# Patient Record
Sex: MALE | Race: ASIAN | NOT HISPANIC OR LATINO | Employment: OTHER | URBAN - METROPOLITAN AREA
[De-identification: names, ages, dates, MRNs, and addresses within clinical notes are randomized per-mention and may not be internally consistent; named-entity substitution may affect disease eponyms.]

---

## 2018-12-10 ENCOUNTER — TELEPHONE (OUTPATIENT)
Dept: ONCOLOGY | Facility: CLINIC | Age: 58
End: 2018-12-10

## 2018-12-10 NOTE — TELEPHONE ENCOUNTER
ONCOLOGY INTAKE: Records Information      APPT INFORMATION:  Referring provider:  self, referred   Referring provider s clinic:    Reason for visit/diagnosis:      Were the records received with the referral (via [employer])?     Has patient been seen for any external appt for this diagnosis (enter clinic/location)?

## 2019-01-30 NOTE — TELEPHONE ENCOUNTER
RECORDS STATUS - BREAST    RECORDS RECEIVED FROM: LOC&ALL   DATE RECEIVED: Care Everywhere   NOTES STATUS DETAILS   OFFICE NOTE from referring provider none Self referred   OFFICE NOTE from medical oncologist Received CE   OFFICE NOTE from surgeon     OFFICE NOTE from radiation oncologist Received CE   DISCHARGE SUMMARY from hospital     DISCHARGE REPORT from the      OPERATIVE REPORT     MEDICATION LIST Received [facility]    CLINICAL TRIAL TREATMENTS TO DATE     LABS Care Everywhere    PATHOLOGY REPORTS  (Tissue diagnosis, Stage, ER/PA percentage positive and intensity of staining, HER2 IHC, FISH, and all biopsies from breast and any distant metastasis)                 Care Everywhere    GENONOMIC TESTING     TYPE:   (Next Generation Sequencing, including Foundation One testing, and Oncotype score)     IMAGING (NEED IMAGES & REPORT)     CT SCANS Receieved PACS/[facility]   MRI     MAMMO     ULTRASOUND Received PACS/Epic   PET     BONE SCAN     BRAIN MRI

## 2019-01-31 ENCOUNTER — PRE VISIT (OUTPATIENT)
Dept: ONCOLOGY | Facility: CLINIC | Age: 59
End: 2019-01-31

## 2019-01-31 ENCOUNTER — ONCOLOGY VISIT (OUTPATIENT)
Dept: ONCOLOGY | Facility: CLINIC | Age: 59
End: 2019-01-31
Attending: INTERNAL MEDICINE
Payer: COMMERCIAL

## 2019-01-31 VITALS
HEIGHT: 66 IN | TEMPERATURE: 97.7 F | HEART RATE: 107 BPM | BODY MASS INDEX: 24.94 KG/M2 | WEIGHT: 155.2 LBS | OXYGEN SATURATION: 97 % | SYSTOLIC BLOOD PRESSURE: 179 MMHG | DIASTOLIC BLOOD PRESSURE: 99 MMHG

## 2019-01-31 DIAGNOSIS — D45 POLYCYTHEMIA VERA (H): ICD-10-CM

## 2019-01-31 DIAGNOSIS — D45 POLYCYTHEMIA VERA (H): Primary | ICD-10-CM

## 2019-01-31 LAB
ANISOCYTOSIS BLD QL SMEAR: SLIGHT
BASOPHILS # BLD AUTO: 0.3 10E9/L (ref 0–0.2)
BASOPHILS NFR BLD AUTO: 3.6 %
DIFFERENTIAL METHOD BLD: ABNORMAL
ELLIPTOCYTES BLD QL SMEAR: SLIGHT
EOSINOPHIL # BLD AUTO: 0.5 10E9/L (ref 0–0.7)
EOSINOPHIL NFR BLD AUTO: 6.2 %
ERYTHROCYTE [DISTWIDTH] IN BLOOD BY AUTOMATED COUNT: 18.6 % (ref 10–15)
HCT VFR BLD AUTO: 49.2 % (ref 40–53)
HGB BLD-MCNC: 14.1 G/DL (ref 13.3–17.7)
LYMPHOCYTES # BLD AUTO: 0.9 10E9/L (ref 0.8–5.3)
LYMPHOCYTES NFR BLD AUTO: 9.7 %
MCH RBC QN AUTO: 20.3 PG (ref 26.5–33)
MCHC RBC AUTO-ENTMCNC: 28.7 G/DL (ref 31.5–36.5)
MCV RBC AUTO: 71 FL (ref 78–100)
MICROCYTES BLD QL SMEAR: PRESENT
MONOCYTES # BLD AUTO: 0.5 10E9/L (ref 0–1.3)
MONOCYTES NFR BLD AUTO: 5.3 %
MYELOCYTES # BLD: 0.1 10E9/L
MYELOCYTES NFR BLD MANUAL: 0.9 %
NEUTROPHILS # BLD AUTO: 6.5 10E9/L (ref 1.6–8.3)
NEUTROPHILS NFR BLD AUTO: 74.3 %
PLATELET # BLD AUTO: 371 10E9/L (ref 150–450)
PLATELET # BLD EST: ABNORMAL 10*3/UL
POIKILOCYTOSIS BLD QL SMEAR: SLIGHT
POLYCHROMASIA BLD QL SMEAR: SLIGHT
RBC # BLD AUTO: 6.94 10E12/L (ref 4.4–5.9)
WBC # BLD AUTO: 8.8 10E9/L (ref 4–11)

## 2019-01-31 PROCEDURE — 85025 COMPLETE CBC W/AUTO DIFF WBC: CPT | Performed by: INTERNAL MEDICINE

## 2019-01-31 PROCEDURE — 36415 COLL VENOUS BLD VENIPUNCTURE: CPT | Performed by: INTERNAL MEDICINE

## 2019-01-31 PROCEDURE — G0463 HOSPITAL OUTPT CLINIC VISIT: HCPCS | Mod: ZF

## 2019-01-31 PROCEDURE — 99204 OFFICE O/P NEW MOD 45 MIN: CPT | Mod: GC | Performed by: INTERNAL MEDICINE

## 2019-01-31 RX ORDER — HYDROXYUREA 500 MG/1
500 CAPSULE ORAL
COMMUNITY
Start: 2018-08-10 | End: 2019-02-02

## 2019-01-31 RX ORDER — LORATADINE 10 MG/1
10 TABLET ORAL PRN
Status: ON HOLD | COMMUNITY
End: 2024-01-01

## 2019-01-31 RX ORDER — IBUPROFEN 400 MG/1
200 TABLET, FILM COATED ORAL EVERY 6 HOURS PRN
COMMUNITY
End: 2024-01-01

## 2019-01-31 RX ORDER — OMEGA-3 FATTY ACIDS/FISH OIL 300-1000MG
CAPSULE ORAL
COMMUNITY
End: 2024-01-01

## 2019-01-31 SDOH — HEALTH STABILITY: MENTAL HEALTH: HOW OFTEN DO YOU HAVE 6 OR MORE DRINKS ON ONE OCCASION?: LESS THAN MONTHLY

## 2019-01-31 SDOH — HEALTH STABILITY: MENTAL HEALTH: HOW MANY STANDARD DRINKS CONTAINING ALCOHOL DO YOU HAVE ON A TYPICAL DAY?: 1 OR 2

## 2019-01-31 SDOH — HEALTH STABILITY: MENTAL HEALTH: HOW OFTEN DO YOU HAVE A DRINK CONTAINING ALCOHOL?: 2-3 TIMES A WEEK

## 2019-01-31 ASSESSMENT — MIFFLIN-ST. JEOR: SCORE: 1470.24

## 2019-01-31 ASSESSMENT — PAIN SCALES - GENERAL: PAINLEVEL: NO PAIN (0)

## 2019-01-31 NOTE — LETTER
2019       RE: Zaheer Borges  [address] [place] [id number]     Dear Colleague,    Thank you for referring your patient, Zaheer Borges, to the [facility]CER [facility]IC. Please see a copy of my visit note below.    RE: Zaheer Borges : 1960   MRN: [id number] GILMER: 2019     HEMATOLOGY -- NEW PATIENT VISIT NOTE       PROBLEM: Polycythemia vera     was seen for initial consultation on 2019 at the request of Dr. Sandoval    HISTORY OF PRESENT ILLNESS:   Mr. Borges is a 58 year old male with a history of polycythemia vera diagnosed in . He has been managed by Dr. Julianne Sandoval at [facility] since that time. He has been managed with serial phlebotomy approximately every 2 months and he has not had much variation in his need for phlebotomy. He states that he typically has 500 ml removed each time and he does not require volume resuscitation or slow blood removal. He more recently had hydrea added about 6 months ago due to elevated platelets of around 1,000,000. He was initially started on 1000 mg daily but this dropped his platelets below 100,000 and the decision was made to decrease his hydrea to 500 mg daily. He has been on this dose for about the past 5 months and reports that his platelets have been in the normal range. While on 1000 mg hydrea, he did not require phlebotomy. However, after dropping his dose to 500 mg, he again requires phlebotomy every 2 months. Aside from his need for phlebotomy and medication, his disease has not significantly impacted his overall well-being. He reports some shortness of breath with activity around the time he needs phlebotomy. He also has some pruritis symptoms but these are not particularly significant at the moment.  He reports more severe itching last winter. He tolerates the hydrea well and has not noticed any nausea, vomiting, or diarrhea. No skin changes or chest pains. A complete ROS was otherwise unremarkable.     PAST MEDICAL HISTORY:   No past  "medical history on file.    No past surgical history on file.     MEDICATIONS:   Current Outpatient Medications   Medication Sig Dispense Refill     cetirizine HCl (ZYRTEC ALLERGY) 10 MG CAPS        hydroxyurea (HYDREA) 500 MG capsule CHEMO Take 500 mg by mouth       ibuprofen (ADVIL/MOTRIN) 400 MG tablet Take 200 mg by mouth every 6 hours as needed for moderate pain       loratadine (CLARITIN) 10 MG tablet Take 10 mg by mouth       Multiple Vitamins-Minerals (MULTIVITAMIN ADULT PO)        omega 3 1000 MG CAPS          ALLERGIES:  is allergic to sulfa drugs    SOCIAL HISTORY: Lives in [place].  with two teenage children. Non smoker. Rare EtOH. 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    FAMILY HISTORY:   No family history of blood disorders    REVIEW OF SYMPTOMS:  A full 14-point review of systems was performed.     PHYSICAL EXAMINATION:    BP (!) 179/99   Pulse 107   Temp 97.7  F (36.5  C) (Oral)   Ht 1.674 m (5' 5.91\")   Wt 70.4 kg (155 lb 3.2 oz)   SpO2 97%   BMI 25.12 kg/m     General: Alert, oriented, NAD  Skin: No rashes or lesions appreciated  HEENT: NCAT, EOMI  Neck: Supple, full ROM, no cervical LAD  Breasts: Deferred  Heart: WWP, RRR  Lungs: Breathing comfortably on RA, CTAB  Abd: Nondistended  /Rectal: Deferred  Ext: Atraumatic, grossly intact strength  Neuro: CNs grossly intact, normal gait    LABORATORY DATA: Pending    ASSESSMENT AND PLAN: 58 year old male with polycythemia vera, well controlled with hydroxyurea and serial phlebotomy.     We discussed that given his stability on his current regimen of phlebotomy every 2 months with hydrea 500 mg daily, we are inclined to continue this regimen. His primary reason for coming to us is due to an insurance change and he was otherwise satisfied with his care previously. He previously had a standing order for labs and then would call to schedule phlebotomy appointments and he would like to " continue to manage his disease in this way. We agree that this is an acceptable plan given his longstanding history with this set up. He understands that our target hematocrit is <45% and he should undergo phlebotomy when he exceeds this. We will also plan to continue hydrea 500 mg daily assuming his blood counts remain stable. Follow up will be every 6 months for now or sooner if he has any concerns. His last phlebotomy was in early December and he will be due soon. We will check his counts today and schedule phlebotomy accordingly. Finally, we discussed that we would recommend restarting ASA 81 mg to lower thrombotic risk. He previously had taken this but stopped it when he developed some stomach discomfort. He is willing to try it again and will monitor for gastric symptoms.         The patient was seen and discussed with staff, Dr. Peterson.     James Marcano MD  Resident, PGY-4  Department of Radiation Oncology  [facility]  [phone]      HEMATOLOGY STAFF:  Seen with resident, whose note reflects our joint evaluation, assessment, and plan.    Escobar Peterson MD  Associate Professor of Medicine  Division of Hematology, Oncology, and Transplantation  Director, [facility]                         Again, thank you for allowing me to participate in the care of your patient.      Sincerely,    Escobar Peterson MD

## 2019-01-31 NOTE — NURSING NOTE
"Oncology Rooming Note    January 31, 2019 11:21 AM   Zaheer Borges is a 58 year old male who presents for:    Chief Complaint   Patient presents with     Oncology Clinic Visit     NEW; Polycythemia     Initial Vitals: BP (!) 179/99   Pulse 107   Temp 97.7  F (36.5  C) (Oral)   Ht 1.674 m (5' 5.91\")   Wt 70.4 kg (155 lb 3.2 oz)   SpO2 97%   BMI 25.12 kg/m   Estimated body mass index is 25.12 kg/m  as calculated from the following:    Height as of this encounter: 1.674 m (5' 5.91\").    Weight as of this encounter: 70.4 kg (155 lb 3.2 oz). Body surface area is 1.81 meters squared.  No Pain (0) Comment: Data Unavailable   No LMP for male patient.  Allergies reviewed: Yes  Medications reviewed: Yes    Medications: Medication refills not needed today.  [facility] into [employer] [address] [place] [address]  AT [facility] [address]    Clinical concerns: No New Concerns Nicholas was NOT notified.    8 minutes for nursing intake (face to face time)     Dalia Prieto MA              "

## 2019-01-31 NOTE — PROGRESS NOTES
RE: Zaheer Borges : 1960   MRN: [id number] GILMER: 2019     HEMATOLOGY -- NEW PATIENT VISIT NOTE       PROBLEM: Polycythemia vera     was seen for initial consultation on 2019 at the request of Dr. Sandoval    HISTORY OF PRESENT ILLNESS:   Mr. Borges is a 58 year old male with a history of polycythemia vera diagnosed in . He has been managed by Dr. Julianne Sandoval at [facility] since that time. He has been managed with serial phlebotomy approximately every 2 months and he has not had much variation in his need for phlebotomy. He states that he typically has 500 ml removed each time and he does not require volume resuscitation or slow blood removal. He more recently had hydrea added about 6 months ago due to elevated platelets of around 1,000,000. He was initially started on 1000 mg daily but this dropped his platelets below 100,000 and the decision was made to decrease his hydrea to 500 mg daily. He has been on this dose for about the past 5 months and reports that his platelets have been in the normal range. While on 1000 mg hydrea, he did not require phlebotomy. However, after dropping his dose to 500 mg, he again requires phlebotomy every 2 months. Aside from his need for phlebotomy and medication, his disease has not significantly impacted his overall well-being. He reports some shortness of breath with activity around the time he needs phlebotomy. He also has some pruritis symptoms but these are not particularly significant at the moment.  He reports more severe itching last winter. He tolerates the hydrea well and has not noticed any nausea, vomiting, or diarrhea. No skin changes or chest pains. A complete ROS was otherwise unremarkable.     PAST MEDICAL HISTORY:   No past medical history on file.    No past surgical history on file.     MEDICATIONS:   Current Outpatient Medications   Medication Sig Dispense Refill     cetirizine HCl (ZYRTEC ALLERGY) 10 MG CAPS        hydroxyurea (HYDREA)  "500 MG capsule CHEMO Take 500 mg by mouth       ibuprofen (ADVIL/MOTRIN) 400 MG tablet Take 200 mg by mouth every 6 hours as needed for moderate pain       loratadine (CLARITIN) 10 MG tablet Take 10 mg by mouth       Multiple Vitamins-Minerals (MULTIVITAMIN ADULT PO)        omega 3 1000 MG CAPS          ALLERGIES:  is allergic to sulfa drugs    SOCIAL HISTORY: Lives in [place].  with two teenage children. Non smoker. Rare EtOH. 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    FAMILY HISTORY:   No family history of blood disorders    REVIEW OF SYMPTOMS:  A full 14-point review of systems was performed.     PHYSICAL EXAMINATION:    BP (!) 179/99   Pulse 107   Temp 97.7  F (36.5  C) (Oral)   Ht 1.674 m (5' 5.91\")   Wt 70.4 kg (155 lb 3.2 oz)   SpO2 97%   BMI 25.12 kg/m    General: Alert, oriented, NAD  Skin: No rashes or lesions appreciated  HEENT: NCAT, EOMI  Neck: Supple, full ROM, no cervical LAD  Breasts: Deferred  Heart: WWP, RRR  Lungs: Breathing comfortably on RA, CTAB  Abd: Nondistended  /Rectal: Deferred  Ext: Atraumatic, grossly intact strength  Neuro: CNs grossly intact, normal gait    LABORATORY DATA: Pending    ASSESSMENT AND PLAN: 58 year old male with polycythemia vera, well controlled with hydroxyurea and serial phlebotomy.     We discussed that given his stability on his current regimen of phlebotomy every 2 months with hydrea 500 mg daily, we are inclined to continue this regimen. His primary reason for coming to us is due to an insurance change and he was otherwise satisfied with his care previously. He previously had a standing order for labs and then would call to schedule phlebotomy appointments and he would like to continue to manage his disease in this way. We agree that this is an acceptable plan given his longstanding history with this set up. He understands that our target hematocrit is <45% and he should undergo phlebotomy when " he exceeds this. We will also plan to continue hydrea 500 mg daily assuming his blood counts remain stable. Follow up will be every 6 months for now or sooner if he has any concerns. His last phlebotomy was in early December and he will be due soon. We will check his counts today and schedule phlebotomy accordingly. Finally, we discussed that we would recommend restarting ASA 81 mg to lower thrombotic risk. He previously had taken this but stopped it when he developed some stomach discomfort. He is willing to try it again and will monitor for gastric symptoms.         The patient was seen and discussed with staff, Dr. Peterson.     James Marcano MD  Resident, PGY-4  Department of Radiation Oncology  [facility]  [phone]      HEMATOLOGY STAFF:  Seen with resident, whose note reflects our joint evaluation, assessment, and plan.    Escobar Peterson MD  Associate Professor of Medicine  Division of Hematology, Oncology, and Transplantation  Director, [facility] Ble[facility] Clotting [facility]

## 2019-01-31 NOTE — LETTER
Date:February 4, 2019      Patient was self referred, no letter generated. Do not send.        [facility] Information

## 2019-02-01 ENCOUNTER — INFUSION THERAPY VISIT (OUTPATIENT)
Dept: INFUSION THERAPY | Facility: CLINIC | Age: 59
End: 2019-02-01
Attending: INTERNAL MEDICINE
Payer: COMMERCIAL

## 2019-02-01 VITALS — SYSTOLIC BLOOD PRESSURE: 150 MMHG | DIASTOLIC BLOOD PRESSURE: 96 MMHG | HEART RATE: 84 BPM

## 2019-02-01 DIAGNOSIS — D45 POLYCYTHEMIA VERA (H): Primary | ICD-10-CM

## 2019-02-01 PROCEDURE — 99195 PHLEBOTOMY: CPT

## 2019-02-01 NOTE — PROGRESS NOTES
Infusion Nursing Note:  Zaheer Borges presents today for therapeutic phlebotomy.    Patient seen by provider today: No   present during visit today: Not Applicable.    Note: 500 ml blood removed from pt's right AC without difficulty. Denied dizziness following phleb. Was given 240 ml juice.     Intravenous Access:  Phlebotomy set used.    Treatment Conditions:  Results reviewed, labs MET treatment parameters, ok to proceed with treatment.      Post Infusion Assessment:  Patient tolerated procedure without incident.    Discharge Plan:   Patient discharged in stable condition accompanied by: self.  Departure Mode: Ambulatory.    Bianca Tee RN

## 2019-02-02 RX ORDER — HYDROXYUREA 500 MG/1
500 CAPSULE ORAL DAILY
Qty: 90 CAPSULE | Refills: 3 | Status: SHIPPED | OUTPATIENT
Start: 2019-02-02 | End: 2019-11-06

## 2019-02-21 ENCOUNTER — MYC MEDICAL ADVICE (OUTPATIENT)
Dept: ONCOLOGY | Facility: CLINIC | Age: 59
End: 2019-02-21

## 2019-03-01 DIAGNOSIS — D45 POLYCYTHEMIA VERA (H): ICD-10-CM

## 2019-03-01 LAB
ANISOCYTOSIS BLD QL SMEAR: ABNORMAL
BASOPHILS # BLD AUTO: 0.7 10E9/L (ref 0–0.2)
BASOPHILS NFR BLD AUTO: 8.8 %
DACRYOCYTES BLD QL SMEAR: SLIGHT
DIFFERENTIAL METHOD BLD: ABNORMAL
ELLIPTOCYTES BLD QL SMEAR: SLIGHT
EOSINOPHIL # BLD AUTO: 0.4 10E9/L (ref 0–0.7)
EOSINOPHIL NFR BLD AUTO: 5.3 %
ERYTHROCYTE [DISTWIDTH] IN BLOOD BY AUTOMATED COUNT: 18.3 % (ref 10–15)
HCT VFR BLD AUTO: 46.7 % (ref 40–53)
HGB BLD-MCNC: 13 G/DL (ref 13.3–17.7)
LYMPHOCYTES # BLD AUTO: 1.3 10E9/L (ref 0.8–5.3)
LYMPHOCYTES NFR BLD AUTO: 16.8 %
MCH RBC QN AUTO: 19.9 PG (ref 26.5–33)
MCHC RBC AUTO-ENTMCNC: 27.8 G/DL (ref 31.5–36.5)
MCV RBC AUTO: 72 FL (ref 78–100)
MICROCYTES BLD QL SMEAR: PRESENT
MONOCYTES # BLD AUTO: 0.1 10E9/L (ref 0–1.3)
MONOCYTES NFR BLD AUTO: 1.8 %
NEUTROPHILS # BLD AUTO: 5.2 10E9/L (ref 1.6–8.3)
NEUTROPHILS NFR BLD AUTO: 67.3 %
OVALOCYTES BLD QL SMEAR: SLIGHT
PLATELET # BLD AUTO: 459 10E9/L (ref 150–450)
PLATELET # BLD EST: ABNORMAL 10*3/UL
POIKILOCYTOSIS BLD QL SMEAR: SLIGHT
RBC # BLD AUTO: 6.53 10E12/L (ref 4.4–5.9)
STOMATOCYTES BLD QL SMEAR: SLIGHT
WBC # BLD AUTO: 7.8 10E9/L (ref 4–11)

## 2019-03-04 ENCOUNTER — CARE COORDINATION (OUTPATIENT)
Dept: HEMATOLOGY | Facility: CLINIC | Age: 59
End: 2019-03-04

## 2019-03-04 DIAGNOSIS — D45 POLYCYTHEMIA VERA (H): Primary | ICD-10-CM

## 2019-03-04 NOTE — PROGRESS NOTES
Received phone call from patient, along with Vito, and a staff message to update phlebotomy orders.  Previous orders was entered as a one time, and Dr. Peterson intended it to be a standing order.  Orders updated.  Patient is planning to have phlebotomy soon, and is planning to travel soon as well.

## 2019-03-05 ENCOUNTER — INFUSION THERAPY VISIT (OUTPATIENT)
Dept: INFUSION THERAPY | Facility: CLINIC | Age: 59
End: 2019-03-05
Attending: INTERNAL MEDICINE
Payer: COMMERCIAL

## 2019-03-05 VITALS
RESPIRATION RATE: 18 BRPM | SYSTOLIC BLOOD PRESSURE: 135 MMHG | HEART RATE: 91 BPM | TEMPERATURE: 98.1 F | DIASTOLIC BLOOD PRESSURE: 95 MMHG

## 2019-03-05 DIAGNOSIS — D45 POLYCYTHEMIA VERA (H): Primary | ICD-10-CM

## 2019-03-05 PROCEDURE — 99195 PHLEBOTOMY: CPT

## 2019-03-05 ASSESSMENT — PAIN SCALES - GENERAL: PAINLEVEL: NO PAIN (0)

## 2019-03-05 NOTE — PROGRESS NOTES
Here for therapeutic phlebotomy, which he states he has been doing for many years.  Recent hematocrit 46.3.  Basilic vein on right arm accessed on first attempt with #16 jelco.  Derrick 500 ml blood obtained over approximately 15 minutes.    Patient consumed 11 oz juice prior to being discharged in stable condition.

## 2019-04-23 DIAGNOSIS — D45 POLYCYTHEMIA VERA (H): ICD-10-CM

## 2019-04-23 LAB
ANISOCYTOSIS BLD QL SMEAR: ABNORMAL
BASOPHILS # BLD AUTO: 0.4 10E9/L (ref 0–0.2)
BASOPHILS NFR BLD AUTO: 4.4 %
DIFFERENTIAL METHOD BLD: ABNORMAL
EOSINOPHIL # BLD AUTO: 0.4 10E9/L (ref 0–0.7)
EOSINOPHIL NFR BLD AUTO: 4.4 %
ERYTHROCYTE [DISTWIDTH] IN BLOOD BY AUTOMATED COUNT: 18.2 % (ref 10–15)
HCT VFR BLD AUTO: 44.5 % (ref 40–53)
HGB BLD-MCNC: 12.6 G/DL (ref 13.3–17.7)
LYMPHOCYTES # BLD AUTO: 1.3 10E9/L (ref 0.8–5.3)
LYMPHOCYTES NFR BLD AUTO: 15 %
MCH RBC QN AUTO: 20.2 PG (ref 26.5–33)
MCHC RBC AUTO-ENTMCNC: 28.3 G/DL (ref 31.5–36.5)
MCV RBC AUTO: 71 FL (ref 78–100)
MONOCYTES # BLD AUTO: 0.4 10E9/L (ref 0–1.3)
MONOCYTES NFR BLD AUTO: 4.4 %
NEUTROPHILS # BLD AUTO: 6.3 10E9/L (ref 1.6–8.3)
NEUTROPHILS NFR BLD AUTO: 71.8 %
OVALOCYTES BLD QL SMEAR: ABNORMAL
PLATELET # BLD AUTO: 479 10E9/L (ref 150–450)
PLATELET # BLD EST: ABNORMAL 10*3/UL
POIKILOCYTOSIS BLD QL SMEAR: ABNORMAL
RBC # BLD AUTO: 6.25 10E12/L (ref 4.4–5.9)
WBC # BLD AUTO: 8.8 10E9/L (ref 4–11)

## 2019-05-17 DIAGNOSIS — D45 POLYCYTHEMIA VERA (H): ICD-10-CM

## 2019-05-17 LAB
BASOPHILS # BLD AUTO: 0.2 10E9/L (ref 0–0.2)
BASOPHILS NFR BLD AUTO: 2.9 %
DIFFERENTIAL METHOD BLD: ABNORMAL
EOSINOPHIL # BLD AUTO: 0.3 10E9/L (ref 0–0.7)
EOSINOPHIL NFR BLD AUTO: 3.7 %
ERYTHROCYTE [DISTWIDTH] IN BLOOD BY AUTOMATED COUNT: 18.3 % (ref 10–15)
HCT VFR BLD AUTO: 46 % (ref 40–53)
HGB BLD-MCNC: 12.8 G/DL (ref 13.3–17.7)
IMM GRANULOCYTES # BLD: 0.1 10E9/L (ref 0–0.4)
IMM GRANULOCYTES NFR BLD: 1.5 %
LYMPHOCYTES # BLD AUTO: 1.1 10E9/L (ref 0.8–5.3)
LYMPHOCYTES NFR BLD AUTO: 13.6 %
MCH RBC QN AUTO: 19.9 PG (ref 26.5–33)
MCHC RBC AUTO-ENTMCNC: 27.8 G/DL (ref 31.5–36.5)
MCV RBC AUTO: 72 FL (ref 78–100)
MONOCYTES # BLD AUTO: 0.4 10E9/L (ref 0–1.3)
MONOCYTES NFR BLD AUTO: 4.6 %
NEUTROPHILS # BLD AUTO: 5.7 10E9/L (ref 1.6–8.3)
NEUTROPHILS NFR BLD AUTO: 73.7 %
NRBC # BLD AUTO: 0 10*3/UL
NRBC BLD AUTO-RTO: 0 /100
PLATELET # BLD AUTO: 288 10E9/L (ref 150–450)
RBC # BLD AUTO: 6.43 10E12/L (ref 4.4–5.9)
WBC # BLD AUTO: 7.8 10E9/L (ref 4–11)

## 2019-05-21 ENCOUNTER — INFUSION THERAPY VISIT (OUTPATIENT)
Dept: INFUSION THERAPY | Facility: CLINIC | Age: 59
End: 2019-05-21
Attending: INTERNAL MEDICINE
Payer: COMMERCIAL

## 2019-05-21 DIAGNOSIS — D45 POLYCYTHEMIA VERA (H): Primary | ICD-10-CM

## 2019-05-21 PROCEDURE — 99195 PHLEBOTOMY: CPT

## 2019-05-21 NOTE — PROGRESS NOTES
Pt here for therapeutic phlebotomy.  Labs drawn earlier this week.      Declined lidocaine.  Accessed L arm antecubital vein with 16 gauge needle.  Apprx 500 ml blood withdrawn.  Gave pt 2 cans juice.  Denied dizziness.  Declined having VS taken as he has not had problems in the past.     Discharged to home ambulatory.  RTC in 2 months or PRN.

## 2019-07-19 DIAGNOSIS — D45 POLYCYTHEMIA VERA (H): ICD-10-CM

## 2019-07-19 LAB
ANISOCYTOSIS BLD QL SMEAR: ABNORMAL
BASOPHILS # BLD AUTO: 0.3 10E9/L (ref 0–0.2)
BASOPHILS NFR BLD AUTO: 4.4 %
DIFFERENTIAL METHOD BLD: ABNORMAL
ELLIPTOCYTES BLD QL SMEAR: SLIGHT
EOSINOPHIL # BLD AUTO: 0.3 10E9/L (ref 0–0.7)
EOSINOPHIL NFR BLD AUTO: 4.4 %
ERYTHROCYTE [DISTWIDTH] IN BLOOD BY AUTOMATED COUNT: 18.3 % (ref 10–15)
HCT VFR BLD AUTO: 44.7 % (ref 40–53)
HGB BLD-MCNC: 12.1 G/DL (ref 13.3–17.7)
LYMPHOCYTES # BLD AUTO: 1.3 10E9/L (ref 0.8–5.3)
LYMPHOCYTES NFR BLD AUTO: 16.7 %
MCH RBC QN AUTO: 19.5 PG (ref 26.5–33)
MCHC RBC AUTO-ENTMCNC: 27.1 G/DL (ref 31.5–36.5)
MCV RBC AUTO: 72 FL (ref 78–100)
MONOCYTES # BLD AUTO: 0.2 10E9/L (ref 0–1.3)
MONOCYTES NFR BLD AUTO: 2.6 %
MYELOCYTES # BLD: 0.1 10E9/L
MYELOCYTES NFR BLD MANUAL: 0.9 %
NEUTROPHILS # BLD AUTO: 5.5 10E9/L (ref 1.6–8.3)
NEUTROPHILS NFR BLD AUTO: 71 %
OVALOCYTES BLD QL SMEAR: SLIGHT
PLATELET # BLD AUTO: 306 10E9/L (ref 150–450)
PLATELET # BLD EST: ABNORMAL 10*3/UL
POIKILOCYTOSIS BLD QL SMEAR: SLIGHT
RBC # BLD AUTO: 6.2 10E12/L (ref 4.4–5.9)
WBC # BLD AUTO: 7.7 10E9/L (ref 4–11)

## 2019-08-22 ENCOUNTER — ONCOLOGY VISIT (OUTPATIENT)
Dept: ONCOLOGY | Facility: CLINIC | Age: 59
End: 2019-08-22
Attending: INTERNAL MEDICINE
Payer: COMMERCIAL

## 2019-08-22 VITALS
OXYGEN SATURATION: 98 % | DIASTOLIC BLOOD PRESSURE: 96 MMHG | HEART RATE: 76 BPM | HEIGHT: 66 IN | RESPIRATION RATE: 16 BRPM | SYSTOLIC BLOOD PRESSURE: 168 MMHG | BODY MASS INDEX: 24.73 KG/M2 | WEIGHT: 153.9 LBS

## 2019-08-22 DIAGNOSIS — D45 POLYCYTHEMIA VERA (H): ICD-10-CM

## 2019-08-22 DIAGNOSIS — D45 POLYCYTHEMIA VERA (H): Primary | ICD-10-CM

## 2019-08-22 LAB
ANISOCYTOSIS BLD QL SMEAR: ABNORMAL
BASOPHILS # BLD AUTO: 0.8 10E9/L (ref 0–0.2)
BASOPHILS NFR BLD AUTO: 8 %
DACRYOCYTES BLD QL SMEAR: ABNORMAL
DIFFERENTIAL METHOD BLD: ABNORMAL
EOSINOPHIL # BLD AUTO: 0.3 10E9/L (ref 0–0.7)
EOSINOPHIL NFR BLD AUTO: 3 %
ERYTHROCYTE [DISTWIDTH] IN BLOOD BY AUTOMATED COUNT: 18.9 % (ref 10–15)
HCT VFR BLD AUTO: 49.2 % (ref 40–53)
HGB BLD-MCNC: 13.5 G/DL (ref 13.3–17.7)
LYMPHOCYTES # BLD AUTO: 0.9 10E9/L (ref 0.8–5.3)
LYMPHOCYTES NFR BLD AUTO: 9 %
MCH RBC QN AUTO: 19.9 PG (ref 26.5–33)
MCHC RBC AUTO-ENTMCNC: 27.4 G/DL (ref 31.5–36.5)
MCV RBC AUTO: 73 FL (ref 78–100)
METAMYELOCYTES # BLD: 0.2 10E9/L
METAMYELOCYTES NFR BLD MANUAL: 2 %
MONOCYTES # BLD AUTO: 0.1 10E9/L (ref 0–1.3)
MONOCYTES NFR BLD AUTO: 1 %
NEUTROPHILS # BLD AUTO: 7.5 10E9/L (ref 1.6–8.3)
NEUTROPHILS NFR BLD AUTO: 77 %
OVALOCYTES BLD QL SMEAR: ABNORMAL
PLATELET # BLD AUTO: 371 10E9/L (ref 150–450)
PLATELET # BLD EST: ABNORMAL 10*3/UL
POIKILOCYTOSIS BLD QL SMEAR: ABNORMAL
RBC # BLD AUTO: 6.77 10E12/L (ref 4.4–5.9)
WBC # BLD AUTO: 9.7 10E9/L (ref 4–11)

## 2019-08-22 PROCEDURE — 36415 COLL VENOUS BLD VENIPUNCTURE: CPT | Performed by: INTERNAL MEDICINE

## 2019-08-22 PROCEDURE — 40000556 ZZH STATISTIC PERIPHERAL IV START W US GUIDANCE: Mod: ZF

## 2019-08-22 PROCEDURE — G0463 HOSPITAL OUTPT CLINIC VISIT: HCPCS | Mod: ZF

## 2019-08-22 PROCEDURE — 85025 COMPLETE CBC W/AUTO DIFF WBC: CPT | Performed by: INTERNAL MEDICINE

## 2019-08-22 PROCEDURE — 99214 OFFICE O/P EST MOD 30 MIN: CPT | Mod: ZP | Performed by: INTERNAL MEDICINE

## 2019-08-22 PROCEDURE — 99195 PHLEBOTOMY: CPT

## 2019-08-22 ASSESSMENT — PAIN SCALES - GENERAL: PAINLEVEL: NO PAIN (0)

## 2019-08-22 ASSESSMENT — MIFFLIN-ST. JEOR: SCORE: 1459.41

## 2019-08-22 NOTE — PROGRESS NOTES
Infusion Nursing Note:  Zaheer Borges presents today for phlebotomy.    Patient seen by provider today: Yes, Dr. Peterson   present during visit today: Not Applicable.    Note: Proceed with treatment.    500 ml blood removed. Pt tolerated well, denied any dizziness or lightheadedness. Pt declined getting post vital signs, drank 8 oz of apple juice prior to leaving.     Intravenous Access:  Peripheral IV placed.    Treatment Conditions:  Lab Results   Component Value Date    HGB 13.5 08/22/2019     Lab Results   Component Value Date    WBC 9.7 08/22/2019      Lab Results   Component Value Date    ANEU 5.5 07/19/2019     Lab Results   Component Value Date     08/22/2019      Results reviewed, labs MET treatment parameters, ok to proceed with treatment.      Post Infusion Assessment:  Patient tolerated infusion without incident.  Blood return noted pre and post infusion.  Site patent and intact, free from redness, edema or discomfort.  No evidence of extravasations.  Access discontinued per protocol.       Discharge Plan:   Patient declined prescription refills.  Discharge instructions reviewed with: Patient.  Patient and/or family verbalized understanding of discharge instructions and all questions answered.  AVS to patient via [employer].  Patient will call for next appointment.   Patient discharged in stable condition accompanied by: self.  Departure Mode: Ambulatory.    Sharon Solomon, RN, RN

## 2019-08-22 NOTE — PATIENT INSTRUCTIONS
Contact Numbers:     [facility] Main Line: [phone]  [facility] Triage: [phone]    Call triage to speak with triage if you are experiencing chills and/or temperature greater than or equal to 100.5, uncontrolled nausea/vomiting, diarrhea, constipation, dizziness, shortness of breath, chest pain, bleeding, unexplained bruising, or any new/concerning symptoms, questions/concerns.     If you are having any concerning symptoms or wish to speak to a provider before your next infusion visit, please call your care coordinator or triage to notify them so we can adequately serve you.     If you need a refill on a narcotic prescription, please call triage or your care coordinator before your infusion appointment.

## 2019-08-22 NOTE — NURSING NOTE
"Oncology Rooming Note    August 22, 2019 12:47 PM   Zaheer Borges is a 59 year old male who presents for:    Chief Complaint   Patient presents with     Oncology Clinic Visit     RETURN VISIT; 6 MONTH FOLLOW UP POLYCYTHEMIA VERA; VITALS COMPLETED BY [facility]      Initial Vitals: BP (!) 168/96   Pulse 76   Resp 16   Ht 1.674 m (5' 5.91\")   Wt 69.8 kg (153 lb 14.4 oz)   SpO2 98%   BMI 24.91 kg/m   Estimated body mass index is 24.91 kg/m  as calculated from the following:    Height as of this encounter: 1.674 m (5' 5.91\").    Weight as of this encounter: 69.8 kg (153 lb 14.4 oz). Body surface area is 1.8 meters squared.  No Pain (0) Comment: Data Unavailable   No LMP for male patient.  Allergies reviewed: Yes  Medications reviewed: Yes    Medications: Medication refills not needed today.  Pharmacy name entered into [employer]: [id number] [address]  AT [facility] OF [address]    Clinical concerns: No new concerns but would like to discuss the dose of his Hydrea    Dr Peterson  was notified.      Sharon Benites              "

## 2019-08-22 NOTE — LETTER
8/22/2019       RE: Zaheer Borges  [address] [place] [address]     Dear Colleague,    Thank you for referring your patient, Zaheer Borges, to the [facility]CER [facility]INIC. Please see a copy of my visit note below.    HEMATOLOGY CLINIC VISIT    Zaheer Borges is a 59-year-old male with polycythemia vera who returns today for follow-up.  Please see my initial consultation from January 2019 for details of his history.  In summary, he was diagnosed in 2003 and had been followed in the [facility] system since that time.  He has recently transitioned his care here due to a change in insurance.    He has been managed for more than 10 years with phlebotomy, requiring these approximately every 2 months.  In mid 2018, hydroxyurea was added due to an elevated platelet count of approximately 1 million.  He was initially started on 1000 mg of Hydrea daily but this dropped his platelet count less than 100,000 and so the dose was decreased to 500 mg daily, with his platelet count subsequently returning to normal range.  While he was on the larger dose of Hydrea, he did not require phlebotomy to maintain a hematocrit less than 45%.  However, when the dose was decreased, he returned to requiring phlebotomies about every 2 months.    Since we last saw him several months ago, he is continued to do well.  He has been receiving phlebotomies about every 2 months although his last one was in May which is almost 3 months ago.  He feels increasing fatigue and decreased energy when his hematocrit exceeds 45%.  He would like to do something to decrease his need for phlebotomy if possible.  He is open to trying a larger dose of hydroxyurea again.    Remainder of complete review of systems is negative.    On physical exam he looks well.  Detailed exam was not performed today.    Labs today show white count of 9.7, hemoglobin 13.5, hematocrit 49.2%, platelet count 371,000.      ASSESSMENT / PLAN:  Polycythemia vera.    Overall, Mr. Borges is  doing quite well but would like to try to increase the Hydrea dose to see if we can decrease the need for phlebotomy.  We decided together to go up to 1000 mg alternating with 500 mg every other day.  He will take the bigger dose on even days.  I encouraged him to continue taking daily aspirin as well.    We will monitor his labs every 2 to 4 weeks while we see the effect of the change in Hydrea dose.  As long as things remain stable, will remain in contact through [facility] and plan to see him back in 12 months for repeat clinical evaluation.  He will contact us with any new questions or concerns in the meantime.    Total time 25 minutes, all in counseling and coordination of care.    Escobar Peterson MD  Associate Professor of Medicine  Division of Hematology, Oncology, and Transplantation  Director, [facility] Clotting [facility]

## 2019-08-29 NOTE — PROGRESS NOTES
HEMATOLOGY CLINIC VISIT    Zaheer Borges is a 59-year-old male with polycythemia vera who returns today for follow-up.  Please see my initial consultation from January 2019 for details of his history.  In summary, he was diagnosed in 2003 and had been followed in the [facility] system since that time.  He has recently transitioned his care here due to a change in insurance.    He has been managed for more than 10 years with phlebotomy, requiring these approximately every 2 months.  In mid 2018, hydroxyurea was added due to an elevated platelet count of approximately 1 million.  He was initially started on 1000 mg of Hydrea daily but this dropped his platelet count less than 100,000 and so the dose was decreased to 500 mg daily, with his platelet count subsequently returning to normal range.  While he was on the larger dose of Hydrea, he did not require phlebotomy to maintain a hematocrit less than 45%.  However, when the dose was decreased, he returned to requiring phlebotomies about every 2 months.    Since we last saw him several months ago, he is continued to do well.  He has been receiving phlebotomies about every 2 months although his last one was in May which is almost 3 months ago.  He feels increasing fatigue and decreased energy when his hematocrit exceeds 45%.  He would like to do something to decrease his need for phlebotomy if possible.  He is open to trying a larger dose of hydroxyurea again.    Remainder of complete review of systems is negative.    On physical exam he looks well.  Detailed exam was not performed today.    Labs today show white count of 9.7, hemoglobin 13.5, hematocrit 49.2%, platelet count 371,000.      ASSESSMENT / PLAN:  Polycythemia vera.    Overall, Mr. Borges is doing quite well but would like to try to increase the Hydrea dose to see if we can decrease the need for phlebotomy.  We decided together to go up to 1000 mg alternating with 500 mg every other day.  He will take the  bigger dose on even days.  I encouraged him to continue taking daily aspirin as well.    We will monitor his labs every 2 to 4 weeks while we see the effect of the change in Hydrea dose.  As long as things remain stable, will remain in contact through [facility] and plan to see him back in 12 months for repeat clinical evaluation.  He will contact us with any new questions or concerns in the meantime.      Total time 25 minutes, all in counseling and coordination of care.      Escobar Peterson MD  Associate Professor of Medicine  Division of Hematology, Oncology, and Transplantation  Director, [facility] Bleeding [facility] Clotting Disorders

## 2019-08-30 DIAGNOSIS — D45 POLYCYTHEMIA VERA (H): ICD-10-CM

## 2019-08-30 LAB
ANISOCYTOSIS BLD QL SMEAR: ABNORMAL
BASOPHILS # BLD AUTO: 0.3 10E9/L (ref 0–0.2)
BASOPHILS NFR BLD AUTO: 3.6 %
DIFFERENTIAL METHOD BLD: ABNORMAL
EOSINOPHIL # BLD AUTO: 0.3 10E9/L (ref 0–0.7)
EOSINOPHIL NFR BLD AUTO: 3.5 %
ERYTHROCYTE [DISTWIDTH] IN BLOOD BY AUTOMATED COUNT: 18.6 % (ref 10–15)
HCT VFR BLD AUTO: 43 % (ref 40–53)
HGB BLD-MCNC: 12.1 G/DL (ref 13.3–17.7)
LYMPHOCYTES # BLD AUTO: 1.1 10E9/L (ref 0.8–5.3)
LYMPHOCYTES NFR BLD AUTO: 15.2 %
MCH RBC QN AUTO: 20 PG (ref 26.5–33)
MCHC RBC AUTO-ENTMCNC: 28.1 G/DL (ref 31.5–36.5)
MCV RBC AUTO: 71 FL (ref 78–100)
METAMYELOCYTES # BLD: 0.1 10E9/L
METAMYELOCYTES NFR BLD MANUAL: 0.9 %
MONOCYTES # BLD AUTO: 0.2 10E9/L (ref 0–1.3)
MONOCYTES NFR BLD AUTO: 2.7 %
NEUTROPHILS # BLD AUTO: 5.6 10E9/L (ref 1.6–8.3)
NEUTROPHILS NFR BLD AUTO: 74.1 %
PLATELET # BLD AUTO: 510 10E9/L (ref 150–450)
PLATELET # BLD EST: ABNORMAL 10*3/UL
POIKILOCYTOSIS BLD QL SMEAR: SLIGHT
POLYCHROMASIA BLD QL SMEAR: SLIGHT
RBC # BLD AUTO: 6.05 10E12/L (ref 4.4–5.9)
WBC # BLD AUTO: 7.5 10E9/L (ref 4–11)

## 2019-10-02 ENCOUNTER — HEALTH MAINTENANCE LETTER (OUTPATIENT)
Age: 59
End: 2019-10-02

## 2019-10-11 DIAGNOSIS — D45 POLYCYTHEMIA VERA (H): ICD-10-CM

## 2019-10-11 LAB
ANISOCYTOSIS BLD QL SMEAR: ABNORMAL
BASOPHILS # BLD AUTO: 0.1 10E9/L (ref 0–0.2)
BASOPHILS NFR BLD AUTO: 2.6 %
DIFFERENTIAL METHOD BLD: ABNORMAL
EOSINOPHIL # BLD AUTO: 0.1 10E9/L (ref 0–0.7)
EOSINOPHIL NFR BLD AUTO: 1.8 %
ERYTHROCYTE [DISTWIDTH] IN BLOOD BY AUTOMATED COUNT: 19.3 % (ref 10–15)
HCT VFR BLD AUTO: 43.7 % (ref 40–53)
HGB BLD-MCNC: 12.5 G/DL (ref 13.3–17.7)
LYMPHOCYTES # BLD AUTO: 1.2 10E9/L (ref 0.8–5.3)
LYMPHOCYTES NFR BLD AUTO: 21.9 %
MCH RBC QN AUTO: 21.3 PG (ref 26.5–33)
MCHC RBC AUTO-ENTMCNC: 28.6 G/DL (ref 31.5–36.5)
MCV RBC AUTO: 74 FL (ref 78–100)
MONOCYTES # BLD AUTO: 0.1 10E9/L (ref 0–1.3)
MONOCYTES NFR BLD AUTO: 1.8 %
NEUTROPHILS # BLD AUTO: 3.8 10E9/L (ref 1.6–8.3)
NEUTROPHILS NFR BLD AUTO: 71.9 %
PLATELET # BLD AUTO: 167 10E9/L (ref 150–450)
PLATELET # BLD EST: ABNORMAL 10*3/UL
POIKILOCYTOSIS BLD QL SMEAR: SLIGHT
RBC # BLD AUTO: 5.88 10E12/L (ref 4.4–5.9)
WBC # BLD AUTO: 5.3 10E9/L (ref 4–11)

## 2019-10-15 DIAGNOSIS — D45 POLYCYTHEMIA VERA (H): Primary | ICD-10-CM

## 2019-10-22 DIAGNOSIS — D45 POLYCYTHEMIA VERA (H): ICD-10-CM

## 2019-10-22 LAB
ANISOCYTOSIS BLD QL SMEAR: ABNORMAL
BASOPHILS # BLD AUTO: 0.3 10E9/L (ref 0–0.2)
BASOPHILS NFR BLD AUTO: 5.3 %
DIFFERENTIAL METHOD BLD: ABNORMAL
ELLIPTOCYTES BLD QL SMEAR: SLIGHT
EOSINOPHIL # BLD AUTO: 0.2 10E9/L (ref 0–0.7)
EOSINOPHIL NFR BLD AUTO: 4.4 %
ERYTHROCYTE [DISTWIDTH] IN BLOOD BY AUTOMATED COUNT: 19.6 % (ref 10–15)
HCT VFR BLD AUTO: 47.1 % (ref 40–53)
HGB BLD-MCNC: 13.1 G/DL (ref 13.3–17.7)
LYMPHOCYTES # BLD AUTO: 1.2 10E9/L (ref 0.8–5.3)
LYMPHOCYTES NFR BLD AUTO: 22.1 %
MCH RBC QN AUTO: 21 PG (ref 26.5–33)
MCHC RBC AUTO-ENTMCNC: 27.8 G/DL (ref 31.5–36.5)
MCV RBC AUTO: 76 FL (ref 78–100)
MICROCYTES BLD QL SMEAR: PRESENT
MONOCYTES # BLD AUTO: 0.3 10E9/L (ref 0–1.3)
MONOCYTES NFR BLD AUTO: 5.3 %
NEUTROPHILS # BLD AUTO: 3.5 10E9/L (ref 1.6–8.3)
NEUTROPHILS NFR BLD AUTO: 62.9 %
OVALOCYTES BLD QL SMEAR: SLIGHT
PLATELET # BLD AUTO: 297 10E9/L (ref 150–450)
PLATELET # BLD EST: ABNORMAL 10*3/UL
POIKILOCYTOSIS BLD QL SMEAR: SLIGHT
RBC # BLD AUTO: 6.24 10E12/L (ref 4.4–5.9)
WBC # BLD AUTO: 5.6 10E9/L (ref 4–11)

## 2019-10-29 DIAGNOSIS — D45 POLYCYTHEMIA VERA (H): ICD-10-CM

## 2019-10-29 LAB
ANISOCYTOSIS BLD QL SMEAR: SLIGHT
BASOPHILS # BLD AUTO: 0.3 10E9/L (ref 0–0.2)
BASOPHILS NFR BLD AUTO: 6.1 %
DIFFERENTIAL METHOD BLD: ABNORMAL
EOSINOPHIL # BLD AUTO: 0.4 10E9/L (ref 0–0.7)
EOSINOPHIL NFR BLD AUTO: 7 %
ERYTHROCYTE [DISTWIDTH] IN BLOOD BY AUTOMATED COUNT: 19.3 % (ref 10–15)
HCT VFR BLD AUTO: 47.6 % (ref 40–53)
HGB BLD-MCNC: 13.2 G/DL (ref 13.3–17.7)
LYMPHOCYTES # BLD AUTO: 0.9 10E9/L (ref 0.8–5.3)
LYMPHOCYTES NFR BLD AUTO: 17.4 %
MCH RBC QN AUTO: 21.2 PG (ref 26.5–33)
MCHC RBC AUTO-ENTMCNC: 27.7 G/DL (ref 31.5–36.5)
MCV RBC AUTO: 76 FL (ref 78–100)
MICROCYTES BLD QL SMEAR: PRESENT
MONOCYTES # BLD AUTO: 0.3 10E9/L (ref 0–1.3)
MONOCYTES NFR BLD AUTO: 6.1 %
MYELOCYTES # BLD: 0 10E9/L
MYELOCYTES NFR BLD MANUAL: 0.9 %
NEUTROPHILS # BLD AUTO: 3.3 10E9/L (ref 1.6–8.3)
NEUTROPHILS NFR BLD AUTO: 62.5 %
OVALOCYTES BLD QL SMEAR: ABNORMAL
PLATELET # BLD AUTO: 425 10E9/L (ref 150–450)
PLATELET # BLD EST: ABNORMAL 10*3/UL
POIKILOCYTOSIS BLD QL SMEAR: ABNORMAL
RBC # BLD AUTO: 6.24 10E12/L (ref 4.4–5.9)
WBC # BLD AUTO: 5.2 10E9/L (ref 4–11)

## 2019-10-31 ENCOUNTER — INFUSION THERAPY VISIT (OUTPATIENT)
Dept: INFUSION THERAPY | Facility: CLINIC | Age: 59
End: 2019-10-31
Attending: INTERNAL MEDICINE
Payer: COMMERCIAL

## 2019-10-31 VITALS — RESPIRATION RATE: 16 BRPM | HEART RATE: 75 BPM | OXYGEN SATURATION: 98 %

## 2019-10-31 DIAGNOSIS — D45 POLYCYTHEMIA VERA (H): Primary | ICD-10-CM

## 2019-10-31 PROCEDURE — 99195 PHLEBOTOMY: CPT

## 2019-10-31 NOTE — PROGRESS NOTES
Infusion Nursing Note:  Zaheer Borges presents today for therapeutic phlebotomy.    Patient seen by provider today: No   present during visit today: Not Applicable.    Note: Patient states he was feeling a little more short of breath and some fatigue.  Declines having BP taken    Intravenous Access: #16 jelco placed in right basilic vein with 1 attempt.    Treatment Conditions:  Lab Results   Component Value Date    HGB 13.2 10/29/2019     Lab Results   Component Value Date    WBC 5.2 10/29/2019      Lab Results   Component Value Date    ANEU 3.3 10/29/2019     Lab Results   Component Value Date     10/29/2019    hematocrit  47.6    Results reviewed, labs MET treatment parameters, ok to proceed with treatment.      Post Infusion Assessment:  500cc phlebotomy performed without difficulty over about 20 minutes.  Patient had 2 cans of juice following this.    Discharge Plan:   Patient discharged in stable condition accompanied by: self.  Departure Mode: Ambulatory.  Will follow up with provider.  Stefanie Tejada RN

## 2019-11-15 DIAGNOSIS — D45 POLYCYTHEMIA VERA (H): ICD-10-CM

## 2019-11-15 LAB
BASOPHILS # BLD AUTO: 0.1 10E9/L (ref 0–0.2)
BASOPHILS NFR BLD AUTO: 2.6 %
DIFFERENTIAL METHOD BLD: ABNORMAL
EOSINOPHIL # BLD AUTO: 0.2 10E9/L (ref 0–0.7)
EOSINOPHIL NFR BLD AUTO: 3.5 %
ERYTHROCYTE [DISTWIDTH] IN BLOOD BY AUTOMATED COUNT: 17.4 % (ref 10–15)
HCT VFR BLD AUTO: 41.9 % (ref 40–53)
HGB BLD-MCNC: 11.8 G/DL (ref 13.3–17.7)
IMM GRANULOCYTES # BLD: 0 10E9/L (ref 0–0.4)
IMM GRANULOCYTES NFR BLD: 0.8 %
LYMPHOCYTES # BLD AUTO: 0.9 10E9/L (ref 0.8–5.3)
LYMPHOCYTES NFR BLD AUTO: 18.9 %
MCH RBC QN AUTO: 21.5 PG (ref 26.5–33)
MCHC RBC AUTO-ENTMCNC: 28.2 G/DL (ref 31.5–36.5)
MCV RBC AUTO: 76 FL (ref 78–100)
MONOCYTES # BLD AUTO: 0.3 10E9/L (ref 0–1.3)
MONOCYTES NFR BLD AUTO: 5.1 %
NEUTROPHILS # BLD AUTO: 3.4 10E9/L (ref 1.6–8.3)
NEUTROPHILS NFR BLD AUTO: 69.1 %
NRBC # BLD AUTO: 0 10*3/UL
NRBC BLD AUTO-RTO: 0 /100
PLATELET # BLD AUTO: 164 10E9/L (ref 150–450)
RBC # BLD AUTO: 5.5 10E12/L (ref 4.4–5.9)
WBC # BLD AUTO: 4.9 10E9/L (ref 4–11)

## 2019-11-21 ENCOUNTER — MYC MEDICAL ADVICE (OUTPATIENT)
Dept: ONCOLOGY | Facility: CLINIC | Age: 59
End: 2019-11-21

## 2019-12-02 ENCOUNTER — MYC REFILL (OUTPATIENT)
Dept: ONCOLOGY | Facility: CLINIC | Age: 59
End: 2019-12-02

## 2019-12-02 DIAGNOSIS — D45 POLYCYTHEMIA VERA (H): ICD-10-CM

## 2019-12-03 DIAGNOSIS — D45 POLYCYTHEMIA VERA (H): ICD-10-CM

## 2019-12-03 LAB
ANISOCYTOSIS BLD QL SMEAR: ABNORMAL
BASOPHILS # BLD AUTO: 0.2 10E9/L (ref 0–0.2)
BASOPHILS NFR BLD AUTO: 4.3 %
DIFFERENTIAL METHOD BLD: ABNORMAL
EOSINOPHIL # BLD AUTO: 0.2 10E9/L (ref 0–0.7)
EOSINOPHIL NFR BLD AUTO: 3.4 %
ERYTHROCYTE [DISTWIDTH] IN BLOOD BY AUTOMATED COUNT: 17.8 % (ref 10–15)
HCT VFR BLD AUTO: 44.3 % (ref 40–53)
HGB BLD-MCNC: 12.5 G/DL (ref 13.3–17.7)
LYMPHOCYTES # BLD AUTO: 0.9 10E9/L (ref 0.8–5.3)
LYMPHOCYTES NFR BLD AUTO: 16.4 %
MCH RBC QN AUTO: 21.9 PG (ref 26.5–33)
MCHC RBC AUTO-ENTMCNC: 28.2 G/DL (ref 31.5–36.5)
MCV RBC AUTO: 77 FL (ref 78–100)
METAMYELOCYTES # BLD: 0 10E9/L
METAMYELOCYTES NFR BLD MANUAL: 0.9 %
MICROCYTES BLD QL SMEAR: PRESENT
MONOCYTES # BLD AUTO: 0.1 10E9/L (ref 0–1.3)
MONOCYTES NFR BLD AUTO: 1.7 %
NEUTROPHILS # BLD AUTO: 4 10E9/L (ref 1.6–8.3)
NEUTROPHILS NFR BLD AUTO: 73.3 %
OVALOCYTES BLD QL SMEAR: ABNORMAL
PLATELET # BLD AUTO: 327 10E9/L (ref 150–450)
PLATELET # BLD EST: ABNORMAL 10*3/UL
POIKILOCYTOSIS BLD QL SMEAR: ABNORMAL
RBC # BLD AUTO: 5.72 10E12/L (ref 4.4–5.9)
WBC # BLD AUTO: 5.4 10E9/L (ref 4–11)

## 2019-12-03 RX ORDER — HYDROXYUREA 500 MG/1
CAPSULE ORAL
Qty: 90 CAPSULE | Refills: 3 | Status: SHIPPED | OUTPATIENT
Start: 2019-12-03 | End: 2019-12-10

## 2019-12-10 RX ORDER — HYDROXYUREA 500 MG/1
CAPSULE ORAL
Qty: 90 CAPSULE | Refills: 3
Start: 2019-12-06 | End: 2020-04-07

## 2019-12-16 DIAGNOSIS — D45 POLYCYTHEMIA VERA (H): ICD-10-CM

## 2019-12-16 LAB
ANISOCYTOSIS BLD QL SMEAR: SLIGHT
BASOPHILS # BLD AUTO: 0.2 10E9/L (ref 0–0.2)
BASOPHILS NFR BLD AUTO: 3.5 %
DIFFERENTIAL METHOD BLD: ABNORMAL
EOSINOPHIL # BLD AUTO: 0.1 10E9/L (ref 0–0.7)
EOSINOPHIL NFR BLD AUTO: 1.7 %
ERYTHROCYTE [DISTWIDTH] IN BLOOD BY AUTOMATED COUNT: 17.8 % (ref 10–15)
HCT VFR BLD AUTO: 43.7 % (ref 40–53)
HGB BLD-MCNC: 12.5 G/DL (ref 13.3–17.7)
LYMPHOCYTES # BLD AUTO: 1.1 10E9/L (ref 0.8–5.3)
LYMPHOCYTES NFR BLD AUTO: 24.4 %
MCH RBC QN AUTO: 22 PG (ref 26.5–33)
MCHC RBC AUTO-ENTMCNC: 28.6 G/DL (ref 31.5–36.5)
MCV RBC AUTO: 77 FL (ref 78–100)
MICROCYTES BLD QL SMEAR: PRESENT
MONOCYTES # BLD AUTO: 0.1 10E9/L (ref 0–1.3)
MONOCYTES NFR BLD AUTO: 1.7 %
NEUTROPHILS # BLD AUTO: 3 10E9/L (ref 1.6–8.3)
NEUTROPHILS NFR BLD AUTO: 68.7 %
OVALOCYTES BLD QL SMEAR: SLIGHT
PLATELET # BLD AUTO: 123 10E9/L (ref 150–450)
PLATELET # BLD EST: ABNORMAL 10*3/UL
POIKILOCYTOSIS BLD QL SMEAR: ABNORMAL
RBC # BLD AUTO: 5.67 10E12/L (ref 4.4–5.9)
RBC INCLUSIONS BLD: SLIGHT
WBC # BLD AUTO: 4.4 10E9/L (ref 4–11)

## 2020-01-02 DIAGNOSIS — D45 POLYCYTHEMIA VERA (H): ICD-10-CM

## 2020-01-02 LAB
BASOPHILS # BLD AUTO: 0.2 10E9/L (ref 0–0.2)
BASOPHILS NFR BLD AUTO: 2.9 %
DIFFERENTIAL METHOD BLD: ABNORMAL
EOSINOPHIL # BLD AUTO: 0.2 10E9/L (ref 0–0.7)
EOSINOPHIL NFR BLD AUTO: 3.7 %
ERYTHROCYTE [DISTWIDTH] IN BLOOD BY AUTOMATED COUNT: 17.8 % (ref 10–15)
HCT VFR BLD AUTO: 43.7 % (ref 40–53)
HGB BLD-MCNC: 12.6 G/DL (ref 13.3–17.7)
IMM GRANULOCYTES # BLD: 0 10E9/L (ref 0–0.4)
IMM GRANULOCYTES NFR BLD: 0.8 %
LYMPHOCYTES # BLD AUTO: 1 10E9/L (ref 0.8–5.3)
LYMPHOCYTES NFR BLD AUTO: 19 %
MCH RBC QN AUTO: 22.5 PG (ref 26.5–33)
MCHC RBC AUTO-ENTMCNC: 28.8 G/DL (ref 31.5–36.5)
MCV RBC AUTO: 78 FL (ref 78–100)
MONOCYTES # BLD AUTO: 0.4 10E9/L (ref 0–1.3)
MONOCYTES NFR BLD AUTO: 8 %
NEUTROPHILS # BLD AUTO: 3.4 10E9/L (ref 1.6–8.3)
NEUTROPHILS NFR BLD AUTO: 65.6 %
NRBC # BLD AUTO: 0 10*3/UL
NRBC BLD AUTO-RTO: 0 /100
PLATELET # BLD AUTO: 299 10E9/L (ref 150–450)
RBC # BLD AUTO: 5.61 10E12/L (ref 4.4–5.9)
WBC # BLD AUTO: 5.2 10E9/L (ref 4–11)

## 2020-01-22 DIAGNOSIS — D45 POLYCYTHEMIA VERA (H): ICD-10-CM

## 2020-01-22 LAB
BASOPHILS # BLD AUTO: 0.1 10E9/L (ref 0–0.2)
BASOPHILS NFR BLD AUTO: 2.2 %
DIFFERENTIAL METHOD BLD: ABNORMAL
EOSINOPHIL # BLD AUTO: 0.2 10E9/L (ref 0–0.7)
EOSINOPHIL NFR BLD AUTO: 3.9 %
ERYTHROCYTE [DISTWIDTH] IN BLOOD BY AUTOMATED COUNT: 17.8 % (ref 10–15)
HCT VFR BLD AUTO: 46.6 % (ref 40–53)
HGB BLD-MCNC: 13.4 G/DL (ref 13.3–17.7)
IMM GRANULOCYTES # BLD: 0.1 10E9/L (ref 0–0.4)
IMM GRANULOCYTES NFR BLD: 1 %
LYMPHOCYTES # BLD AUTO: 1.1 10E9/L (ref 0.8–5.3)
LYMPHOCYTES NFR BLD AUTO: 23 %
MCH RBC QN AUTO: 22.9 PG (ref 26.5–33)
MCHC RBC AUTO-ENTMCNC: 28.8 G/DL (ref 31.5–36.5)
MCV RBC AUTO: 80 FL (ref 78–100)
MONOCYTES # BLD AUTO: 0.4 10E9/L (ref 0–1.3)
MONOCYTES NFR BLD AUTO: 8.1 %
NEUTROPHILS # BLD AUTO: 3 10E9/L (ref 1.6–8.3)
NEUTROPHILS NFR BLD AUTO: 61.8 %
NRBC # BLD AUTO: 0 10*3/UL
NRBC BLD AUTO-RTO: 0 /100
PLATELET # BLD AUTO: 146 10E9/L (ref 150–450)
PLATELET # BLD EST: ABNORMAL 10*3/UL
RBC # BLD AUTO: 5.85 10E12/L (ref 4.4–5.9)
WBC # BLD AUTO: 4.9 10E9/L (ref 4–11)

## 2020-02-03 DIAGNOSIS — D45 POLYCYTHEMIA VERA (H): ICD-10-CM

## 2020-02-03 LAB
ANISOCYTOSIS BLD QL SMEAR: SLIGHT
BASOPHILS # BLD AUTO: 0.2 10E9/L (ref 0–0.2)
BASOPHILS NFR BLD AUTO: 4.4 %
DIFFERENTIAL METHOD BLD: ABNORMAL
EOSINOPHIL # BLD AUTO: 0.2 10E9/L (ref 0–0.7)
EOSINOPHIL NFR BLD AUTO: 3.5 %
ERYTHROCYTE [DISTWIDTH] IN BLOOD BY AUTOMATED COUNT: 17.7 % (ref 10–15)
HCT VFR BLD AUTO: 45.2 % (ref 40–53)
HGB BLD-MCNC: 13.3 G/DL (ref 13.3–17.7)
LYMPHOCYTES # BLD AUTO: 1.2 10E9/L (ref 0.8–5.3)
LYMPHOCYTES NFR BLD AUTO: 24.6 %
MCH RBC QN AUTO: 23.4 PG (ref 26.5–33)
MCHC RBC AUTO-ENTMCNC: 29.4 G/DL (ref 31.5–36.5)
MCV RBC AUTO: 79 FL (ref 78–100)
MICROCYTES BLD QL SMEAR: PRESENT
MONOCYTES # BLD AUTO: 0.1 10E9/L (ref 0–1.3)
MONOCYTES NFR BLD AUTO: 2.6 %
NEUTROPHILS # BLD AUTO: 3.2 10E9/L (ref 1.6–8.3)
NEUTROPHILS NFR BLD AUTO: 64.9 %
OVALOCYTES BLD QL SMEAR: SLIGHT
PLATELET # BLD AUTO: 239 10E9/L (ref 150–450)
PLATELET # BLD EST: ABNORMAL 10*3/UL
POIKILOCYTOSIS BLD QL SMEAR: SLIGHT
RBC # BLD AUTO: 5.69 10E12/L (ref 4.4–5.9)
WBC # BLD AUTO: 4.9 10E9/L (ref 4–11)

## 2020-02-04 ENCOUNTER — INFUSION THERAPY VISIT (OUTPATIENT)
Dept: INFUSION THERAPY | Facility: CLINIC | Age: 60
End: 2020-02-04
Attending: INTERNAL MEDICINE
Payer: COMMERCIAL

## 2020-02-04 VITALS
DIASTOLIC BLOOD PRESSURE: 102 MMHG | SYSTOLIC BLOOD PRESSURE: 158 MMHG | TEMPERATURE: 98.3 F | OXYGEN SATURATION: 99 % | HEART RATE: 74 BPM

## 2020-02-04 DIAGNOSIS — D45 POLYCYTHEMIA VERA (H): Primary | ICD-10-CM

## 2020-02-04 PROCEDURE — 99195 PHLEBOTOMY: CPT

## 2020-02-04 NOTE — PROGRESS NOTES
Nursing Note  Zaheer Borges [facility] for:   Chief Complaint   Patient presents with     Procedure     THERAPEUTIC PHLEBOTOMY     During today's [facility] appointment, orders from Dr. Peterson were completed.      Progress note:  Patient identification verified by name and date of birth.  Assessment completed.  Vitals recorded in Doc Flowsheets.       present during visit today: Not Applicable.    Treatment Conditions: hematocrit >45% per parameters. 500 mls blood phelbotomized per therapy plan orders.     HTN at today's visit, pt reporting this is common for him he states 'I have white coat syndrome.' BP post procedure 158/103. Pt reports being asymptomatic and states he will follow up at his PCP appt this Friday regarding HTN.     Post Infusion Assessment:  Patient tolerated infusion without incident.     Discharge Plan:   Follow up plan of care with: ongoing infusions at [facility]. and primary care provider,  Discharge instructions were reviewed with patient.  Patient/representative verbalized understanding of discharge instructions and all questions answered.  Patient discharged from [facility] in stable condition.    Sarahi Srivastava RN        BP (!) (P) 182/94   Pulse 71   Temp 98.3  F (36.8  C) (Oral)   SpO2 99%

## 2020-02-07 ENCOUNTER — RECORDS - HEALTHEAST (OUTPATIENT)
Dept: LAB | Facility: CLINIC | Age: 60
End: 2020-02-07

## 2020-02-07 LAB
ALBUMIN SERPL-MCNC: 4.3 G/DL (ref 3.5–5)
ALP SERPL-CCNC: 59 U/L (ref 45–120)
ALT SERPL W P-5'-P-CCNC: 19 U/L (ref 0–45)
ANION GAP SERPL CALCULATED.3IONS-SCNC: 12 MMOL/L (ref 5–18)
AST SERPL W P-5'-P-CCNC: 21 U/L (ref 0–40)
BILIRUB SERPL-MCNC: 0.5 MG/DL (ref 0–1)
BUN SERPL-MCNC: 17 MG/DL (ref 8–22)
CALCIUM SERPL-MCNC: 9.3 MG/DL (ref 8.5–10.5)
CHLORIDE BLD-SCNC: 105 MMOL/L (ref 98–107)
CHOLEST SERPL-MCNC: 155 MG/DL
CO2 SERPL-SCNC: 24 MMOL/L (ref 22–31)
CREAT SERPL-MCNC: 0.78 MG/DL (ref 0.7–1.3)
FASTING STATUS PATIENT QL REPORTED: ABNORMAL
GFR SERPL CREATININE-BSD FRML MDRD: >60 ML/MIN/1.73M2
GLUCOSE BLD-MCNC: 107 MG/DL (ref 70–125)
HDLC SERPL-MCNC: 45 MG/DL
LDLC SERPL CALC-MCNC: 58 MG/DL
POTASSIUM BLD-SCNC: 3.8 MMOL/L (ref 3.5–5)
PROT SERPL-MCNC: 6.8 G/DL (ref 6–8)
PSA SERPL-MCNC: 1.1 NG/ML (ref 0–3.5)
SODIUM SERPL-SCNC: 141 MMOL/L (ref 136–145)
TRIGL SERPL-MCNC: 259 MG/DL
TSH SERPL DL<=0.005 MIU/L-ACNC: 1.63 UIU/ML (ref 0.3–5)

## 2020-02-27 DIAGNOSIS — D45 POLYCYTHEMIA VERA (H): ICD-10-CM

## 2020-02-27 LAB
BASOPHILS # BLD AUTO: 0.1 10E9/L (ref 0–0.2)
BASOPHILS NFR BLD AUTO: 2.5 %
DIFFERENTIAL METHOD BLD: ABNORMAL
EOSINOPHIL # BLD AUTO: 0.2 10E9/L (ref 0–0.7)
EOSINOPHIL NFR BLD AUTO: 3.5 %
ERYTHROCYTE [DISTWIDTH] IN BLOOD BY AUTOMATED COUNT: 17.2 % (ref 10–15)
HCT VFR BLD AUTO: 41.6 % (ref 40–53)
HGB BLD-MCNC: 12 G/DL (ref 13.3–17.7)
IMM GRANULOCYTES # BLD: 0 10E9/L (ref 0–0.4)
IMM GRANULOCYTES NFR BLD: 0.5 %
LYMPHOCYTES # BLD AUTO: 1 10E9/L (ref 0.8–5.3)
LYMPHOCYTES NFR BLD AUTO: 22.2 %
MCH RBC QN AUTO: 24 PG (ref 26.5–33)
MCHC RBC AUTO-ENTMCNC: 28.8 G/DL (ref 31.5–36.5)
MCV RBC AUTO: 83 FL (ref 78–100)
MONOCYTES # BLD AUTO: 0.4 10E9/L (ref 0–1.3)
MONOCYTES NFR BLD AUTO: 8.6 %
NEUTROPHILS # BLD AUTO: 2.7 10E9/L (ref 1.6–8.3)
NEUTROPHILS NFR BLD AUTO: 62.7 %
NRBC # BLD AUTO: 0 10*3/UL
NRBC BLD AUTO-RTO: 0 /100
PLATELET # BLD AUTO: 227 10E9/L (ref 150–450)
RBC # BLD AUTO: 4.99 10E12/L (ref 4.4–5.9)
WBC # BLD AUTO: 4.3 10E9/L (ref 4–11)

## 2020-04-07 DIAGNOSIS — D45 POLYCYTHEMIA VERA (H): ICD-10-CM

## 2020-04-07 RX ORDER — HYDROXYUREA 500 MG/1
CAPSULE ORAL
Qty: 90 CAPSULE | Refills: 1 | Status: SHIPPED | OUTPATIENT
Start: 2020-04-07 | End: 2020-07-03

## 2020-06-23 DIAGNOSIS — D45 POLYCYTHEMIA VERA (H): ICD-10-CM

## 2020-06-23 LAB
BASOPHILS # BLD AUTO: 0 10E9/L (ref 0–0.2)
BASOPHILS NFR BLD AUTO: 1.2 %
DIFFERENTIAL METHOD BLD: ABNORMAL
EOSINOPHIL # BLD AUTO: 0.1 10E9/L (ref 0–0.7)
EOSINOPHIL NFR BLD AUTO: 3 %
ERYTHROCYTE [DISTWIDTH] IN BLOOD BY AUTOMATED COUNT: 16.2 % (ref 10–15)
HCT VFR BLD AUTO: 45.5 % (ref 40–53)
HGB BLD-MCNC: 14.6 G/DL (ref 13.3–17.7)
IMM GRANULOCYTES # BLD: 0 10E9/L (ref 0–0.4)
IMM GRANULOCYTES NFR BLD: 0.6 %
LYMPHOCYTES # BLD AUTO: 0.9 10E9/L (ref 0.8–5.3)
LYMPHOCYTES NFR BLD AUTO: 26.6 %
MCH RBC QN AUTO: 29.4 PG (ref 26.5–33)
MCHC RBC AUTO-ENTMCNC: 32.1 G/DL (ref 31.5–36.5)
MCV RBC AUTO: 92 FL (ref 78–100)
MONOCYTES # BLD AUTO: 0.3 10E9/L (ref 0–1.3)
MONOCYTES NFR BLD AUTO: 8.3 %
NEUTROPHILS # BLD AUTO: 2 10E9/L (ref 1.6–8.3)
NEUTROPHILS NFR BLD AUTO: 60.3 %
NRBC # BLD AUTO: 0 10*3/UL
NRBC BLD AUTO-RTO: 0 /100
PLATELET # BLD AUTO: 90 10E9/L (ref 150–450)
PLATELET # BLD EST: ABNORMAL 10*3/UL
RBC # BLD AUTO: 4.96 10E12/L (ref 4.4–5.9)
WBC # BLD AUTO: 3.4 10E9/L (ref 4–11)

## 2020-07-03 ENCOUNTER — MYC REFILL (OUTPATIENT)
Dept: ONCOLOGY | Facility: CLINIC | Age: 60
End: 2020-07-03

## 2020-07-03 DIAGNOSIS — D45 POLYCYTHEMIA VERA (H): ICD-10-CM

## 2020-07-06 RX ORDER — HYDROXYUREA 500 MG/1
CAPSULE ORAL
Qty: 90 CAPSULE | Refills: 1 | Status: SHIPPED | OUTPATIENT
Start: 2020-07-06 | End: 2020-08-27

## 2020-07-07 DIAGNOSIS — D45 POLYCYTHEMIA VERA (H): ICD-10-CM

## 2020-07-07 LAB
BASOPHILS # BLD AUTO: 0.1 10E9/L (ref 0–0.2)
BASOPHILS NFR BLD AUTO: 1.3 %
DIFFERENTIAL METHOD BLD: ABNORMAL
EOSINOPHIL # BLD AUTO: 0.1 10E9/L (ref 0–0.7)
EOSINOPHIL NFR BLD AUTO: 3 %
ERYTHROCYTE [DISTWIDTH] IN BLOOD BY AUTOMATED COUNT: 15.2 % (ref 10–15)
HCT VFR BLD AUTO: 49.4 % (ref 40–53)
HGB BLD-MCNC: 15.5 G/DL (ref 13.3–17.7)
IMM GRANULOCYTES # BLD: 0.1 10E9/L (ref 0–0.4)
IMM GRANULOCYTES NFR BLD: 1.1 %
LYMPHOCYTES # BLD AUTO: 1.1 10E9/L (ref 0.8–5.3)
LYMPHOCYTES NFR BLD AUTO: 23.4 %
MCH RBC QN AUTO: 29.1 PG (ref 26.5–33)
MCHC RBC AUTO-ENTMCNC: 31.4 G/DL (ref 31.5–36.5)
MCV RBC AUTO: 93 FL (ref 78–100)
MONOCYTES # BLD AUTO: 0.3 10E9/L (ref 0–1.3)
MONOCYTES NFR BLD AUTO: 6 %
NEUTROPHILS # BLD AUTO: 3 10E9/L (ref 1.6–8.3)
NEUTROPHILS NFR BLD AUTO: 65.2 %
NRBC # BLD AUTO: 0 10*3/UL
NRBC BLD AUTO-RTO: 0 /100
PLATELET # BLD AUTO: 244 10E9/L (ref 150–450)
RBC # BLD AUTO: 5.32 10E12/L (ref 4.4–5.9)
WBC # BLD AUTO: 4.7 10E9/L (ref 4–11)

## 2020-07-13 ENCOUNTER — INFUSION THERAPY VISIT (OUTPATIENT)
Dept: ONCOLOGY | Facility: CLINIC | Age: 60
End: 2020-07-13
Attending: INTERNAL MEDICINE
Payer: COMMERCIAL

## 2020-07-13 VITALS
SYSTOLIC BLOOD PRESSURE: 124 MMHG | HEART RATE: 80 BPM | RESPIRATION RATE: 16 BRPM | OXYGEN SATURATION: 98 % | DIASTOLIC BLOOD PRESSURE: 81 MMHG | TEMPERATURE: 98.4 F

## 2020-07-13 DIAGNOSIS — D45 POLYCYTHEMIA VERA (H): Primary | ICD-10-CM

## 2020-07-13 PROCEDURE — 99195 PHLEBOTOMY: CPT

## 2020-07-13 NOTE — PATIENT INSTRUCTIONS
[facility] Triage and after hours / weekends / holidays:  [phone]    Please call the triage or after hours line if you experience a temperature greater than or equal to 100.5, shaking chills, have uncontrolled nausea, vomiting and/or diarrhea, dizziness, shortness of breath, chest pain, bleeding, unexplained bruising, or if you have any other new/concerning symptoms, questions or concerns.      If you are having any concerning symptoms or wish to speak to a provider before your next infusion visit, please call your care coordinator or triage to notify them so we can adequately serve you.     If you need a refill on a narcotic prescription or other medication, please call before your infusion appointment.                 July 2020 Sunday Monday Tuesday Wednesday Thursday Friday Saturday                  1     2     3     4       5     6     7    LAB  10:30 AM   (15 min.)    LAB   [facility] 8     9     10     11       12     13    [facility] ONC INFUSION 120   2:30 PM   (120 min.)    ONCOLOGY INFUSION   [facility] 14     15     16     17     18       19     20     21     22     23     24     25       26     27     28     29     30     31                      August 2020 Sunday Monday Tuesday Wednesday Thursday Friday Saturday                                 1       2     3     4     5     6     7     8       9     10     11     12     13     14     15       16     17     18     19     20     21     22       23     24     25     26     27     28     29       30     31                                              Lab Results:  No results found for this or any previous visit (from the past 12 hour(s)).

## 2020-07-13 NOTE — PROGRESS NOTES
Infusion Nursing Note:  Zaheer Borges presents today for Phlebotomy.  Patient seen by provider today: No   present during visit today: Not Applicable.    Note: Patient presents to infusion feeling well. Patient denies pain and states no acute complaints or concerns needing to be addressed today. Patient states consistent characteristic  symptoms of increased fatigue and minor SOB associated with needing phlebotomy today. Patient also denies s/s of infection such as fever, shortness of breath, cough, chest pain, or changes in taste/smell.  Initial Bp elevated at arrival with 188 systolic with patient stating he has a history of white coat syndrome. Patient states he does follow-up with PCP regarding elevated bp and states he takes bp at home regularly that concludes 120 systolic. Patient denies headache or neurological changes associated with elevated bp and voices understanding  to seek immediate medical attention if those symptoms arise with elevated bp.  500ccs of whole blood removed and completed at 1452.    Intravenous Access:  Peripheral IV placed.    Treatment Conditions:  Lab Results   Component Value Date    HGB 15.5 07/07/2020     Lab Results   Component Value Date    WBC 4.7 07/07/2020      Lab Results   Component Value Date    ANEU 3.0 07/07/2020     Lab Results   Component Value Date     07/07/2020      Results reviewed, labs MET treatment parameters, ok to proceed with treatment. Phlebotomy to be done for Hematocrit greater then 45% (49.4% on 7/7/2020) .      Post Infusion Assessment:  Bp 124/81 post Phlebotomy. Patient denies dizziness, lightheadedness, or headache post whole blood removal; refusing IVFs or juice post phlebotomy. Pt voiced understanding to maintain adequate fluid intake post blood removal.  Access discontinued per protocol.       Discharge Plan:   Patient declined prescription refills.  Discharge instructions reviewed with: Patient.  Patient and/or family verbalized  understanding of discharge instructions and all questions answered.  AVS to patient via [id number].  Patient will return in a couple months for next appointment. IB sent to Dr. Peterson to confirm patients lab/phlebotomy schedule.  Patient discharged in stable condition accompanied by: self.  Departure Mode: Ambulatory.  Face to Face time: 0 minutes.    Nazario Sorensen RN

## 2020-07-24 ENCOUNTER — RECORDS - HEALTHEAST (OUTPATIENT)
Dept: NEUROLOGY | Facility: CLINIC | Age: 60
End: 2020-07-24

## 2020-07-24 ENCOUNTER — HOSPITAL ENCOUNTER (OUTPATIENT)
Dept: CT IMAGING | Facility: CLINIC | Age: 60
Discharge: HOME | End: 2020-07-24
Attending: PSYCHIATRY & NEUROLOGY

## 2020-07-24 ENCOUNTER — RECORDS - HEALTHEAST (OUTPATIENT)
Dept: LAB | Facility: HOSPITAL | Age: 60
End: 2020-07-24

## 2020-07-24 ENCOUNTER — RECORDS - HEALTHEAST (OUTPATIENT)
Dept: ADMINISTRATIVE | Facility: OTHER | Age: 60
End: 2020-07-24

## 2020-07-24 ENCOUNTER — AMBULATORY - HEALTHEAST (OUTPATIENT)
Dept: LAB | Facility: HOSPITAL | Age: 60
End: 2020-07-24

## 2020-07-24 DIAGNOSIS — N20.0 KIDNEY STONE: ICD-10-CM

## 2020-07-24 DIAGNOSIS — D45 POLYCYTHEMIA VERA (H): ICD-10-CM

## 2020-07-24 DIAGNOSIS — R10.30 LOWER ABDOMINAL PAIN: ICD-10-CM

## 2020-07-24 DIAGNOSIS — R41.3 MEMORY DIFFICULTIES: Primary | ICD-10-CM

## 2020-07-24 DIAGNOSIS — R53.83 OTHER FATIGUE: ICD-10-CM

## 2020-07-24 LAB
ALBUMIN UR-MCNC: NEGATIVE MG/DL
ANION GAP SERPL CALCULATED.3IONS-SCNC: 9 MMOL/L (ref 5–18)
APPEARANCE UR: CLEAR
BACTERIA #/AREA URNS HPF: ABNORMAL HPF
BASOPHILS # BLD AUTO: 0.1 THOU/UL (ref 0–0.2)
BASOPHILS NFR BLD AUTO: 2 % (ref 0–2)
BILIRUB UR QL STRIP: NEGATIVE
BUN SERPL-MCNC: 11 MG/DL (ref 8–22)
CALCIUM SERPL-MCNC: 9.2 MG/DL (ref 8.5–10.5)
CHLORIDE BLD-SCNC: 107 MMOL/L (ref 98–107)
CO2 SERPL-SCNC: 26 MMOL/L (ref 22–31)
COLOR UR AUTO: YELLOW
CREAT SERPL-MCNC: 0.78 MG/DL (ref 0.7–1.3)
EOSINOPHIL # BLD AUTO: 0.1 THOU/UL (ref 0–0.4)
EOSINOPHIL NFR BLD AUTO: 2 % (ref 0–6)
ERYTHROCYTE [DISTWIDTH] IN BLOOD BY AUTOMATED COUNT: 14.5 % (ref 11–14.5)
GFR SERPL CREATININE-BSD FRML MDRD: >60 ML/MIN/1.73M2
GLUCOSE BLD-MCNC: 100 MG/DL (ref 70–125)
GLUCOSE UR STRIP-MCNC: NEGATIVE MG/DL
HCT VFR BLD AUTO: 42.9 % (ref 40–54)
HGB BLD-MCNC: 13.8 G/DL (ref 14–18)
HGB UR QL STRIP: ABNORMAL
KETONES UR STRIP-MCNC: NEGATIVE MG/DL
LEUKOCYTE ESTERASE UR QL STRIP: NEGATIVE
LYMPHOCYTES # BLD AUTO: 0.9 THOU/UL (ref 0.8–4.4)
LYMPHOCYTES NFR BLD AUTO: 23 % (ref 20–40)
MCH RBC QN AUTO: 29.6 PG (ref 27–34)
MCHC RBC AUTO-ENTMCNC: 32.2 G/DL (ref 32–36)
MCV RBC AUTO: 92 FL (ref 80–100)
MONOCYTES # BLD AUTO: 0.3 THOU/UL (ref 0–0.9)
MONOCYTES NFR BLD AUTO: 7 % (ref 2–10)
NEUTROPHILS # BLD AUTO: 2.6 THOU/UL (ref 2–7.7)
NEUTROPHILS NFR BLD AUTO: 66 % (ref 50–70)
NITRATE UR QL: NEGATIVE
PH UR STRIP: 7.5 [PH] (ref 4.5–8)
PLATELET # BLD AUTO: 133 THOU/UL (ref 140–440)
PMV BLD AUTO: 9.4 FL (ref 8.5–12.5)
POTASSIUM BLD-SCNC: 4.4 MMOL/L (ref 3.5–5)
RBC # BLD AUTO: 4.67 MILL/UL (ref 4.4–6.2)
RBC #/AREA URNS AUTO: >100 HPF
SODIUM SERPL-SCNC: 142 MMOL/L (ref 136–145)
SP GR UR STRIP: 1 (ref 1–1.03)
SQUAMOUS #/AREA URNS AUTO: ABNORMAL LPF
TSH SERPL DL<=0.005 MIU/L-ACNC: 2.25 UIU/ML (ref 0.3–5)
UROBILINOGEN UR STRIP-ACNC: ABNORMAL
VIT B12 SERPL-MCNC: 390 PG/ML (ref 213–816)
WBC #/AREA URNS AUTO: ABNORMAL HPF
WBC: 4 THOU/UL (ref 4–11)

## 2020-07-24 PROCEDURE — 99214 OFFICE O/P EST MOD 30 MIN: CPT | Performed by: PSYCHIATRY & NEUROLOGY

## 2020-07-24 NOTE — PROGRESS NOTES
NEUROLOGY NOTE        Assessment/Plan          Some memory concerns over the last 1 year.    Fatigue for 1 week.      Painless bloody urine 1 day, history of kidney stones attack in 1996.    Polycythemia vera getting phlebotomy regularly, last treatment was 7/13/2020.    Migraines visual auras.  Not intractable and no status migrainosus.  Taking Excedrin migraine or ibuprofen to abort.    Plan/recommendations:  Check vitamin B12 and thiamine level, BMP TSH.  Consider MRI study of the brain.  COVID-19 screening  UA, ultrasound of kidneys.  Consider CT or MRI study of the kidneys.  She will also make an appointment with urologist         [place]       Zaheer Bass is a 60 year old male seen for multiple symptoms.      He had painless bloody urination on 7/23 all day.  No urinary frequency or urgency or dysuria.  No fevers.  Feeling some pressure sensation and discomfort in the lower back for a few days.    Feeling fatigued easily over the last 1 week.  No true weakness.  No sensory difficulty.    No falls or injury.  No incontinence.    Over the past 1 year noted occasionally trouble to remember names.  He normally has excellent memory.  Was a neurosurgeon.  Only very sharp person.    History of polycythemia vera diagnosed around 2008 initially at [facility] transferred care to [place] in 2008 due to insurance issue.  Followed up regularly with hematology. getting phlebotomy regularly, last treatment was 7/13/2020.  Hydroxyurea was added in mid 2018 for elevated platelet counts which was approximately 1 million.  Dosage has been adjusted up and down depending on platelet count.    No respiratory symptoms except shortness of breath after exertion at times.    Maybe slight weight loss over the last few months intentionally with exercise and diet control.    Migraines visual auras.  Not intractable and no status migrainosus.  Taking Excedrin migraine or ibuprofen to abort.    Results for ZAHEER BASS (MRN  [id number]) as of 7/24/2020 09:57   Ref. Range 6/23/2020 11:22 7/7/2020 10:34   WBC Latest Ref Range: 4.0 - 11.0 10e9/L 3.4 (L) 4.7   Hemoglobin Latest Ref Range: 13.3 - 17.7 g/dL 14.6 15.5   Hematocrit Latest Ref Range: 40.0 - 53.0 % 45.5 49.4   Platelet Count Latest Ref Range: 150 - 450 10e9/L 90 (L) 244   RBC Count Latest Ref Range: 4.4 - 5.9 10e12/L 4.96 5.32   MCV Latest Ref Range: 78 - 100 fl 92 93   MCH Latest Ref Range: 26.5 - 33.0 pg 29.4 29.1   MCHC Latest Ref Range: 31.5 - 36.5 g/dL 32.1 31.4 (L)   RDW Latest Ref Range: 10.0 - 15.0 % 16.2 (H) 15.2 (H)              Review of system     10 point system review otherwise unremarkable    PHYSICAL EXAMINATION     Vital signs in last 24 hours:  There were no vitals filed for this visit.    Vitals seem to be fine.  Normal mental status, language and speech.  Cognitive function fine.  Exam of head, neck, eyes, ears, nose and mouth negative.  Normal muscle bulk, tone and strength in 4 extremities.  No focal sensory difficulty.  Hand coordination, gait and station intact.  No edema or skin rashes.        Problem List     Patient Active Problem List    Diagnosis Date Noted     Polycythemia vera (H) 02/01/2019     Priority: Medium         Past medical history     History of kidney stone in 2017    Family history     Diabetes in his mom.    Social history     Social History     Socioeconomic History     Marital status:      Spouse name: Not on file     Number of children: Not on file     Years of education: Not on file     Highest education level: Not on file   Occupational History     Not on file   Social Needs     Financial resource strain: Not on file     Food insecurity     Worry: Not on file     Inability: Not on file     Transportation needs     Medical: Not on file     Non-medical: Not on file   Tobacco Use     Smoking status: Former Smoker     Packs/day: 0.10     Years: 0.50     Pack years: 0.05     Types: Cigarettes     Start date: 1/1/1980      Smokeless tobacco: Never Used   Substance and Sexual Activity     Alcohol use: Yes     Alcohol/week: 2.0 - 3.0 standard drinks     Types: 2 - 3 Glasses of wine per week     Frequency: 2-3 times a week     Drinks per session: 1 or 2     Binge frequency: Less than monthly     Drug use: No     Sexual activity: Not on file   Lifestyle     Physical activity     Days per week: Not on file     Minutes per session: Not on file     Stress: Not on file   Relationships     Social connections     Talks on phone: Not on file     Gets together: Not on file     Attends Anabaptism service: Not on file     Active member of club or organization: Not on file     Attends meetings of clubs or organizations: Not on file     Relationship status: Not on file     Intimate partner violence     Fear of current or ex partner: Not on file     Emotionally abused: Not on file     Physically abused: Not on file     Forced sexual activity: Not on file   Other Topics Concern     Not on file   Social History Narrative     Not on file         Allergy     Sulfa drugs    MEDICATIONS List     Current Outpatient Medications   Medication Sig Dispense Refill     cetirizine HCl (ZYRTEC ALLERGY) 10 MG CAPS Take by mouth as needed        hydroxyurea (HYDREA) 500 MG capsule Take 1000mg 5 days a week alternating with 500mg on Monday and Friday 90 capsule 1     ibuprofen (ADVIL/MOTRIN) 400 MG tablet Take 200 mg by mouth every 6 hours as needed for moderate pain       loratadine (CLARITIN) 10 MG tablet Take 10 mg by mouth as needed        Multiple Vitamins-Minerals (MULTIVITAMIN ADULT PO)        omega 3 1000 MG CAPS                Sherri Merrill MD, MD, PhD  Neurology   Office tel: [phone]

## 2020-07-28 LAB — VIT B1 PYROPHOSHATE BLD-SCNC: 98 NMOL/L (ref 70–180)

## 2020-08-25 DIAGNOSIS — R10.30 LOWER ABDOMINAL PAIN: ICD-10-CM

## 2020-08-25 DIAGNOSIS — R41.3 MEMORY DIFFICULTIES: ICD-10-CM

## 2020-08-25 DIAGNOSIS — N20.0 KIDNEY STONE: ICD-10-CM

## 2020-08-25 DIAGNOSIS — D45 POLYCYTHEMIA VERA (H): ICD-10-CM

## 2020-08-25 DIAGNOSIS — R53.83 OTHER FATIGUE: ICD-10-CM

## 2020-08-25 LAB
BASOPHILS # BLD AUTO: 0.1 10E9/L (ref 0–0.2)
BASOPHILS NFR BLD AUTO: 1.8 %
DIFFERENTIAL METHOD BLD: ABNORMAL
EOSINOPHIL # BLD AUTO: 0.1 10E9/L (ref 0–0.7)
EOSINOPHIL NFR BLD AUTO: 2.9 %
ERYTHROCYTE [DISTWIDTH] IN BLOOD BY AUTOMATED COUNT: 13.6 % (ref 10–15)
HCT VFR BLD AUTO: 46.4 % (ref 40–53)
HGB BLD-MCNC: 15.2 G/DL (ref 13.3–17.7)
IMM GRANULOCYTES # BLD: 0 10E9/L (ref 0–0.4)
IMM GRANULOCYTES NFR BLD: 0.8 %
LYMPHOCYTES # BLD AUTO: 1 10E9/L (ref 0.8–5.3)
LYMPHOCYTES NFR BLD AUTO: 27 %
MCH RBC QN AUTO: 31 PG (ref 26.5–33)
MCHC RBC AUTO-ENTMCNC: 32.8 G/DL (ref 31.5–36.5)
MCV RBC AUTO: 95 FL (ref 78–100)
MONOCYTES # BLD AUTO: 0.3 10E9/L (ref 0–1.3)
MONOCYTES NFR BLD AUTO: 7.3 %
NEUTROPHILS # BLD AUTO: 2.3 10E9/L (ref 1.6–8.3)
NEUTROPHILS NFR BLD AUTO: 60.2 %
NRBC # BLD AUTO: 0 10*3/UL
NRBC BLD AUTO-RTO: 0 /100
PLATELET # BLD AUTO: 140 10E9/L (ref 150–450)
RBC # BLD AUTO: 4.9 10E12/L (ref 4.4–5.9)
WBC # BLD AUTO: 3.8 10E9/L (ref 4–11)

## 2020-08-27 ENCOUNTER — VIRTUAL VISIT (OUTPATIENT)
Dept: ONCOLOGY | Facility: CLINIC | Age: 60
End: 2020-08-27
Attending: INTERNAL MEDICINE
Payer: COMMERCIAL

## 2020-08-27 DIAGNOSIS — D45 POLYCYTHEMIA VERA (H): Primary | ICD-10-CM

## 2020-08-27 PROCEDURE — 40001009 ZZH VIDEO/TELEPHONE VISIT; NO CHARGE

## 2020-08-27 PROCEDURE — G0463 HOSPITAL OUTPT CLINIC VISIT: HCPCS | Mod: GT,ZF

## 2020-08-27 PROCEDURE — 99214 OFFICE O/P EST MOD 30 MIN: CPT | Mod: 95 | Performed by: INTERNAL MEDICINE

## 2020-08-27 RX ORDER — HYDROXYUREA 500 MG/1
CAPSULE ORAL
Qty: 90 CAPSULE | Refills: 3 | Status: SHIPPED | OUTPATIENT
Start: 2020-08-27 | End: 2021-08-05 | Stop reason: DRUGHIGH

## 2020-08-27 NOTE — LETTER
"    8/27/2020         RE: Zaheer Borges  [address] [place] [address]        Dear Colleague,    Thank you for referring your patient, Zaheer Borges, to the [facility]. Please see a copy of my visit note below.    Zaheer Borges is a 60 year old male who is being evaluated via a billable video visit.      The patient has been notified of following:     \"This video visit will be conducted via a call between you and your physician/provider. We have found that certain health care needs can be provided without the need for an in-person physical exam.  This service lets us provide the care you need with a video conversation.  If a prescription is necessary we can send it directly to your pharmacy.  If lab work is needed we can place an order for that and you can then stop by our lab to have the test done at a later time.    Video visits are billed at different rates depending on your insurance coverage.  Please reach out to your insurance provider with any questions.    If during the course of the call the physician/provider feels a video visit is not appropriate, you will not be charged for this service.\"    Patient has given verbal consent for Video visit? Yes  How would you like to obtain your AVS? MyChart  If you are dropped from the video visit, the video invite should be resent to: Text to cell phone: [phone]   Will anyone else be joining your video visit? No       Vitals - Patient Reported  Weight (Patient Reported): 68 kg (150 lb)  Height (Patient Reported): 170.2 cm (5' 7\")  BMI (Based on Pt Reported Ht/Wt): 23.49  Pain Score: No Pain (0)      I have reviewed and updated the patient's allergies and medication list.    Concerns: Is wanting to increase the dosage of his Hydroxyurea-says current dose is not working.  Refills: Will need Hydroxyurea once decides new dosage      Tory Olmstead CMA          Video-Visit Details    Video link would not connect.  Converted to telephone " visit.            HEMATOLOGY CLINIC VISIT      NOTE:  Due to the ongoing COVID-19 pandemic, this visit was conducted by telephone, with the patient's approval.      Zaheer Borges is a 60-year-old male with polycythemia vera who returns today for follow-up.  Please see my initial consultation from January 2019 for details of his history.  In summary, he was diagnosed in 2003 and had been followed in the [facility] system since that time.  He transitioned his care here due to a change in insurance.    He has been managed since diagnosis with phlebotomy, requiring these approximately every 2 months.  In mid 2018, hydroxyurea was added due to an elevated platelet count of approximately 1 million.  He was initially started on 1000 mg of Hydrea daily but this dropped his platelet count less than 100,000 and so the dose was decreased to 500 mg daily, with his platelet count subsequently returning to normal range.  While he was on the larger dose of Hydrea, he did not require phlebotomy to maintain a hematocrit less than 45%.  However, when the dose was decreased, he returned to requiring phlebotomies about every 2 months.    When I last saw him in August 2019, we decided to begin gently increasing the hydroxyurea dose to see if we could increase the interval between phlebotomies.  He does notice increased fatigue and shortness of breath when his hematocrit exceeds 45%.  We started with hydroxyurea 1000 mg alternating with 500 mg every other day.  By November 2019 we had increased to taking the 1000 mg dose 5 days a week, with 500 mg dosing 2 days a week.  Because this did not seem to change his need for phlebotomies every 2 months, in April 2020 he increased the Hydrea dose to 1000 mg 6 days a week and 5 mg 1 day/week.  2 months later, in June 2020, his platelet count had dropped to 90,000.  At that time his hematocrit was 45.5% but it had been 4 months since his prior phlebotomy.  In response to the lower platelet count,  he decreased the hydroxyurea dose back down to 1000 mg 4 days a week and 500 mg 3 days a week.    Currently, he is taking 2 different hydroxyurea regimens on alternating weeks.  One week he takes 1000 mg 6 days and 500 mg 1 day, and alternating weeks he takes the 1000 mg dose on 5 days and the 500 mg dose of 2 days.    Overall he continues to feel well and has no new symptoms or concerns..    Physical exam: Not done, telephone visit.    Labs done 2 days ago show white count of 3.8 with a normal differential, hemoglobin 15.2, hematocrit 46.4 %, platelet count 140,000.      ASSESSMENT / PLAN:  Polycythemia vera.    Overall, Zaheer continues to do well.  He appears to be quite sensitive to very small changes in hydroxyurea dose.  Our goal is still to try to increase the interval between phlebotomies as much as possible.  It does not appear that we will be able to avoid phlebotomy altogether, as the amount of hydroxyurea it would take to achieve this would leave him with unacceptable thrombocytopenia and/or leukopenia.    I explained to him that even though his white count and platelet count have drifted down in response to the higher dose of hydroxyurea, as long as they are maintained in safe ranges, they do not necessarily need to be normal.  In that regard, would like to keep his total white blood cell count right around the lower limit of normal and we could tolerate platelet counts in the 100,000 range as long as they are stable.    Thus, we will continue with hydroxyurea 1000 mg 6 days/week and 500 mg 1 day/week.  We will continue to monitor his CBC closely.  We will also continue with therapeutic phlebotomy as needed for hematocrits of 45% or higher.  We will continue to maintain contact through MyChart and/or telephone, and plan for return clinic visit in 12 months, sooner if there are new issues or concerns.      Total time 25 minutes, all in counseling and coordination of care.      Escobar Peterson MD  Associate  Professor of Medicine  Division of Hematology, Oncology, and Transplantation  Director, [facility]        Again, thank you for allowing me to participate in the care of your patient.        Sincerely,        Escobar Peterson MD

## 2020-08-27 NOTE — PROGRESS NOTES
"Zaheer Borges is a 60 year old male who is being evaluated via a billable video visit.      The patient has been notified of following:     \"This video visit will be conducted via a call between you and your physician/provider. We have found that certain health care needs can be provided without the need for an in-person physical exam.  This service lets us provide the care you need with a video conversation.  If a prescription is necessary we can send it directly to your pharmacy.  If lab work is needed we can place an order for that and you can then stop by our lab to have the test done at a later time.    Video visits are billed at different rates depending on your insurance coverage.  Please reach out to your insurance provider with any questions.    If during the course of the call the physician/provider feels a video visit is not appropriate, you will not be charged for this service.\"    Patient has given verbal consent for Video visit? Yes  How would you like to obtain your AVS? MyChart  If you are dropped from the video visit, the video invite should be resent to: Text to cell phone: [phone]   Will anyone else be joining your video visit? No       Vitals - Patient Reported  Weight (Patient Reported): 68 kg (150 lb)  Height (Patient Reported): 170.2 cm (5' 7\")  BMI (Based on Pt Reported Ht/Wt): 23.49  Pain Score: No Pain (0)      I have reviewed and updated the patient's allergies and medication list.    Concerns: Is wanting to increase the dosage of his Hydroxyurea-says current dose is not working.  Refills: Will need Hydroxyurea once decides new dosage      Tory Olmstead CMA          Video-Visit Details    Video link would not connect.  Converted to telephone visit.        "

## 2020-09-02 ENCOUNTER — INFUSION THERAPY VISIT (OUTPATIENT)
Dept: INFUSION THERAPY | Facility: CLINIC | Age: 60
End: 2020-09-02
Attending: INTERNAL MEDICINE
Payer: COMMERCIAL

## 2020-09-02 VITALS
RESPIRATION RATE: 16 BRPM | DIASTOLIC BLOOD PRESSURE: 96 MMHG | OXYGEN SATURATION: 99 % | HEART RATE: 79 BPM | SYSTOLIC BLOOD PRESSURE: 151 MMHG

## 2020-09-02 DIAGNOSIS — D45 POLYCYTHEMIA VERA (H): Primary | ICD-10-CM

## 2020-09-02 PROCEDURE — 99195 PHLEBOTOMY: CPT

## 2020-09-02 NOTE — LETTER
9/2/2020         RE: Zaheer Borges  [address] [place] [address]        Dear Colleague,    Thank you for referring your patient, Zaheer Borges, to the [facility]. Please see a copy of my visit note below.    Nursing Note  Zaheer Borges [facility] for: Phlebotomy  During [facility]'s [facility] appointment, orders from Dr. Peterson were completed.  Frequency: prn    Progress note:  Patient identification verified by name and date of birth.  Assessment completed.  Vitals recorded in Doc Flowsheets.  Patient was provided with education regarding medication/procedure and possible side effects.  Patient verbalized understanding.     present during visit today: Not Applicable.    Treatment Conditions: hematocrit on 8/25/20 was 46.4 so met parameters to do phlebotomy    Premedications: were not ordered.    Drug Waste Record: No    Infusion length and rate:  500ml blood removed over approx 20 minutes.    Labs: see treatment conditions above    Vascular access: peripheral IV placed today.    Post Infusion Assessment:  Patient tolerated infusion without incident.     Discharge Plan:   Follow up plan of care with: ongoing infusions at [facility] as needed.   Discharge instructions were reviewed with patient.  Patient/representative verbalized understanding of discharge instructions and all questions answered.  Patient discharged [facility] in stable condition.    Alondra Torres RN        BP (!) 149/95   Pulse 88   Resp 16   SpO2 99%       Again, thank you for allowing me to participate in the care of your patient.        Sincerely,        [facility]

## 2020-09-02 NOTE — PROGRESS NOTES
Nursing Note  Zaheer Borges presents today [facility] for: Phlebotomy  During [facility]'s [facility] appointment, orders from Dr. Peterson were completed.  Frequency: prn    Progress note:  Patient identification verified by name and date of birth.  Assessment completed.  Vitals recorded in Doc Flowsheets.  Patient was provided with education regarding medication/procedure and possible side effects.  Patient verbalized understanding.     present during visit today: Not Applicable.    Treatment Conditions: hematocrit on 8/25/20 was 46.4 so met parameters to do phlebotomy    Premedications: were not ordered.    Drug Waste Record: No    Infusion length and rate:  500ml blood removed over approx 20 minutes.    Labs: see treatment conditions above    Vascular access: peripheral IV placed today.    Post Infusion Assessment:  Patient tolerated infusion without incident.     Discharge Plan:   Follow up plan of care with: ongoing infusions at [facility] as needed.   Discharge instructions were reviewed with patient.  Patient/representative verbalized understanding of discharge instructions and all questions answered.  Patient discharged [facility] in stable condition.    Alondra Torres RN        BP (!) 149/95   Pulse 88   Resp 16   SpO2 99%

## 2020-09-10 DIAGNOSIS — D45 POLYCYTHEMIA VERA (H): ICD-10-CM

## 2020-09-10 LAB
BASOPHILS # BLD AUTO: 0 10E9/L (ref 0–0.2)
BASOPHILS NFR BLD AUTO: 0.9 %
DIFFERENTIAL METHOD BLD: ABNORMAL
EOSINOPHIL # BLD AUTO: 0.1 10E9/L (ref 0–0.7)
EOSINOPHIL NFR BLD AUTO: 2.8 %
ERYTHROCYTE [DISTWIDTH] IN BLOOD BY AUTOMATED COUNT: 13.5 % (ref 10–15)
HCT VFR BLD AUTO: 42.3 % (ref 40–53)
HGB BLD-MCNC: 13.5 G/DL (ref 13.3–17.7)
IMM GRANULOCYTES # BLD: 0 10E9/L (ref 0–0.4)
IMM GRANULOCYTES NFR BLD: 0.6 %
LYMPHOCYTES # BLD AUTO: 0.9 10E9/L (ref 0.8–5.3)
LYMPHOCYTES NFR BLD AUTO: 26.9 %
MCH RBC QN AUTO: 30.9 PG (ref 26.5–33)
MCHC RBC AUTO-ENTMCNC: 31.9 G/DL (ref 31.5–36.5)
MCV RBC AUTO: 97 FL (ref 78–100)
MONOCYTES # BLD AUTO: 0.3 10E9/L (ref 0–1.3)
MONOCYTES NFR BLD AUTO: 8.8 %
NEUTROPHILS # BLD AUTO: 1.9 10E9/L (ref 1.6–8.3)
NEUTROPHILS NFR BLD AUTO: 60 %
NRBC # BLD AUTO: 0 10*3/UL
NRBC BLD AUTO-RTO: 0 /100
PLATELET # BLD AUTO: 137 10E9/L (ref 150–450)
RBC # BLD AUTO: 4.37 10E12/L (ref 4.4–5.9)
WBC # BLD AUTO: 3.2 10E9/L (ref 4–11)

## 2020-09-17 ENCOUNTER — HOSPITAL ENCOUNTER (OUTPATIENT)
Dept: MRI IMAGING | Facility: CLINIC | Age: 60
Discharge: HOME | End: 2020-09-17
Attending: PSYCHIATRY & NEUROLOGY

## 2020-09-17 DIAGNOSIS — R41.3 MEMORY DIFFICULTIES: ICD-10-CM

## 2020-09-17 DIAGNOSIS — R51.9 ACUTE INTRACTABLE HEADACHE, UNSPECIFIED HEADACHE TYPE: ICD-10-CM

## 2020-09-17 DIAGNOSIS — R42 DIZZINESS: ICD-10-CM

## 2020-09-24 DIAGNOSIS — D45 POLYCYTHEMIA VERA (H): ICD-10-CM

## 2020-09-24 LAB
BASOPHILS # BLD AUTO: 0 10E9/L (ref 0–0.2)
BASOPHILS NFR BLD AUTO: 1.4 %
DIFFERENTIAL METHOD BLD: ABNORMAL
EOSINOPHIL # BLD AUTO: 0.1 10E9/L (ref 0–0.7)
EOSINOPHIL NFR BLD AUTO: 1.8 %
ERYTHROCYTE [DISTWIDTH] IN BLOOD BY AUTOMATED COUNT: 13.2 % (ref 10–15)
HCT VFR BLD AUTO: 40.9 % (ref 40–53)
HGB BLD-MCNC: 13.1 G/DL (ref 13.3–17.7)
IMM GRANULOCYTES # BLD: 0 10E9/L (ref 0–0.4)
IMM GRANULOCYTES NFR BLD: 0.7 %
LYMPHOCYTES # BLD AUTO: 0.7 10E9/L (ref 0.8–5.3)
LYMPHOCYTES NFR BLD AUTO: 25.1 %
MCH RBC QN AUTO: 30.6 PG (ref 26.5–33)
MCHC RBC AUTO-ENTMCNC: 32 G/DL (ref 31.5–36.5)
MCV RBC AUTO: 96 FL (ref 78–100)
MONOCYTES # BLD AUTO: 0.2 10E9/L (ref 0–1.3)
MONOCYTES NFR BLD AUTO: 7.5 %
NEUTROPHILS # BLD AUTO: 1.8 10E9/L (ref 1.6–8.3)
NEUTROPHILS NFR BLD AUTO: 63.5 %
NRBC # BLD AUTO: 0 10*3/UL
NRBC BLD AUTO-RTO: 0 /100
PLATELET # BLD AUTO: 165 10E9/L (ref 150–450)
RBC # BLD AUTO: 4.28 10E12/L (ref 4.4–5.9)
WBC # BLD AUTO: 2.8 10E9/L (ref 4–11)

## 2020-10-08 DIAGNOSIS — D45 POLYCYTHEMIA VERA (H): ICD-10-CM

## 2020-10-08 LAB
BASOPHILS # BLD AUTO: 0.1 10E9/L (ref 0–0.2)
BASOPHILS NFR BLD AUTO: 1.2 %
DIFFERENTIAL METHOD BLD: ABNORMAL
EOSINOPHIL # BLD AUTO: 0.1 10E9/L (ref 0–0.7)
EOSINOPHIL NFR BLD AUTO: 1.4 %
ERYTHROCYTE [DISTWIDTH] IN BLOOD BY AUTOMATED COUNT: 13.1 % (ref 10–15)
HCT VFR BLD AUTO: 43.7 % (ref 40–53)
HGB BLD-MCNC: 13.7 G/DL (ref 13.3–17.7)
IMM GRANULOCYTES # BLD: 0 10E9/L (ref 0–0.4)
IMM GRANULOCYTES NFR BLD: 0.6 %
LYMPHOCYTES # BLD AUTO: 0.8 10E9/L (ref 0.8–5.3)
LYMPHOCYTES NFR BLD AUTO: 15.4 %
MCH RBC QN AUTO: 30.3 PG (ref 26.5–33)
MCHC RBC AUTO-ENTMCNC: 31.4 G/DL (ref 31.5–36.5)
MCV RBC AUTO: 97 FL (ref 78–100)
MONOCYTES # BLD AUTO: 0.3 10E9/L (ref 0–1.3)
MONOCYTES NFR BLD AUTO: 6.4 %
NEUTROPHILS # BLD AUTO: 3.9 10E9/L (ref 1.6–8.3)
NEUTROPHILS NFR BLD AUTO: 75 %
NRBC # BLD AUTO: 0 10*3/UL
NRBC BLD AUTO-RTO: 0 /100
PLATELET # BLD AUTO: 110 10E9/L (ref 150–450)
RBC # BLD AUTO: 4.52 10E12/L (ref 4.4–5.9)
WBC # BLD AUTO: 5.1 10E9/L (ref 4–11)

## 2020-10-08 PROCEDURE — 85025 COMPLETE CBC W/AUTO DIFF WBC: CPT | Performed by: PATHOLOGY

## 2020-10-22 DIAGNOSIS — D45 POLYCYTHEMIA VERA (H): ICD-10-CM

## 2020-10-22 LAB
BASOPHILS # BLD AUTO: 0.1 10E9/L (ref 0–0.2)
BASOPHILS NFR BLD AUTO: 1.7 %
DIFFERENTIAL METHOD BLD: ABNORMAL
EOSINOPHIL # BLD AUTO: 0.1 10E9/L (ref 0–0.7)
EOSINOPHIL NFR BLD AUTO: 2.3 %
ERYTHROCYTE [DISTWIDTH] IN BLOOD BY AUTOMATED COUNT: 13.2 % (ref 10–15)
HCT VFR BLD AUTO: 44.7 % (ref 40–53)
HGB BLD-MCNC: 13.9 G/DL (ref 13.3–17.7)
IMM GRANULOCYTES # BLD: 0 10E9/L (ref 0–0.4)
IMM GRANULOCYTES NFR BLD: 0.7 %
LYMPHOCYTES # BLD AUTO: 0.8 10E9/L (ref 0.8–5.3)
LYMPHOCYTES NFR BLD AUTO: 27.5 %
MCH RBC QN AUTO: 30.3 PG (ref 26.5–33)
MCHC RBC AUTO-ENTMCNC: 31.1 G/DL (ref 31.5–36.5)
MCV RBC AUTO: 98 FL (ref 78–100)
MONOCYTES # BLD AUTO: 0.2 10E9/L (ref 0–1.3)
MONOCYTES NFR BLD AUTO: 5.7 %
NEUTROPHILS # BLD AUTO: 1.9 10E9/L (ref 1.6–8.3)
NEUTROPHILS NFR BLD AUTO: 62.1 %
NRBC # BLD AUTO: 0 10*3/UL
NRBC BLD AUTO-RTO: 0 /100
PLATELET # BLD AUTO: 177 10E9/L (ref 150–450)
RBC # BLD AUTO: 4.58 10E12/L (ref 4.4–5.9)
WBC # BLD AUTO: 3 10E9/L (ref 4–11)

## 2020-10-22 PROCEDURE — 36415 COLL VENOUS BLD VENIPUNCTURE: CPT | Performed by: PATHOLOGY

## 2020-10-22 PROCEDURE — 85025 COMPLETE CBC W/AUTO DIFF WBC: CPT | Performed by: PATHOLOGY

## 2020-11-03 DIAGNOSIS — D45 POLYCYTHEMIA VERA (H): Primary | ICD-10-CM

## 2020-11-03 RX ORDER — HYDROXYUREA 500 MG/1
1000 CAPSULE ORAL DAILY
Qty: 60 CAPSULE | Refills: 3 | Status: SHIPPED | OUTPATIENT
Start: 2020-11-03 | End: 2021-03-26

## 2020-11-05 DIAGNOSIS — D45 POLYCYTHEMIA VERA (H): ICD-10-CM

## 2020-11-05 LAB
BASOPHILS # BLD AUTO: 0.1 10E9/L (ref 0–0.2)
BASOPHILS NFR BLD AUTO: 1.8 %
DIFFERENTIAL METHOD BLD: ABNORMAL
EOSINOPHIL # BLD AUTO: 0.1 10E9/L (ref 0–0.7)
EOSINOPHIL NFR BLD AUTO: 1.8 %
ERYTHROCYTE [DISTWIDTH] IN BLOOD BY AUTOMATED COUNT: 13 % (ref 10–15)
HCT VFR BLD AUTO: 45.8 % (ref 40–53)
HGB BLD-MCNC: 14.4 G/DL (ref 13.3–17.7)
IMM GRANULOCYTES # BLD: 0 10E9/L (ref 0–0.4)
IMM GRANULOCYTES NFR BLD: 0.9 %
LYMPHOCYTES # BLD AUTO: 0.7 10E9/L (ref 0.8–5.3)
LYMPHOCYTES NFR BLD AUTO: 22 %
MCH RBC QN AUTO: 29.8 PG (ref 26.5–33)
MCHC RBC AUTO-ENTMCNC: 31.4 G/DL (ref 31.5–36.5)
MCV RBC AUTO: 95 FL (ref 78–100)
MONOCYTES # BLD AUTO: 0.2 10E9/L (ref 0–1.3)
MONOCYTES NFR BLD AUTO: 7.3 %
NEUTROPHILS # BLD AUTO: 2.2 10E9/L (ref 1.6–8.3)
NEUTROPHILS NFR BLD AUTO: 66.2 %
NRBC # BLD AUTO: 0 10*3/UL
NRBC BLD AUTO-RTO: 0 /100
PLATELET # BLD AUTO: 99 10E9/L (ref 150–450)
RBC # BLD AUTO: 4.83 10E12/L (ref 4.4–5.9)
WBC # BLD AUTO: 3.3 10E9/L (ref 4–11)

## 2020-11-05 PROCEDURE — 36415 COLL VENOUS BLD VENIPUNCTURE: CPT | Performed by: PATHOLOGY

## 2020-11-05 PROCEDURE — 85025 COMPLETE CBC W/AUTO DIFF WBC: CPT | Performed by: PATHOLOGY

## 2020-11-10 ENCOUNTER — INFUSION THERAPY VISIT (OUTPATIENT)
Dept: ONCOLOGY | Facility: CLINIC | Age: 60
End: 2020-11-10
Attending: INTERNAL MEDICINE
Payer: COMMERCIAL

## 2020-11-10 VITALS
DIASTOLIC BLOOD PRESSURE: 86 MMHG | OXYGEN SATURATION: 100 % | HEART RATE: 79 BPM | TEMPERATURE: 98.1 F | SYSTOLIC BLOOD PRESSURE: 143 MMHG | RESPIRATION RATE: 16 BRPM

## 2020-11-10 DIAGNOSIS — D45 POLYCYTHEMIA VERA (H): Primary | ICD-10-CM

## 2020-11-10 PROCEDURE — 999N000127 HC STATISTIC PERIPHERAL IV START W US GUIDANCE

## 2020-11-10 ASSESSMENT — PAIN SCALES - GENERAL: PAINLEVEL: NO PAIN (0)

## 2020-11-10 NOTE — PROGRESS NOTES
Infusion Nursing Note:  Zaheer Borges presents today for Phlebotomy.    Patient seen by provider today: No   present during visit today: Not Applicable.    Note: Patient reported to clinic today with complaint of fatigue and SOB with exertion. Patient states this happens when hematocrit is over 45. He stated he usually feels much better after phlebotomy.  527 ml taken off in 30 minutes.  Intravenous Access:  Peripheral IV placed.    Treatment Conditions:  Lab Results   Component Value Date    HGB 14.4 11/05/2020     Lab Results   Component Value Date    WBC 3.3 11/05/2020      Lab Results   Component Value Date    ANEU 2.2 11/05/2020     Lab Results   Component Value Date    PLT 99 11/05/2020          Post Infusion Assessment:  Patient tolerated infusion without incident.  Site patent and intact, free from redness, edema or discomfort.  No evidence of extravasations.  Access discontinued per protocol.       Discharge Plan:   Patient declined prescription refills.  Discharge instructions reviewed with: Patient.  Patient and/or family verbalized understanding of discharge instructions and all questions answered.  AVS to patient via Lavante[employer].  Patient will call  for next appointment.   Patient discharged in stable condition accompanied by: self.  Departure Mode: Ambulatory.  Face to Face time: 5 minutes.    Lion Pardees RN

## 2020-11-10 NOTE — PATIENT INSTRUCTIONS
[facility] Main Line: [phone]    Call triage nurse with chills and/or temperature greater than or equal to 100.4, uncontrolled nausea/vomiting, diarrhea, constipation, dizziness, shortness of breath, chest pain, bleeding, unexplained bruising, or any new/concerning symptoms, questions/concerns.   If you are having any concerning symptoms or wish to speak to a provider before your next infusion visit, please call your care coordinator or triage to notify them so we can adequately serve you.   Nurse Triage line:  [phone]    If after hours, weekends, or holidays, call main hospital  and ask for Oncology doctor on call @ [phone]      November 2020 Sunday Monday Tuesday Wednesday Thursday Friday Saturday   1     2     3     4     5    LAB  12:45 PM   (15 min.)    LAB   [facility] [place] 6     7       8     9     10    [facility] ONC INFUSION 60   3:00 PM   (60 min.)    ONCOLOGY INFUSION   [facility] 11     12     13     14       15     16     17     18     19     20     21       22     23     24     25     26     27     28       29     30                                          December 2020 Sunday Monday Tuesday Wednesday Thursday Friday Saturday             1     2     3     4     5       6     7     8     9     10     11     12       13     14     15     16     17     18     19       20     21     22     23     24     25     26       27     28     29     30     31                               Lab Results:  No results found for this or any previous visit (from the past 12 hour(s)).

## 2020-12-01 DIAGNOSIS — D45 POLYCYTHEMIA VERA (H): ICD-10-CM

## 2020-12-01 LAB
BASOPHILS # BLD AUTO: 0 10E9/L (ref 0–0.2)
BASOPHILS NFR BLD AUTO: 1.1 %
DIFFERENTIAL METHOD BLD: ABNORMAL
EOSINOPHIL # BLD AUTO: 0.1 10E9/L (ref 0–0.7)
EOSINOPHIL NFR BLD AUTO: 2.8 %
ERYTHROCYTE [DISTWIDTH] IN BLOOD BY AUTOMATED COUNT: 13 % (ref 10–15)
HCT VFR BLD AUTO: 43.3 % (ref 40–53)
HGB BLD-MCNC: 13.7 G/DL (ref 13.3–17.7)
IMM GRANULOCYTES # BLD: 0 10E9/L (ref 0–0.4)
IMM GRANULOCYTES NFR BLD: 0.6 %
LYMPHOCYTES # BLD AUTO: 1 10E9/L (ref 0.8–5.3)
LYMPHOCYTES NFR BLD AUTO: 27.6 %
MCH RBC QN AUTO: 29.9 PG (ref 26.5–33)
MCHC RBC AUTO-ENTMCNC: 31.6 G/DL (ref 31.5–36.5)
MCV RBC AUTO: 95 FL (ref 78–100)
MONOCYTES # BLD AUTO: 0.3 10E9/L (ref 0–1.3)
MONOCYTES NFR BLD AUTO: 8.8 %
NEUTROPHILS # BLD AUTO: 2.1 10E9/L (ref 1.6–8.3)
NEUTROPHILS NFR BLD AUTO: 59.1 %
NRBC # BLD AUTO: 0 10*3/UL
NRBC BLD AUTO-RTO: 0 /100
PLATELET # BLD AUTO: 122 10E9/L (ref 150–450)
RBC # BLD AUTO: 4.58 10E12/L (ref 4.4–5.9)
WBC # BLD AUTO: 3.6 10E9/L (ref 4–11)

## 2020-12-01 PROCEDURE — 36415 COLL VENOUS BLD VENIPUNCTURE: CPT | Performed by: PATHOLOGY

## 2020-12-01 PROCEDURE — 85025 COMPLETE CBC W/AUTO DIFF WBC: CPT | Performed by: PATHOLOGY

## 2021-01-08 DIAGNOSIS — D45 POLYCYTHEMIA VERA (H): ICD-10-CM

## 2021-01-08 LAB
BASOPHILS # BLD AUTO: 0.1 10E9/L (ref 0–0.2)
BASOPHILS NFR BLD AUTO: 1.4 %
DIFFERENTIAL METHOD BLD: ABNORMAL
EOSINOPHIL # BLD AUTO: 0.1 10E9/L (ref 0–0.7)
EOSINOPHIL NFR BLD AUTO: 2.4 %
ERYTHROCYTE [DISTWIDTH] IN BLOOD BY AUTOMATED COUNT: 13.5 % (ref 10–15)
HCT VFR BLD AUTO: 43 % (ref 40–53)
HGB BLD-MCNC: 13.9 G/DL (ref 13.3–17.7)
IMM GRANULOCYTES # BLD: 0 10E9/L (ref 0–0.4)
IMM GRANULOCYTES NFR BLD: 1.1 %
LYMPHOCYTES # BLD AUTO: 0.9 10E9/L (ref 0.8–5.3)
LYMPHOCYTES NFR BLD AUTO: 25.5 %
MCH RBC QN AUTO: 30.1 PG (ref 26.5–33)
MCHC RBC AUTO-ENTMCNC: 32.3 G/DL (ref 31.5–36.5)
MCV RBC AUTO: 93 FL (ref 78–100)
MONOCYTES # BLD AUTO: 0.3 10E9/L (ref 0–1.3)
MONOCYTES NFR BLD AUTO: 7.6 %
NEUTROPHILS # BLD AUTO: 2.3 10E9/L (ref 1.6–8.3)
NEUTROPHILS NFR BLD AUTO: 62 %
NRBC # BLD AUTO: 0 10*3/UL
NRBC BLD AUTO-RTO: 1 /100
PLATELET # BLD AUTO: 100 10E9/L (ref 150–450)
RBC # BLD AUTO: 4.62 10E12/L (ref 4.4–5.9)
WBC # BLD AUTO: 3.7 10E9/L (ref 4–11)

## 2021-01-08 PROCEDURE — 85025 COMPLETE CBC W/AUTO DIFF WBC: CPT | Performed by: PATHOLOGY

## 2021-01-08 PROCEDURE — 36415 COLL VENOUS BLD VENIPUNCTURE: CPT | Performed by: PATHOLOGY

## 2021-01-15 ENCOUNTER — HEALTH MAINTENANCE LETTER (OUTPATIENT)
Age: 61
End: 2021-01-15

## 2021-01-29 DIAGNOSIS — D45 POLYCYTHEMIA VERA (H): ICD-10-CM

## 2021-01-29 LAB
BASOPHILS # BLD AUTO: 0.1 10E9/L (ref 0–0.2)
BASOPHILS NFR BLD AUTO: 1.7 %
DIFFERENTIAL METHOD BLD: ABNORMAL
EOSINOPHIL # BLD AUTO: 0.1 10E9/L (ref 0–0.7)
EOSINOPHIL NFR BLD AUTO: 2 %
ERYTHROCYTE [DISTWIDTH] IN BLOOD BY AUTOMATED COUNT: 13.5 % (ref 10–15)
HCT VFR BLD AUTO: 44.8 % (ref 40–53)
HGB BLD-MCNC: 14.4 G/DL (ref 13.3–17.7)
IMM GRANULOCYTES # BLD: 0 10E9/L (ref 0–0.4)
IMM GRANULOCYTES NFR BLD: 0.7 %
LYMPHOCYTES # BLD AUTO: 0.7 10E9/L (ref 0.8–5.3)
LYMPHOCYTES NFR BLD AUTO: 23.1 %
MCH RBC QN AUTO: 30 PG (ref 26.5–33)
MCHC RBC AUTO-ENTMCNC: 32.1 G/DL (ref 31.5–36.5)
MCV RBC AUTO: 93 FL (ref 78–100)
MONOCYTES # BLD AUTO: 0.2 10E9/L (ref 0–1.3)
MONOCYTES NFR BLD AUTO: 7.6 %
NEUTROPHILS # BLD AUTO: 2 10E9/L (ref 1.6–8.3)
NEUTROPHILS NFR BLD AUTO: 64.9 %
NRBC # BLD AUTO: 0 10*3/UL
NRBC BLD AUTO-RTO: 0 /100
PLATELET # BLD AUTO: 172 10E9/L (ref 150–450)
RBC # BLD AUTO: 4.8 10E12/L (ref 4.4–5.9)
WBC # BLD AUTO: 3 10E9/L (ref 4–11)

## 2021-01-29 PROCEDURE — 36415 COLL VENOUS BLD VENIPUNCTURE: CPT | Performed by: PATHOLOGY

## 2021-01-29 PROCEDURE — 85025 COMPLETE CBC W/AUTO DIFF WBC: CPT | Performed by: PATHOLOGY

## 2021-02-04 ENCOUNTER — INFUSION THERAPY VISIT (OUTPATIENT)
Dept: ONCOLOGY | Facility: CLINIC | Age: 61
End: 2021-02-04
Attending: INTERNAL MEDICINE
Payer: COMMERCIAL

## 2021-02-04 VITALS
OXYGEN SATURATION: 97 % | TEMPERATURE: 98.2 F | RESPIRATION RATE: 16 BRPM | DIASTOLIC BLOOD PRESSURE: 97 MMHG | HEART RATE: 79 BPM | SYSTOLIC BLOOD PRESSURE: 191 MMHG

## 2021-02-04 DIAGNOSIS — D45 POLYCYTHEMIA VERA (H): Primary | ICD-10-CM

## 2021-02-04 DIAGNOSIS — D45 POLYCYTHEMIA VERA (H): ICD-10-CM

## 2021-02-04 LAB
BASOPHILS # BLD AUTO: 0.1 10E9/L (ref 0–0.2)
BASOPHILS NFR BLD AUTO: 1.3 %
DIFFERENTIAL METHOD BLD: ABNORMAL
EOSINOPHIL # BLD AUTO: 0.1 10E9/L (ref 0–0.7)
EOSINOPHIL NFR BLD AUTO: 2.4 %
ERYTHROCYTE [DISTWIDTH] IN BLOOD BY AUTOMATED COUNT: 13.5 % (ref 10–15)
HCT VFR BLD AUTO: 46.3 % (ref 40–53)
HGB BLD-MCNC: 15 G/DL (ref 13.3–17.7)
IMM GRANULOCYTES # BLD: 0 10E9/L (ref 0–0.4)
IMM GRANULOCYTES NFR BLD: 0.8 %
LYMPHOCYTES # BLD AUTO: 1.1 10E9/L (ref 0.8–5.3)
LYMPHOCYTES NFR BLD AUTO: 28.1 %
MCH RBC QN AUTO: 31.1 PG (ref 26.5–33)
MCHC RBC AUTO-ENTMCNC: 32.4 G/DL (ref 31.5–36.5)
MCV RBC AUTO: 96 FL (ref 78–100)
MONOCYTES # BLD AUTO: 0.3 10E9/L (ref 0–1.3)
MONOCYTES NFR BLD AUTO: 7.7 %
NEUTROPHILS # BLD AUTO: 2.3 10E9/L (ref 1.6–8.3)
NEUTROPHILS NFR BLD AUTO: 59.7 %
NRBC # BLD AUTO: 0 10*3/UL
NRBC BLD AUTO-RTO: 0 /100
PLATELET # BLD AUTO: 133 10E9/L (ref 150–450)
RBC # BLD AUTO: 4.82 10E12/L (ref 4.4–5.9)
WBC # BLD AUTO: 3.8 10E9/L (ref 4–11)

## 2021-02-04 PROCEDURE — 36415 COLL VENOUS BLD VENIPUNCTURE: CPT | Performed by: INTERNAL MEDICINE

## 2021-02-04 PROCEDURE — 85025 COMPLETE CBC W/AUTO DIFF WBC: CPT | Performed by: INTERNAL MEDICINE

## 2021-02-04 ASSESSMENT — PAIN SCALES - GENERAL: PAINLEVEL: NO PAIN (0)

## 2021-02-04 NOTE — PROGRESS NOTES
"Infusion Nursing Note:  Zaheer Borges presents today for Phlebotomy.    Patient seen by provider today: No   present during visit today: Not Applicable.    Note: Patient arrives feeling well. Reports NGUYEN when hematocrit elevated but otherwise feeling well and has good energy. VSS although hypertensive today. Pt states he has \"white coat syndrome\".    Intravenous Access:  20g IV placed.    Treatment Conditions:  Lab Results   Component Value Date    HGB 15.0 02/04/2021     Lab Results   Component Value Date    WBC 3.8 02/04/2021      Lab Results   Component Value Date    ANEU 2.3 02/04/2021     Lab Results   Component Value Date     02/04/2021      Hematocrit 46.3% on 2/4/21  Results reviewed, labs MET treatment parameters, ok to proceed with treatment. Hematocrit >45%.      Post Infusion Assessment:  Patient tolerated phlebotomy without incident. 586ml removed.  Site patent and intact, free from redness, edema or discomfort.  No evidence of extravasations.  Access discontinued per protocol.       Discharge Plan:   Patient declined prescription refills.   Discharge instructions reviewed with: Patient.  Patient and/or family verbalized understanding of discharge instructions and all questions answered.  AVS to patient via [employer].  Patient will call to schedule next appointment.  Patient discharged in stable condition accompanied by: self.  Departure Mode: Ambulatory.    Sophie Grigsby RN                        "

## 2021-03-12 DIAGNOSIS — D45 POLYCYTHEMIA VERA (H): ICD-10-CM

## 2021-03-12 LAB
BASOPHILS # BLD AUTO: 0.1 10E9/L (ref 0–0.2)
BASOPHILS NFR BLD AUTO: 1.8 %
DIFFERENTIAL METHOD BLD: ABNORMAL
EOSINOPHIL # BLD AUTO: 0.1 10E9/L (ref 0–0.7)
EOSINOPHIL NFR BLD AUTO: 2.5 %
ERYTHROCYTE [DISTWIDTH] IN BLOOD BY AUTOMATED COUNT: 13 % (ref 10–15)
HCT VFR BLD AUTO: 42.8 % (ref 40–53)
HGB BLD-MCNC: 13.4 G/DL (ref 13.3–17.7)
IMM GRANULOCYTES # BLD: 0 10E9/L (ref 0–0.4)
IMM GRANULOCYTES NFR BLD: 0.7 %
LYMPHOCYTES # BLD AUTO: 0.7 10E9/L (ref 0.8–5.3)
LYMPHOCYTES NFR BLD AUTO: 26.1 %
MCH RBC QN AUTO: 30.2 PG (ref 26.5–33)
MCHC RBC AUTO-ENTMCNC: 31.3 G/DL (ref 31.5–36.5)
MCV RBC AUTO: 96 FL (ref 78–100)
MONOCYTES # BLD AUTO: 0.3 10E9/L (ref 0–1.3)
MONOCYTES NFR BLD AUTO: 8.8 %
NEUTROPHILS # BLD AUTO: 1.7 10E9/L (ref 1.6–8.3)
NEUTROPHILS NFR BLD AUTO: 60.1 %
NRBC # BLD AUTO: 0 10*3/UL
NRBC BLD AUTO-RTO: 0 /100
PLATELET # BLD AUTO: 97 10E9/L (ref 150–450)
RBC # BLD AUTO: 4.44 10E12/L (ref 4.4–5.9)
WBC # BLD AUTO: 2.8 10E9/L (ref 4–11)

## 2021-03-12 PROCEDURE — 85025 COMPLETE CBC W/AUTO DIFF WBC: CPT | Performed by: PATHOLOGY

## 2021-03-12 PROCEDURE — 36415 COLL VENOUS BLD VENIPUNCTURE: CPT | Performed by: PATHOLOGY

## 2021-03-19 DIAGNOSIS — D45 POLYCYTHEMIA VERA (H): ICD-10-CM

## 2021-03-19 LAB
BASOPHILS # BLD AUTO: 0.1 10E9/L (ref 0–0.2)
BASOPHILS NFR BLD AUTO: 1.5 %
DIFFERENTIAL METHOD BLD: ABNORMAL
EOSINOPHIL # BLD AUTO: 0.1 10E9/L (ref 0–0.7)
EOSINOPHIL NFR BLD AUTO: 2.9 %
ERYTHROCYTE [DISTWIDTH] IN BLOOD BY AUTOMATED COUNT: 12.9 % (ref 10–15)
HCT VFR BLD AUTO: 42.3 % (ref 40–53)
HGB BLD-MCNC: 14 G/DL (ref 13.3–17.7)
IMM GRANULOCYTES # BLD: 0 10E9/L (ref 0–0.4)
IMM GRANULOCYTES NFR BLD: 0.6 %
LYMPHOCYTES # BLD AUTO: 1 10E9/L (ref 0.8–5.3)
LYMPHOCYTES NFR BLD AUTO: 27.6 %
MCH RBC QN AUTO: 30.9 PG (ref 26.5–33)
MCHC RBC AUTO-ENTMCNC: 33.1 G/DL (ref 31.5–36.5)
MCV RBC AUTO: 93 FL (ref 78–100)
MONOCYTES # BLD AUTO: 0.3 10E9/L (ref 0–1.3)
MONOCYTES NFR BLD AUTO: 8.7 %
NEUTROPHILS # BLD AUTO: 2 10E9/L (ref 1.6–8.3)
NEUTROPHILS NFR BLD AUTO: 58.7 %
NRBC # BLD AUTO: 0 10*3/UL
NRBC BLD AUTO-RTO: 0 /100
PLATELET # BLD AUTO: 168 10E9/L (ref 150–450)
RBC # BLD AUTO: 4.53 10E12/L (ref 4.4–5.9)
WBC # BLD AUTO: 3.4 10E9/L (ref 4–11)

## 2021-03-19 PROCEDURE — 36415 COLL VENOUS BLD VENIPUNCTURE: CPT | Performed by: PATHOLOGY

## 2021-03-19 PROCEDURE — 85025 COMPLETE CBC W/AUTO DIFF WBC: CPT | Performed by: PATHOLOGY

## 2021-03-26 DIAGNOSIS — D45 POLYCYTHEMIA VERA (H): ICD-10-CM

## 2021-03-26 NOTE — TELEPHONE ENCOUNTER
Last OV 8/27/20, next follow-up 8/26/21    Dose increased in mychart sent 11/3 by Dr. Peterson: Yes, I agree with increasing the hydroxyurea to 1000 mg daily as you suggested.    Routed to provider for approval

## 2021-03-29 RX ORDER — HYDROXYUREA 500 MG/1
CAPSULE ORAL
Qty: 180 CAPSULE | Refills: 3 | Status: SHIPPED | OUTPATIENT
Start: 2021-03-29 | End: 2021-08-05 | Stop reason: DRUGHIGH

## 2021-04-16 DIAGNOSIS — D45 POLYCYTHEMIA VERA (H): ICD-10-CM

## 2021-04-16 LAB
BASOPHILS # BLD AUTO: 0.1 10E9/L (ref 0–0.2)
BASOPHILS NFR BLD AUTO: 1.9 %
DIFFERENTIAL METHOD BLD: ABNORMAL
EOSINOPHIL # BLD AUTO: 0.1 10E9/L (ref 0–0.7)
EOSINOPHIL NFR BLD AUTO: 3.2 %
ERYTHROCYTE [DISTWIDTH] IN BLOOD BY AUTOMATED COUNT: 13.2 % (ref 10–15)
HCT VFR BLD AUTO: 42.6 % (ref 40–53)
HGB BLD-MCNC: 13.8 G/DL (ref 13.3–17.7)
IMM GRANULOCYTES # BLD: 0 10E9/L (ref 0–0.4)
IMM GRANULOCYTES NFR BLD: 1 %
LYMPHOCYTES # BLD AUTO: 1 10E9/L (ref 0.8–5.3)
LYMPHOCYTES NFR BLD AUTO: 30.7 %
MCH RBC QN AUTO: 30.5 PG (ref 26.5–33)
MCHC RBC AUTO-ENTMCNC: 32.4 G/DL (ref 31.5–36.5)
MCV RBC AUTO: 94 FL (ref 78–100)
MONOCYTES # BLD AUTO: 0.3 10E9/L (ref 0–1.3)
MONOCYTES NFR BLD AUTO: 8.3 %
NEUTROPHILS # BLD AUTO: 1.7 10E9/L (ref 1.6–8.3)
NEUTROPHILS NFR BLD AUTO: 54.9 %
NRBC # BLD AUTO: 0 10*3/UL
NRBC BLD AUTO-RTO: 0 /100
PLATELET # BLD AUTO: 117 10E9/L (ref 150–450)
RBC # BLD AUTO: 4.53 10E12/L (ref 4.4–5.9)
WBC # BLD AUTO: 3.1 10E9/L (ref 4–11)

## 2021-04-16 PROCEDURE — 36415 COLL VENOUS BLD VENIPUNCTURE: CPT | Performed by: PATHOLOGY

## 2021-04-16 PROCEDURE — 85025 COMPLETE CBC W/AUTO DIFF WBC: CPT | Performed by: PATHOLOGY

## 2021-05-27 DIAGNOSIS — D45 POLYCYTHEMIA VERA (H): ICD-10-CM

## 2021-05-27 LAB
BASOPHILS # BLD AUTO: 0.1 10E9/L (ref 0–0.2)
BASOPHILS NFR BLD AUTO: 1.8 %
DIFFERENTIAL METHOD BLD: ABNORMAL
EOSINOPHIL # BLD AUTO: 0.1 10E9/L (ref 0–0.7)
EOSINOPHIL NFR BLD AUTO: 2.3 %
ERYTHROCYTE [DISTWIDTH] IN BLOOD BY AUTOMATED COUNT: 13.5 % (ref 10–15)
HCT VFR BLD AUTO: 44.4 % (ref 40–53)
HGB BLD-MCNC: 14.4 G/DL (ref 13.3–17.7)
IMM GRANULOCYTES # BLD: 0 10E9/L (ref 0–0.4)
IMM GRANULOCYTES NFR BLD: 0.8 %
LYMPHOCYTES # BLD AUTO: 1 10E9/L (ref 0.8–5.3)
LYMPHOCYTES NFR BLD AUTO: 26.9 %
MCH RBC QN AUTO: 31.1 PG (ref 26.5–33)
MCHC RBC AUTO-ENTMCNC: 32.4 G/DL (ref 31.5–36.5)
MCV RBC AUTO: 96 FL (ref 78–100)
MONOCYTES # BLD AUTO: 0.4 10E9/L (ref 0–1.3)
MONOCYTES NFR BLD AUTO: 9.1 %
NEUTROPHILS # BLD AUTO: 2.3 10E9/L (ref 1.6–8.3)
NEUTROPHILS NFR BLD AUTO: 59.1 %
NRBC # BLD AUTO: 0 10*3/UL
NRBC BLD AUTO-RTO: 0 /100
PLATELET # BLD AUTO: 213 10E9/L (ref 150–450)
RBC # BLD AUTO: 4.63 10E12/L (ref 4.4–5.9)
WBC # BLD AUTO: 3.9 10E9/L (ref 4–11)

## 2021-05-27 PROCEDURE — 36415 COLL VENOUS BLD VENIPUNCTURE: CPT | Performed by: PATHOLOGY

## 2021-05-27 PROCEDURE — 85025 COMPLETE CBC W/AUTO DIFF WBC: CPT | Performed by: PATHOLOGY

## 2021-06-18 DIAGNOSIS — D45 POLYCYTHEMIA VERA (H): ICD-10-CM

## 2021-06-18 LAB
BASOPHILS # BLD AUTO: 0 10E9/L (ref 0–0.2)
BASOPHILS NFR BLD AUTO: 1 %
DIFFERENTIAL METHOD BLD: ABNORMAL
EOSINOPHIL # BLD AUTO: 0.1 10E9/L (ref 0–0.7)
EOSINOPHIL NFR BLD AUTO: 2 %
ERYTHROCYTE [DISTWIDTH] IN BLOOD BY AUTOMATED COUNT: 13.8 % (ref 10–15)
HCT VFR BLD AUTO: 45 % (ref 40–53)
HGB BLD-MCNC: 14.9 G/DL (ref 13.3–17.7)
IMM GRANULOCYTES # BLD: 0 10E9/L (ref 0–0.4)
IMM GRANULOCYTES NFR BLD: 0.5 %
LYMPHOCYTES # BLD AUTO: 0.7 10E9/L (ref 0.8–5.3)
LYMPHOCYTES NFR BLD AUTO: 16.7 %
MCH RBC QN AUTO: 30.9 PG (ref 26.5–33)
MCHC RBC AUTO-ENTMCNC: 33.1 G/DL (ref 31.5–36.5)
MCV RBC AUTO: 93 FL (ref 78–100)
MONOCYTES # BLD AUTO: 0.3 10E9/L (ref 0–1.3)
MONOCYTES NFR BLD AUTO: 6.3 %
NEUTROPHILS # BLD AUTO: 2.9 10E9/L (ref 1.6–8.3)
NEUTROPHILS NFR BLD AUTO: 73.5 %
NRBC # BLD AUTO: 0 10*3/UL
NRBC BLD AUTO-RTO: 0 /100
PLATELET # BLD AUTO: 131 10E9/L (ref 150–450)
RBC # BLD AUTO: 4.82 10E12/L (ref 4.4–5.9)
WBC # BLD AUTO: 4 10E9/L (ref 4–11)

## 2021-06-18 PROCEDURE — 36415 COLL VENOUS BLD VENIPUNCTURE: CPT | Performed by: PATHOLOGY

## 2021-06-18 PROCEDURE — 85025 COMPLETE CBC W/AUTO DIFF WBC: CPT | Performed by: PATHOLOGY

## 2021-06-28 ENCOUNTER — INFUSION THERAPY VISIT (OUTPATIENT)
Dept: ONCOLOGY | Facility: CLINIC | Age: 61
End: 2021-06-28
Attending: INTERNAL MEDICINE
Payer: COMMERCIAL

## 2021-06-28 ENCOUNTER — APPOINTMENT (OUTPATIENT)
Dept: LAB | Facility: CLINIC | Age: 61
End: 2021-06-28
Attending: INTERNAL MEDICINE
Payer: COMMERCIAL

## 2021-06-28 VITALS
TEMPERATURE: 98.6 F | WEIGHT: 149.4 LBS | BODY MASS INDEX: 24.18 KG/M2 | HEART RATE: 77 BPM | SYSTOLIC BLOOD PRESSURE: 148 MMHG | DIASTOLIC BLOOD PRESSURE: 82 MMHG | RESPIRATION RATE: 16 BRPM | OXYGEN SATURATION: 98 %

## 2021-06-28 DIAGNOSIS — D45 POLYCYTHEMIA VERA (H): Primary | ICD-10-CM

## 2021-06-28 LAB
BASOPHILS # BLD AUTO: 0.1 10E9/L (ref 0–0.2)
BASOPHILS NFR BLD AUTO: 1.6 %
DIFFERENTIAL METHOD BLD: ABNORMAL
EOSINOPHIL # BLD AUTO: 0.1 10E9/L (ref 0–0.7)
EOSINOPHIL NFR BLD AUTO: 2.1 %
ERYTHROCYTE [DISTWIDTH] IN BLOOD BY AUTOMATED COUNT: 14.3 % (ref 10–15)
HCT VFR BLD AUTO: 47.9 % (ref 40–53)
HGB BLD-MCNC: 15.5 G/DL (ref 13.3–17.7)
IMM GRANULOCYTES # BLD: 0 10E9/L (ref 0–0.4)
IMM GRANULOCYTES NFR BLD: 0.5 %
LYMPHOCYTES # BLD AUTO: 0.9 10E9/L (ref 0.8–5.3)
LYMPHOCYTES NFR BLD AUTO: 23 %
MCH RBC QN AUTO: 31.5 PG (ref 26.5–33)
MCHC RBC AUTO-ENTMCNC: 32.4 G/DL (ref 31.5–36.5)
MCV RBC AUTO: 97 FL (ref 78–100)
MONOCYTES # BLD AUTO: 0.3 10E9/L (ref 0–1.3)
MONOCYTES NFR BLD AUTO: 8 %
NEUTROPHILS # BLD AUTO: 2.5 10E9/L (ref 1.6–8.3)
NEUTROPHILS NFR BLD AUTO: 64.8 %
NRBC # BLD AUTO: 0 10*3/UL
NRBC BLD AUTO-RTO: 0 /100
PLATELET # BLD AUTO: 194 10E9/L (ref 150–450)
RBC # BLD AUTO: 4.92 10E12/L (ref 4.4–5.9)
WBC # BLD AUTO: 3.9 10E9/L (ref 4–11)

## 2021-06-28 PROCEDURE — 85025 COMPLETE CBC W/AUTO DIFF WBC: CPT | Performed by: INTERNAL MEDICINE

## 2021-06-28 PROCEDURE — 99195 PHLEBOTOMY: CPT

## 2021-06-28 ASSESSMENT — PAIN SCALES - GENERAL: PAINLEVEL: NO PAIN (0)

## 2021-06-28 NOTE — PROGRESS NOTES
Infusion Nursing Note:  Zaheer Borges presents today for therapeutic phlebotomy.    Patient seen by provider today: No   present during visit today: Not Applicable.    Note: Zaheer arrives to infusion feeling well, he denies any recent fevers, chills or signs of infection. Patient notes some fatigue and mild NGUYEN when it comes time for his phlebotomy, but other than that he has no symptoms or concerns. Mild hypertension noted, but patient states this is normal for him when he comes to clinic and he tracks his blood pressures at home.    527 ml phlebotomized off. BP stable- denies any dizziness or lightheadedness.    Intravenous Access:  Peripheral IV placed.    Treatment Conditions:  Lab Results   Component Value Date    HGB 15.5 06/28/2021     Lab Results   Component Value Date    WBC 3.9 06/28/2021      Lab Results   Component Value Date    ANEU 2.5 06/28/2021     Lab Results   Component Value Date     06/28/2021      Hematocrit: 47.9- phlebotomy parameters met (> 45%)    Post Infusion Assessment:  Patient tolerated infusion without incident.  Blood return noted pre and post infusion.  Access discontinued per protocol.       Discharge Plan:   Patient declined prescription refills.  AVS to patient via [employer].  Patient states he will call to schedule his next phlebotomy appointment.   Patient discharged in stable condition accompanied by: self.  Departure Mode: Ambulatory.  Face to Face time: 0.      Adalgisa Lundberg RN

## 2021-06-28 NOTE — NURSING NOTE
Chief Complaint   Patient presents with     Blood Draw     Labs drawn from placed PIV by RN in lab. Vitals taken. Checked into next appointment.      Labs drawn with IV start by RN in lab.  Vital signs taken.  Pt checked in to next appointment.    Karin Fiore RN

## 2021-08-05 ENCOUNTER — VIRTUAL VISIT (OUTPATIENT)
Dept: ONCOLOGY | Facility: CLINIC | Age: 61
End: 2021-08-05
Attending: INTERNAL MEDICINE
Payer: COMMERCIAL

## 2021-08-05 DIAGNOSIS — D45 POLYCYTHEMIA VERA (H): ICD-10-CM

## 2021-08-05 PROCEDURE — 99214 OFFICE O/P EST MOD 30 MIN: CPT | Mod: GT | Performed by: INTERNAL MEDICINE

## 2021-08-05 RX ORDER — HYDROXYUREA 500 MG/1
CAPSULE ORAL
Qty: 160 CAPSULE | Refills: 3 | Status: SHIPPED | OUTPATIENT
Start: 2021-08-05 | End: 2022-04-16

## 2021-08-05 NOTE — PROGRESS NOTES
Zaheer is a 61 year old who is being evaluated via a billable video visit.      How would you like to obtain your AVS? MyChart  If the video visit is dropped, the invitation should be resent by: Text to cell phone: [phone]  Will anyone else be joining your video visit? No

## 2021-08-05 NOTE — LETTER
8/5/2021         RE: Zaheer Borges  [address] [place] [address]        Dear Colleague,    Thank you for referring your patient, Zaheer Borges, to the [facility]. Please see a copy of my visit note below.    Zaheer is a 61 year old who is being evaluated via a billable video visit.      How would you like to obtain your AVS? MyChart  If the video visit is dropped, the invitation should be resent by: Text to cell phone: [phone]  Will anyone else be joining your video visit? No                HEMATOLOGY CLINIC VISIT      NOTE:  Due to the ongoing COVID-19 pandemic, this visit was conducted by video, with the patient's approval.      Zaheer Borges is a 61-year-old male with polycythemia vera who returns today for follow-up.  Please see my initial consultation from January 2019 for details of his history.  In summary, he was diagnosed in 2003 and had been followed in the [facility] system since that time.  He transitioned his care here due to a change in insurance.    He has been managed since diagnosis with phlebotomy, requiring these approximately every 2 months.  In mid 2018, hydroxyurea was added due to an elevated platelet count of approximately 1 million.  He was initially started on 1000 mg of Hydrea daily but this dropped his platelet count less than 100,000 and so the dose was decreased to 500 mg daily, with his platelet count subsequently returning to normal range.  While he was on the larger dose of Hydrea, he did not require phlebotomy to maintain a hematocrit less than 45%. However, when the dose was decreased, he returned to requiring phlebotomies about every 2 months.    Over the last year, he has remained well. 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 He is currently taking hydroxyurea 1000 mg daily. He would like to try to increase the dose a bit to see if he can minimize his need for  phlebotomy. Over the last year he has required phlebotomy treatments 4 times (September 2020, November 2020, February 2021, June 2021). Still taking a daily aspirin tablet in addition to hydroxyurea.    He reports no other concerns.    Physical exam: He looks well. Detailed exam not performed (video visit).    Labs: No new labs were obtained as part of today's visit. Recent labs reviewed. Over the last year, his CBC parameters have remained stable.      ASSESSMENT / PLAN:  Polycythemia vera.    Overall, Zaheer continues to do well. He would like to continue adjusting the hydroxyurea dose to minimize his need for phlebotomy. His CBC parameters have remained stable and he does have some room to go up on the dose. Historically he has been quite sensitive to small changes in dose and so we will do this carefully.  He is currently taking 1000 mg daily. He will start by adding 2 additional pills per week and will continue to monitor his CBC every 2 to 4 weeks as long as dose adjustments are being made.    We will also continue with therapeutic phlebotomy as needed for hematocrits of 45% or higher.  We will continue to maintain contact through MyChart and/or telephone, and plan for return clinic visit in 12 months, sooner if there are new issues or concerns.    Total time 30 minutes, including review of medical records and labs, video visit, and documentation.      Escobar Peterson MD  Professor of Medicine  Division of Hematology, Oncology, and Transplantation  Director, [facility]          Again, thank you for allowing me to participate in the care of your patient.        Sincerely,        Escobar Peterson MD

## 2021-08-05 NOTE — PROGRESS NOTES
HEMATOLOGY CLINIC VISIT      NOTE:  Due to the ongoing COVID-19 pandemic, this visit was conducted by video, with the patient's approval.      Zaheer Borges is a 61-year-old male with polycythemia vera who returns today for follow-up.  Please see my initial consultation from January 2019 for details of his history.  In summary, he was diagnosed in 2003 and had been followed in the [facility] system since that time.  He transitioned his care here due to a change in insurance.    He has been managed since diagnosis with phlebotomy, requiring these approximately every 2 months.  In mid 2018, hydroxyurea was added due to an elevated platelet count of approximately 1 million.  He was initially started on 1000 mg of Hydrea daily but this dropped his platelet count less than 100,000 and so the dose was decreased to 500 mg daily, with his platelet count subsequently returning to normal range.  While he was on the larger dose of Hydrea, he did not require phlebotomy to maintain a hematocrit less than 45%. However, when the dose was decreased, he returned to requiring phlebotomies about every 2 months.    Over the last year, he has remained well. 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 He is currently taking hydroxyurea 1000 mg daily. He would like to try to increase the dose a bit to see if he can minimize his need for phlebotomy. Over the last year he has required phlebotomy treatments 4 times (September 2020, November 2020, February 2021, June 2021). Still taking a daily aspirin tablet in addition to hydroxyurea.    He reports no other concerns.    Physical exam: He looks well. Detailed exam not performed (video visit).    Labs: No new labs were obtained as part of today's visit. Recent labs reviewed. Over the last year, his CBC parameters have remained stable.      ASSESSMENT / PLAN:  Polycythemia vera.    Overall, Zaheer continues to do  well. He would like to continue adjusting the hydroxyurea dose to minimize his need for phlebotomy. His CBC parameters have remained stable and he does have some room to go up on the dose. Historically he has been quite sensitive to small changes in dose and so we will do this carefully.  He is currently taking 1000 mg daily. He will start by adding 2 additional pills per week and will continue to monitor his CBC every 2 to 4 weeks as long as dose adjustments are being made.    We will also continue with therapeutic phlebotomy as needed for hematocrits of 45% or higher.  We will continue to maintain contact through Playrcarthart and/or telephone, and plan for return clinic visit in 12 months, sooner if there are new issues or concerns.    Total time 30 minutes, including review of medical records and labs, video visit, and documentation.      Escobar Peterson MD  Professor of Medicine  Division of Hematology, Oncology, and Transplantation  Director, [facility]

## 2021-08-20 ENCOUNTER — LAB (OUTPATIENT)
Dept: LAB | Facility: CLINIC | Age: 61
End: 2021-08-20
Payer: COMMERCIAL

## 2021-08-20 DIAGNOSIS — D45 POLYCYTHEMIA VERA (H): ICD-10-CM

## 2021-08-20 LAB
BASOPHILS # BLD AUTO: 0.1 10E3/UL (ref 0–0.2)
BASOPHILS NFR BLD AUTO: 2 %
EOSINOPHIL # BLD AUTO: 0.1 10E3/UL (ref 0–0.7)
EOSINOPHIL NFR BLD AUTO: 3 %
ERYTHROCYTE [DISTWIDTH] IN BLOOD BY AUTOMATED COUNT: 13.1 % (ref 10–15)
HCT VFR BLD AUTO: 43 % (ref 40–53)
HGB BLD-MCNC: 14.2 G/DL (ref 13.3–17.7)
IMM GRANULOCYTES # BLD: 0 10E3/UL
IMM GRANULOCYTES NFR BLD: 1 %
LYMPHOCYTES # BLD AUTO: 0.9 10E3/UL (ref 0.8–5.3)
LYMPHOCYTES NFR BLD AUTO: 22 %
MCH RBC QN AUTO: 30.6 PG (ref 26.5–33)
MCHC RBC AUTO-ENTMCNC: 33 G/DL (ref 31.5–36.5)
MCV RBC AUTO: 93 FL (ref 78–100)
MONOCYTES # BLD AUTO: 0.3 10E3/UL (ref 0–1.3)
MONOCYTES NFR BLD AUTO: 8 %
NEUTROPHILS # BLD AUTO: 2.7 10E3/UL (ref 1.6–8.3)
NEUTROPHILS NFR BLD AUTO: 64 %
NRBC # BLD AUTO: 0 10E3/UL
NRBC BLD AUTO-RTO: 0 /100
PLATELET # BLD AUTO: 111 10E3/UL (ref 150–450)
RBC # BLD AUTO: 4.64 10E6/UL (ref 4.4–5.9)
WBC # BLD AUTO: 4.1 10E3/UL (ref 4–11)

## 2021-08-20 PROCEDURE — 36415 COLL VENOUS BLD VENIPUNCTURE: CPT | Performed by: PATHOLOGY

## 2021-08-20 PROCEDURE — 85025 COMPLETE CBC W/AUTO DIFF WBC: CPT | Performed by: PATHOLOGY

## 2021-09-03 ENCOUNTER — LAB (OUTPATIENT)
Dept: LAB | Facility: CLINIC | Age: 61
End: 2021-09-03
Payer: COMMERCIAL

## 2021-09-03 DIAGNOSIS — D45 POLYCYTHEMIA VERA (H): ICD-10-CM

## 2021-09-03 LAB
BASOPHILS # BLD AUTO: 0.1 10E3/UL (ref 0–0.2)
BASOPHILS NFR BLD AUTO: 2 %
EOSINOPHIL # BLD AUTO: 0.1 10E3/UL (ref 0–0.7)
EOSINOPHIL NFR BLD AUTO: 3 %
ERYTHROCYTE [DISTWIDTH] IN BLOOD BY AUTOMATED COUNT: 13.5 % (ref 10–15)
HCT VFR BLD AUTO: 43.5 % (ref 40–53)
HGB BLD-MCNC: 14.5 G/DL (ref 13.3–17.7)
IMM GRANULOCYTES # BLD: 0 10E3/UL
IMM GRANULOCYTES NFR BLD: 1 %
LYMPHOCYTES # BLD AUTO: 0.9 10E3/UL (ref 0.8–5.3)
LYMPHOCYTES NFR BLD AUTO: 26 %
MCH RBC QN AUTO: 30.9 PG (ref 26.5–33)
MCHC RBC AUTO-ENTMCNC: 33.3 G/DL (ref 31.5–36.5)
MCV RBC AUTO: 93 FL (ref 78–100)
MONOCYTES # BLD AUTO: 0.2 10E3/UL (ref 0–1.3)
MONOCYTES NFR BLD AUTO: 7 %
NEUTROPHILS # BLD AUTO: 2.3 10E3/UL (ref 1.6–8.3)
NEUTROPHILS NFR BLD AUTO: 61 %
NRBC # BLD AUTO: 0 10E3/UL
NRBC BLD AUTO-RTO: 0 /100
PLATELET # BLD AUTO: 200 10E3/UL (ref 150–450)
RBC # BLD AUTO: 4.69 10E6/UL (ref 4.4–5.9)
WBC # BLD AUTO: 3.6 10E3/UL (ref 4–11)

## 2021-09-03 PROCEDURE — 85025 COMPLETE CBC W/AUTO DIFF WBC: CPT | Performed by: PATHOLOGY

## 2021-09-03 PROCEDURE — 36415 COLL VENOUS BLD VENIPUNCTURE: CPT | Performed by: PATHOLOGY

## 2021-09-04 ENCOUNTER — HEALTH MAINTENANCE LETTER (OUTPATIENT)
Age: 61
End: 2021-09-04

## 2021-09-17 ENCOUNTER — LAB (OUTPATIENT)
Dept: LAB | Facility: CLINIC | Age: 61
End: 2021-09-17
Payer: COMMERCIAL

## 2021-09-17 LAB — HOLD SPECIMEN: NORMAL

## 2021-09-24 ENCOUNTER — LAB (OUTPATIENT)
Dept: LAB | Facility: CLINIC | Age: 61
End: 2021-09-24
Payer: COMMERCIAL

## 2021-09-24 DIAGNOSIS — D45 POLYCYTHEMIA VERA (H): ICD-10-CM

## 2021-09-24 LAB
BASOPHILS # BLD AUTO: 0 10E3/UL (ref 0–0.2)
BASOPHILS NFR BLD AUTO: 1 %
EOSINOPHIL # BLD AUTO: 0.1 10E3/UL (ref 0–0.7)
EOSINOPHIL NFR BLD AUTO: 2 %
ERYTHROCYTE [DISTWIDTH] IN BLOOD BY AUTOMATED COUNT: 15 % (ref 10–15)
HCT VFR BLD AUTO: 43.5 % (ref 40–53)
HGB BLD-MCNC: 14.3 G/DL (ref 13.3–17.7)
IMM GRANULOCYTES # BLD: 0 10E3/UL
IMM GRANULOCYTES NFR BLD: 0 %
LYMPHOCYTES # BLD AUTO: 0.6 10E3/UL (ref 0.8–5.3)
LYMPHOCYTES NFR BLD AUTO: 24 %
MCH RBC QN AUTO: 31.6 PG (ref 26.5–33)
MCHC RBC AUTO-ENTMCNC: 32.9 G/DL (ref 31.5–36.5)
MCV RBC AUTO: 96 FL (ref 78–100)
MONOCYTES # BLD AUTO: 0.2 10E3/UL (ref 0–1.3)
MONOCYTES NFR BLD AUTO: 7 %
NEUTROPHILS # BLD AUTO: 1.7 10E3/UL (ref 1.6–8.3)
NEUTROPHILS NFR BLD AUTO: 66 %
NRBC # BLD AUTO: 0 10E3/UL
NRBC BLD AUTO-RTO: 0 /100
PLATELET # BLD AUTO: 85 10E3/UL (ref 150–450)
RBC # BLD AUTO: 4.52 10E6/UL (ref 4.4–5.9)
WBC # BLD AUTO: 2.6 10E3/UL (ref 4–11)

## 2021-09-24 PROCEDURE — 85025 COMPLETE CBC W/AUTO DIFF WBC: CPT | Performed by: PATHOLOGY

## 2021-09-24 PROCEDURE — 36415 COLL VENOUS BLD VENIPUNCTURE: CPT | Performed by: PATHOLOGY

## 2021-09-30 ENCOUNTER — LAB (OUTPATIENT)
Dept: LAB | Facility: CLINIC | Age: 61
End: 2021-09-30
Payer: COMMERCIAL

## 2021-09-30 DIAGNOSIS — D45 POLYCYTHEMIA VERA (H): ICD-10-CM

## 2021-09-30 LAB
BASOPHILS # BLD AUTO: 0.1 10E3/UL (ref 0–0.2)
BASOPHILS NFR BLD AUTO: 2 %
EOSINOPHIL # BLD AUTO: 0.1 10E3/UL (ref 0–0.7)
EOSINOPHIL NFR BLD AUTO: 2 %
ERYTHROCYTE [DISTWIDTH] IN BLOOD BY AUTOMATED COUNT: 15.3 % (ref 10–15)
HCT VFR BLD AUTO: 45 % (ref 40–53)
HGB BLD-MCNC: 15.1 G/DL (ref 13.3–17.7)
IMM GRANULOCYTES # BLD: 0 10E3/UL
IMM GRANULOCYTES NFR BLD: 1 %
LYMPHOCYTES # BLD AUTO: 0.9 10E3/UL (ref 0.8–5.3)
LYMPHOCYTES NFR BLD AUTO: 27 %
MCH RBC QN AUTO: 31.2 PG (ref 26.5–33)
MCHC RBC AUTO-ENTMCNC: 33.6 G/DL (ref 31.5–36.5)
MCV RBC AUTO: 93 FL (ref 78–100)
MONOCYTES # BLD AUTO: 0.2 10E3/UL (ref 0–1.3)
MONOCYTES NFR BLD AUTO: 7 %
NEUTROPHILS # BLD AUTO: 1.9 10E3/UL (ref 1.6–8.3)
NEUTROPHILS NFR BLD AUTO: 61 %
NRBC # BLD AUTO: 0 10E3/UL
NRBC BLD AUTO-RTO: 0 /100
PLATELET # BLD AUTO: 160 10E3/UL (ref 150–450)
RBC # BLD AUTO: 4.84 10E6/UL (ref 4.4–5.9)
WBC # BLD AUTO: 3.1 10E3/UL (ref 4–11)

## 2021-09-30 PROCEDURE — 85025 COMPLETE CBC W/AUTO DIFF WBC: CPT | Performed by: PATHOLOGY

## 2021-09-30 PROCEDURE — 36415 COLL VENOUS BLD VENIPUNCTURE: CPT | Performed by: PATHOLOGY

## 2021-10-11 ENCOUNTER — APPOINTMENT (OUTPATIENT)
Dept: LAB | Facility: CLINIC | Age: 61
End: 2021-10-11
Payer: COMMERCIAL

## 2021-10-11 LAB
BASOPHILS # BLD AUTO: 0 10E3/UL (ref 0–0.2)
BASOPHILS NFR BLD AUTO: 1 %
EOSINOPHIL # BLD AUTO: 0.1 10E3/UL (ref 0–0.7)
EOSINOPHIL NFR BLD AUTO: 2 %
ERYTHROCYTE [DISTWIDTH] IN BLOOD BY AUTOMATED COUNT: 14.7 % (ref 10–15)
HCT VFR BLD AUTO: 44.6 % (ref 40–53)
HGB BLD-MCNC: 14.9 G/DL (ref 13.3–17.7)
IMM GRANULOCYTES # BLD: 0 10E3/UL
IMM GRANULOCYTES NFR BLD: 1 %
LYMPHOCYTES # BLD AUTO: 0.7 10E3/UL (ref 0.8–5.3)
LYMPHOCYTES NFR BLD AUTO: 20 %
MCH RBC QN AUTO: 32 PG (ref 26.5–33)
MCHC RBC AUTO-ENTMCNC: 33.4 G/DL (ref 31.5–36.5)
MCV RBC AUTO: 96 FL (ref 78–100)
MONOCYTES # BLD AUTO: 0.3 10E3/UL (ref 0–1.3)
MONOCYTES NFR BLD AUTO: 8 %
NEUTROPHILS # BLD AUTO: 2.4 10E3/UL (ref 1.6–8.3)
NEUTROPHILS NFR BLD AUTO: 68 %
NRBC # BLD AUTO: 0 10E3/UL
NRBC BLD AUTO-RTO: 0 /100
PLATELET # BLD AUTO: 217 10E3/UL (ref 150–450)
RBC # BLD AUTO: 4.65 10E6/UL (ref 4.4–5.9)
WBC # BLD AUTO: 3.5 10E3/UL (ref 4–11)

## 2021-10-22 ENCOUNTER — LAB (OUTPATIENT)
Dept: LAB | Facility: CLINIC | Age: 61
End: 2021-10-22
Payer: COMMERCIAL

## 2021-10-22 DIAGNOSIS — D45 POLYCYTHEMIA VERA (H): ICD-10-CM

## 2021-10-22 LAB
BASOPHILS # BLD AUTO: 0 10E3/UL (ref 0–0.2)
BASOPHILS NFR BLD AUTO: 1 %
EOSINOPHIL # BLD AUTO: 0.1 10E3/UL (ref 0–0.7)
EOSINOPHIL NFR BLD AUTO: 2 %
ERYTHROCYTE [DISTWIDTH] IN BLOOD BY AUTOMATED COUNT: 13.8 % (ref 10–15)
HCT VFR BLD AUTO: 44.9 % (ref 40–53)
HGB BLD-MCNC: 15.2 G/DL (ref 13.3–17.7)
IMM GRANULOCYTES # BLD: 0 10E3/UL
IMM GRANULOCYTES NFR BLD: 1 %
LYMPHOCYTES # BLD AUTO: 0.7 10E3/UL (ref 0.8–5.3)
LYMPHOCYTES NFR BLD AUTO: 23 %
MCH RBC QN AUTO: 31.7 PG (ref 26.5–33)
MCHC RBC AUTO-ENTMCNC: 33.9 G/DL (ref 31.5–36.5)
MCV RBC AUTO: 94 FL (ref 78–100)
MONOCYTES # BLD AUTO: 0.2 10E3/UL (ref 0–1.3)
MONOCYTES NFR BLD AUTO: 7 %
NEUTROPHILS # BLD AUTO: 2.2 10E3/UL (ref 1.6–8.3)
NEUTROPHILS NFR BLD AUTO: 66 %
NRBC # BLD AUTO: 0 10E3/UL
NRBC BLD AUTO-RTO: 0 /100
PLATELET # BLD AUTO: 121 10E3/UL (ref 150–450)
RBC # BLD AUTO: 4.8 10E6/UL (ref 4.4–5.9)
WBC # BLD AUTO: 3.3 10E3/UL (ref 4–11)

## 2021-10-22 PROCEDURE — 36415 COLL VENOUS BLD VENIPUNCTURE: CPT | Performed by: PATHOLOGY

## 2021-10-22 PROCEDURE — 85025 COMPLETE CBC W/AUTO DIFF WBC: CPT | Performed by: PATHOLOGY

## 2021-10-28 ENCOUNTER — INFUSION THERAPY VISIT (OUTPATIENT)
Dept: ONCOLOGY | Facility: CLINIC | Age: 61
End: 2021-10-28
Attending: INTERNAL MEDICINE
Payer: COMMERCIAL

## 2021-10-28 VITALS
OXYGEN SATURATION: 96 % | DIASTOLIC BLOOD PRESSURE: 87 MMHG | RESPIRATION RATE: 12 BRPM | HEART RATE: 75 BPM | TEMPERATURE: 99.2 F | SYSTOLIC BLOOD PRESSURE: 144 MMHG

## 2021-10-28 DIAGNOSIS — D45 POLYCYTHEMIA VERA (H): Primary | ICD-10-CM

## 2021-10-28 LAB
BASOPHILS # BLD AUTO: 0.1 10E3/UL (ref 0–0.2)
BASOPHILS NFR BLD AUTO: 2 %
EOSINOPHIL # BLD AUTO: 0.1 10E3/UL (ref 0–0.7)
EOSINOPHIL NFR BLD AUTO: 3 %
ERYTHROCYTE [DISTWIDTH] IN BLOOD BY AUTOMATED COUNT: 14.1 % (ref 10–15)
HCT VFR BLD AUTO: 47.5 % (ref 40–53)
HGB BLD-MCNC: 16 G/DL (ref 13.3–17.7)
IMM GRANULOCYTES # BLD: 0 10E3/UL
IMM GRANULOCYTES NFR BLD: 1 %
LYMPHOCYTES # BLD AUTO: 0.9 10E3/UL (ref 0.8–5.3)
LYMPHOCYTES NFR BLD AUTO: 23 %
MCH RBC QN AUTO: 31.7 PG (ref 26.5–33)
MCHC RBC AUTO-ENTMCNC: 33.7 G/DL (ref 31.5–36.5)
MCV RBC AUTO: 94 FL (ref 78–100)
MONOCYTES # BLD AUTO: 0.3 10E3/UL (ref 0–1.3)
MONOCYTES NFR BLD AUTO: 8 %
NEUTROPHILS # BLD AUTO: 2.5 10E3/UL (ref 1.6–8.3)
NEUTROPHILS NFR BLD AUTO: 63 %
NRBC # BLD AUTO: 0 10E3/UL
NRBC BLD AUTO-RTO: 0 /100
PLATELET # BLD AUTO: 122 10E3/UL (ref 150–450)
RBC # BLD AUTO: 5.04 10E6/UL (ref 4.4–5.9)
WBC # BLD AUTO: 3.9 10E3/UL (ref 4–11)

## 2021-10-28 PROCEDURE — 99195 PHLEBOTOMY: CPT

## 2021-10-28 PROCEDURE — 85025 COMPLETE CBC W/AUTO DIFF WBC: CPT

## 2021-10-28 PROCEDURE — 36415 COLL VENOUS BLD VENIPUNCTURE: CPT

## 2021-10-28 ASSESSMENT — PAIN SCALES - GENERAL: PAINLEVEL: NO PAIN (0)

## 2021-10-28 NOTE — PROGRESS NOTES
Infusion Nursing Note:  Zaheer Borges presents today for Phlebotomy.    Patient seen by provider today: No   present during visit today: Not Applicable.    Note: Patient denies the following: fevers, body aches, chills, headaches, vision changes, dizziness, chest pain, shortness of breath, nausea, vomiting, constipation, abdominal pain, rashes, bruising/bleeding, mouth sores, swelling or pain in the legs. Ok for treatment.     Remove 500 mL blood prn for hematocrit >45%.    500 ml blood return without incident. VS monitored pre and post phlebotomy, see VS flow sheet.    Intravenous Access:  Peripheral IV placed.    Treatment Conditions:  Lab Results   Component Value Date    HGB 16.0 10/28/2021    WBC 3.9 (L) 10/28/2021    ANEU 2.5 06/28/2021    ANEUTAUTO 2.5 10/28/2021     (L) 10/28/2021      HEMAT : 47.5%    Post Infusion Assessment:  Site patent and intact, free from redness, edema or discomfort.  No evidence of extravasations.  Access discontinued per protocol.       Discharge Plan:   Patient declined prescription refills.  Discharge instructions reviewed with: Patient.  Patient and/or family verbalized understanding of discharge instructions and all questions answered.  AVS to patient via MelodigramT.  Patient will call scheduling to make his next phlebotomy appt.  Patient discharged in stable condition accompanied by: self.  Departure Mode: Ambulatory.    Jing Cavazos RN

## 2021-12-03 ENCOUNTER — LAB (OUTPATIENT)
Dept: LAB | Facility: CLINIC | Age: 61
End: 2021-12-03
Payer: COMMERCIAL

## 2021-12-03 DIAGNOSIS — D45 POLYCYTHEMIA VERA (H): ICD-10-CM

## 2021-12-03 LAB
BASOPHILS # BLD AUTO: 0 10E3/UL (ref 0–0.2)
BASOPHILS NFR BLD AUTO: 1 %
EOSINOPHIL # BLD AUTO: 0.1 10E3/UL (ref 0–0.7)
EOSINOPHIL NFR BLD AUTO: 2 %
ERYTHROCYTE [DISTWIDTH] IN BLOOD BY AUTOMATED COUNT: 12.7 % (ref 10–15)
HCT VFR BLD AUTO: 42.8 % (ref 40–53)
HGB BLD-MCNC: 14.5 G/DL (ref 13.3–17.7)
IMM GRANULOCYTES # BLD: 0 10E3/UL
IMM GRANULOCYTES NFR BLD: 1 %
LYMPHOCYTES # BLD AUTO: 0.8 10E3/UL (ref 0.8–5.3)
LYMPHOCYTES NFR BLD AUTO: 26 %
MCH RBC QN AUTO: 32.7 PG (ref 26.5–33)
MCHC RBC AUTO-ENTMCNC: 33.9 G/DL (ref 31.5–36.5)
MCV RBC AUTO: 96 FL (ref 78–100)
MONOCYTES # BLD AUTO: 0.2 10E3/UL (ref 0–1.3)
MONOCYTES NFR BLD AUTO: 6 %
NEUTROPHILS # BLD AUTO: 2 10E3/UL (ref 1.6–8.3)
NEUTROPHILS NFR BLD AUTO: 64 %
NRBC # BLD AUTO: 0 10E3/UL
NRBC BLD AUTO-RTO: 0 /100
PLATELET # BLD AUTO: 146 10E3/UL (ref 150–450)
RBC # BLD AUTO: 4.44 10E6/UL (ref 4.4–5.9)
WBC # BLD AUTO: 3.1 10E3/UL (ref 4–11)

## 2021-12-03 PROCEDURE — 85025 COMPLETE CBC W/AUTO DIFF WBC: CPT | Performed by: PATHOLOGY

## 2021-12-03 PROCEDURE — 36415 COLL VENOUS BLD VENIPUNCTURE: CPT | Performed by: PATHOLOGY

## 2022-01-13 ENCOUNTER — APPOINTMENT (OUTPATIENT)
Dept: LAB | Facility: CLINIC | Age: 62
End: 2022-01-13
Payer: COMMERCIAL

## 2022-01-13 PROCEDURE — 36415 COLL VENOUS BLD VENIPUNCTURE: CPT | Performed by: PATHOLOGY

## 2022-01-13 PROCEDURE — 85025 COMPLETE CBC W/AUTO DIFF WBC: CPT | Performed by: PATHOLOGY

## 2022-01-16 ENCOUNTER — MYC MEDICAL ADVICE (OUTPATIENT)
Dept: ONCOLOGY | Facility: CLINIC | Age: 62
End: 2022-01-16
Payer: COMMERCIAL

## 2022-01-28 ENCOUNTER — INFUSION THERAPY VISIT (OUTPATIENT)
Dept: ONCOLOGY | Facility: CLINIC | Age: 62
End: 2022-01-28
Attending: INTERNAL MEDICINE
Payer: COMMERCIAL

## 2022-01-28 VITALS
DIASTOLIC BLOOD PRESSURE: 97 MMHG | HEART RATE: 81 BPM | SYSTOLIC BLOOD PRESSURE: 163 MMHG | TEMPERATURE: 97.8 F | OXYGEN SATURATION: 96 % | RESPIRATION RATE: 16 BRPM

## 2022-01-28 DIAGNOSIS — D45 POLYCYTHEMIA VERA (H): Primary | ICD-10-CM

## 2022-01-28 PROCEDURE — 99195 PHLEBOTOMY: CPT

## 2022-01-28 NOTE — PROGRESS NOTES
"Infusion Nursing Note:  Zaheer Borges presents today for Therapeutic Phlebotomy.    Patient seen by provider today: No   present during visit today: Not Applicable.    Note: Patient arrives feeling well. Denies N/V/D, fever or chills. Gets SOB with moderate activity but this his baseline. BP 200s/110s upon arrival. Otherwise asymptomatic and states he gets \"white coat syndrome\". Has been continuously elevated during his infusion visits but this is higher than his usual. Pt takes his BP at home and runs normal. BP improved to 160s/90s post phleb. Notified Dr. Peterson via IB.      Intravenous Access:  20g peripheral IV placed.    Treatment Conditions:  Hematocrit on 1/13: 47.5%    Orders to remove 500cc's if hematocrit >45%.      Post Infusion Assessment:  Patient tolerated phleb without incident. ~610cc's moved.  Blood return noted pre and post infusion.  Site patent and intact, free from redness, edema or discomfort.  Access discontinued per protocol.       Discharge Plan:   Patient declined prescription refills.  Discharge instructions reviewed with: Patient.  Patient and/or family verbalized understanding of discharge instructions and all questions answered.  AVS to patient via [employer]. Patient will call scheduling to make his next phlebotomy appt.  Patient discharged in stable condition accompanied by: self.  Departure Mode: Ambulatory.      Sophie Grigsby RN                      "

## 2022-02-19 ENCOUNTER — HEALTH MAINTENANCE LETTER (OUTPATIENT)
Age: 62
End: 2022-02-19

## 2022-04-15 ENCOUNTER — LAB (OUTPATIENT)
Dept: LAB | Facility: CLINIC | Age: 62
End: 2022-04-15
Payer: COMMERCIAL

## 2022-04-15 DIAGNOSIS — D45 POLYCYTHEMIA VERA (H): ICD-10-CM

## 2022-04-15 LAB
BASOPHILS # BLD AUTO: 0.1 10E3/UL (ref 0–0.2)
BASOPHILS NFR BLD AUTO: 2 %
EOSINOPHIL # BLD AUTO: 0.1 10E3/UL (ref 0–0.7)
EOSINOPHIL NFR BLD AUTO: 2 %
ERYTHROCYTE [DISTWIDTH] IN BLOOD BY AUTOMATED COUNT: 13 % (ref 10–15)
HCT VFR BLD AUTO: 46.7 % (ref 40–53)
HGB BLD-MCNC: 15.2 G/DL (ref 13.3–17.7)
IMM GRANULOCYTES # BLD: 0 10E3/UL
IMM GRANULOCYTES NFR BLD: 1 %
LYMPHOCYTES # BLD AUTO: 0.9 10E3/UL (ref 0.8–5.3)
LYMPHOCYTES NFR BLD AUTO: 26 %
MCH RBC QN AUTO: 31.7 PG (ref 26.5–33)
MCHC RBC AUTO-ENTMCNC: 32.5 G/DL (ref 31.5–36.5)
MCV RBC AUTO: 97 FL (ref 78–100)
MONOCYTES # BLD AUTO: 0.3 10E3/UL (ref 0–1.3)
MONOCYTES NFR BLD AUTO: 8 %
NEUTROPHILS # BLD AUTO: 2.1 10E3/UL (ref 1.6–8.3)
NEUTROPHILS NFR BLD AUTO: 61 %
NRBC # BLD AUTO: 0 10E3/UL
NRBC BLD AUTO-RTO: 0 /100
PLATELET # BLD AUTO: 254 10E3/UL (ref 150–450)
RBC # BLD AUTO: 4.8 10E6/UL (ref 4.4–5.9)
WBC # BLD AUTO: 3.3 10E3/UL (ref 4–11)

## 2022-04-15 PROCEDURE — 36415 COLL VENOUS BLD VENIPUNCTURE: CPT | Performed by: PATHOLOGY

## 2022-04-15 PROCEDURE — 85025 COMPLETE CBC W/AUTO DIFF WBC: CPT | Performed by: PATHOLOGY

## 2022-04-15 NOTE — TELEPHONE ENCOUNTER
[facility] Triage    Last prescribing provider: Nicholas    Last clinic visit date: 8/4/22 [place]    Any missed appointments or no-shows since last clinic visit?: No    Recommendations for requested medication (if none, N/A): NA    Next clinic visit date: 8/5/22 Nicholas    Any other pertinent information (if none, N/A): LULU    Routing to Dr. Peterson

## 2022-04-16 RX ORDER — HYDROXYUREA 500 MG/1
CAPSULE ORAL
Qty: 180 CAPSULE | Refills: 1 | Status: SHIPPED | OUTPATIENT
Start: 2022-04-16 | End: 2022-04-18

## 2022-04-26 ENCOUNTER — INFUSION THERAPY VISIT (OUTPATIENT)
Dept: ONCOLOGY | Facility: CLINIC | Age: 62
End: 2022-04-26
Attending: INTERNAL MEDICINE
Payer: COMMERCIAL

## 2022-04-26 VITALS
TEMPERATURE: 98.5 F | DIASTOLIC BLOOD PRESSURE: 92 MMHG | RESPIRATION RATE: 16 BRPM | SYSTOLIC BLOOD PRESSURE: 145 MMHG | HEART RATE: 84 BPM | OXYGEN SATURATION: 95 %

## 2022-04-26 DIAGNOSIS — D45 POLYCYTHEMIA VERA (H): Primary | ICD-10-CM

## 2022-04-26 PROCEDURE — 99195 PHLEBOTOMY: CPT

## 2022-04-26 ASSESSMENT — PAIN SCALES - GENERAL: PAINLEVEL: NO PAIN (0)

## 2022-04-26 NOTE — PATIENT INSTRUCTIONS
April 2022 Sunday Monday Tuesday Wednesday Thursday Friday Saturday                            1     2       3     4     5     6     7     8     9       10     11     12     13     14     15    LAB   1:30 PM   (15 min.)    LAB   [facility] [place] 16       17     18     19     20     21     22     23       24     25     26    ONC INFUSION 1 HR (60 MIN)   2:00 PM   (60 min.)    ONC INFUSION NURSE   [facility] 27 28 29     30                   May 2022      [place] Monday Tuesday Wednesday Thursday Friday Saturday   1     2     3     4     5     6     7       8     9     10     11     12     13     14       15     16     17     18     19     20     21       22     23     24     25     26     27     28       29     30     31                                           Lab Results:  No results found for this or any previous visit (from the past 12 hour(s)).

## 2022-04-26 NOTE — PROGRESS NOTES
Infusion Nursing Note:  Zaheer Borges presents today for Phlebotomy.    Patient seen by provider today: No   present during visit today: Not Applicable.    Note: Patient reported to clinic today with no new complaints or concerns. Patient started Phlebotomy at 1430 and was complete at 500 ml out at 1447. No concerning s/s and VSS upon leaving clinic.    Intravenous Access:  Peripheral IV placed.    Treatment Conditions:  Lab Results   Component Value Date    HGB 15.2 04/15/2022    WBC 3.3 (L) 04/15/2022    ANEU 2.5 06/28/2021    ANEUTAUTO 2.1 04/15/2022     04/15/2022      Lab Results   Component Value Date     07/24/2020    POTASSIUM 4.4 07/24/2020    CR 0.78 07/24/2020    ARISTIDES 9.2 07/24/2020    BILITOTAL 0.5 02/07/2020    ALBUMIN 4.3 02/07/2020    ALT 19 02/07/2020    AST 21 02/07/2020     Post Infusion Assessment:  Site patent and intact, free from redness, edema or discomfort.  No evidence of extravasations.  Access discontinued per protocol.     Discharge Plan:   Patient declined prescription refills.  Discharge instructions reviewed with: Patient.  Patient and/or family verbalized understanding of discharge instructions and all questions answered.  AVS to patient via [employer].  Patient will return 8/4/22 for next appointment.   Patient discharged in stable condition accompanied by: self.  Departure Mode: Ambulatory.  Face to Face time: 0 minutes.    Lion Paredes RN

## 2022-06-06 ENCOUNTER — LAB (OUTPATIENT)
Dept: LAB | Facility: CLINIC | Age: 62
End: 2022-06-06
Payer: COMMERCIAL

## 2022-06-06 DIAGNOSIS — D45 POLYCYTHEMIA VERA (H): ICD-10-CM

## 2022-06-06 LAB
BASOPHILS # BLD AUTO: 0 10E3/UL (ref 0–0.2)
BASOPHILS NFR BLD AUTO: 1 %
EOSINOPHIL # BLD AUTO: 0.1 10E3/UL (ref 0–0.7)
EOSINOPHIL NFR BLD AUTO: 2 %
ERYTHROCYTE [DISTWIDTH] IN BLOOD BY AUTOMATED COUNT: 12.5 % (ref 10–15)
HCT VFR BLD AUTO: 42.8 % (ref 40–53)
HGB BLD-MCNC: 14.2 G/DL (ref 13.3–17.7)
IMM GRANULOCYTES # BLD: 0 10E3/UL
IMM GRANULOCYTES NFR BLD: 1 %
LYMPHOCYTES # BLD AUTO: 0.5 10E3/UL (ref 0.8–5.3)
LYMPHOCYTES NFR BLD AUTO: 18 %
MCH RBC QN AUTO: 31.8 PG (ref 26.5–33)
MCHC RBC AUTO-ENTMCNC: 33.2 G/DL (ref 31.5–36.5)
MCV RBC AUTO: 96 FL (ref 78–100)
MONOCYTES # BLD AUTO: 0.2 10E3/UL (ref 0–1.3)
MONOCYTES NFR BLD AUTO: 8 %
NEUTROPHILS # BLD AUTO: 2 10E3/UL (ref 1.6–8.3)
NEUTROPHILS NFR BLD AUTO: 70 %
NRBC # BLD AUTO: 0 10E3/UL
NRBC BLD AUTO-RTO: 0 /100
PLATELET # BLD AUTO: 91 10E3/UL (ref 150–450)
RBC # BLD AUTO: 4.47 10E6/UL (ref 4.4–5.9)
WBC # BLD AUTO: 2.9 10E3/UL (ref 4–11)

## 2022-06-06 PROCEDURE — 85025 COMPLETE CBC W/AUTO DIFF WBC: CPT | Performed by: PATHOLOGY

## 2022-06-06 PROCEDURE — 36415 COLL VENOUS BLD VENIPUNCTURE: CPT | Performed by: PATHOLOGY

## 2022-06-30 ENCOUNTER — LAB (OUTPATIENT)
Dept: LAB | Facility: CLINIC | Age: 62
End: 2022-06-30
Payer: COMMERCIAL

## 2022-06-30 DIAGNOSIS — D45 POLYCYTHEMIA VERA (H): ICD-10-CM

## 2022-06-30 LAB
BASOPHILS # BLD AUTO: 0.1 10E3/UL (ref 0–0.2)
BASOPHILS NFR BLD AUTO: 1 %
EOSINOPHIL # BLD AUTO: 0.1 10E3/UL (ref 0–0.7)
EOSINOPHIL NFR BLD AUTO: 2 %
ERYTHROCYTE [DISTWIDTH] IN BLOOD BY AUTOMATED COUNT: 12.1 % (ref 10–15)
HCT VFR BLD AUTO: 44.9 % (ref 40–53)
HGB BLD-MCNC: 14.8 G/DL (ref 13.3–17.7)
IMM GRANULOCYTES # BLD: 0 10E3/UL
IMM GRANULOCYTES NFR BLD: 1 %
LYMPHOCYTES # BLD AUTO: 0.9 10E3/UL (ref 0.8–5.3)
LYMPHOCYTES NFR BLD AUTO: 24 %
MCH RBC QN AUTO: 31.5 PG (ref 26.5–33)
MCHC RBC AUTO-ENTMCNC: 33 G/DL (ref 31.5–36.5)
MCV RBC AUTO: 96 FL (ref 78–100)
MONOCYTES # BLD AUTO: 0.3 10E3/UL (ref 0–1.3)
MONOCYTES NFR BLD AUTO: 8 %
NEUTROPHILS # BLD AUTO: 2.4 10E3/UL (ref 1.6–8.3)
NEUTROPHILS NFR BLD AUTO: 64 %
NRBC # BLD AUTO: 0 10E3/UL
NRBC BLD AUTO-RTO: 0 /100
PLATELET # BLD AUTO: 133 10E3/UL (ref 150–450)
RBC # BLD AUTO: 4.7 10E6/UL (ref 4.4–5.9)
WBC # BLD AUTO: 3.7 10E3/UL (ref 4–11)

## 2022-06-30 PROCEDURE — 36415 COLL VENOUS BLD VENIPUNCTURE: CPT | Performed by: PATHOLOGY

## 2022-06-30 PROCEDURE — 85025 COMPLETE CBC W/AUTO DIFF WBC: CPT | Performed by: PATHOLOGY

## 2022-07-13 ENCOUNTER — PATIENT OUTREACH (OUTPATIENT)
Dept: ONCOLOGY | Facility: CLINIC | Age: 62
End: 2022-07-13

## 2022-07-13 ENCOUNTER — LAB (OUTPATIENT)
Dept: LAB | Facility: CLINIC | Age: 62
End: 2022-07-13
Payer: COMMERCIAL

## 2022-07-13 DIAGNOSIS — D45 POLYCYTHEMIA VERA (H): ICD-10-CM

## 2022-07-13 LAB
BASOPHILS # BLD AUTO: 0.1 10E3/UL (ref 0–0.2)
BASOPHILS NFR BLD AUTO: 1 %
EOSINOPHIL # BLD AUTO: 0.1 10E3/UL (ref 0–0.7)
EOSINOPHIL NFR BLD AUTO: 4 %
ERYTHROCYTE [DISTWIDTH] IN BLOOD BY AUTOMATED COUNT: 12.6 % (ref 10–15)
HCT VFR BLD AUTO: 44.2 % (ref 40–53)
HGB BLD-MCNC: 15 G/DL (ref 13.3–17.7)
IMM GRANULOCYTES # BLD: 0 10E3/UL
IMM GRANULOCYTES NFR BLD: 1 %
LYMPHOCYTES # BLD AUTO: 0.9 10E3/UL (ref 0.8–5.3)
LYMPHOCYTES NFR BLD AUTO: 26 %
MCH RBC QN AUTO: 31.7 PG (ref 26.5–33)
MCHC RBC AUTO-ENTMCNC: 33.9 G/DL (ref 31.5–36.5)
MCV RBC AUTO: 93 FL (ref 78–100)
MONOCYTES # BLD AUTO: 0.3 10E3/UL (ref 0–1.3)
MONOCYTES NFR BLD AUTO: 8 %
NEUTROPHILS # BLD AUTO: 2.1 10E3/UL (ref 1.6–8.3)
NEUTROPHILS NFR BLD AUTO: 60 %
NRBC # BLD AUTO: 0 10E3/UL
NRBC BLD AUTO-RTO: 0 /100
PLATELET # BLD AUTO: 147 10E3/UL (ref 150–450)
RBC # BLD AUTO: 4.73 10E6/UL (ref 4.4–5.9)
WBC # BLD AUTO: 3.5 10E3/UL (ref 4–11)

## 2022-07-13 PROCEDURE — 36415 COLL VENOUS BLD VENIPUNCTURE: CPT

## 2022-07-13 PROCEDURE — 85025 COMPLETE CBC W/AUTO DIFF WBC: CPT

## 2022-07-13 NOTE — NURSING NOTE
Chief Complaint   Patient presents with     Labs Only     Blood drawn via VPT by LPN.      GOPI Gil LPN

## 2022-07-13 NOTE — PROGRESS NOTES
"   07/13/22 1332   OTHER   Cancer Care Care Coordination Status Maintenance     Coordination needed when labs <45%hematocrit    [facility]: Hematology                                                                                          Patient labs today show hematocrit at 44.2%.  Therapy plan is for  phlebotomy when hematocrit >45%    Today patient is reporting symptoms and would like to proceed with phlebotomy as planned for tomorrow 7/14.    Symptoms include itching and SOB and SOB with exertion.  He also would like to proceed due to upcoming travel and would like to feel better for his trip.     Updated , whom approves this request : \"ok to proceed with phlebotomy on 7/14 with hematocrit at  44.2%\"        Noreen Carrillo LPN  Hematology Care Coordination  [phone]  "

## 2022-07-14 ENCOUNTER — INFUSION THERAPY VISIT (OUTPATIENT)
Dept: ONCOLOGY | Facility: CLINIC | Age: 62
End: 2022-07-14
Payer: COMMERCIAL

## 2022-07-14 VITALS
RESPIRATION RATE: 16 BRPM | SYSTOLIC BLOOD PRESSURE: 131 MMHG | DIASTOLIC BLOOD PRESSURE: 94 MMHG | TEMPERATURE: 98.1 F | OXYGEN SATURATION: 96 % | HEART RATE: 80 BPM

## 2022-07-14 DIAGNOSIS — D45 POLYCYTHEMIA VERA (H): Primary | ICD-10-CM

## 2022-07-14 DIAGNOSIS — D45 POLYCYTHEMIA VERA (H): ICD-10-CM

## 2022-07-14 PROCEDURE — 99195 PHLEBOTOMY: CPT

## 2022-07-14 ASSESSMENT — PAIN SCALES - GENERAL: PAINLEVEL: NO PAIN (0)

## 2022-07-14 NOTE — PROGRESS NOTES
Infusion Nursing Note:  Zaheer Borges presents today for phlebotomy.   Patient seen by provider : No        present during visit today: Not Applicable.    Note: Zaheer arrives to infusion today with symptoms of itchiness and SOB with exertion. He otherwise feels at his baseline. He requests a phlebotomy today with hematocrit 44.2%, which was approved by Dr. Peterson.     Intravenous Access:  20 gauge peripheral IV placed.    Post Infusion Assessment:  Access discontinued per protocol.  500 mL whole blood removed without incident.  BP stable, no dizziness/lightheadedness post treatment.      Discharge Plan:   Patient declined prescription refills.  AVS to patient via Yvol[employer]T.  Patient will return as needed for next appointment.   Patient discharged in stable condition accompanied by: self.  Departure Mode: Ambulatory.    Tiki Restrepo RN

## 2022-07-15 RX ORDER — HYDROXYUREA 500 MG/1
CAPSULE ORAL
Qty: 195 CAPSULE | Refills: 3 | Status: SHIPPED | OUTPATIENT
Start: 2022-07-15 | End: 2023-06-07

## 2022-09-14 ENCOUNTER — LAB (OUTPATIENT)
Dept: LAB | Facility: CLINIC | Age: 62
End: 2022-09-14
Payer: COMMERCIAL

## 2022-09-14 DIAGNOSIS — D45 POLYCYTHEMIA VERA (H): ICD-10-CM

## 2022-09-14 LAB
BASOPHILS # BLD AUTO: 0.1 10E3/UL (ref 0–0.2)
BASOPHILS NFR BLD AUTO: 1 %
EOSINOPHIL # BLD AUTO: 0.1 10E3/UL (ref 0–0.7)
EOSINOPHIL NFR BLD AUTO: 2 %
ERYTHROCYTE [DISTWIDTH] IN BLOOD BY AUTOMATED COUNT: 13.4 % (ref 10–15)
HCT VFR BLD AUTO: 43.9 % (ref 40–53)
HGB BLD-MCNC: 14.4 G/DL (ref 13.3–17.7)
IMM GRANULOCYTES # BLD: 0 10E3/UL
IMM GRANULOCYTES NFR BLD: 1 %
LYMPHOCYTES # BLD AUTO: 1 10E3/UL (ref 0.8–5.3)
LYMPHOCYTES NFR BLD AUTO: 23 %
MCH RBC QN AUTO: 30.8 PG (ref 26.5–33)
MCHC RBC AUTO-ENTMCNC: 32.8 G/DL (ref 31.5–36.5)
MCV RBC AUTO: 94 FL (ref 78–100)
MONOCYTES # BLD AUTO: 0.3 10E3/UL (ref 0–1.3)
MONOCYTES NFR BLD AUTO: 7 %
NEUTROPHILS # BLD AUTO: 2.9 10E3/UL (ref 1.6–8.3)
NEUTROPHILS NFR BLD AUTO: 66 %
NRBC # BLD AUTO: 0 10E3/UL
NRBC BLD AUTO-RTO: 0 /100
PLATELET # BLD AUTO: 160 10E3/UL (ref 150–450)
RBC # BLD AUTO: 4.68 10E6/UL (ref 4.4–5.9)
WBC # BLD AUTO: 4.3 10E3/UL (ref 4–11)

## 2022-09-14 PROCEDURE — 85025 COMPLETE CBC W/AUTO DIFF WBC: CPT | Performed by: PATHOLOGY

## 2022-09-14 PROCEDURE — 36415 COLL VENOUS BLD VENIPUNCTURE: CPT | Performed by: PATHOLOGY

## 2022-09-15 ENCOUNTER — VIRTUAL VISIT (OUTPATIENT)
Dept: ONCOLOGY | Facility: CLINIC | Age: 62
End: 2022-09-15
Attending: INTERNAL MEDICINE
Payer: COMMERCIAL

## 2022-09-15 DIAGNOSIS — D45 POLYCYTHEMIA VERA (H): Primary | ICD-10-CM

## 2022-09-15 PROCEDURE — 99214 OFFICE O/P EST MOD 30 MIN: CPT | Mod: GT | Performed by: INTERNAL MEDICINE

## 2022-09-15 NOTE — LETTER
9/15/2022         RE: Zaheer Borges  [address] [place] [address]        Dear Colleague,    Thank you for referring your patient, Zaheer Borges, to the [facility]. Please see a copy of my visit note below.      HEMATOLOGY CLINIC VISIT      NOTE:  Due to the ongoing COVID-19 pandemic, this visit was conducted by video, with the patient's approval.      Zaheer Borges is a 62-year-old male with polycythemia vera who returns today for follow-up.      Background history:  He was diagnosed in 2003 and [facility].  He transitioned his care here in January 2019 due to a change in insurance.    He has been managed since diagnosis with phlebotomy, initially requiring these approximately every 2 months.  In mid 2018, hydroxyurea was added due to an elevated platelet count of approximately 1 million.  He was started on 1000 mg of Hydrea daily but this dropped his platelet count less than 100,000 and so the dose was decreased to 500 mg daily, with his platelet count subsequently returning to normal range.  While he was on the larger dose of Hydrea, he did not require phlebotomy to maintain a hematocrit less than 45%. However, when the dose was decreased, he returned to requiring phlebotomies about every 2 months.    In addition to hydroxyurea, he takes aspirin 81 mg daily.    Interval history:  At his last visit in August 2021, we initiated a plan of trying to increase the hydroxyurea dose in an attempt to increase the interval between phlebotomy treatments.  We started by adding 2 extra pills per week.  We took this cautious approach because historically he has been quite sensitive to small adjustments in hydroxyurea dosing.  With 2 extra pills per week, his platelet count dropped noticeably and so we eventually settled on a regimen of 1000 mg daily, with 1500 mg every Monday.    Over the last year, he has remained well.  He continues to require  phlebotomy treatments approximately every 3 months (most recent treatments were in October 2021, January 2022, April 2022, and July 2022).  He continues to enjoy his [facility] and is traveling more frequently.  Over the summer, we authorized a phlebotomy treatment a bit early (hematocrit just under 45%) to accommodate an upcoming trip.  He tolerated this without difficulty.  He asked if he can continue to have some flexibility in his treatment plan in this regard.      No other new issues or concerns.    Physical exam:   He looks well. Detailed exam not performed (video visit).    Labs:   White count 4.3 with a normal differential, hemoglobin 14.4, MCV 94, hematocrit 43.9, platelets 160,000.  Over the last year, his CBC parameters have remained stable.      ASSESSMENT / PLAN:  Polycythemia vera.    Overall, Zaheer continues to do well.  With the current hydroxyurea dose of 1000 mg daily except on Mondays when he takes 1500 mg, he continues to require phlebotomy treatments every 3 months.  He remains exquisitely sensitive to small adjustments in hydroxyurea dose, with his platelet count dropping into the 90,000 100,000 range by simply adding 1 additional hydroxyurea pill per week.  Thus, we will not make any further adjustments in his hydroxyurea regimen at this time.    As outlined above, he may request phlebotomy treatments a bit early (hematocrit not yet above 45%) to accommodate his travels.  I reassured him that we should be able to make adjustments in his phlebotomy schedule to accommodate his travel schedule.    As long as he remains well, we will reevaluate things in 12 months.  He will let me know if he has any questions or concerns in the meanwhile.      Total time 30 minutes, including review of medical records and labs, video visit, and documentation.      Escobar Peterson MD  Professor of Medicine  Division of Hematology, Oncology, and Transplantation  Director, [facility]

## 2022-09-15 NOTE — PROGRESS NOTES
Zaheer is a 62 year old who is being evaluated via a billable video visit.      How would you like to obtain your AVS? MyChart  If the video visit is dropped, the invitation should be resent by: Text to cell phone: [phone]  Will anyone else be joining your video visit? Eva STEVEN    Video-Visit Details    Type of service:  Video Visit    Originating Location (pt. Location):  Home    Distant Location (provider location):  Office     Platform used for Video Visit: Matilda

## 2022-09-15 NOTE — PROGRESS NOTES
HEMATOLOGY CLINIC VISIT      NOTE:  Due to the ongoing COVID-19 pandemic, this visit was conducted by video, with the patient's approval.      Zaheer Borges is a 62-year-old male with polycythemia vera who returns today for follow-up.      Background history:  He was diagnosed in 2003 and had been followed in the [facility] system initially.  He transitioned his care here in January 2019 due to a change in insurance.    He has been managed since diagnosis with phlebotomy, initially requiring these approximately every 2 months.  In mid 2018, hydroxyurea was added due to an elevated platelet count of approximately 1 million.  He was started on 1000 mg of Hydrea daily but this dropped his platelet count less than 100,000 and so the dose was decreased to 500 mg daily, with his platelet count subsequently returning to normal range.  While he was on the larger dose of Hydrea, he did not require phlebotomy to maintain a hematocrit less than 45%. However, when the dose was decreased, he returned to requiring phlebotomies about every 2 months.    In addition to hydroxyurea, he takes aspirin 81 mg daily.    Interval history:  At his last visit in August 2021, we initiated a plan of trying to increase the hydroxyurea dose in an attempt to increase the interval between phlebotomy treatments.  We started by adding 2 extra pills per week.  We took this cautious approach because historically he has been quite sensitive to small adjustments in hydroxyurea dosing.  With 2 extra pills per week, his platelet count dropped noticeably and so we eventually settled on a regimen of 1000 mg daily, with 1500 mg every Monday.    Over the last year, he has remained well.  He continues to require phlebotomy treatments approximately every 3 months (most recent treatments were in October 2021, January 2022, April 2022, and July 2022).  He continues to enjoy his [place] and is traveling more frequently.  Over the summer, we authorized a  phlebotomy treatment a bit early (hematocrit just under 45%) to accommodate an upcoming trip.  He tolerated this without difficulty.  He asked if he can continue to have some flexibility in his treatment plan in this regard.      No other new issues or concerns.    Physical exam:   He looks well. Detailed exam not performed (video visit).    Labs:   White count 4.3 with a normal differential, hemoglobin 14.4, MCV 94, hematocrit 43.9, platelets 160,000.  Over the last year, his CBC parameters have remained stable.      ASSESSMENT / PLAN:  Polycythemia vera.    Overall, Zaheer continues to do well.  With the current hydroxyurea dose of 1000 mg daily except on Mondays when he takes 1500 mg, he continues to require phlebotomy treatments every 3 months.  He remains exquisitely sensitive to small adjustments in hydroxyurea dose, with his platelet count dropping into the 90,000 100,000 range by simply adding 1 additional hydroxyurea pill per week.  Thus, we will not make any further adjustments in his hydroxyurea regimen at this time.    As outlined above, he may request phlebotomy treatments a bit early (hematocrit not yet above 45%) to accommodate his travels.  I reassured him that we should be able to make adjustments in his phlebotomy schedule to accommodate his travel schedule.    As long as he remains well, we will reevaluate things in 12 months.  He will let me know if he has any questions or concerns in the meanwhile.      Total time 30 minutes, including review of medical records and labs, video visit, and documentation.      Escobar Peterson MD  Professor of Medicine  Division of Hematology, Oncology, and Transplantation  Director, [facility]

## 2022-10-13 ENCOUNTER — LAB (OUTPATIENT)
Dept: LAB | Facility: CLINIC | Age: 62
End: 2022-10-13
Payer: COMMERCIAL

## 2022-10-13 DIAGNOSIS — D45 POLYCYTHEMIA VERA (H): ICD-10-CM

## 2022-10-13 LAB
BASOPHILS # BLD AUTO: 0 10E3/UL (ref 0–0.2)
BASOPHILS NFR BLD AUTO: 1 %
EOSINOPHIL # BLD AUTO: 0 10E3/UL (ref 0–0.7)
EOSINOPHIL NFR BLD AUTO: 1 %
ERYTHROCYTE [DISTWIDTH] IN BLOOD BY AUTOMATED COUNT: 13.7 % (ref 10–15)
HCT VFR BLD AUTO: 46.5 % (ref 40–53)
HGB BLD-MCNC: 15.4 G/DL (ref 13.3–17.7)
IMM GRANULOCYTES # BLD: 0.1 10E3/UL
IMM GRANULOCYTES NFR BLD: 1 %
LYMPHOCYTES # BLD AUTO: 0.4 10E3/UL (ref 0.8–5.3)
LYMPHOCYTES NFR BLD AUTO: 9 %
MCH RBC QN AUTO: 30.4 PG (ref 26.5–33)
MCHC RBC AUTO-ENTMCNC: 33.1 G/DL (ref 31.5–36.5)
MCV RBC AUTO: 92 FL (ref 78–100)
MONOCYTES # BLD AUTO: 0.3 10E3/UL (ref 0–1.3)
MONOCYTES NFR BLD AUTO: 7 %
NEUTROPHILS # BLD AUTO: 3.6 10E3/UL (ref 1.6–8.3)
NEUTROPHILS NFR BLD AUTO: 81 %
NRBC # BLD AUTO: 0 10E3/UL
NRBC BLD AUTO-RTO: 0 /100
PLATELET # BLD AUTO: 108 10E3/UL (ref 150–450)
RBC # BLD AUTO: 5.07 10E6/UL (ref 4.4–5.9)
WBC # BLD AUTO: 4.4 10E3/UL (ref 4–11)

## 2022-10-13 PROCEDURE — 85025 COMPLETE CBC W/AUTO DIFF WBC: CPT | Performed by: PATHOLOGY

## 2022-10-13 PROCEDURE — 36415 COLL VENOUS BLD VENIPUNCTURE: CPT | Performed by: PATHOLOGY

## 2022-10-18 ENCOUNTER — INFUSION THERAPY VISIT (OUTPATIENT)
Dept: ONCOLOGY | Facility: CLINIC | Age: 62
End: 2022-10-18
Attending: INTERNAL MEDICINE
Payer: COMMERCIAL

## 2022-10-18 VITALS
SYSTOLIC BLOOD PRESSURE: 146 MMHG | DIASTOLIC BLOOD PRESSURE: 92 MMHG | RESPIRATION RATE: 22 BRPM | HEART RATE: 73 BPM | TEMPERATURE: 98.2 F | OXYGEN SATURATION: 95 %

## 2022-10-18 DIAGNOSIS — D45 POLYCYTHEMIA VERA (H): Primary | ICD-10-CM

## 2022-10-18 PROCEDURE — 99195 PHLEBOTOMY: CPT

## 2022-10-18 ASSESSMENT — PAIN SCALES - GENERAL: PAINLEVEL: NO PAIN (0)

## 2022-10-18 NOTE — PROGRESS NOTES
Infusion Nursing Note:  Zaheer Borges presents today for Phlebotomy.    Patient seen by provider today: No    Note: Patient presents to clinic today feeling well with no questions.  Pt did not request or require any intervention for pain today.  Pt felt well following removal of 500cc blood, VSS.    Intravenous Access:  Peripheral IV placed.    Treatment Conditions:  Lab Results   Component Value Date    HGB 15.4 10/13/2022    WBC 4.4 10/13/2022    ANEU 2.5 06/28/2021    ANEUTAUTO 3.6 10/13/2022     (L) 10/13/2022      Results reviewed, labs MET treatment parameters, ok to proceed with treatment.    Post Infusion Assessment:  Patient tolerated Phlebotomy without .    Discharge Plan:   Patient declined prescription refills.  Discharge instructions reviewed with: Patient.  Patient and/or family verbalized understanding of discharge instructions and all questions answered.  AVS to patient via MerkleT.  Patient will return in three months for next appointment; pt will call to schedule.   Patient discharged in stable condition accompanied by: self.  Departure Mode: Ambulatory.    Nelly Castillo RN

## 2022-10-18 NOTE — PROGRESS NOTES
HEMATOLOGY CLINIC VISIT      NOTE:  Due to the ongoing COVID-19 pandemic, this visit was conducted by telephone, with the patient's approval.      Zaheer Borges is a 60-year-old male with polycythemia vera who returns today for follow-up.  Please see my initial consultation from January 2019 for details of his history.  In summary, he was diagnosed in 2003 and had been followed in the [facility] system since that time.  He transitioned his care here due to a change in insurance.    He has been managed since diagnosis with phlebotomy, requiring these approximately every 2 months.  In mid 2018, hydroxyurea was added due to an elevated platelet count of approximately 1 million.  He was initially started on 1000 mg of Hydrea daily but this dropped his platelet count less than 100,000 and so the dose was decreased to 500 mg daily, with his platelet count subsequently returning to normal range.  While he was on the larger dose of Hydrea, he did not require phlebotomy to maintain a hematocrit less than 45%.  However, when the dose was decreased, he returned to requiring phlebotomies about every 2 months.    When I last saw him in August 2019, we decided to begin gently increasing the hydroxyurea dose to see if we could increase the interval between phlebotomies.  He does notice increased fatigue and shortness of breath when his hematocrit exceeds 45%.  We started with hydroxyurea 1000 mg alternating with 500 mg every other day.  By November 2019 we had increased to taking the 1000 mg dose 5 days a week, with 500 mg dosing 2 days a week.  Because this did not seem to change his need for phlebotomies every 2 months, in April 2020 he increased the Hydrea dose to 1000 mg 6 days a week and 5 mg 1 day/week.  2 months later, in June 2020, his platelet count had dropped to 90,000.  At that time his hematocrit was 45.5% but it had been 4 months since his prior phlebotomy.  In response to the lower platelet count, he decreased the  hydroxyurea dose back down to 1000 mg 4 days a week and 500 mg 3 days a week.    Currently, he is taking 2 different hydroxyurea regimens on alternating weeks.  One week he takes 1000 mg 6 days and 500 mg 1 day, and alternating weeks he takes the 1000 mg dose on 5 days and the 500 mg dose of 2 days.    Overall he continues to feel well and has no new symptoms or concerns..    Physical exam: Not done, telephone visit.    Labs done 2 days ago show white count of 3.8 with a normal differential, hemoglobin 15.2, hematocrit 46.4 %, platelet count 140,000.      ASSESSMENT / PLAN:  Polycythemia vera.    Overall, Zaheer continues to do well.  He appears to be quite sensitive to very small changes in hydroxyurea dose.  Our goal is still to try to increase the interval between phlebotomies as much as possible.  It does not appear that we will be able to avoid phlebotomy altogether, as the amount of hydroxyurea it would take to achieve this would leave him with unacceptable thrombocytopenia and/or leukopenia.    I explained to him that even though his white count and platelet count have drifted down in response to the higher dose of hydroxyurea, as long as they are maintained in safe ranges, they do not necessarily need to be normal.  In that regard, would like to keep his total white blood cell count right around the lower limit of normal and we could tolerate platelet counts in the 100,000 range as long as they are stable.    Thus, we will continue with hydroxyurea 1000 mg 6 days/week and 500 mg 1 day/week.  We will continue to monitor his CBC closely.  We will also continue with therapeutic phlebotomy as needed for hematocrits of 45% or higher.  We will continue to maintain contact through MyChart and/or telephone, and plan for return clinic visit in 12 months, sooner if there are new issues or concerns.      Total time 25 minutes, all in counseling and coordination of care.      Escobar Peterson MD   of  Medicine  Division of Hematology, Oncology, and Transplantation  Director, [facility]       Billing Type: [employer] Expected Date Of Service: 10/18/2022 Bill For Surgical Tray: no Performing Laboratory: [id number]

## 2022-10-18 NOTE — PATIENT INSTRUCTIONS
Contact Numbers    [facility] [place]: [phone]    Call with chills and/or temperature greater than or equal to 100.5, uncontrolled nausea/vomiting, diarrhea, constipation, dizziness, shortness of breath, chest pain, bleeding, unexplained bruising, or any new/concerning symptoms, questions/concerns.     If you are having any concerning symptoms or wish to speak to a provider before your next infusion visit, please call your care coordinator or triage to notify them so we can adequately serve you.       After Hours: [phone]    If after hours, weekends, or holidays, call main hospital  and ask for Oncology doctor on call.      October 2022 Sunday Monday Tuesday Wednesday Thursday Friday Saturday                                 1       2     3     4     5     6     7     8       9     10     11     12     13    LAB   4:00 PM   (15 min.)    LAB   [facility] [place] 14     15       16     17     18    ONC INFUSION 1 HR (60 MIN)   3:30 PM   (60 min.)    ONC INFUSION NURSE   [facility] 19     20     21     22       23     24     25     26     27     28     29       30     31                                            November 2022 Sunday Monday Tuesday Wednesday Thursday Friday Saturday             1     2     3     4     5       6     7     8     9     10     11     12       13     14     15     16     17     18     19       20     21     22     23     24     25     26       27     28     29     30                                      Lab Results:  No results found for this or any previous visit (from the past 12 hour(s)).

## 2022-12-20 ENCOUNTER — LAB (OUTPATIENT)
Dept: LAB | Facility: CLINIC | Age: 62
End: 2022-12-20
Payer: COMMERCIAL

## 2022-12-20 DIAGNOSIS — D45 POLYCYTHEMIA VERA (H): ICD-10-CM

## 2022-12-20 LAB
BASOPHILS # BLD AUTO: 0.1 10E3/UL (ref 0–0.2)
BASOPHILS NFR BLD AUTO: 2 %
EOSINOPHIL # BLD AUTO: 0.1 10E3/UL (ref 0–0.7)
EOSINOPHIL NFR BLD AUTO: 2 %
ERYTHROCYTE [DISTWIDTH] IN BLOOD BY AUTOMATED COUNT: 14.6 % (ref 10–15)
HCT VFR BLD AUTO: 48.1 % (ref 40–53)
HGB BLD-MCNC: 15.6 G/DL (ref 13.3–17.7)
IMM GRANULOCYTES # BLD: 0.1 10E3/UL
IMM GRANULOCYTES NFR BLD: 1 %
LYMPHOCYTES # BLD AUTO: 0.8 10E3/UL (ref 0.8–5.3)
LYMPHOCYTES NFR BLD AUTO: 17 %
MCH RBC QN AUTO: 29.5 PG (ref 26.5–33)
MCHC RBC AUTO-ENTMCNC: 32.4 G/DL (ref 31.5–36.5)
MCV RBC AUTO: 91 FL (ref 78–100)
MONOCYTES # BLD AUTO: 0.3 10E3/UL (ref 0–1.3)
MONOCYTES NFR BLD AUTO: 6 %
NEUTROPHILS # BLD AUTO: 3.2 10E3/UL (ref 1.6–8.3)
NEUTROPHILS NFR BLD AUTO: 72 %
NRBC # BLD AUTO: 0 10E3/UL
NRBC BLD AUTO-RTO: 0 /100
PLATELET # BLD AUTO: 133 10E3/UL (ref 150–450)
RBC # BLD AUTO: 5.28 10E6/UL (ref 4.4–5.9)
WBC # BLD AUTO: 4.5 10E3/UL (ref 4–11)

## 2022-12-20 PROCEDURE — 85025 COMPLETE CBC W/AUTO DIFF WBC: CPT | Performed by: PATHOLOGY

## 2022-12-20 PROCEDURE — 36415 COLL VENOUS BLD VENIPUNCTURE: CPT | Performed by: PATHOLOGY

## 2022-12-21 ENCOUNTER — LAB (OUTPATIENT)
Dept: LAB | Facility: CLINIC | Age: 62
End: 2022-12-21
Payer: COMMERCIAL

## 2022-12-21 DIAGNOSIS — D45 POLYCYTHEMIA VERA (H): ICD-10-CM

## 2022-12-21 LAB
BASOPHILS # BLD AUTO: 0.1 10E3/UL (ref 0–0.2)
BASOPHILS NFR BLD AUTO: 2 %
EOSINOPHIL # BLD AUTO: 0.1 10E3/UL (ref 0–0.7)
EOSINOPHIL NFR BLD AUTO: 3 %
ERYTHROCYTE [DISTWIDTH] IN BLOOD BY AUTOMATED COUNT: 14.5 % (ref 10–15)
HCT VFR BLD AUTO: 49.5 % (ref 40–53)
HGB BLD-MCNC: 15.8 G/DL (ref 13.3–17.7)
IMM GRANULOCYTES # BLD: 0 10E3/UL
IMM GRANULOCYTES NFR BLD: 1 %
LYMPHOCYTES # BLD AUTO: 0.8 10E3/UL (ref 0.8–5.3)
LYMPHOCYTES NFR BLD AUTO: 19 %
MCH RBC QN AUTO: 29.5 PG (ref 26.5–33)
MCHC RBC AUTO-ENTMCNC: 31.9 G/DL (ref 31.5–36.5)
MCV RBC AUTO: 92 FL (ref 78–100)
MONOCYTES # BLD AUTO: 0.3 10E3/UL (ref 0–1.3)
MONOCYTES NFR BLD AUTO: 7 %
NEUTROPHILS # BLD AUTO: 2.9 10E3/UL (ref 1.6–8.3)
NEUTROPHILS NFR BLD AUTO: 68 %
NRBC # BLD AUTO: 0 10E3/UL
NRBC BLD AUTO-RTO: 0 /100
PLATELET # BLD AUTO: 155 10E3/UL (ref 150–450)
RBC # BLD AUTO: 5.36 10E6/UL (ref 4.4–5.9)
WBC # BLD AUTO: 4.2 10E3/UL (ref 4–11)

## 2022-12-21 PROCEDURE — 36415 COLL VENOUS BLD VENIPUNCTURE: CPT | Performed by: PATHOLOGY

## 2022-12-21 PROCEDURE — 85025 COMPLETE CBC W/AUTO DIFF WBC: CPT | Performed by: PATHOLOGY

## 2022-12-23 ENCOUNTER — INFUSION THERAPY VISIT (OUTPATIENT)
Dept: ONCOLOGY | Facility: CLINIC | Age: 62
End: 2022-12-23
Attending: INTERNAL MEDICINE
Payer: COMMERCIAL

## 2022-12-23 VITALS
OXYGEN SATURATION: 95 % | SYSTOLIC BLOOD PRESSURE: 163 MMHG | DIASTOLIC BLOOD PRESSURE: 84 MMHG | HEART RATE: 79 BPM | RESPIRATION RATE: 18 BRPM | TEMPERATURE: 97.8 F

## 2022-12-23 DIAGNOSIS — D45 POLYCYTHEMIA VERA (H): Primary | ICD-10-CM

## 2022-12-23 NOTE — PATIENT INSTRUCTIONS
Contact Numbers  [facility]: [phone] (for symptom and scheduling needs)    Please call the [facility] Triage line if you experience a temperature greater than or equal to 100.4, shaking chills, have uncontrolled nausea, vomiting and/or diarrhea, dizziness, shortness of breath, chest pain, bleeding, unexplained bruising, or if you have any other new/concerning symptoms, questions or concerns.     If you are having any concerning symptoms or wish to speak to a provider before your next infusion visit, please call your care coordinator or triage to notify them so we can adequately serve you.     If you need a refill on a narcotic prescription or other medication, please call triage before your infusion appointment.           December 2022 Sunday Monday Tuesday Wednesday Thursday Friday Saturday                       1     2     3       4     5     6     7     8     9     10       11     12     13     14     15     16     17       18     19     20    LAB   2:00 PM   (15 min.)    LAB   [facility] [place] 21    LAB  10:15 AM   (15 min.)    LAB   [facility] [place] 22     23    ONC INFUSION 1 HR (60 MIN)   1:15 PM   (60 min.)    ONC INFUSION NURSE   [facility] 24       25     26     27     28     29     30     31                   January 2023 Sunday Monday Tuesday Wednesday Thursday Friday Saturday   1     2     3     4     5     6     7       8     9     10     11     12     13     14       15     16     17     18     19     20     21       22     23     24     25     26     27     28       29     30     31                                           Lab Results:  No results found for this or any previous visit (from the past 12 hour(s)).

## 2022-12-23 NOTE — PROGRESS NOTES
"Infusion Nursing Note:  Zaheer Borges presents today for Phlebotomy.    Patient seen by provider today: No   present during visit today: Not Applicable.    Note: Patient presents to infusion today doing well. Denies any recent fevers, chills, shortness of breath, chest pains or cough. BP elevated at 179/95 upon arrival but asymptomatic. Patient states he gets \"white coat syndrome\" and monitors his BP regularly at home where it is WNL. Offers no new changes or concerns at this time.    500ml whole blood removed per orders for Hematocrit > 45%. Patient tolerated phlebotomy without incident. Vital signs stable.     Intravenous Access:  Peripheral IV placed.    Treatment Conditions:  Lab Results   Component Value Date    HGB 15.8 12/21/2022    WBC 4.2 12/21/2022    ANEU 2.5 06/28/2021    ANEUTAUTO 2.9 12/21/2022     12/21/2022      Results reviewed, labs MET treatment parameters, ok to proceed with treatment.  Hematocrit 49.5.    Post Infusion Assessment:  Patient tolerated infusion without incident.  Blood return noted pre and post infusion.  Site patent and intact, free from redness, edema or discomfort.  No evidence of extravasations.  Access discontinued per protocol.     Discharge Plan:   Patient declined prescription refills.  Discharge instructions reviewed with: Patient.  Patient and/or family verbalized understanding of discharge instructions and all questions answered.  AVS to patient via [email or web address]. IB sent to scheduling to cancel 12/27 phlebotomy appointment as it is not needed. Patient states he will call to make next lab appointment and schedule next phlebotomy when needed.  Patient discharged in stable condition accompanied by: self.  Departure Mode: Ambulatory.      Irene Leone RN                    "

## 2023-01-01 ENCOUNTER — LAB (OUTPATIENT)
Dept: LAB | Facility: CLINIC | Age: 63
End: 2023-01-01
Payer: COMMERCIAL

## 2023-01-01 ENCOUNTER — NURSE TRIAGE (OUTPATIENT)
Dept: ONCOLOGY | Facility: CLINIC | Age: 63
End: 2023-01-01

## 2023-01-01 ENCOUNTER — INFUSION THERAPY VISIT (OUTPATIENT)
Dept: ONCOLOGY | Facility: CLINIC | Age: 63
End: 2023-01-01
Attending: INTERNAL MEDICINE
Payer: COMMERCIAL

## 2023-01-01 ENCOUNTER — APPOINTMENT (OUTPATIENT)
Dept: LAB | Facility: CLINIC | Age: 63
End: 2023-01-01
Payer: COMMERCIAL

## 2023-01-01 ENCOUNTER — MYC MEDICAL ADVICE (OUTPATIENT)
Dept: ONCOLOGY | Facility: CLINIC | Age: 63
End: 2023-01-01
Payer: COMMERCIAL

## 2023-01-01 VITALS
HEART RATE: 65 BPM | DIASTOLIC BLOOD PRESSURE: 93 MMHG | OXYGEN SATURATION: 97 % | SYSTOLIC BLOOD PRESSURE: 137 MMHG | TEMPERATURE: 98 F | RESPIRATION RATE: 16 BRPM

## 2023-01-01 DIAGNOSIS — D45 POLYCYTHEMIA VERA (H): ICD-10-CM

## 2023-01-01 DIAGNOSIS — D45 POLYCYTHEMIA VERA (H): Primary | ICD-10-CM

## 2023-01-01 LAB
BASO+EOS+MONOS # BLD AUTO: ABNORMAL 10*3/UL
BASO+EOS+MONOS NFR BLD AUTO: ABNORMAL %
BASOPHILS # BLD AUTO: 0 10E3/UL (ref 0–0.2)
BASOPHILS # BLD AUTO: 0.1 10E3/UL (ref 0–0.2)
BASOPHILS NFR BLD AUTO: 1 %
EOSINOPHIL # BLD AUTO: 0 10E3/UL (ref 0–0.7)
EOSINOPHIL # BLD AUTO: 0.1 10E3/UL (ref 0–0.7)
EOSINOPHIL NFR BLD AUTO: 1 %
EOSINOPHIL NFR BLD AUTO: 2 %
ERYTHROCYTE [DISTWIDTH] IN BLOOD BY AUTOMATED COUNT: 12 % (ref 10–15)
ERYTHROCYTE [DISTWIDTH] IN BLOOD BY AUTOMATED COUNT: 12.1 % (ref 10–15)
ERYTHROCYTE [DISTWIDTH] IN BLOOD BY AUTOMATED COUNT: 12.1 % (ref 10–15)
ERYTHROCYTE [DISTWIDTH] IN BLOOD BY AUTOMATED COUNT: 12.3 % (ref 10–15)
ERYTHROCYTE [DISTWIDTH] IN BLOOD BY AUTOMATED COUNT: 12.4 % (ref 10–15)
ERYTHROCYTE [DISTWIDTH] IN BLOOD BY AUTOMATED COUNT: 12.6 % (ref 10–15)
ERYTHROCYTE [DISTWIDTH] IN BLOOD BY AUTOMATED COUNT: 12.8 % (ref 10–15)
ERYTHROCYTE [DISTWIDTH] IN BLOOD BY AUTOMATED COUNT: 12.9 % (ref 10–15)
ERYTHROCYTE [DISTWIDTH] IN BLOOD BY AUTOMATED COUNT: 13.1 % (ref 10–15)
ERYTHROCYTE [DISTWIDTH] IN BLOOD BY AUTOMATED COUNT: 13.2 % (ref 10–15)
HCT VFR BLD AUTO: 39.5 % (ref 40–53)
HCT VFR BLD AUTO: 40.5 % (ref 40–53)
HCT VFR BLD AUTO: 44.2 % (ref 40–53)
HCT VFR BLD AUTO: 44.5 % (ref 40–53)
HCT VFR BLD AUTO: 45.2 % (ref 40–53)
HCT VFR BLD AUTO: 45.2 % (ref 40–53)
HCT VFR BLD AUTO: 45.8 % (ref 40–53)
HCT VFR BLD AUTO: 46.2 % (ref 40–53)
HCT VFR BLD AUTO: 46.5 % (ref 40–53)
HCT VFR BLD AUTO: 46.9 % (ref 40–53)
HGB BLD-MCNC: 14.1 G/DL (ref 13.3–17.7)
HGB BLD-MCNC: 14.5 G/DL (ref 13.3–17.7)
HGB BLD-MCNC: 15.7 G/DL (ref 13.3–17.7)
HGB BLD-MCNC: 15.7 G/DL (ref 13.3–17.7)
HGB BLD-MCNC: 15.8 G/DL (ref 13.3–17.7)
HGB BLD-MCNC: 15.9 G/DL (ref 13.3–17.7)
HGB BLD-MCNC: 16.1 G/DL (ref 13.3–17.7)
HGB BLD-MCNC: 16.3 G/DL (ref 13.3–17.7)
HGB BLD-MCNC: 16.4 G/DL (ref 13.3–17.7)
HGB BLD-MCNC: 16.5 G/DL (ref 13.3–17.7)
IMM GRANULOCYTES # BLD: 0 10E3/UL
IMM GRANULOCYTES NFR BLD: 1 %
LYMPHOCYTES # BLD AUTO: 0.5 10E3/UL (ref 0.8–5.3)
LYMPHOCYTES # BLD AUTO: 0.7 10E3/UL (ref 0.8–5.3)
LYMPHOCYTES # BLD AUTO: 0.8 10E3/UL (ref 0.8–5.3)
LYMPHOCYTES # BLD AUTO: 0.8 10E3/UL (ref 0.8–5.3)
LYMPHOCYTES # BLD AUTO: 0.9 10E3/UL (ref 0.8–5.3)
LYMPHOCYTES NFR BLD AUTO: 15 %
LYMPHOCYTES NFR BLD AUTO: 16 %
LYMPHOCYTES NFR BLD AUTO: 18 %
LYMPHOCYTES NFR BLD AUTO: 19 %
LYMPHOCYTES NFR BLD AUTO: 20 %
LYMPHOCYTES NFR BLD AUTO: 20 %
LYMPHOCYTES NFR BLD AUTO: 21 %
LYMPHOCYTES NFR BLD AUTO: 22 %
LYMPHOCYTES NFR BLD AUTO: 23 %
LYMPHOCYTES NFR BLD AUTO: 27 %
MCH RBC QN AUTO: 35.8 PG (ref 26.5–33)
MCH RBC QN AUTO: 35.9 PG (ref 26.5–33)
MCH RBC QN AUTO: 36.3 PG (ref 26.5–33)
MCH RBC QN AUTO: 36.5 PG (ref 26.5–33)
MCH RBC QN AUTO: 36.6 PG (ref 26.5–33)
MCH RBC QN AUTO: 36.7 PG (ref 26.5–33)
MCH RBC QN AUTO: 37.2 PG (ref 26.5–33)
MCH RBC QN AUTO: 37.5 PG (ref 26.5–33)
MCHC RBC AUTO-ENTMCNC: 34.7 G/DL (ref 31.5–36.5)
MCHC RBC AUTO-ENTMCNC: 34.8 G/DL (ref 31.5–36.5)
MCHC RBC AUTO-ENTMCNC: 35.2 G/DL (ref 31.5–36.5)
MCHC RBC AUTO-ENTMCNC: 35.2 G/DL (ref 31.5–36.5)
MCHC RBC AUTO-ENTMCNC: 35.3 G/DL (ref 31.5–36.5)
MCHC RBC AUTO-ENTMCNC: 35.3 G/DL (ref 31.5–36.5)
MCHC RBC AUTO-ENTMCNC: 35.6 G/DL (ref 31.5–36.5)
MCHC RBC AUTO-ENTMCNC: 35.7 G/DL (ref 31.5–36.5)
MCHC RBC AUTO-ENTMCNC: 35.7 G/DL (ref 31.5–36.5)
MCHC RBC AUTO-ENTMCNC: 35.8 G/DL (ref 31.5–36.5)
MCV RBC AUTO: 102 FL (ref 78–100)
MCV RBC AUTO: 102 FL (ref 78–100)
MCV RBC AUTO: 103 FL (ref 78–100)
MCV RBC AUTO: 104 FL (ref 78–100)
MCV RBC AUTO: 105 FL (ref 78–100)
MONOCYTES # BLD AUTO: 0.2 10E3/UL (ref 0–1.3)
MONOCYTES # BLD AUTO: 0.3 10E3/UL (ref 0–1.3)
MONOCYTES NFR BLD AUTO: 5 %
MONOCYTES NFR BLD AUTO: 6 %
MONOCYTES NFR BLD AUTO: 7 %
MONOCYTES NFR BLD AUTO: 8 %
NEUTROPHILS # BLD AUTO: 2.1 10E3/UL (ref 1.6–8.3)
NEUTROPHILS # BLD AUTO: 2.2 10E3/UL (ref 1.6–8.3)
NEUTROPHILS # BLD AUTO: 2.3 10E3/UL (ref 1.6–8.3)
NEUTROPHILS # BLD AUTO: 2.7 10E3/UL (ref 1.6–8.3)
NEUTROPHILS # BLD AUTO: 2.8 10E3/UL (ref 1.6–8.3)
NEUTROPHILS # BLD AUTO: 3.1 10E3/UL (ref 1.6–8.3)
NEUTROPHILS # BLD AUTO: 3.1 10E3/UL (ref 1.6–8.3)
NEUTROPHILS NFR BLD AUTO: 61 %
NEUTROPHILS NFR BLD AUTO: 67 %
NEUTROPHILS NFR BLD AUTO: 68 %
NEUTROPHILS NFR BLD AUTO: 69 %
NEUTROPHILS NFR BLD AUTO: 69 %
NEUTROPHILS NFR BLD AUTO: 71 %
NEUTROPHILS NFR BLD AUTO: 72 %
NEUTROPHILS NFR BLD AUTO: 72 %
NEUTROPHILS NFR BLD AUTO: 74 %
NEUTROPHILS NFR BLD AUTO: 76 %
NRBC # BLD AUTO: 0 10E3/UL
NRBC BLD AUTO-RTO: 0 /100
PLATELET # BLD AUTO: 109 10E3/UL (ref 150–450)
PLATELET # BLD AUTO: 109 10E3/UL (ref 150–450)
PLATELET # BLD AUTO: 119 10E3/UL (ref 150–450)
PLATELET # BLD AUTO: 131 10E3/UL (ref 150–450)
PLATELET # BLD AUTO: 132 10E3/UL (ref 150–450)
PLATELET # BLD AUTO: 139 10E3/UL (ref 150–450)
PLATELET # BLD AUTO: 152 10E3/UL (ref 150–450)
PLATELET # BLD AUTO: 153 10E3/UL (ref 150–450)
PLATELET # BLD AUTO: 155 10E3/UL (ref 150–450)
PLATELET # BLD AUTO: 162 10E3/UL (ref 150–450)
RBC # BLD AUTO: 3.79 10E6/UL (ref 4.4–5.9)
RBC # BLD AUTO: 3.87 10E6/UL (ref 4.4–5.9)
RBC # BLD AUTO: 4.31 10E6/UL (ref 4.4–5.9)
RBC # BLD AUTO: 4.37 10E6/UL (ref 4.4–5.9)
RBC # BLD AUTO: 4.38 10E6/UL (ref 4.4–5.9)
RBC # BLD AUTO: 4.38 10E6/UL (ref 4.4–5.9)
RBC # BLD AUTO: 4.43 10E6/UL (ref 4.4–5.9)
RBC # BLD AUTO: 4.49 10E6/UL (ref 4.4–5.9)
RBC # BLD AUTO: 4.49 10E6/UL (ref 4.4–5.9)
RBC # BLD AUTO: 4.51 10E6/UL (ref 4.4–5.9)
WBC # BLD AUTO: 3.3 10E3/UL (ref 4–11)
WBC # BLD AUTO: 3.3 10E3/UL (ref 4–11)
WBC # BLD AUTO: 3.5 10E3/UL (ref 4–11)
WBC # BLD AUTO: 3.7 10E3/UL (ref 4–11)
WBC # BLD AUTO: 3.8 10E3/UL (ref 4–11)
WBC # BLD AUTO: 3.9 10E3/UL (ref 4–11)
WBC # BLD AUTO: 4.1 10E3/UL (ref 4–11)
WBC # BLD AUTO: 4.3 10E3/UL (ref 4–11)

## 2023-01-01 PROCEDURE — 36415 COLL VENOUS BLD VENIPUNCTURE: CPT | Performed by: PATHOLOGY

## 2023-01-01 PROCEDURE — 85025 COMPLETE CBC W/AUTO DIFF WBC: CPT | Performed by: PATHOLOGY

## 2023-01-01 PROCEDURE — 36415 COLL VENOUS BLD VENIPUNCTURE: CPT

## 2023-01-01 PROCEDURE — 99195 PHLEBOTOMY: CPT

## 2023-01-01 PROCEDURE — 85025 COMPLETE CBC W/AUTO DIFF WBC: CPT

## 2023-01-01 RX ORDER — HYDROXYUREA 500 MG/1
1500 CAPSULE ORAL DAILY
Qty: 270 CAPSULE | Refills: 3 | Status: SHIPPED | OUTPATIENT
Start: 2023-01-01 | End: 2024-01-01

## 2023-01-10 ENCOUNTER — LAB (OUTPATIENT)
Dept: LAB | Facility: CLINIC | Age: 63
End: 2023-01-10
Payer: COMMERCIAL

## 2023-01-10 DIAGNOSIS — D45 POLYCYTHEMIA VERA (H): ICD-10-CM

## 2023-01-10 LAB
BASOPHILS # BLD AUTO: 0.1 10E3/UL (ref 0–0.2)
BASOPHILS NFR BLD AUTO: 2 %
EOSINOPHIL # BLD AUTO: 0.1 10E3/UL (ref 0–0.7)
EOSINOPHIL NFR BLD AUTO: 3 %
ERYTHROCYTE [DISTWIDTH] IN BLOOD BY AUTOMATED COUNT: 15.3 % (ref 10–15)
HCT VFR BLD AUTO: 46.1 % (ref 40–53)
HGB BLD-MCNC: 14.4 G/DL (ref 13.3–17.7)
IMM GRANULOCYTES # BLD: 0 10E3/UL
IMM GRANULOCYTES NFR BLD: 1 %
LYMPHOCYTES # BLD AUTO: 1.1 10E3/UL (ref 0.8–5.3)
LYMPHOCYTES NFR BLD AUTO: 27 %
MCH RBC QN AUTO: 29 PG (ref 26.5–33)
MCHC RBC AUTO-ENTMCNC: 31.2 G/DL (ref 31.5–36.5)
MCV RBC AUTO: 93 FL (ref 78–100)
MONOCYTES # BLD AUTO: 0.3 10E3/UL (ref 0–1.3)
MONOCYTES NFR BLD AUTO: 8 %
NEUTROPHILS # BLD AUTO: 2.3 10E3/UL (ref 1.6–8.3)
NEUTROPHILS NFR BLD AUTO: 59 %
NRBC # BLD AUTO: 0 10E3/UL
NRBC BLD AUTO-RTO: 0 /100
PLATELET # BLD AUTO: 165 10E3/UL (ref 150–450)
RBC # BLD AUTO: 4.96 10E6/UL (ref 4.4–5.9)
WBC # BLD AUTO: 3.9 10E3/UL (ref 4–11)

## 2023-01-10 PROCEDURE — 36415 COLL VENOUS BLD VENIPUNCTURE: CPT | Performed by: PATHOLOGY

## 2023-01-10 PROCEDURE — 85025 COMPLETE CBC W/AUTO DIFF WBC: CPT | Performed by: PATHOLOGY

## 2023-01-12 ENCOUNTER — LAB (OUTPATIENT)
Dept: LAB | Facility: CLINIC | Age: 63
End: 2023-01-12
Payer: COMMERCIAL

## 2023-01-12 DIAGNOSIS — D45 POLYCYTHEMIA VERA (H): ICD-10-CM

## 2023-01-12 LAB
BASOPHILS # BLD AUTO: 0.1 10E3/UL (ref 0–0.2)
BASOPHILS NFR BLD AUTO: 2 %
EOSINOPHIL # BLD AUTO: 0.1 10E3/UL (ref 0–0.7)
EOSINOPHIL NFR BLD AUTO: 2 %
ERYTHROCYTE [DISTWIDTH] IN BLOOD BY AUTOMATED COUNT: 15.3 % (ref 10–15)
HCT VFR BLD AUTO: 46.8 % (ref 40–53)
HGB BLD-MCNC: 15.1 G/DL (ref 13.3–17.7)
IMM GRANULOCYTES # BLD: 0 10E3/UL
IMM GRANULOCYTES NFR BLD: 1 %
LYMPHOCYTES # BLD AUTO: 1 10E3/UL (ref 0.8–5.3)
LYMPHOCYTES NFR BLD AUTO: 22 %
MCH RBC QN AUTO: 29.7 PG (ref 26.5–33)
MCHC RBC AUTO-ENTMCNC: 32.3 G/DL (ref 31.5–36.5)
MCV RBC AUTO: 92 FL (ref 78–100)
MONOCYTES # BLD AUTO: 0.3 10E3/UL (ref 0–1.3)
MONOCYTES NFR BLD AUTO: 8 %
NEUTROPHILS # BLD AUTO: 2.9 10E3/UL (ref 1.6–8.3)
NEUTROPHILS NFR BLD AUTO: 65 %
NRBC # BLD AUTO: 0 10E3/UL
NRBC BLD AUTO-RTO: 0 /100
PLATELET # BLD AUTO: 163 10E3/UL (ref 150–450)
RBC # BLD AUTO: 5.08 10E6/UL (ref 4.4–5.9)
WBC # BLD AUTO: 4.4 10E3/UL (ref 4–11)

## 2023-01-12 PROCEDURE — 85025 COMPLETE CBC W/AUTO DIFF WBC: CPT | Performed by: PATHOLOGY

## 2023-01-12 PROCEDURE — 36415 COLL VENOUS BLD VENIPUNCTURE: CPT | Performed by: PATHOLOGY

## 2023-01-14 ENCOUNTER — INFUSION THERAPY VISIT (OUTPATIENT)
Dept: ONCOLOGY | Facility: CLINIC | Age: 63
End: 2023-01-14
Attending: INTERNAL MEDICINE
Payer: COMMERCIAL

## 2023-01-14 VITALS
OXYGEN SATURATION: 98 % | TEMPERATURE: 98 F | HEART RATE: 71 BPM | RESPIRATION RATE: 16 BRPM | DIASTOLIC BLOOD PRESSURE: 95 MMHG | SYSTOLIC BLOOD PRESSURE: 148 MMHG

## 2023-01-14 DIAGNOSIS — D45 POLYCYTHEMIA VERA (H): Primary | ICD-10-CM

## 2023-01-14 NOTE — PROGRESS NOTES
Infusion Nursing Note:  Zaheer Borges presents today for Phlebotomy.    Patient seen by provider today: No   present during visit today: Not Applicable.    Note: Patient states he has been .    Intravenous Access:  Peripheral IV placed (20 gauge).    Treatment Conditions:  Hgb 15.1  hematocrit 46.8  Results reviewed, labs MET treatment parameters, ok to proceed with treatment.    Post Infusion Assessment:  Patient tolerated phlebotomy without incident.   Post BP WNL.  Patient denied any lightheadedness or dizziness prior to discharge.  Encouraged patient to drink plenty of PO fluids.      Discharge Plan:   Patient declined prescription refills.  Patient and/or family verbalized understanding of discharge instructions and all questions answered.  AVS to patient via [email or web address].  Patient states he will call to make his next appointment.  Patient discharged in stable condition accompanied by: self.  Departure Mode: Ambulatory.  Face to Face time: 0.      LACIE QUICK RN

## 2023-01-18 ENCOUNTER — LAB (OUTPATIENT)
Dept: LAB | Facility: CLINIC | Age: 63
End: 2023-01-18
Payer: COMMERCIAL

## 2023-01-18 DIAGNOSIS — D45 POLYCYTHEMIA VERA (H): ICD-10-CM

## 2023-01-18 LAB
BASOPHILS # BLD AUTO: 0.1 10E3/UL (ref 0–0.2)
BASOPHILS NFR BLD AUTO: 2 %
EOSINOPHIL # BLD AUTO: 0.1 10E3/UL (ref 0–0.7)
EOSINOPHIL NFR BLD AUTO: 2 %
ERYTHROCYTE [DISTWIDTH] IN BLOOD BY AUTOMATED COUNT: 15.6 % (ref 10–15)
HCT VFR BLD AUTO: 44.7 % (ref 40–53)
HGB BLD-MCNC: 14.3 G/DL (ref 13.3–17.7)
IMM GRANULOCYTES # BLD: 0.1 10E3/UL
IMM GRANULOCYTES NFR BLD: 1 %
LYMPHOCYTES # BLD AUTO: 0.9 10E3/UL (ref 0.8–5.3)
LYMPHOCYTES NFR BLD AUTO: 20 %
MCH RBC QN AUTO: 29.9 PG (ref 26.5–33)
MCHC RBC AUTO-ENTMCNC: 32 G/DL (ref 31.5–36.5)
MCV RBC AUTO: 93 FL (ref 78–100)
MONOCYTES # BLD AUTO: 0.3 10E3/UL (ref 0–1.3)
MONOCYTES NFR BLD AUTO: 6 %
NEUTROPHILS # BLD AUTO: 3 10E3/UL (ref 1.6–8.3)
NEUTROPHILS NFR BLD AUTO: 69 %
NRBC # BLD AUTO: 0 10E3/UL
NRBC BLD AUTO-RTO: 0 /100
PLATELET # BLD AUTO: 122 10E3/UL (ref 150–450)
RBC # BLD AUTO: 4.79 10E6/UL (ref 4.4–5.9)
WBC # BLD AUTO: 4.3 10E3/UL (ref 4–11)

## 2023-01-18 PROCEDURE — 85025 COMPLETE CBC W/AUTO DIFF WBC: CPT | Performed by: PATHOLOGY

## 2023-01-18 PROCEDURE — 36415 COLL VENOUS BLD VENIPUNCTURE: CPT | Performed by: PATHOLOGY

## 2023-01-28 ENCOUNTER — INFUSION THERAPY VISIT (OUTPATIENT)
Dept: ONCOLOGY | Facility: CLINIC | Age: 63
End: 2023-01-28
Attending: INTERNAL MEDICINE
Payer: COMMERCIAL

## 2023-01-28 ENCOUNTER — LAB (OUTPATIENT)
Dept: LAB | Facility: CLINIC | Age: 63
End: 2023-01-28
Payer: COMMERCIAL

## 2023-01-28 VITALS
RESPIRATION RATE: 20 BRPM | HEART RATE: 73 BPM | DIASTOLIC BLOOD PRESSURE: 93 MMHG | OXYGEN SATURATION: 100 % | SYSTOLIC BLOOD PRESSURE: 161 MMHG

## 2023-01-28 DIAGNOSIS — D45 POLYCYTHEMIA VERA (H): ICD-10-CM

## 2023-01-28 LAB
BASOPHILS # BLD AUTO: 0 10E3/UL (ref 0–0.2)
BASOPHILS NFR BLD AUTO: 1 %
EOSINOPHIL # BLD AUTO: 0.1 10E3/UL (ref 0–0.7)
EOSINOPHIL NFR BLD AUTO: 3 %
ERYTHROCYTE [DISTWIDTH] IN BLOOD BY AUTOMATED COUNT: 15.7 % (ref 10–15)
HCT VFR BLD AUTO: 42.9 % (ref 40–53)
HGB BLD-MCNC: 13.9 G/DL (ref 13.3–17.7)
IMM GRANULOCYTES # BLD: 0 10E3/UL
IMM GRANULOCYTES NFR BLD: 1 %
LYMPHOCYTES # BLD AUTO: 0.8 10E3/UL (ref 0.8–5.3)
LYMPHOCYTES NFR BLD AUTO: 24 %
MCH RBC QN AUTO: 30.3 PG (ref 26.5–33)
MCHC RBC AUTO-ENTMCNC: 32.4 G/DL (ref 31.5–36.5)
MCV RBC AUTO: 94 FL (ref 78–100)
MONOCYTES # BLD AUTO: 0.3 10E3/UL (ref 0–1.3)
MONOCYTES NFR BLD AUTO: 9 %
NEUTROPHILS # BLD AUTO: 2.2 10E3/UL (ref 1.6–8.3)
NEUTROPHILS NFR BLD AUTO: 62 %
NRBC # BLD AUTO: 0 10E3/UL
NRBC BLD AUTO-RTO: 0 /100
PLATELET # BLD AUTO: 214 10E3/UL (ref 150–450)
RBC # BLD AUTO: 4.59 10E6/UL (ref 4.4–5.9)
WBC # BLD AUTO: 3.5 10E3/UL (ref 4–11)

## 2023-01-28 PROCEDURE — 85025 COMPLETE CBC W/AUTO DIFF WBC: CPT | Performed by: PATHOLOGY

## 2023-01-28 PROCEDURE — 99195 PHLEBOTOMY: CPT

## 2023-01-28 PROCEDURE — 36415 COLL VENOUS BLD VENIPUNCTURE: CPT | Performed by: PATHOLOGY

## 2023-01-28 ASSESSMENT — PAIN SCALES - GENERAL: PAINLEVEL: NO PAIN (0)

## 2023-01-28 NOTE — PATIENT INSTRUCTIONS
Contact Numbers    [facility]: [phone]    Call with chills and/or temperature greater than or equal to 100.5, uncontrolled nausea/vomiting, diarrhea, constipation, dizziness, shortness of breath, chest pain, bleeding, unexplained bruising, or any new/concerning symptoms, questions/concerns.     If you are having any concerning symptoms or wish to speak to a provider before your next infusion visit, please call your care coordinator or triage to notify them so we can adequately serve you.       After Hours: [phone]    If after hours, weekends, or holidays, call main hospital  and ask for Oncology doctor on call.      January 2023 Sunday Monday Tuesday Wednesday Thursday Friday Saturday   1     2     3     4     5     6     7       8     9     10    LAB  10:15 AM   (15 min.)    LAB   [facility] [place] 11     12    LAB   9:15 AM   (15 min.)    LAB   [facility] 13     14    ONC INFUSION 1 HR (60 MIN)  11:00 AM   (60 min.)    ONC INFUSION NURSE   [facility]   15     16     17     18    LAB  10:00 AM   (15 min.)    LAB   [facility] [place] 19     20     21       22     23     24     25     26     27     28    LAB   9:30 AM   (15 min.)    LAB   [facility]    ONC INFUSION 1 HR (60 MIN)  10:00 AM   (60 min.)    ONC INFUSION NURSE   [facility]   29 30 31 February 2023 Sunday Monday Tuesday Wednesday Thursday Friday Saturday                  1     2     3     4       5     6     7     8     9     10     11       12     13     14     15     16     17     18       19     20     21     22     23     24     25       26     27     28                                           Lab Results:  Recent Results (from the past 12 hour(s))   CBC with platelets and differential    Collection Time: 01/28/23  9:45 AM    Result Value Ref Range    WBC Count 3.5 (L) 4.0 - 11.0 10e3/uL    RBC Count 4.59 4.40 - 5.90 10e6/uL    Hemoglobin 13.9 13.3 - 17.7 g/dL    Hematocrit 42.9 40.0 - 53.0 %    MCV 94 78 - 100 fL    MCH 30.3 26.5 - 33.0 pg    MCHC 32.4 31.5 - 36.5 g/dL    RDW 15.7 (H) 10.0 - 15.0 %    Platelet Count 214 150 - 450 10e3/uL    % Neutrophils 62 %    % Lymphocytes 24 %    % Monocytes 9 %    % Eosinophils 3 %    % Basophils 1 %    % Immature Granulocytes 1 %    NRBCs per 100 WBC 0 <1 /100    Absolute Neutrophils 2.2 1.6 - 8.3 10e3/uL    Absolute Lymphocytes 0.8 0.8 - 5.3 10e3/uL    Absolute Monocytes 0.3 0.0 - 1.3 10e3/uL    Absolute Eosinophils 0.1 0.0 - 0.7 10e3/uL    Absolute Basophils 0.0 0.0 - 0.2 10e3/uL    Absolute Immature Granulocytes 0.0 <=0.4 10e3/uL    Absolute NRBCs 0.0 10e3/uL

## 2023-01-28 NOTE — PROGRESS NOTES
Infusion Nursing Note:  Zaheer Borges presents today for phlebotomy.    Patient seen by provider today: No    Note: Patient presents to clinic today feeling generally well.  Endorses some NGUYEN.  Reports small amt of bleeding on bilateral feet this past week, has resolved.  Denies trama, s/s infection, change in shoes, soaps, etc.  Will monitor and notify us if returns.  Pt did not request or require any intervention for pain today.    Intravenous Access:  No Intravenous access at this visit.    Treatment Conditions:  Lab Results   Component Value Date    HGB 13.9 01/28/2023    WBC 3.5 (L) 01/28/2023    ANEU 2.5 06/28/2021    ANEUTAUTO 2.2 01/28/2023     01/28/2023      Results reviewed, labs did NOT meet treatment parameters: not needed.    Discharge Plan:   Patient declined prescription refills.  Discharge instructions reviewed with: Patient.  Patient and/or family verbalized understanding of discharge instructions and all questions answered.  AVS to patient via [email or web address].  Patient will return as needed/team directed for next appointment.  No further phleb appts scheduled, requested RNCC follow up and schedule as appropriate.    Patient discharged in stable condition accompanied by: self.  Departure Mode: Ambulatory.    Nelly Castillo RN

## 2023-02-03 ENCOUNTER — LAB (OUTPATIENT)
Dept: LAB | Facility: CLINIC | Age: 63
End: 2023-02-03
Payer: COMMERCIAL

## 2023-02-03 DIAGNOSIS — D45 POLYCYTHEMIA VERA (H): ICD-10-CM

## 2023-02-03 LAB
BASOPHILS # BLD AUTO: 0 10E3/UL (ref 0–0.2)
BASOPHILS NFR BLD AUTO: 2 %
EOSINOPHIL # BLD AUTO: 0.1 10E3/UL (ref 0–0.7)
EOSINOPHIL NFR BLD AUTO: 2 %
ERYTHROCYTE [DISTWIDTH] IN BLOOD BY AUTOMATED COUNT: 14.6 % (ref 10–15)
HCT VFR BLD AUTO: 44.5 % (ref 40–53)
HGB BLD-MCNC: 14.1 G/DL (ref 13.3–17.7)
IMM GRANULOCYTES # BLD: 0 10E3/UL
IMM GRANULOCYTES NFR BLD: 1 %
LYMPHOCYTES # BLD AUTO: 0.6 10E3/UL (ref 0.8–5.3)
LYMPHOCYTES NFR BLD AUTO: 23 %
MCH RBC QN AUTO: 29.9 PG (ref 26.5–33)
MCHC RBC AUTO-ENTMCNC: 31.7 G/DL (ref 31.5–36.5)
MCV RBC AUTO: 95 FL (ref 78–100)
MONOCYTES # BLD AUTO: 0.2 10E3/UL (ref 0–1.3)
MONOCYTES NFR BLD AUTO: 8 %
NEUTROPHILS # BLD AUTO: 1.7 10E3/UL (ref 1.6–8.3)
NEUTROPHILS NFR BLD AUTO: 64 %
NRBC # BLD AUTO: 0 10E3/UL
NRBC BLD AUTO-RTO: 0 /100
PLATELET # BLD AUTO: 205 10E3/UL (ref 150–450)
RBC # BLD AUTO: 4.71 10E6/UL (ref 4.4–5.9)
WBC # BLD AUTO: 2.6 10E3/UL (ref 4–11)

## 2023-02-03 PROCEDURE — 36415 COLL VENOUS BLD VENIPUNCTURE: CPT | Performed by: PATHOLOGY

## 2023-02-03 PROCEDURE — 85025 COMPLETE CBC W/AUTO DIFF WBC: CPT | Performed by: PATHOLOGY

## 2023-02-10 ENCOUNTER — LAB (OUTPATIENT)
Dept: LAB | Facility: CLINIC | Age: 63
End: 2023-02-10
Payer: COMMERCIAL

## 2023-02-10 DIAGNOSIS — D45 POLYCYTHEMIA VERA (H): ICD-10-CM

## 2023-02-10 LAB
BASOPHILS # BLD AUTO: 0.1 10E3/UL (ref 0–0.2)
BASOPHILS NFR BLD AUTO: 2 %
EOSINOPHIL # BLD AUTO: 0.1 10E3/UL (ref 0–0.7)
EOSINOPHIL NFR BLD AUTO: 3 %
ERYTHROCYTE [DISTWIDTH] IN BLOOD BY AUTOMATED COUNT: 14.3 % (ref 10–15)
HCT VFR BLD AUTO: 43.7 % (ref 40–53)
HGB BLD-MCNC: 14.2 G/DL (ref 13.3–17.7)
IMM GRANULOCYTES # BLD: 0 10E3/UL
IMM GRANULOCYTES NFR BLD: 0 %
LYMPHOCYTES # BLD AUTO: 0.7 10E3/UL (ref 0.8–5.3)
LYMPHOCYTES NFR BLD AUTO: 23 %
MCH RBC QN AUTO: 30.3 PG (ref 26.5–33)
MCHC RBC AUTO-ENTMCNC: 32.5 G/DL (ref 31.5–36.5)
MCV RBC AUTO: 93 FL (ref 78–100)
MONOCYTES # BLD AUTO: 0.2 10E3/UL (ref 0–1.3)
MONOCYTES NFR BLD AUTO: 7 %
NEUTROPHILS # BLD AUTO: 2 10E3/UL (ref 1.6–8.3)
NEUTROPHILS NFR BLD AUTO: 65 %
NRBC # BLD AUTO: 0 10E3/UL
NRBC BLD AUTO-RTO: 0 /100
PLATELET # BLD AUTO: 142 10E3/UL (ref 150–450)
RBC # BLD AUTO: 4.68 10E6/UL (ref 4.4–5.9)
WBC # BLD AUTO: 3.1 10E3/UL (ref 4–11)

## 2023-02-10 PROCEDURE — 36415 COLL VENOUS BLD VENIPUNCTURE: CPT | Performed by: PATHOLOGY

## 2023-02-10 PROCEDURE — 85025 COMPLETE CBC W/AUTO DIFF WBC: CPT | Performed by: PATHOLOGY

## 2023-02-24 ENCOUNTER — LAB (OUTPATIENT)
Dept: LAB | Facility: CLINIC | Age: 63
End: 2023-02-24
Payer: COMMERCIAL

## 2023-02-24 DIAGNOSIS — D45 POLYCYTHEMIA VERA (H): ICD-10-CM

## 2023-02-24 LAB
BASOPHILS # BLD AUTO: 0.1 10E3/UL (ref 0–0.2)
BASOPHILS NFR BLD AUTO: 2 %
EOSINOPHIL # BLD AUTO: 0.1 10E3/UL (ref 0–0.7)
EOSINOPHIL NFR BLD AUTO: 2 %
ERYTHROCYTE [DISTWIDTH] IN BLOOD BY AUTOMATED COUNT: 14.4 % (ref 10–15)
HCT VFR BLD AUTO: 46.1 % (ref 40–53)
HGB BLD-MCNC: 15.2 G/DL (ref 13.3–17.7)
IMM GRANULOCYTES # BLD: 0 10E3/UL
IMM GRANULOCYTES NFR BLD: 1 %
LYMPHOCYTES # BLD AUTO: 0.8 10E3/UL (ref 0.8–5.3)
LYMPHOCYTES NFR BLD AUTO: 24 %
MCH RBC QN AUTO: 31 PG (ref 26.5–33)
MCHC RBC AUTO-ENTMCNC: 33 G/DL (ref 31.5–36.5)
MCV RBC AUTO: 94 FL (ref 78–100)
MONOCYTES # BLD AUTO: 0.2 10E3/UL (ref 0–1.3)
MONOCYTES NFR BLD AUTO: 6 %
NEUTROPHILS # BLD AUTO: 2.1 10E3/UL (ref 1.6–8.3)
NEUTROPHILS NFR BLD AUTO: 65 %
NRBC # BLD AUTO: 0 10E3/UL
NRBC BLD AUTO-RTO: 0 /100
PLATELET # BLD AUTO: 130 10E3/UL (ref 150–450)
RBC # BLD AUTO: 4.9 10E6/UL (ref 4.4–5.9)
WBC # BLD AUTO: 3.2 10E3/UL (ref 4–11)

## 2023-02-24 PROCEDURE — 36415 COLL VENOUS BLD VENIPUNCTURE: CPT | Performed by: PATHOLOGY

## 2023-02-24 PROCEDURE — 85025 COMPLETE CBC W/AUTO DIFF WBC: CPT | Performed by: PATHOLOGY

## 2023-03-07 ENCOUNTER — APPOINTMENT (OUTPATIENT)
Dept: LAB | Facility: CLINIC | Age: 63
End: 2023-03-07
Payer: COMMERCIAL

## 2023-03-07 ENCOUNTER — INFUSION THERAPY VISIT (OUTPATIENT)
Dept: ONCOLOGY | Facility: CLINIC | Age: 63
End: 2023-03-07
Attending: INTERNAL MEDICINE
Payer: COMMERCIAL

## 2023-03-07 VITALS
HEART RATE: 75 BPM | RESPIRATION RATE: 18 BRPM | OXYGEN SATURATION: 98 % | DIASTOLIC BLOOD PRESSURE: 92 MMHG | TEMPERATURE: 97.9 F | SYSTOLIC BLOOD PRESSURE: 151 MMHG

## 2023-03-07 DIAGNOSIS — D45 POLYCYTHEMIA VERA (H): Primary | ICD-10-CM

## 2023-03-07 LAB
BASOPHILS # BLD AUTO: 0 10E3/UL (ref 0–0.2)
BASOPHILS NFR BLD AUTO: 1 %
EOSINOPHIL # BLD AUTO: 0.1 10E3/UL (ref 0–0.7)
EOSINOPHIL NFR BLD AUTO: 2 %
ERYTHROCYTE [DISTWIDTH] IN BLOOD BY AUTOMATED COUNT: 14.8 % (ref 10–15)
HCT VFR BLD AUTO: 48.9 % (ref 40–53)
HGB BLD-MCNC: 15.9 G/DL (ref 13.3–17.7)
IMM GRANULOCYTES # BLD: 0 10E3/UL
IMM GRANULOCYTES NFR BLD: 1 %
LYMPHOCYTES # BLD AUTO: 0.5 10E3/UL (ref 0.8–5.3)
LYMPHOCYTES NFR BLD AUTO: 14 %
MCH RBC QN AUTO: 31.1 PG (ref 26.5–33)
MCHC RBC AUTO-ENTMCNC: 32.5 G/DL (ref 31.5–36.5)
MCV RBC AUTO: 96 FL (ref 78–100)
MONOCYTES # BLD AUTO: 0.3 10E3/UL (ref 0–1.3)
MONOCYTES NFR BLD AUTO: 8 %
NEUTROPHILS # BLD AUTO: 2.8 10E3/UL (ref 1.6–8.3)
NEUTROPHILS NFR BLD AUTO: 74 %
NRBC # BLD AUTO: 0 10E3/UL
NRBC BLD AUTO-RTO: 0 /100
PLATELET # BLD AUTO: 174 10E3/UL (ref 150–450)
RBC # BLD AUTO: 5.11 10E6/UL (ref 4.4–5.9)
WBC # BLD AUTO: 3.8 10E3/UL (ref 4–11)

## 2023-03-07 PROCEDURE — 85025 COMPLETE CBC W/AUTO DIFF WBC: CPT

## 2023-03-07 PROCEDURE — 36415 COLL VENOUS BLD VENIPUNCTURE: CPT

## 2023-03-07 PROCEDURE — 99195 PHLEBOTOMY: CPT

## 2023-03-07 NOTE — PROGRESS NOTES
Infusion Nursing Note:  Zaheer Borges presents today for Phlebotomy.    Patient spoke with provider today: No    Treatment Conditions:  Lab Results   Component Value Date    HGB 15.9 03/07/2023    WBC 3.8 (L) 03/07/2023    ANEU 2.5 06/28/2021    ANEUTAUTO 2.8 03/07/2023     03/07/2023          Note: Hematocrit 48.9 today. Orders to remove 500 mL blood prn for hematocrit >45%.    Denies fever/chills, or cough. Reports occasional NGUYEN, and overall fatigue.     500 ml blood removed without incident.     VS stable at time of discharge, see flowsheet.  No complaints of dizziness/lightheadedness.  Encouraged PO fluids at home.    Intravenous Access:  Peripheral IV placed.      Discharge Plan:   Patient declined prescription refills.  Discharge instructions reviewed with: Patient.  Patient and/or family verbalized understanding of discharge instructions and all questions answered.  AVS to patient via MYCHART.    Patient discharged in stable condition accompanied by: self.  Departure Mode: Ambulatory.    Neisha Moore RN

## 2023-03-24 ENCOUNTER — LAB (OUTPATIENT)
Dept: LAB | Facility: CLINIC | Age: 63
End: 2023-03-24
Payer: COMMERCIAL

## 2023-03-24 DIAGNOSIS — D45 POLYCYTHEMIA VERA (H): ICD-10-CM

## 2023-03-24 LAB
BASOPHILS # BLD AUTO: 0 10E3/UL (ref 0–0.2)
BASOPHILS NFR BLD AUTO: 1 %
EOSINOPHIL # BLD AUTO: 0.1 10E3/UL (ref 0–0.7)
EOSINOPHIL NFR BLD AUTO: 2 %
ERYTHROCYTE [DISTWIDTH] IN BLOOD BY AUTOMATED COUNT: 14.7 % (ref 10–15)
HCT VFR BLD AUTO: 44.6 % (ref 40–53)
HGB BLD-MCNC: 14.7 G/DL (ref 13.3–17.7)
IMM GRANULOCYTES # BLD: 0 10E3/UL
IMM GRANULOCYTES NFR BLD: 1 %
LYMPHOCYTES # BLD AUTO: 0.8 10E3/UL (ref 0.8–5.3)
LYMPHOCYTES NFR BLD AUTO: 23 %
MCH RBC QN AUTO: 32.6 PG (ref 26.5–33)
MCHC RBC AUTO-ENTMCNC: 33 G/DL (ref 31.5–36.5)
MCV RBC AUTO: 99 FL (ref 78–100)
MONOCYTES # BLD AUTO: 0.2 10E3/UL (ref 0–1.3)
MONOCYTES NFR BLD AUTO: 7 %
NEUTROPHILS # BLD AUTO: 2.2 10E3/UL (ref 1.6–8.3)
NEUTROPHILS NFR BLD AUTO: 66 %
NRBC # BLD AUTO: 0 10E3/UL
NRBC BLD AUTO-RTO: 0 /100
PLATELET # BLD AUTO: 94 10E3/UL (ref 150–450)
RBC # BLD AUTO: 4.51 10E6/UL (ref 4.4–5.9)
WBC # BLD AUTO: 3.3 10E3/UL (ref 4–11)

## 2023-03-24 PROCEDURE — 36415 COLL VENOUS BLD VENIPUNCTURE: CPT | Performed by: PATHOLOGY

## 2023-03-24 PROCEDURE — 85025 COMPLETE CBC W/AUTO DIFF WBC: CPT | Performed by: PATHOLOGY

## 2023-03-31 ENCOUNTER — LAB (OUTPATIENT)
Dept: LAB | Facility: CLINIC | Age: 63
End: 2023-03-31
Payer: COMMERCIAL

## 2023-03-31 DIAGNOSIS — D45 POLYCYTHEMIA VERA (H): ICD-10-CM

## 2023-03-31 LAB
BASOPHILS # BLD AUTO: 0 10E3/UL (ref 0–0.2)
BASOPHILS NFR BLD AUTO: 1 %
EOSINOPHIL # BLD AUTO: 0.1 10E3/UL (ref 0–0.7)
EOSINOPHIL NFR BLD AUTO: 2 %
ERYTHROCYTE [DISTWIDTH] IN BLOOD BY AUTOMATED COUNT: 15 % (ref 10–15)
HCT VFR BLD AUTO: 46.3 % (ref 40–53)
HGB BLD-MCNC: 15.5 G/DL (ref 13.3–17.7)
IMM GRANULOCYTES # BLD: 0 10E3/UL
IMM GRANULOCYTES NFR BLD: 1 %
LYMPHOCYTES # BLD AUTO: 1 10E3/UL (ref 0.8–5.3)
LYMPHOCYTES NFR BLD AUTO: 27 %
MCH RBC QN AUTO: 33.2 PG (ref 26.5–33)
MCHC RBC AUTO-ENTMCNC: 33.5 G/DL (ref 31.5–36.5)
MCV RBC AUTO: 99 FL (ref 78–100)
MONOCYTES # BLD AUTO: 0.3 10E3/UL (ref 0–1.3)
MONOCYTES NFR BLD AUTO: 8 %
NEUTROPHILS # BLD AUTO: 2.2 10E3/UL (ref 1.6–8.3)
NEUTROPHILS NFR BLD AUTO: 61 %
NRBC # BLD AUTO: 0 10E3/UL
NRBC BLD AUTO-RTO: 0 /100
PLATELET # BLD AUTO: 192 10E3/UL (ref 150–450)
RBC # BLD AUTO: 4.67 10E6/UL (ref 4.4–5.9)
WBC # BLD AUTO: 3.6 10E3/UL (ref 4–11)

## 2023-03-31 PROCEDURE — 36415 COLL VENOUS BLD VENIPUNCTURE: CPT | Performed by: PATHOLOGY

## 2023-03-31 PROCEDURE — 85025 COMPLETE CBC W/AUTO DIFF WBC: CPT | Performed by: PATHOLOGY

## 2023-04-01 ENCOUNTER — HEALTH MAINTENANCE LETTER (OUTPATIENT)
Age: 63
End: 2023-04-01

## 2023-04-07 ENCOUNTER — LAB (OUTPATIENT)
Dept: LAB | Facility: CLINIC | Age: 63
End: 2023-04-07
Payer: COMMERCIAL

## 2023-04-07 DIAGNOSIS — D45 POLYCYTHEMIA VERA (H): ICD-10-CM

## 2023-04-07 LAB
BASOPHILS # BLD AUTO: 0.1 10E3/UL (ref 0–0.2)
BASOPHILS NFR BLD AUTO: 1 %
EOSINOPHIL # BLD AUTO: 0.1 10E3/UL (ref 0–0.7)
EOSINOPHIL NFR BLD AUTO: 2 %
ERYTHROCYTE [DISTWIDTH] IN BLOOD BY AUTOMATED COUNT: 14.5 % (ref 10–15)
HCT VFR BLD AUTO: 45.2 % (ref 40–53)
HGB BLD-MCNC: 15.2 G/DL (ref 13.3–17.7)
IMM GRANULOCYTES # BLD: 0 10E3/UL
IMM GRANULOCYTES NFR BLD: 1 %
LYMPHOCYTES # BLD AUTO: 1 10E3/UL (ref 0.8–5.3)
LYMPHOCYTES NFR BLD AUTO: 25 %
MCH RBC QN AUTO: 33.5 PG (ref 26.5–33)
MCHC RBC AUTO-ENTMCNC: 33.6 G/DL (ref 31.5–36.5)
MCV RBC AUTO: 100 FL (ref 78–100)
MONOCYTES # BLD AUTO: 0.3 10E3/UL (ref 0–1.3)
MONOCYTES NFR BLD AUTO: 8 %
NEUTROPHILS # BLD AUTO: 2.6 10E3/UL (ref 1.6–8.3)
NEUTROPHILS NFR BLD AUTO: 63 %
NRBC # BLD AUTO: 0 10E3/UL
NRBC BLD AUTO-RTO: 0 /100
PLATELET # BLD AUTO: 210 10E3/UL (ref 150–450)
RBC # BLD AUTO: 4.54 10E6/UL (ref 4.4–5.9)
WBC # BLD AUTO: 4 10E3/UL (ref 4–11)

## 2023-04-07 PROCEDURE — 36415 COLL VENOUS BLD VENIPUNCTURE: CPT | Performed by: PATHOLOGY

## 2023-04-07 PROCEDURE — 85025 COMPLETE CBC W/AUTO DIFF WBC: CPT | Performed by: PATHOLOGY

## 2023-04-14 ENCOUNTER — LAB (OUTPATIENT)
Dept: LAB | Facility: CLINIC | Age: 63
End: 2023-04-14
Payer: COMMERCIAL

## 2023-04-14 DIAGNOSIS — D45 POLYCYTHEMIA VERA (H): ICD-10-CM

## 2023-04-14 LAB
BASOPHILS # BLD AUTO: 0.1 10E3/UL (ref 0–0.2)
BASOPHILS NFR BLD AUTO: 2 %
EOSINOPHIL # BLD AUTO: 0 10E3/UL (ref 0–0.7)
EOSINOPHIL NFR BLD AUTO: 1 %
ERYTHROCYTE [DISTWIDTH] IN BLOOD BY AUTOMATED COUNT: 14.3 % (ref 10–15)
HCT VFR BLD AUTO: 43.8 % (ref 40–53)
HGB BLD-MCNC: 15.1 G/DL (ref 13.3–17.7)
IMM GRANULOCYTES # BLD: 0 10E3/UL
IMM GRANULOCYTES NFR BLD: 1 %
LYMPHOCYTES # BLD AUTO: 0.8 10E3/UL (ref 0.8–5.3)
LYMPHOCYTES NFR BLD AUTO: 23 %
MCH RBC QN AUTO: 33.9 PG (ref 26.5–33)
MCHC RBC AUTO-ENTMCNC: 34.5 G/DL (ref 31.5–36.5)
MCV RBC AUTO: 98 FL (ref 78–100)
MONOCYTES # BLD AUTO: 0.2 10E3/UL (ref 0–1.3)
MONOCYTES NFR BLD AUTO: 7 %
NEUTROPHILS # BLD AUTO: 2.2 10E3/UL (ref 1.6–8.3)
NEUTROPHILS NFR BLD AUTO: 66 %
NRBC # BLD AUTO: 0 10E3/UL
NRBC BLD AUTO-RTO: 0 /100
PLATELET # BLD AUTO: 118 10E3/UL (ref 150–450)
RBC # BLD AUTO: 4.46 10E6/UL (ref 4.4–5.9)
WBC # BLD AUTO: 3.3 10E3/UL (ref 4–11)

## 2023-04-14 PROCEDURE — 85025 COMPLETE CBC W/AUTO DIFF WBC: CPT | Performed by: PATHOLOGY

## 2023-04-14 PROCEDURE — 36415 COLL VENOUS BLD VENIPUNCTURE: CPT | Performed by: PATHOLOGY

## 2023-04-21 ENCOUNTER — LAB (OUTPATIENT)
Dept: LAB | Facility: CLINIC | Age: 63
End: 2023-04-21
Payer: COMMERCIAL

## 2023-04-21 DIAGNOSIS — D45 POLYCYTHEMIA VERA (H): ICD-10-CM

## 2023-04-21 LAB
BASOPHILS # BLD AUTO: 0.1 10E3/UL (ref 0–0.2)
BASOPHILS NFR BLD AUTO: 2 %
EOSINOPHIL # BLD AUTO: 0.1 10E3/UL (ref 0–0.7)
EOSINOPHIL NFR BLD AUTO: 2 %
ERYTHROCYTE [DISTWIDTH] IN BLOOD BY AUTOMATED COUNT: 14.5 % (ref 10–15)
HCT VFR BLD AUTO: 47.2 % (ref 40–53)
HGB BLD-MCNC: 16.2 G/DL (ref 13.3–17.7)
IMM GRANULOCYTES # BLD: 0 10E3/UL
IMM GRANULOCYTES NFR BLD: 1 %
LYMPHOCYTES # BLD AUTO: 1 10E3/UL (ref 0.8–5.3)
LYMPHOCYTES NFR BLD AUTO: 24 %
MCH RBC QN AUTO: 34 PG (ref 26.5–33)
MCHC RBC AUTO-ENTMCNC: 34.3 G/DL (ref 31.5–36.5)
MCV RBC AUTO: 99 FL (ref 78–100)
MONOCYTES # BLD AUTO: 0.3 10E3/UL (ref 0–1.3)
MONOCYTES NFR BLD AUTO: 8 %
NEUTROPHILS # BLD AUTO: 2.6 10E3/UL (ref 1.6–8.3)
NEUTROPHILS NFR BLD AUTO: 63 %
NRBC # BLD AUTO: 0 10E3/UL
NRBC BLD AUTO-RTO: 0 /100
PLATELET # BLD AUTO: 108 10E3/UL (ref 150–450)
RBC # BLD AUTO: 4.76 10E6/UL (ref 4.4–5.9)
WBC # BLD AUTO: 4.1 10E3/UL (ref 4–11)

## 2023-04-21 PROCEDURE — 36415 COLL VENOUS BLD VENIPUNCTURE: CPT | Performed by: PATHOLOGY

## 2023-04-21 PROCEDURE — 85025 COMPLETE CBC W/AUTO DIFF WBC: CPT | Performed by: PATHOLOGY

## 2023-04-28 ENCOUNTER — LAB (OUTPATIENT)
Dept: LAB | Facility: CLINIC | Age: 63
End: 2023-04-28
Payer: COMMERCIAL

## 2023-04-28 DIAGNOSIS — D45 POLYCYTHEMIA VERA (H): ICD-10-CM

## 2023-04-28 LAB
BASOPHILS # BLD AUTO: 0 10E3/UL (ref 0–0.2)
BASOPHILS NFR BLD AUTO: 1 %
EOSINOPHIL # BLD AUTO: 0.1 10E3/UL (ref 0–0.7)
EOSINOPHIL NFR BLD AUTO: 2 %
ERYTHROCYTE [DISTWIDTH] IN BLOOD BY AUTOMATED COUNT: 14.4 % (ref 10–15)
HCT VFR BLD AUTO: 45.3 % (ref 40–53)
HGB BLD-MCNC: 15.6 G/DL (ref 13.3–17.7)
IMM GRANULOCYTES # BLD: 0 10E3/UL
IMM GRANULOCYTES NFR BLD: 1 %
LYMPHOCYTES # BLD AUTO: 0.8 10E3/UL (ref 0.8–5.3)
LYMPHOCYTES NFR BLD AUTO: 24 %
MCH RBC QN AUTO: 34.8 PG (ref 26.5–33)
MCHC RBC AUTO-ENTMCNC: 34.4 G/DL (ref 31.5–36.5)
MCV RBC AUTO: 101 FL (ref 78–100)
MONOCYTES # BLD AUTO: 0.2 10E3/UL (ref 0–1.3)
MONOCYTES NFR BLD AUTO: 7 %
NEUTROPHILS # BLD AUTO: 2 10E3/UL (ref 1.6–8.3)
NEUTROPHILS NFR BLD AUTO: 65 %
NRBC # BLD AUTO: 0 10E3/UL
NRBC BLD AUTO-RTO: 0 /100
PLATELET # BLD AUTO: 112 10E3/UL (ref 150–450)
RBC # BLD AUTO: 4.48 10E6/UL (ref 4.4–5.9)
WBC # BLD AUTO: 3.1 10E3/UL (ref 4–11)

## 2023-04-28 PROCEDURE — 85025 COMPLETE CBC W/AUTO DIFF WBC: CPT | Performed by: PATHOLOGY

## 2023-04-28 PROCEDURE — 36415 COLL VENOUS BLD VENIPUNCTURE: CPT | Performed by: PATHOLOGY

## 2023-05-04 ENCOUNTER — INFUSION THERAPY VISIT (OUTPATIENT)
Dept: ONCOLOGY | Facility: CLINIC | Age: 63
End: 2023-05-04
Payer: COMMERCIAL

## 2023-05-04 VITALS
TEMPERATURE: 98.3 F | OXYGEN SATURATION: 95 % | SYSTOLIC BLOOD PRESSURE: 162 MMHG | RESPIRATION RATE: 16 BRPM | HEART RATE: 77 BPM | DIASTOLIC BLOOD PRESSURE: 82 MMHG

## 2023-05-04 DIAGNOSIS — D45 POLYCYTHEMIA VERA (H): Primary | ICD-10-CM

## 2023-05-04 PROCEDURE — 999N000128 HC STATISTIC PERIPHERAL IV START W/O US GUIDANCE

## 2023-05-04 PROCEDURE — 99195 PHLEBOTOMY: CPT

## 2023-05-04 NOTE — PATIENT INSTRUCTIONS
[facility] Triage and after hours / weekends / holidays:  [phone]    Please call the triage or after hours line if you experience a temperature greater than or equal to 100.4, shaking chills, have uncontrolled nausea, vomiting and/or diarrhea, dizziness, shortness of breath, chest pain, bleeding, unexplained bruising, or if you have any other new/concerning symptoms, questions or concerns.      If you are having any concerning symptoms or wish to speak to a provider before your next infusion visit, please call triage to notify them so we can adequately serve you.     If you need a refill on a narcotic prescription or other medication, please call before your infusion appointment.                May 2023      [place] Monday Tuesday Wednesday Thursday Friday Saturday        1     2     3     4    ONC INFUSION 1 HR (60 MIN)   9:30 AM   (60 min.)    ONC INFUSION NURSE   [facility] 5     6       7     8     9     10     11     12     13       14     15     16     17     18     19     20       21     22     23     24     25     26     27       28     29     30     31 June 2023 Sunday Monday Tuesday Wednesday Thursday Friday Saturday                       1     2     3       4     5     6     7     8     9     10       11     12     13     14     15     16     17       18     19     20     21     22     23     24       25     26     27     28     29     30                            Lab Results:  No results found for this or any previous visit (from the past 12 hour(s)).

## 2023-05-04 NOTE — PROGRESS NOTES
Infusion Nursing Note:  Zaheer Borges presents today for therapeutic phlebotomy.    Patient seen by provider today: No   present during visit today: Not Applicable.    Note: Pt comes to infusion today with no questions or concerns.  Pt  has no pain today and denies any need for intervention at this appointment.  Pt has been afebrile and denies signs and symptoms of infection including: cough, SOB, sore throat, diarrhea, vomiting, rash, or pain with urination.  Pt wishes to proceed with today's planned treatment.    Intravenous Access:  Peripheral IV placed.    Treatment Conditions:  Lab Results   Component Value Date    HGB 15.6 04/28/2023    WBC 3.1 (L) 04/28/2023    ANEU 2.5 06/28/2021    ANEUTAUTO 2.0 04/28/2023     (L) 04/28/2023    Hematocrit >45 at 45.3 - okay for phlebotomy today.    Post Infusion Assessment:  Site patent and intact, free from redness, edema or discomfort.  No evidence of extravasations.  Access discontinued per protocol.     Discharge Plan:   Patient declined prescription refills.  Discharge instructions reviewed with: Patient.  Patient and/or family verbalized understanding of discharge instructions and all questions answered.  AVS to patient via itembaseT.  Patient will return 09/14/23 for next appointment.   Patient discharged in stable condition accompanied by: self.  Departure Mode: Ambulatory.      Emily Meese, RN

## 2023-05-31 ENCOUNTER — LAB (OUTPATIENT)
Dept: LAB | Facility: CLINIC | Age: 63
End: 2023-05-31
Payer: COMMERCIAL

## 2023-05-31 DIAGNOSIS — D45 POLYCYTHEMIA VERA (H): ICD-10-CM

## 2023-05-31 LAB
BASOPHILS # BLD AUTO: 0 10E3/UL (ref 0–0.2)
BASOPHILS NFR BLD AUTO: 0 %
EOSINOPHIL # BLD AUTO: 0.1 10E3/UL (ref 0–0.7)
EOSINOPHIL NFR BLD AUTO: 3 %
ERYTHROCYTE [DISTWIDTH] IN BLOOD BY AUTOMATED COUNT: 13.1 % (ref 10–15)
HCT VFR BLD AUTO: 38.8 % (ref 40–53)
HGB BLD-MCNC: 13.8 G/DL (ref 13.3–17.7)
IMM GRANULOCYTES # BLD: 0 10E3/UL
IMM GRANULOCYTES NFR BLD: 1 %
LYMPHOCYTES # BLD AUTO: 0.8 10E3/UL (ref 0.8–5.3)
LYMPHOCYTES NFR BLD AUTO: 27 %
MCH RBC QN AUTO: 35.9 PG (ref 26.5–33)
MCHC RBC AUTO-ENTMCNC: 35.6 G/DL (ref 31.5–36.5)
MCV RBC AUTO: 101 FL (ref 78–100)
MONOCYTES # BLD AUTO: 0.3 10E3/UL (ref 0–1.3)
MONOCYTES NFR BLD AUTO: 9 %
NEUTROPHILS # BLD AUTO: 1.8 10E3/UL (ref 1.6–8.3)
NEUTROPHILS NFR BLD AUTO: 60 %
NRBC # BLD AUTO: 0 10E3/UL
NRBC BLD AUTO-RTO: 0 /100
PLATELET # BLD AUTO: 116 10E3/UL (ref 150–450)
RBC # BLD AUTO: 3.84 10E6/UL (ref 4.4–5.9)
WBC # BLD AUTO: 2.9 10E3/UL (ref 4–11)

## 2023-05-31 PROCEDURE — 85025 COMPLETE CBC W/AUTO DIFF WBC: CPT | Performed by: PATHOLOGY

## 2023-05-31 PROCEDURE — 36415 COLL VENOUS BLD VENIPUNCTURE: CPT | Performed by: PATHOLOGY

## 2023-06-07 DIAGNOSIS — D45 POLYCYTHEMIA VERA (H): ICD-10-CM

## 2023-06-07 RX ORDER — HYDROXYUREA 500 MG/1
CAPSULE ORAL
Qty: 195 CAPSULE | Refills: 0 | Status: SHIPPED | OUTPATIENT
Start: 2023-06-07 | End: 2023-06-11

## 2023-06-07 NOTE — TELEPHONE ENCOUNTER
hydroxyurea (HYDREA) Refill   Last prescribing provider: Dr Peterson     Last clinic visit date: 9/15/22 Dr Peterson     Recommendations for requested medication (if none, N/A): Copied from chart note 9/15/22 DR Peterson   Overall, Zaheer continues to do well. With the current hydroxyurea dose of 1000 mg daily except on Mondays when he takes 1500 mg, he continues to require phlebotomy treatments every 3 months. He remains exquisitely sensitive to small adjustments in hydroxyurea dose, with his platelet count dropping into the 90,000 100,000 range by simply adding 1 additional hydroxyurea pill per week. Thus, we will not make any further adjustments in his hydroxyurea regimen at this time.      Any other pertinent information (if none, N/A): N/A    Refilled: Y/N, if NO, why?

## 2023-06-09 ENCOUNTER — LAB (OUTPATIENT)
Dept: LAB | Facility: CLINIC | Age: 63
End: 2023-06-09
Payer: COMMERCIAL

## 2023-06-09 DIAGNOSIS — D45 POLYCYTHEMIA VERA (H): ICD-10-CM

## 2023-06-09 LAB
BASOPHILS # BLD AUTO: 0 10E3/UL (ref 0–0.2)
BASOPHILS NFR BLD AUTO: 1 %
EOSINOPHIL # BLD AUTO: 0.1 10E3/UL (ref 0–0.7)
EOSINOPHIL NFR BLD AUTO: 2 %
ERYTHROCYTE [DISTWIDTH] IN BLOOD BY AUTOMATED COUNT: 13.2 % (ref 10–15)
HCT VFR BLD AUTO: 40.2 % (ref 40–53)
HGB BLD-MCNC: 14 G/DL (ref 13.3–17.7)
IMM GRANULOCYTES # BLD: 0 10E3/UL
IMM GRANULOCYTES NFR BLD: 1 %
LYMPHOCYTES # BLD AUTO: 0.9 10E3/UL (ref 0.8–5.3)
LYMPHOCYTES NFR BLD AUTO: 26 %
MCH RBC QN AUTO: 36.6 PG (ref 26.5–33)
MCHC RBC AUTO-ENTMCNC: 34.8 G/DL (ref 31.5–36.5)
MCV RBC AUTO: 105 FL (ref 78–100)
MONOCYTES # BLD AUTO: 0.3 10E3/UL (ref 0–1.3)
MONOCYTES NFR BLD AUTO: 8 %
NEUTROPHILS # BLD AUTO: 2.1 10E3/UL (ref 1.6–8.3)
NEUTROPHILS NFR BLD AUTO: 62 %
NRBC # BLD AUTO: 0 10E3/UL
NRBC BLD AUTO-RTO: 0 /100
PLATELET # BLD AUTO: 207 10E3/UL (ref 150–450)
RBC # BLD AUTO: 3.83 10E6/UL (ref 4.4–5.9)
WBC # BLD AUTO: 3.4 10E3/UL (ref 4–11)

## 2023-06-09 PROCEDURE — 85025 COMPLETE CBC W/AUTO DIFF WBC: CPT | Performed by: PATHOLOGY

## 2023-06-09 PROCEDURE — 36415 COLL VENOUS BLD VENIPUNCTURE: CPT | Performed by: PATHOLOGY

## 2023-06-11 ENCOUNTER — MYC REFILL (OUTPATIENT)
Dept: ONCOLOGY | Facility: CLINIC | Age: 63
End: 2023-06-11
Payer: COMMERCIAL

## 2023-06-11 DIAGNOSIS — D45 POLYCYTHEMIA VERA (H): ICD-10-CM

## 2023-06-12 RX ORDER — HYDROXYUREA 500 MG/1
CAPSULE ORAL
Qty: 195 CAPSULE | Refills: 0 | Status: SHIPPED | OUTPATIENT
Start: 2023-06-12 | End: 2023-06-14

## 2023-06-14 ENCOUNTER — MYC REFILL (OUTPATIENT)
Dept: ONCOLOGY | Facility: CLINIC | Age: 63
End: 2023-06-14
Payer: COMMERCIAL

## 2023-06-14 DIAGNOSIS — D45 POLYCYTHEMIA VERA (H): ICD-10-CM

## 2023-06-15 RX ORDER — HYDROXYUREA 500 MG/1
CAPSULE ORAL
Qty: 195 CAPSULE | Refills: 4 | Status: SHIPPED | OUTPATIENT
Start: 2023-06-15 | End: 2023-01-01

## 2023-06-16 ENCOUNTER — LAB (OUTPATIENT)
Dept: LAB | Facility: CLINIC | Age: 63
End: 2023-06-16
Payer: COMMERCIAL

## 2023-06-16 DIAGNOSIS — D45 POLYCYTHEMIA VERA (H): ICD-10-CM

## 2023-06-16 LAB
BASOPHILS # BLD AUTO: 0 10E3/UL (ref 0–0.2)
BASOPHILS NFR BLD AUTO: 1 %
EOSINOPHIL # BLD AUTO: 0.1 10E3/UL (ref 0–0.7)
EOSINOPHIL NFR BLD AUTO: 2 %
ERYTHROCYTE [DISTWIDTH] IN BLOOD BY AUTOMATED COUNT: 13 % (ref 10–15)
HCT VFR BLD AUTO: 39.4 % (ref 40–53)
HGB BLD-MCNC: 14 G/DL (ref 13.3–17.7)
IMM GRANULOCYTES # BLD: 0 10E3/UL
IMM GRANULOCYTES NFR BLD: 1 %
LYMPHOCYTES # BLD AUTO: 0.9 10E3/UL (ref 0.8–5.3)
LYMPHOCYTES NFR BLD AUTO: 29 %
MCH RBC QN AUTO: 37.3 PG (ref 26.5–33)
MCHC RBC AUTO-ENTMCNC: 35.5 G/DL (ref 31.5–36.5)
MCV RBC AUTO: 105 FL (ref 78–100)
MONOCYTES # BLD AUTO: 0.3 10E3/UL (ref 0–1.3)
MONOCYTES NFR BLD AUTO: 9 %
NEUTROPHILS # BLD AUTO: 1.8 10E3/UL (ref 1.6–8.3)
NEUTROPHILS NFR BLD AUTO: 58 %
NRBC # BLD AUTO: 0 10E3/UL
NRBC BLD AUTO-RTO: 0 /100
PLATELET # BLD AUTO: 127 10E3/UL (ref 150–450)
RBC # BLD AUTO: 3.75 10E6/UL (ref 4.4–5.9)
WBC # BLD AUTO: 3.1 10E3/UL (ref 4–11)

## 2023-06-16 PROCEDURE — 36415 COLL VENOUS BLD VENIPUNCTURE: CPT | Performed by: PATHOLOGY

## 2023-06-16 PROCEDURE — 85025 COMPLETE CBC W/AUTO DIFF WBC: CPT | Performed by: PATHOLOGY

## 2023-06-23 ENCOUNTER — LAB (OUTPATIENT)
Dept: LAB | Facility: CLINIC | Age: 63
End: 2023-06-23
Payer: COMMERCIAL

## 2023-06-23 DIAGNOSIS — D45 POLYCYTHEMIA VERA (H): ICD-10-CM

## 2023-06-23 LAB
BASOPHILS # BLD AUTO: 0 10E3/UL (ref 0–0.2)
BASOPHILS NFR BLD AUTO: 1 %
EOSINOPHIL # BLD AUTO: 0.1 10E3/UL (ref 0–0.7)
EOSINOPHIL NFR BLD AUTO: 3 %
ERYTHROCYTE [DISTWIDTH] IN BLOOD BY AUTOMATED COUNT: 13 % (ref 10–15)
HCT VFR BLD AUTO: 40.4 % (ref 40–53)
HGB BLD-MCNC: 14.2 G/DL (ref 13.3–17.7)
IMM GRANULOCYTES # BLD: 0 10E3/UL
IMM GRANULOCYTES NFR BLD: 1 %
LYMPHOCYTES # BLD AUTO: 0.9 10E3/UL (ref 0.8–5.3)
LYMPHOCYTES NFR BLD AUTO: 25 %
MCH RBC QN AUTO: 37.3 PG (ref 26.5–33)
MCHC RBC AUTO-ENTMCNC: 35.1 G/DL (ref 31.5–36.5)
MCV RBC AUTO: 106 FL (ref 78–100)
MONOCYTES # BLD AUTO: 0.2 10E3/UL (ref 0–1.3)
MONOCYTES NFR BLD AUTO: 7 %
NEUTROPHILS # BLD AUTO: 2.2 10E3/UL (ref 1.6–8.3)
NEUTROPHILS NFR BLD AUTO: 63 %
NRBC # BLD AUTO: 0 10E3/UL
NRBC BLD AUTO-RTO: 1 /100
PLATELET # BLD AUTO: 68 10E3/UL (ref 150–450)
RBC # BLD AUTO: 3.81 10E6/UL (ref 4.4–5.9)
WBC # BLD AUTO: 3.5 10E3/UL (ref 4–11)

## 2023-06-23 PROCEDURE — 85025 COMPLETE CBC W/AUTO DIFF WBC: CPT | Performed by: PATHOLOGY

## 2023-06-23 PROCEDURE — 36415 COLL VENOUS BLD VENIPUNCTURE: CPT | Performed by: PATHOLOGY

## 2023-06-30 ENCOUNTER — LAB (OUTPATIENT)
Dept: LAB | Facility: CLINIC | Age: 63
End: 2023-06-30
Payer: COMMERCIAL

## 2023-06-30 DIAGNOSIS — D45 POLYCYTHEMIA VERA (H): ICD-10-CM

## 2023-06-30 LAB
BASOPHILS # BLD AUTO: 0 10E3/UL (ref 0–0.2)
BASOPHILS NFR BLD AUTO: 1 %
EOSINOPHIL # BLD AUTO: 0.1 10E3/UL (ref 0–0.7)
EOSINOPHIL NFR BLD AUTO: 2 %
ERYTHROCYTE [DISTWIDTH] IN BLOOD BY AUTOMATED COUNT: 12.6 % (ref 10–15)
HCT VFR BLD AUTO: 38.8 % (ref 40–53)
HGB BLD-MCNC: 13.6 G/DL (ref 13.3–17.7)
IMM GRANULOCYTES # BLD: 0 10E3/UL
IMM GRANULOCYTES NFR BLD: 0 %
LYMPHOCYTES # BLD AUTO: 0.7 10E3/UL (ref 0.8–5.3)
LYMPHOCYTES NFR BLD AUTO: 24 %
MCH RBC QN AUTO: 37.2 PG (ref 26.5–33)
MCHC RBC AUTO-ENTMCNC: 35.1 G/DL (ref 31.5–36.5)
MCV RBC AUTO: 106 FL (ref 78–100)
MONOCYTES # BLD AUTO: 0.2 10E3/UL (ref 0–1.3)
MONOCYTES NFR BLD AUTO: 6 %
NEUTROPHILS # BLD AUTO: 2 10E3/UL (ref 1.6–8.3)
NEUTROPHILS NFR BLD AUTO: 67 %
NRBC # BLD AUTO: 0 10E3/UL
NRBC BLD AUTO-RTO: 0 /100
PLATELET # BLD AUTO: 104 10E3/UL (ref 150–450)
RBC # BLD AUTO: 3.66 10E6/UL (ref 4.4–5.9)
WBC # BLD AUTO: 2.9 10E3/UL (ref 4–11)

## 2023-06-30 PROCEDURE — 85025 COMPLETE CBC W/AUTO DIFF WBC: CPT | Performed by: PATHOLOGY

## 2023-06-30 PROCEDURE — 36415 COLL VENOUS BLD VENIPUNCTURE: CPT | Performed by: PATHOLOGY

## 2023-07-07 ENCOUNTER — LAB (OUTPATIENT)
Dept: LAB | Facility: CLINIC | Age: 63
End: 2023-07-07
Payer: COMMERCIAL

## 2023-07-07 DIAGNOSIS — D45 POLYCYTHEMIA VERA (H): ICD-10-CM

## 2023-07-07 LAB
BASOPHILS # BLD AUTO: 0 10E3/UL (ref 0–0.2)
BASOPHILS NFR BLD AUTO: 1 %
EOSINOPHIL # BLD AUTO: 0 10E3/UL (ref 0–0.7)
EOSINOPHIL NFR BLD AUTO: 1 %
ERYTHROCYTE [DISTWIDTH] IN BLOOD BY AUTOMATED COUNT: 12.8 % (ref 10–15)
HCT VFR BLD AUTO: 38.3 % (ref 40–53)
HGB BLD-MCNC: 13.7 G/DL (ref 13.3–17.7)
IMM GRANULOCYTES # BLD: 0 10E3/UL
IMM GRANULOCYTES NFR BLD: 1 %
LYMPHOCYTES # BLD AUTO: 0.7 10E3/UL (ref 0.8–5.3)
LYMPHOCYTES NFR BLD AUTO: 22 %
MCH RBC QN AUTO: 37.5 PG (ref 26.5–33)
MCHC RBC AUTO-ENTMCNC: 35.8 G/DL (ref 31.5–36.5)
MCV RBC AUTO: 105 FL (ref 78–100)
MONOCYTES # BLD AUTO: 0.2 10E3/UL (ref 0–1.3)
MONOCYTES NFR BLD AUTO: 7 %
NEUTROPHILS # BLD AUTO: 2.2 10E3/UL (ref 1.6–8.3)
NEUTROPHILS NFR BLD AUTO: 68 %
NRBC # BLD AUTO: 0 10E3/UL
NRBC BLD AUTO-RTO: 0 /100
PLATELET # BLD AUTO: 199 10E3/UL (ref 150–450)
RBC # BLD AUTO: 3.65 10E6/UL (ref 4.4–5.9)
WBC # BLD AUTO: 3.2 10E3/UL (ref 4–11)

## 2023-07-07 PROCEDURE — 36415 COLL VENOUS BLD VENIPUNCTURE: CPT | Performed by: PATHOLOGY

## 2023-07-07 PROCEDURE — 85025 COMPLETE CBC W/AUTO DIFF WBC: CPT | Performed by: PATHOLOGY

## 2023-07-14 ENCOUNTER — LAB (OUTPATIENT)
Dept: LAB | Facility: CLINIC | Age: 63
End: 2023-07-14
Payer: COMMERCIAL

## 2023-07-14 DIAGNOSIS — D45 POLYCYTHEMIA VERA (H): ICD-10-CM

## 2023-07-14 LAB
BASOPHILS # BLD AUTO: 0 10E3/UL (ref 0–0.2)
BASOPHILS NFR BLD AUTO: 1 %
EOSINOPHIL # BLD AUTO: 0 10E3/UL (ref 0–0.7)
EOSINOPHIL NFR BLD AUTO: 1 %
ERYTHROCYTE [DISTWIDTH] IN BLOOD BY AUTOMATED COUNT: 12.8 % (ref 10–15)
HCT VFR BLD AUTO: 38.2 % (ref 40–53)
HGB BLD-MCNC: 13.5 G/DL (ref 13.3–17.7)
IMM GRANULOCYTES # BLD: 0 10E3/UL
IMM GRANULOCYTES NFR BLD: 1 %
LYMPHOCYTES # BLD AUTO: 1 10E3/UL (ref 0.8–5.3)
LYMPHOCYTES NFR BLD AUTO: 26 %
MCH RBC QN AUTO: 37.8 PG (ref 26.5–33)
MCHC RBC AUTO-ENTMCNC: 35.3 G/DL (ref 31.5–36.5)
MCV RBC AUTO: 107 FL (ref 78–100)
MONOCYTES # BLD AUTO: 0.3 10E3/UL (ref 0–1.3)
MONOCYTES NFR BLD AUTO: 9 %
NEUTROPHILS # BLD AUTO: 2.3 10E3/UL (ref 1.6–8.3)
NEUTROPHILS NFR BLD AUTO: 62 %
NRBC # BLD AUTO: 0 10E3/UL
NRBC BLD AUTO-RTO: 0 /100
PLATELET # BLD AUTO: 195 10E3/UL (ref 150–450)
RBC # BLD AUTO: 3.57 10E6/UL (ref 4.4–5.9)
WBC # BLD AUTO: 3.7 10E3/UL (ref 4–11)

## 2023-07-14 PROCEDURE — 85025 COMPLETE CBC W/AUTO DIFF WBC: CPT | Performed by: PATHOLOGY

## 2023-07-14 PROCEDURE — 36415 COLL VENOUS BLD VENIPUNCTURE: CPT | Performed by: PATHOLOGY

## 2023-07-21 ENCOUNTER — LAB (OUTPATIENT)
Dept: LAB | Facility: CLINIC | Age: 63
End: 2023-07-21
Payer: COMMERCIAL

## 2023-07-21 DIAGNOSIS — D45 POLYCYTHEMIA VERA (H): ICD-10-CM

## 2023-07-21 LAB
BASOPHILS # BLD AUTO: 0 10E3/UL (ref 0–0.2)
BASOPHILS NFR BLD AUTO: 1 %
EOSINOPHIL # BLD AUTO: 0 10E3/UL (ref 0–0.7)
EOSINOPHIL NFR BLD AUTO: 1 %
ERYTHROCYTE [DISTWIDTH] IN BLOOD BY AUTOMATED COUNT: 12.6 % (ref 10–15)
HCT VFR BLD AUTO: 39.4 % (ref 40–53)
HGB BLD-MCNC: 14 G/DL (ref 13.3–17.7)
IMM GRANULOCYTES # BLD: 0 10E3/UL
IMM GRANULOCYTES NFR BLD: 1 %
LYMPHOCYTES # BLD AUTO: 1 10E3/UL (ref 0.8–5.3)
LYMPHOCYTES NFR BLD AUTO: 25 %
MCH RBC QN AUTO: 37.9 PG (ref 26.5–33)
MCHC RBC AUTO-ENTMCNC: 35.5 G/DL (ref 31.5–36.5)
MCV RBC AUTO: 107 FL (ref 78–100)
MONOCYTES # BLD AUTO: 0.3 10E3/UL (ref 0–1.3)
MONOCYTES NFR BLD AUTO: 7 %
NEUTROPHILS # BLD AUTO: 2.6 10E3/UL (ref 1.6–8.3)
NEUTROPHILS NFR BLD AUTO: 65 %
NRBC # BLD AUTO: 0 10E3/UL
NRBC BLD AUTO-RTO: 0 /100
PLATELET # BLD AUTO: 133 10E3/UL (ref 150–450)
RBC # BLD AUTO: 3.69 10E6/UL (ref 4.4–5.9)
WBC # BLD AUTO: 3.9 10E3/UL (ref 4–11)

## 2023-07-21 PROCEDURE — 36415 COLL VENOUS BLD VENIPUNCTURE: CPT | Performed by: PATHOLOGY

## 2023-07-21 PROCEDURE — 85025 COMPLETE CBC W/AUTO DIFF WBC: CPT | Performed by: PATHOLOGY

## 2023-07-28 ENCOUNTER — LAB (OUTPATIENT)
Dept: LAB | Facility: CLINIC | Age: 63
End: 2023-07-28
Payer: COMMERCIAL

## 2023-07-28 DIAGNOSIS — D45 POLYCYTHEMIA VERA (H): ICD-10-CM

## 2023-07-28 LAB
BASOPHILS # BLD AUTO: 0 10E3/UL (ref 0–0.2)
BASOPHILS NFR BLD AUTO: 1 %
EOSINOPHIL # BLD AUTO: 0 10E3/UL (ref 0–0.7)
EOSINOPHIL NFR BLD AUTO: 1 %
ERYTHROCYTE [DISTWIDTH] IN BLOOD BY AUTOMATED COUNT: 12.2 % (ref 10–15)
HCT VFR BLD AUTO: 39.9 % (ref 40–53)
HGB BLD-MCNC: 13.8 G/DL (ref 13.3–17.7)
IMM GRANULOCYTES # BLD: 0 10E3/UL
IMM GRANULOCYTES NFR BLD: 0 %
LYMPHOCYTES # BLD AUTO: 0.7 10E3/UL (ref 0.8–5.3)
LYMPHOCYTES NFR BLD AUTO: 23 %
MCH RBC QN AUTO: 37.6 PG (ref 26.5–33)
MCHC RBC AUTO-ENTMCNC: 34.6 G/DL (ref 31.5–36.5)
MCV RBC AUTO: 109 FL (ref 78–100)
MONOCYTES # BLD AUTO: 0.3 10E3/UL (ref 0–1.3)
MONOCYTES NFR BLD AUTO: 9 %
NEUTROPHILS # BLD AUTO: 2.1 10E3/UL (ref 1.6–8.3)
NEUTROPHILS NFR BLD AUTO: 66 %
NRBC # BLD AUTO: 0 10E3/UL
NRBC BLD AUTO-RTO: 0 /100
PLATELET # BLD AUTO: 107 10E3/UL (ref 150–450)
RBC # BLD AUTO: 3.67 10E6/UL (ref 4.4–5.9)
WBC # BLD AUTO: 3.2 10E3/UL (ref 4–11)

## 2023-07-28 PROCEDURE — 36415 COLL VENOUS BLD VENIPUNCTURE: CPT | Performed by: PATHOLOGY

## 2023-07-28 PROCEDURE — 85025 COMPLETE CBC W/AUTO DIFF WBC: CPT | Performed by: PATHOLOGY

## 2023-08-04 ENCOUNTER — LAB (OUTPATIENT)
Dept: LAB | Facility: CLINIC | Age: 63
End: 2023-08-04
Payer: COMMERCIAL

## 2023-08-04 DIAGNOSIS — D45 POLYCYTHEMIA VERA (H): ICD-10-CM

## 2023-08-04 LAB
BASOPHILS # BLD AUTO: 0 10E3/UL (ref 0–0.2)
BASOPHILS NFR BLD AUTO: 1 %
EOSINOPHIL # BLD AUTO: 0.1 10E3/UL (ref 0–0.7)
EOSINOPHIL NFR BLD AUTO: 3 %
ERYTHROCYTE [DISTWIDTH] IN BLOOD BY AUTOMATED COUNT: 12 % (ref 10–15)
HCT VFR BLD AUTO: 41.1 % (ref 40–53)
HGB BLD-MCNC: 14.8 G/DL (ref 13.3–17.7)
IMM GRANULOCYTES # BLD: 0 10E3/UL
IMM GRANULOCYTES NFR BLD: 1 %
LYMPHOCYTES # BLD AUTO: 0.8 10E3/UL (ref 0.8–5.3)
LYMPHOCYTES NFR BLD AUTO: 27 %
MCH RBC QN AUTO: 38.2 PG (ref 26.5–33)
MCHC RBC AUTO-ENTMCNC: 36 G/DL (ref 31.5–36.5)
MCV RBC AUTO: 106 FL (ref 78–100)
MONOCYTES # BLD AUTO: 0.2 10E3/UL (ref 0–1.3)
MONOCYTES NFR BLD AUTO: 8 %
NEUTROPHILS # BLD AUTO: 1.7 10E3/UL (ref 1.6–8.3)
NEUTROPHILS NFR BLD AUTO: 60 %
NRBC # BLD AUTO: 0 10E3/UL
NRBC BLD AUTO-RTO: 0 /100
PLATELET # BLD AUTO: 134 10E3/UL (ref 150–450)
RBC # BLD AUTO: 3.87 10E6/UL (ref 4.4–5.9)
WBC # BLD AUTO: 2.9 10E3/UL (ref 4–11)

## 2023-08-04 PROCEDURE — 36415 COLL VENOUS BLD VENIPUNCTURE: CPT | Performed by: PATHOLOGY

## 2023-08-04 PROCEDURE — 85025 COMPLETE CBC W/AUTO DIFF WBC: CPT | Performed by: PATHOLOGY

## 2023-08-11 ENCOUNTER — LAB (OUTPATIENT)
Dept: LAB | Facility: CLINIC | Age: 63
End: 2023-08-11
Payer: COMMERCIAL

## 2023-08-11 DIAGNOSIS — D45 POLYCYTHEMIA VERA (H): ICD-10-CM

## 2023-08-11 LAB
BASOPHILS # BLD AUTO: 0 10E3/UL (ref 0–0.2)
BASOPHILS NFR BLD AUTO: 1 %
EOSINOPHIL # BLD AUTO: 0.1 10E3/UL (ref 0–0.7)
EOSINOPHIL NFR BLD AUTO: 3 %
ERYTHROCYTE [DISTWIDTH] IN BLOOD BY AUTOMATED COUNT: 12 % (ref 10–15)
HCT VFR BLD AUTO: 39.5 % (ref 40–53)
HGB BLD-MCNC: 14.2 G/DL (ref 13.3–17.7)
IMM GRANULOCYTES # BLD: 0 10E3/UL
IMM GRANULOCYTES NFR BLD: 1 %
LYMPHOCYTES # BLD AUTO: 0.8 10E3/UL (ref 0.8–5.3)
LYMPHOCYTES NFR BLD AUTO: 24 %
MCH RBC QN AUTO: 38.4 PG (ref 26.5–33)
MCHC RBC AUTO-ENTMCNC: 35.9 G/DL (ref 31.5–36.5)
MCV RBC AUTO: 107 FL (ref 78–100)
MONOCYTES # BLD AUTO: 0.2 10E3/UL (ref 0–1.3)
MONOCYTES NFR BLD AUTO: 7 %
NEUTROPHILS # BLD AUTO: 2.1 10E3/UL (ref 1.6–8.3)
NEUTROPHILS NFR BLD AUTO: 64 %
NRBC # BLD AUTO: 0 10E3/UL
NRBC BLD AUTO-RTO: 0 /100
PLATELET # BLD AUTO: 146 10E3/UL (ref 150–450)
RBC # BLD AUTO: 3.7 10E6/UL (ref 4.4–5.9)
WBC # BLD AUTO: 3.2 10E3/UL (ref 4–11)

## 2023-08-11 PROCEDURE — 85025 COMPLETE CBC W/AUTO DIFF WBC: CPT | Performed by: PATHOLOGY

## 2023-08-11 PROCEDURE — 36415 COLL VENOUS BLD VENIPUNCTURE: CPT | Performed by: PATHOLOGY

## 2023-08-25 ENCOUNTER — LAB (OUTPATIENT)
Dept: LAB | Facility: CLINIC | Age: 63
End: 2023-08-25
Payer: COMMERCIAL

## 2023-08-25 DIAGNOSIS — D45 POLYCYTHEMIA VERA (H): ICD-10-CM

## 2023-08-25 LAB
BASOPHILS # BLD AUTO: 0.1 10E3/UL (ref 0–0.2)
BASOPHILS NFR BLD AUTO: 1 %
EOSINOPHIL # BLD AUTO: 0.1 10E3/UL (ref 0–0.7)
EOSINOPHIL NFR BLD AUTO: 2 %
ERYTHROCYTE [DISTWIDTH] IN BLOOD BY AUTOMATED COUNT: 11.6 % (ref 10–15)
HCT VFR BLD AUTO: 42.7 % (ref 40–53)
HGB BLD-MCNC: 15.3 G/DL (ref 13.3–17.7)
IMM GRANULOCYTES # BLD: 0 10E3/UL
IMM GRANULOCYTES NFR BLD: 1 %
LYMPHOCYTES # BLD AUTO: 0.9 10E3/UL (ref 0.8–5.3)
LYMPHOCYTES NFR BLD AUTO: 19 %
MCH RBC QN AUTO: 37.5 PG (ref 26.5–33)
MCHC RBC AUTO-ENTMCNC: 35.8 G/DL (ref 31.5–36.5)
MCV RBC AUTO: 105 FL (ref 78–100)
MONOCYTES # BLD AUTO: 0.3 10E3/UL (ref 0–1.3)
MONOCYTES NFR BLD AUTO: 6 %
NEUTROPHILS # BLD AUTO: 3.6 10E3/UL (ref 1.6–8.3)
NEUTROPHILS NFR BLD AUTO: 71 %
NRBC # BLD AUTO: 0 10E3/UL
NRBC BLD AUTO-RTO: 0 /100
PLATELET # BLD AUTO: 129 10E3/UL (ref 150–450)
RBC # BLD AUTO: 4.08 10E6/UL (ref 4.4–5.9)
WBC # BLD AUTO: 5 10E3/UL (ref 4–11)

## 2023-08-25 PROCEDURE — 85025 COMPLETE CBC W/AUTO DIFF WBC: CPT | Performed by: PATHOLOGY

## 2023-08-25 PROCEDURE — 36415 COLL VENOUS BLD VENIPUNCTURE: CPT | Performed by: PATHOLOGY

## 2023-09-12 ENCOUNTER — LAB (OUTPATIENT)
Dept: LAB | Facility: CLINIC | Age: 63
End: 2023-09-12
Payer: COMMERCIAL

## 2023-09-12 DIAGNOSIS — D45 POLYCYTHEMIA VERA (H): ICD-10-CM

## 2023-09-12 LAB
BASOPHILS # BLD AUTO: 0.1 10E3/UL (ref 0–0.2)
BASOPHILS NFR BLD AUTO: 1 %
EOSINOPHIL # BLD AUTO: 0.1 10E3/UL (ref 0–0.7)
EOSINOPHIL NFR BLD AUTO: 2 %
ERYTHROCYTE [DISTWIDTH] IN BLOOD BY AUTOMATED COUNT: 11.2 % (ref 10–15)
HCT VFR BLD AUTO: 45.6 % (ref 40–53)
HGB BLD-MCNC: 16.3 G/DL (ref 13.3–17.7)
IMM GRANULOCYTES # BLD: 0.1 10E3/UL
IMM GRANULOCYTES NFR BLD: 1 %
LYMPHOCYTES # BLD AUTO: 0.8 10E3/UL (ref 0.8–5.3)
LYMPHOCYTES NFR BLD AUTO: 20 %
MCH RBC QN AUTO: 37.4 PG (ref 26.5–33)
MCHC RBC AUTO-ENTMCNC: 35.7 G/DL (ref 31.5–36.5)
MCV RBC AUTO: 105 FL (ref 78–100)
MONOCYTES # BLD AUTO: 0.4 10E3/UL (ref 0–1.3)
MONOCYTES NFR BLD AUTO: 9 %
NEUTROPHILS # BLD AUTO: 2.8 10E3/UL (ref 1.6–8.3)
NEUTROPHILS NFR BLD AUTO: 67 %
NRBC # BLD AUTO: 0 10E3/UL
NRBC BLD AUTO-RTO: 0 /100
PLATELET # BLD AUTO: 150 10E3/UL (ref 150–450)
RBC # BLD AUTO: 4.36 10E6/UL (ref 4.4–5.9)
WBC # BLD AUTO: 4.2 10E3/UL (ref 4–11)

## 2023-09-12 PROCEDURE — 36415 COLL VENOUS BLD VENIPUNCTURE: CPT | Performed by: PATHOLOGY

## 2023-09-12 PROCEDURE — 85025 COMPLETE CBC W/AUTO DIFF WBC: CPT | Performed by: PATHOLOGY

## 2023-09-14 ENCOUNTER — VIRTUAL VISIT (OUTPATIENT)
Dept: ONCOLOGY | Facility: CLINIC | Age: 63
End: 2023-09-14
Attending: INTERNAL MEDICINE
Payer: COMMERCIAL

## 2023-09-14 VITALS
SYSTOLIC BLOOD PRESSURE: 130 MMHG | BODY MASS INDEX: 23.07 KG/M2 | HEIGHT: 67 IN | HEART RATE: 65 BPM | WEIGHT: 147 LBS | DIASTOLIC BLOOD PRESSURE: 80 MMHG

## 2023-09-14 DIAGNOSIS — D45 POLYCYTHEMIA VERA (H): Primary | ICD-10-CM

## 2023-09-14 PROCEDURE — 99214 OFFICE O/P EST MOD 30 MIN: CPT | Mod: VID | Performed by: INTERNAL MEDICINE

## 2023-09-14 RX ORDER — AMLODIPINE BESYLATE 5 MG/1
5 TABLET ORAL DAILY
Status: ON HOLD | COMMUNITY
Start: 2022-12-15 | End: 2024-01-01

## 2023-09-14 ASSESSMENT — PAIN SCALES - GENERAL: PAINLEVEL: NO PAIN (0)

## 2023-09-14 NOTE — PROGRESS NOTES
Virtual Visit Details    Type of service:  Video Visit     Originating Location (pt. Location): Home  Distant Location (provider location):  On-site  Platform used for Video Visit: Matilda

## 2023-09-14 NOTE — PROGRESS NOTES
HEMATOLOGY CLINIC VISIT    Zaheer Borges is a 63-year-old male with polycythemia vera who returns today for follow-up.      Background history:  He was diagnosed in 2003 and [facility]  He transitioned his care here in January 2019 due to a change in insurance.    He has been managed since diagnosis with phlebotomy, initially requiring these approximately every 2 months.  In mid 2018, hydroxyurea was added due to an elevated platelet count of approximately 1 million.  He was started on 1000 mg of Hydrea daily but this dropped his platelet count less than 100,000 and so the dose was decreased to 500 mg daily, with his platelet count subsequently returning to normal range.  While he was on the larger dose of Hydrea, he did not require phlebotomy to maintain a hematocrit less than 45%. However, when the dose was decreased, he returned to requiring phlebotomies about every 2 months.    In August 2021, we initiated a plan of trying to increase the hydroxyurea dose in an attempt to increase the interval between phlebotomy treatments.  We started by adding 2 extra pills per week.  We took this cautious approach because historically he has been quite sensitive to small adjustments in hydroxyurea dosing.  With 2 extra pills per week, his platelet count dropped noticeably and so we eventually settled on a regimen of 1000 mg daily, with 1500 mg every Monday.    Over the last year (2022), we have continued to adjust his Hydrea dose, and he seems to have settled on 1500 mg daily 5-6 days per week, and 1000 mg daily the other days of the week.  He adjusts his dose based on his white blood cell count and platelet count.  Overall this approach has worked well, and he clearly remains very sensitive to small dose fluctuations.    In addition to hydroxyurea, he takes aspirin 81 mg daily.      Interval history:  Over the last year, he has remained well.  With the Hydrea dosing regimen  outlined above, he has been able to go 2 to 4 months between phlebotomy treatments.  In this calendar year, he has had phlebotomies in January, March, and May.  He is scheduled again later this week.  When his hematocrit gets to 45% or above, he notices increased fatigue and a sense of soreness in his mid back.  Otherwise he continues to feel well and has no other questions or concerns.    Physical exam:   He looks well. Detailed exam not performed (video visit).    Labs:   White count 4.2 with a normal differential, hemoglobin 16.3, , hematocrit 45.6, platelets 150,000.  Over the last year, his CBC parameters have remained stable overall, although we have noticed his MCV has been running a bit higher due to the higher dose of hydroxyurea.      ASSESSMENT / PLAN:  Polycythemia vera.    Overall, Zaheer continues to do well.  With the current hydroxyurea dose of 1500 mg daily 5 to 6 days/week, and 1000 mg daily the other days, he is requiring phlebotomy treatments on average every 3 months.  He continues to monitor his CBC quite frequently, so he can make dose adjustments accordingly with the goal of increasing the interval between phlebotomy treatments as far as possible.  He is quite satisfied with his approach and we will make no change in his treatment plan today.  We will continue to reevaluate annually, sooner if needed.    Total time 30 minutes, including review of medical records and labs, video visit, and documentation.      Escobar Peterson MD  Professor of Medicine  Division of Hematology, Oncology, and Transplantation  Director, [facility]

## 2023-09-14 NOTE — LETTER
9/14/2023         RE: Zaheer Borges  [address] [place] [address]        Dear Colleague,    Thank you for referring your patient, Zaheer Borges, to the [facility]. Please see a copy of my visit note below.    Virtual Visit Details    Type of service:  Video Visit     Originating Location (pt. Location): Home  Distant Location (provider location):  On-site  Platform used for Video Visit: [facility]      HEMATOLOGY CLINIC VISIT    Zaheer Borges is a 63-year-old male with polycythemia vera who returns today for follow-up.      Background history:  He was diagnosed in 2003 and had been followed in the [facility] system initially.  He transitioned his care here in January 2019 due to a change in insurance.    He has been managed since diagnosis with phlebotomy, initially requiring these approximately every 2 months.  In mid 2018, hydroxyurea was added due to an elevated platelet count of approximately 1 million.  He was started on 1000 mg of Hydrea daily but this dropped his platelet count less than 100,000 and so the dose was decreased to 500 mg daily, with his platelet count subsequently returning to normal range.  While he was on the larger dose of Hydrea, he did not require phlebotomy to maintain a hematocrit less than 45%. However, when the dose was decreased, he returned to requiring phlebotomies about every 2 months.    In August 2021, we initiated a plan of trying to increase the hydroxyurea dose in an attempt to increase the interval between phlebotomy treatments.  We started by adding 2 extra pills per week.  We took this cautious approach because historically he has been quite sensitive to small adjustments in hydroxyurea dosing.  With 2 extra pills per week, his platelet count dropped noticeably and so we eventually settled on a regimen of 1000 mg daily, with 1500 mg every Monday.    Over the last year (2022), we have continued to adjust his Hydrea dose, and he seems to have  settled on 1500 mg daily 5-6 days per week, and 1000 mg daily the other days of the week.  He adjusts his dose based on his white blood cell count and platelet count.  Overall this approach has worked well, and he clearly remains very sensitive to small dose fluctuations.    In addition to hydroxyurea, he takes aspirin 81 mg daily.      Interval history:  Over the last year, he has remained well.  With the Hydrea dosing regimen outlined above, he has been able to go 2 to 4 months between phlebotomy treatments.  In this calendar year, he has had phlebotomies in January, March, and May.  He is scheduled again later this week.  When his hematocrit gets to 45% or above, he notices increased fatigue and a sense of soreness in his mid back.  Otherwise he continues to feel well and has no other questions or concerns.    Physical exam:   He looks well. Detailed exam not performed (video visit).    Labs:   White count 4.2 with a normal differential, hemoglobin 16.3, , hematocrit 45.6, platelets 150,000.  Over the last year, his CBC parameters have remained stable overall, although we have noticed his MCV has been running a bit higher due to the higher dose of hydroxyurea.      ASSESSMENT / PLAN:  Polycythemia vera.    Overall, Zaheer continues to do well.  With the current hydroxyurea dose of 1500 mg daily 5 to 6 days/week, and 1000 mg daily the other days, he is requiring phlebotomy treatments on average every 3 months.  He continues to monitor his CBC quite frequently, so he can make dose adjustments accordingly with the goal of increasing the interval between phlebotomy treatments as far as possible.  He is quite satisfied with his approach and we will make no change in his treatment plan today.  We will continue to reevaluate annually, sooner if needed.    Total time 30 minutes, including review of medical records and labs, video visit, and documentation.      Escobar Peterson MD  Professor of Medicine  Division of  Hematology, Oncology, and Transplantation  Director, [facility]

## 2023-09-14 NOTE — NURSING NOTE
Is the patient currently in the state of [place]? YES    Visit mode:VIDEO    If the visit is dropped, the patient can be reconnected by: VIDEO VISIT: Send to e-mail at: [email or web address]    Will anyone else be joining the visit? NO  (If patient encounters technical issues they should call [phone]    How would you like to obtain your AVS? MyChart    Are changes needed to the allergy or medication list? No    Reason for visit: RECHECK    No other vitals to report per pt      Sharon ZELAYAF

## 2023-09-17 ENCOUNTER — INFUSION THERAPY VISIT (OUTPATIENT)
Dept: ONCOLOGY | Facility: CLINIC | Age: 63
End: 2023-09-17
Attending: INTERNAL MEDICINE
Payer: COMMERCIAL

## 2023-09-17 VITALS
DIASTOLIC BLOOD PRESSURE: 80 MMHG | SYSTOLIC BLOOD PRESSURE: 133 MMHG | RESPIRATION RATE: 18 BRPM | HEART RATE: 78 BPM | OXYGEN SATURATION: 97 % | TEMPERATURE: 98.1 F

## 2023-09-17 DIAGNOSIS — D45 POLYCYTHEMIA VERA (H): ICD-10-CM

## 2023-09-17 LAB
BASOPHILS # BLD AUTO: 0 10E3/UL (ref 0–0.2)
BASOPHILS NFR BLD AUTO: 1 %
EOSINOPHIL # BLD AUTO: 0.1 10E3/UL (ref 0–0.7)
EOSINOPHIL NFR BLD AUTO: 3 %
ERYTHROCYTE [DISTWIDTH] IN BLOOD BY AUTOMATED COUNT: 11.3 % (ref 10–15)
HCT VFR BLD AUTO: 43.9 % (ref 40–53)
HGB BLD-MCNC: 15.7 G/DL (ref 13.3–17.7)
IMM GRANULOCYTES # BLD: 0.1 10E3/UL
IMM GRANULOCYTES NFR BLD: 2 %
LYMPHOCYTES # BLD AUTO: 0.9 10E3/UL (ref 0.8–5.3)
LYMPHOCYTES NFR BLD AUTO: 24 %
MCH RBC QN AUTO: 36.6 PG (ref 26.5–33)
MCHC RBC AUTO-ENTMCNC: 35.8 G/DL (ref 31.5–36.5)
MCV RBC AUTO: 102 FL (ref 78–100)
MONOCYTES # BLD AUTO: 0.3 10E3/UL (ref 0–1.3)
MONOCYTES NFR BLD AUTO: 8 %
NEUTROPHILS # BLD AUTO: 2.4 10E3/UL (ref 1.6–8.3)
NEUTROPHILS NFR BLD AUTO: 62 %
NRBC # BLD AUTO: 0 10E3/UL
NRBC BLD AUTO-RTO: 0 /100
PLATELET # BLD AUTO: 142 10E3/UL (ref 150–450)
RBC # BLD AUTO: 4.29 10E6/UL (ref 4.4–5.9)
WBC # BLD AUTO: 3.8 10E3/UL (ref 4–11)

## 2023-09-17 PROCEDURE — 36415 COLL VENOUS BLD VENIPUNCTURE: CPT

## 2023-09-17 PROCEDURE — 99195 PHLEBOTOMY: CPT

## 2023-09-17 PROCEDURE — 85025 COMPLETE CBC W/AUTO DIFF WBC: CPT

## 2023-09-17 NOTE — PATIENT INSTRUCTIONS
[facility] Triage and after hours / weekends / holidays:  [phone]    Please call the triage or after hours line if you experience a temperature greater than or equal to 100.4, shaking chills, have uncontrolled nausea, vomiting and/or diarrhea, dizziness, shortness of breath, chest pain, bleeding, unexplained bruising, or if you have any other new/concerning symptoms, questions or concerns.      If you are having any concerning symptoms or wish to speak to a provider before your next infusion visit, please call triage to notify them so we can adequately serve you.     If you need a refill on a narcotic prescription or other medication, please call before your infusion appointment.                 September 2023 Sunday Monday Tuesday Wednesday Thursday Friday Saturday                            1     2       3     4     5     6     7     8     9       10     11     12    LAB   4:30 PM   (15 min.)    LAB   [facility] [place] 13     14    VIDEO VISIT RETURN   2:15 PM   (30 min.)   Escobar Peterson MD   [facility] 15     16       17    ONC INFUSION 1 HR (60 MIN)   9:00 AM   (60 min.)    ONC INFUSION NURSE   [facility] 18     19     20     21     22     23       24     25     26     27     28     29     30                 October 2023 Sunday Monday Tuesday Wednesday Thursday Friday Saturday   1     2     3     4     5     6     7       8     9     10     11     12     13     14       15     16     17     18     19     20     21       22     23     24     25     26     27     28       29     30     31                                         Lab Results:  No results found for this or any previous visit (from the past 12 hour(s)).

## 2023-09-17 NOTE — PROGRESS NOTES
Infusion Nursing Note:  Zaheer Borges presents today for Phlebotomy  Patient seen by provider today: No   present during visit today: Not Applicable.    Note: Patient presents to infusion feeling ok. Patient denies acute discomfort and states no s/s of infection such as fever, sore throat, cough, chest pain, body aches, chills, headache, increased nasal congestion, or changes in taste/smell. Pt states he is experiencing characteristic fatigue, shortness of breath with moderate exertion, and low back pain that is consistent with having elevated levels of Hemoglobin/Hematocrit. Pt voices understanding that Dr. Peterson requests infusion staff to recheck CBC given last CBC was on 9/12. Recheck concluded Hematocrt of 43.9%. Pt wishes to get second opinion since he's having characteristic symptoms of needing phleb.     On-call Dr. Hernandez paged to review 9/12 and 9/17 CBC trends and pts assessment above.  TORB. 9/17/2023. 0934. Dr. Hernandez. Nazario Sorensen RN. Ok to perform Phlebotomy with todays labs and pts assessment.      Intravenous Access:  Peripheral IV placed.    Treatment Conditions:  Lab Results   Component Value Date    HGB 15.7 09/17/2023    WBC 3.8 (L) 09/17/2023    ANEU 2.5 06/28/2021    ANEUTAUTO 2.4 09/17/2023     (L) 09/17/2023        Results reviewed, labs did NOT meet treatment parameters: see TORB above.      Post Infusion Assessment:  Patient tolerated phleb without incident. 500mls of whole blood removed. Vitals stable.      Discharge Plan:   Patient declined prescription refills.  Discharge instructions reviewed with: Patient.  Patient and/or family verbalized understanding of discharge instructions and all questions answered.  AVS to patient via SweetSlapT.  Patient will return in 1 week for next for next lab appointment. Pt states he will schedule lab appt himself  Patient discharged in stable condition accompanied by: self.  Departure Mode: Ambulatory.      Nazario Sorensen RN

## 2023-09-29 ENCOUNTER — LAB (OUTPATIENT)
Dept: LAB | Facility: CLINIC | Age: 63
End: 2023-09-29
Payer: COMMERCIAL

## 2023-09-29 DIAGNOSIS — D45 POLYCYTHEMIA VERA (H): ICD-10-CM

## 2023-09-29 LAB
BASOPHILS # BLD AUTO: 0 10E3/UL (ref 0–0.2)
BASOPHILS NFR BLD AUTO: 1 %
EOSINOPHIL # BLD AUTO: 0.1 10E3/UL (ref 0–0.7)
EOSINOPHIL NFR BLD AUTO: 2 %
ERYTHROCYTE [DISTWIDTH] IN BLOOD BY AUTOMATED COUNT: 11.7 % (ref 10–15)
HCT VFR BLD AUTO: 42.2 % (ref 40–53)
HGB BLD-MCNC: 15 G/DL (ref 13.3–17.7)
IMM GRANULOCYTES # BLD: 0.1 10E3/UL
IMM GRANULOCYTES NFR BLD: 1 %
LYMPHOCYTES # BLD AUTO: 1 10E3/UL (ref 0.8–5.3)
LYMPHOCYTES NFR BLD AUTO: 22 %
MCH RBC QN AUTO: 36.5 PG (ref 26.5–33)
MCHC RBC AUTO-ENTMCNC: 35.5 G/DL (ref 31.5–36.5)
MCV RBC AUTO: 103 FL (ref 78–100)
MONOCYTES # BLD AUTO: 0.3 10E3/UL (ref 0–1.3)
MONOCYTES NFR BLD AUTO: 6 %
NEUTROPHILS # BLD AUTO: 3 10E3/UL (ref 1.6–8.3)
NEUTROPHILS NFR BLD AUTO: 68 %
NRBC # BLD AUTO: 0 10E3/UL
NRBC BLD AUTO-RTO: 0 /100
PLATELET # BLD AUTO: 134 10E3/UL (ref 150–450)
RBC # BLD AUTO: 4.11 10E6/UL (ref 4.4–5.9)
WBC # BLD AUTO: 4.4 10E3/UL (ref 4–11)

## 2023-09-29 PROCEDURE — 36415 COLL VENOUS BLD VENIPUNCTURE: CPT | Performed by: PATHOLOGY

## 2023-09-29 PROCEDURE — 85025 COMPLETE CBC W/AUTO DIFF WBC: CPT | Performed by: PATHOLOGY

## 2023-10-27 NOTE — TELEPHONE ENCOUNTER
Padma from first floor lab called, Current lab orders in [facility] although state  on 2024, because original begin date is from 9/15/2022, per Lab they still cannot use the Standing CBC with Plts diff order.           Per Plan note from last provider visit with Dr. Peterson on 23:     One time order entered for CBC with Plts differential and routing message to care team so they can update/enter new set of standing lab orders for future appts.

## 2023-12-08 NOTE — PATIENT INSTRUCTIONS
[facility] Triage and after hours / weekends / holidays:  [phone]    Please call the triage or after hours line if you experience a temperature greater than or equal to 100.4, shaking chills, have uncontrolled nausea, vomiting and/or diarrhea, dizziness, shortness of breath, chest pain, bleeding, unexplained bruising, or if you have any other new/concerning symptoms, questions or concerns.      If you are having any concerning symptoms or wish to speak to a provider before your next infusion visit, please call triage to notify them so we can adequately serve you.     If you need a refill on a narcotic prescription or other medication, please call before your infusion appointment.                 December 2023 Sunday Monday Tuesday Wednesday Thursday Friday Saturday                            1    LAB  12:00 PM   (15 min.)    LAB   [facility] [place] 2       3     4     5     6     7     8    LAB  10:30 AM   (15 min.)    LAB   [facility] [place]    ONC INFUSION 1 HR (60 MIN)  11:00 AM   (60 min.)    ONC INFUSION NURSE   [facility] 9       10     11     12     13     14     15     16       17     18     19     20     21     22     23       24     25     26     27     28     29     30       31                                               January 2024 Sunday Monday Tuesday Wednesday Thursday Friday Saturday        1     2     3     4     5     6       7     8     9     10     11     12     13       14     15     16     17     18     19     20       21     22     23     24     25     26     27       28     29     30     31                                    Lab Results:  Recent Results (from the past 12 hour(s))   CBC with platelets and differential    Collection Time: 12/08/23 10:51 AM   Result Value Ref Range    WBC Count 3.5 (L) 4.0 - 11.0 10e3/uL    RBC Count 4.43 4.40 - 5.90 10e6/uL    Hemoglobin 16.1 13.3 - 17.7 g/dL    Hematocrit 46.2  40.0 - 53.0 %     (H) 78 - 100 fL    MCH 36.3 (H) 26.5 - 33.0 pg    MCHC 34.8 31.5 - 36.5 g/dL    RDW 12.8 10.0 - 15.0 %    Platelet Count 109 (L) 150 - 450 10e3/uL    % Neutrophils 61 %    % Lymphocytes 27 %    % Monocytes 8 %    % Eosinophils 2 %    % Basophils 1 %    % Immature Granulocytes 1 %    NRBCs per 100 WBC 0 <1 /100    Absolute Neutrophils 2.1 1.6 - 8.3 10e3/uL    Absolute Lymphocytes 0.9 0.8 - 5.3 10e3/uL    Absolute Monocytes 0.3 0.0 - 1.3 10e3/uL    Absolute Eosinophils 0.1 0.0 - 0.7 10e3/uL    Absolute Basophils 0.1 0.0 - 0.2 10e3/uL    Absolute Immature Granulocytes 0.0 <=0.4 10e3/uL    Absolute NRBCs 0.0 10e3/uL

## 2023-12-08 NOTE — PROGRESS NOTES
Infusion Nursing Note:  Zaheer Borges presents today for 500ml Phlebotomy for Hematocrit greater then 45% (46.2)  Patient seen by provider today: No   present during visit today: Not Applicable.    Note: Patient presents to infusion feeling ok. Patient denies acute discomfort and states no acute complaints or concerns needing to be addressed today. Specifically, pt denies s/s of infection such as fever, sore throat, cough, chest pain, body aches, chills, headache, increased nasal congestion, or changes in taste/smell. Pt does state mild shortness of breath and fatigue that he contributes to increased Hematocrit. Pt states he is currently taking 3 tablets of his Hydrea daily. Pt states he's been given the OK from Dr. Peterson to dose adjust accordingly based on lab trends.     Intravenous Access:  Peripheral IV placed.    Treatment Conditions:     Latest Reference Range & Units 12/08/23 10:51   WBC 4.0 - 11.0 10e3/uL 3.5 (L)   Hemoglobin 13.3 - 17.7 g/dL 16.1   Hematocrit 40.0 - 53.0 % 46.2   Platelet Count 150 - 450 10e3/uL 109 (L)   RBC Count 4.40 - 5.90 10e6/uL 4.43   MCV 78 - 100 fL 104 (H)   MCH 26.5 - 33.0 pg 36.3 (H)   MCHC 31.5 - 36.5 g/dL 34.8   RDW 10.0 - 15.0 % 12.8   % Neutrophils % 61   % Lymphocytes % 27   % Monocytes % 8   % Eosinophils % 2   % Basophils % 1   Absolute Basophils 0.0 - 0.2 10e3/uL 0.1   Absolute Eosinophils 0.0 - 0.7 10e3/uL 0.1   Absolute Immature Granulocytes <=0.4 10e3/uL 0.0   Absolute Lymphocytes 0.8 - 5.3 10e3/uL 0.9   Absolute Monocytes 0.0 - 1.3 10e3/uL 0.3   % Immature Granulocytes % 1   Absolute Neutrophils 1.6 - 8.3 10e3/uL 2.1   Absolute NRBCs 10e3/uL 0.0   NRBCs per 100 WBC <1 /100 0   (L): Data is abnormally low  (H): Data is abnormally high  Results reviewed, labs MET treatment parameters, ok to proceed with treatment.      Post Infusion Assessment:  Patient tolerated phleb without incident. No deviation in blood pressures pre/post  Blood return noted pre and post  infusion.  Site patent and intact, free from redness, edema or discomfort.  No evidence of extravasations.  Access discontinued per protocol.       Discharge Plan:   Patient declined prescription refills.  Discharge instructions reviewed with: Patient.  Patient and/or family verbalized understanding of discharge instructions and all questions answered.  AVS to patient via [employer].  Patient will return as needed for next appointment. Pt states he will call and make next appt.   Patient discharged in stable condition accompanied by: self.  Departure Mode: Ambulatory.      Nazario Sorensen RN

## 2024-01-01 ENCOUNTER — MEDICAL CORRESPONDENCE (OUTPATIENT)
Dept: TRANSPLANT | Facility: CLINIC | Age: 64
End: 2024-01-01

## 2024-01-01 ENCOUNTER — INFUSION THERAPY VISIT (OUTPATIENT)
Dept: TRANSPLANT | Facility: CLINIC | Age: 64
End: 2024-01-01
Attending: INTERNAL MEDICINE
Payer: COMMERCIAL

## 2024-01-01 ENCOUNTER — APPOINTMENT (OUTPATIENT)
Dept: MEDSURG UNIT | Facility: CLINIC | Age: 64
DRG: 014 | End: 2024-01-01
Attending: INTERNAL MEDICINE
Payer: COMMERCIAL

## 2024-01-01 ENCOUNTER — TELEPHONE (OUTPATIENT)
Dept: ONCOLOGY | Facility: CLINIC | Age: 64
End: 2024-01-01
Payer: COMMERCIAL

## 2024-01-01 ENCOUNTER — ALLIED HEALTH/NURSE VISIT (OUTPATIENT)
Dept: TRANSPLANT | Facility: CLINIC | Age: 64
End: 2024-01-01
Payer: COMMERCIAL

## 2024-01-01 ENCOUNTER — LAB (OUTPATIENT)
Dept: LAB | Facility: CLINIC | Age: 64
End: 2024-01-01
Attending: NURSE PRACTITIONER
Payer: COMMERCIAL

## 2024-01-01 ENCOUNTER — LAB (OUTPATIENT)
Dept: LAB | Facility: CLINIC | Age: 64
End: 2024-01-01
Payer: COMMERCIAL

## 2024-01-01 ENCOUNTER — OFFICE VISIT (OUTPATIENT)
Dept: ONCOLOGY | Facility: CLINIC | Age: 64
End: 2024-01-01
Payer: COMMERCIAL

## 2024-01-01 ENCOUNTER — APPOINTMENT (OUTPATIENT)
Dept: PHYSICAL THERAPY | Facility: CLINIC | Age: 64
DRG: 014 | End: 2024-01-01
Attending: INTERNAL MEDICINE
Payer: COMMERCIAL

## 2024-01-01 ENCOUNTER — DOCUMENTATION ONLY (OUTPATIENT)
Dept: ONCOLOGY | Facility: CLINIC | Age: 64
End: 2024-01-01

## 2024-01-01 ENCOUNTER — TELEPHONE (OUTPATIENT)
Dept: TRANSPLANT | Facility: CLINIC | Age: 64
End: 2024-01-01
Payer: COMMERCIAL

## 2024-01-01 ENCOUNTER — HOSPITAL ENCOUNTER (OUTPATIENT)
Dept: CARDIOLOGY | Facility: CLINIC | Age: 64
Discharge: HOME | End: 2024-06-11
Attending: INTERNAL MEDICINE
Payer: COMMERCIAL

## 2024-01-01 ENCOUNTER — HOSPITAL ENCOUNTER (INPATIENT)
Facility: CLINIC | Age: 64
LOS: 23 days | Discharge: HOME | DRG: 014 | End: 2024-07-14
Attending: INTERNAL MEDICINE | Admitting: INTERNAL MEDICINE
Payer: COMMERCIAL

## 2024-01-01 ENCOUNTER — HEALTH MAINTENANCE LETTER (OUTPATIENT)
Age: 64
End: 2024-01-01

## 2024-01-01 ENCOUNTER — LAB (OUTPATIENT)
Dept: LAB | Facility: CLINIC | Age: 64
End: 2024-01-01
Attending: INTERNAL MEDICINE
Payer: COMMERCIAL

## 2024-01-01 ENCOUNTER — OFFICE VISIT (OUTPATIENT)
Dept: TRANSPLANT | Facility: CLINIC | Age: 64
End: 2024-01-01
Attending: NURSE PRACTITIONER
Payer: COMMERCIAL

## 2024-01-01 ENCOUNTER — DOCUMENTATION ONLY (OUTPATIENT)
Dept: ONCOLOGY | Facility: CLINIC | Age: 64
End: 2024-01-01
Payer: COMMERCIAL

## 2024-01-01 ENCOUNTER — HOSPITAL ENCOUNTER (OUTPATIENT)
Dept: GENERAL RADIOLOGY | Facility: CLINIC | Age: 64
Discharge: HOME | End: 2024-06-11
Attending: INTERNAL MEDICINE
Payer: COMMERCIAL

## 2024-01-01 ENCOUNTER — CARE COORDINATION (OUTPATIENT)
Dept: TRANSPLANT | Facility: CLINIC | Age: 64
End: 2024-01-01
Payer: COMMERCIAL

## 2024-01-01 ENCOUNTER — LAB (OUTPATIENT)
Dept: LAB | Facility: CLINIC | Age: 64
End: 2024-01-01
Attending: PHYSICIAN ASSISTANT
Payer: COMMERCIAL

## 2024-01-01 ENCOUNTER — APPOINTMENT (OUTPATIENT)
Dept: LAB | Facility: CLINIC | Age: 64
End: 2024-01-01
Payer: COMMERCIAL

## 2024-01-01 ENCOUNTER — OFFICE VISIT (OUTPATIENT)
Dept: PULMONOLOGY | Facility: CLINIC | Age: 64
End: 2024-01-01
Payer: COMMERCIAL

## 2024-01-01 ENCOUNTER — ALLIED HEALTH/NURSE VISIT (OUTPATIENT)
Dept: TRANSPLANT | Facility: CLINIC | Age: 64
End: 2024-01-01

## 2024-01-01 ENCOUNTER — TELEPHONE (OUTPATIENT)
Dept: INTERVENTIONAL RADIOLOGY/VASCULAR | Facility: CLINIC | Age: 64
End: 2024-01-01
Payer: COMMERCIAL

## 2024-01-01 ENCOUNTER — ENROLLMENT (OUTPATIENT)
Dept: HOME HEALTH SERVICES | Facility: HOME HEALTH | Age: 64
End: 2024-01-01
Payer: COMMERCIAL

## 2024-01-01 ENCOUNTER — ONCOLOGY VISIT (OUTPATIENT)
Dept: TRANSPLANT | Facility: CLINIC | Age: 64
End: 2024-01-01
Attending: PHYSICIAN ASSISTANT
Payer: COMMERCIAL

## 2024-01-01 ENCOUNTER — DOCUMENTATION ONLY (OUTPATIENT)
Dept: TRANSPLANT | Facility: CLINIC | Age: 64
End: 2024-01-01

## 2024-01-01 ENCOUNTER — MEDICAL CORRESPONDENCE (OUTPATIENT)
Dept: TRANSPLANT | Facility: CLINIC | Age: 64
End: 2024-01-01
Payer: COMMERCIAL

## 2024-01-01 ENCOUNTER — HOME INFUSION (PRE-WILLOW HOME INFUSION) (OUTPATIENT)
Dept: PHARMACY | Facility: CLINIC | Age: 64
End: 2024-01-01
Payer: COMMERCIAL

## 2024-01-01 ENCOUNTER — ONCOLOGY VISIT (OUTPATIENT)
Dept: TRANSPLANT | Facility: CLINIC | Age: 64
End: 2024-01-01
Payer: COMMERCIAL

## 2024-01-01 ENCOUNTER — HOSPITAL ENCOUNTER (OUTPATIENT)
Dept: CT IMAGING | Facility: CLINIC | Age: 64
Discharge: HOME | End: 2024-06-11
Attending: INTERNAL MEDICINE
Payer: COMMERCIAL

## 2024-01-01 ENCOUNTER — LAB REQUISITION (OUTPATIENT)
Dept: LAB | Facility: CLINIC | Age: 64
End: 2024-01-01
Payer: COMMERCIAL

## 2024-01-01 ENCOUNTER — TRANSFERRED RECORDS (OUTPATIENT)
Dept: HEALTH INFORMATION MANAGEMENT | Facility: CLINIC | Age: 64
End: 2024-01-01

## 2024-01-01 ENCOUNTER — APPOINTMENT (OUTPATIENT)
Dept: INTERVENTIONAL RADIOLOGY/VASCULAR | Facility: CLINIC | Age: 64
DRG: 014 | End: 2024-01-01
Attending: INTERNAL MEDICINE
Payer: COMMERCIAL

## 2024-01-01 ENCOUNTER — TELEPHONE (OUTPATIENT)
Dept: TRANSPLANT | Facility: CLINIC | Age: 64
End: 2024-01-01

## 2024-01-01 ENCOUNTER — MYC MEDICAL ADVICE (OUTPATIENT)
Dept: TRANSPLANT | Facility: CLINIC | Age: 64
End: 2024-01-01

## 2024-01-01 ENCOUNTER — APPOINTMENT (OUTPATIENT)
Dept: PHYSICAL THERAPY | Facility: CLINIC | Age: 64
DRG: 014 | End: 2024-01-01
Payer: COMMERCIAL

## 2024-01-01 ENCOUNTER — PATIENT OUTREACH (OUTPATIENT)
Dept: ONCOLOGY | Facility: CLINIC | Age: 64
End: 2024-01-01
Payer: COMMERCIAL

## 2024-01-01 VITALS
HEART RATE: 82 BPM | RESPIRATION RATE: 16 BRPM | BODY MASS INDEX: 23.43 KG/M2 | WEIGHT: 145.8 LBS | OXYGEN SATURATION: 97 % | DIASTOLIC BLOOD PRESSURE: 88 MMHG | SYSTOLIC BLOOD PRESSURE: 149 MMHG

## 2024-01-01 VITALS
RESPIRATION RATE: 16 BRPM | SYSTOLIC BLOOD PRESSURE: 127 MMHG | BODY MASS INDEX: 23.26 KG/M2 | HEIGHT: 67 IN | OXYGEN SATURATION: 97 % | TEMPERATURE: 99.2 F | WEIGHT: 148.2 LBS | DIASTOLIC BLOOD PRESSURE: 80 MMHG | HEART RATE: 95 BPM

## 2024-01-01 VITALS
WEIGHT: 145.5 LBS | TEMPERATURE: 98.8 F | DIASTOLIC BLOOD PRESSURE: 82 MMHG | HEART RATE: 84 BPM | SYSTOLIC BLOOD PRESSURE: 125 MMHG | BODY MASS INDEX: 23.38 KG/M2 | RESPIRATION RATE: 18 BRPM

## 2024-01-01 VITALS
HEART RATE: 95 BPM | WEIGHT: 148.8 LBS | SYSTOLIC BLOOD PRESSURE: 141 MMHG | OXYGEN SATURATION: 97 % | DIASTOLIC BLOOD PRESSURE: 89 MMHG | BODY MASS INDEX: 23.49 KG/M2 | RESPIRATION RATE: 16 BRPM | TEMPERATURE: 97.8 F

## 2024-01-01 VITALS
RESPIRATION RATE: 20 BRPM | BODY MASS INDEX: 23.03 KG/M2 | TEMPERATURE: 98.1 F | SYSTOLIC BLOOD PRESSURE: 116 MMHG | HEIGHT: 66 IN | OXYGEN SATURATION: 96 % | HEART RATE: 85 BPM | WEIGHT: 143.3 LBS | DIASTOLIC BLOOD PRESSURE: 72 MMHG

## 2024-01-01 VITALS
RESPIRATION RATE: 18 BRPM | DIASTOLIC BLOOD PRESSURE: 84 MMHG | HEART RATE: 78 BPM | TEMPERATURE: 97.3 F | OXYGEN SATURATION: 97 % | SYSTOLIC BLOOD PRESSURE: 130 MMHG

## 2024-01-01 VITALS
BODY MASS INDEX: 23.58 KG/M2 | SYSTOLIC BLOOD PRESSURE: 145 MMHG | TEMPERATURE: 98.3 F | HEART RATE: 85 BPM | RESPIRATION RATE: 18 BRPM | WEIGHT: 146.7 LBS | DIASTOLIC BLOOD PRESSURE: 87 MMHG | OXYGEN SATURATION: 96 %

## 2024-01-01 VITALS
OXYGEN SATURATION: 98 % | SYSTOLIC BLOOD PRESSURE: 132 MMHG | RESPIRATION RATE: 16 BRPM | DIASTOLIC BLOOD PRESSURE: 85 MMHG | BODY MASS INDEX: 23.72 KG/M2 | WEIGHT: 147.6 LBS | TEMPERATURE: 98.5 F | HEART RATE: 83 BPM

## 2024-01-01 VITALS
SYSTOLIC BLOOD PRESSURE: 138 MMHG | TEMPERATURE: 98.7 F | WEIGHT: 147 LBS | RESPIRATION RATE: 16 BRPM | HEART RATE: 87 BPM | BODY MASS INDEX: 23.62 KG/M2 | DIASTOLIC BLOOD PRESSURE: 87 MMHG

## 2024-01-01 VITALS
RESPIRATION RATE: 16 BRPM | DIASTOLIC BLOOD PRESSURE: 84 MMHG | HEART RATE: 84 BPM | TEMPERATURE: 98.2 F | OXYGEN SATURATION: 96 % | SYSTOLIC BLOOD PRESSURE: 111 MMHG

## 2024-01-01 VITALS
BODY MASS INDEX: 23.09 KG/M2 | SYSTOLIC BLOOD PRESSURE: 112 MMHG | OXYGEN SATURATION: 97 % | HEART RATE: 87 BPM | RESPIRATION RATE: 16 BRPM | TEMPERATURE: 98.5 F | WEIGHT: 143.7 LBS | DIASTOLIC BLOOD PRESSURE: 82 MMHG

## 2024-01-01 VITALS
WEIGHT: 148.2 LBS | SYSTOLIC BLOOD PRESSURE: 130 MMHG | DIASTOLIC BLOOD PRESSURE: 89 MMHG | TEMPERATURE: 98.1 F | BODY MASS INDEX: 23.82 KG/M2 | OXYGEN SATURATION: 96 % | RESPIRATION RATE: 18 BRPM | HEART RATE: 95 BPM

## 2024-01-01 VITALS — SYSTOLIC BLOOD PRESSURE: 117 MMHG | DIASTOLIC BLOOD PRESSURE: 79 MMHG

## 2024-01-01 VITALS
BODY MASS INDEX: 23.6 KG/M2 | DIASTOLIC BLOOD PRESSURE: 85 MMHG | WEIGHT: 149.5 LBS | TEMPERATURE: 98.1 F | RESPIRATION RATE: 16 BRPM | HEART RATE: 59 BPM | OXYGEN SATURATION: 95 % | SYSTOLIC BLOOD PRESSURE: 131 MMHG

## 2024-01-01 VITALS
SYSTOLIC BLOOD PRESSURE: 126 MMHG | OXYGEN SATURATION: 96 % | RESPIRATION RATE: 20 BRPM | DIASTOLIC BLOOD PRESSURE: 84 MMHG | HEART RATE: 81 BPM | TEMPERATURE: 98.1 F

## 2024-01-01 VITALS
DIASTOLIC BLOOD PRESSURE: 87 MMHG | RESPIRATION RATE: 12 BRPM | OXYGEN SATURATION: 97 % | WEIGHT: 144.18 LBS | SYSTOLIC BLOOD PRESSURE: 126 MMHG | HEART RATE: 81 BPM | BODY MASS INDEX: 23.17 KG/M2 | TEMPERATURE: 98.3 F

## 2024-01-01 VITALS
HEART RATE: 78 BPM | TEMPERATURE: 98.2 F | OXYGEN SATURATION: 97 % | BODY MASS INDEX: 23.48 KG/M2 | RESPIRATION RATE: 16 BRPM | SYSTOLIC BLOOD PRESSURE: 135 MMHG | DIASTOLIC BLOOD PRESSURE: 82 MMHG | WEIGHT: 146.1 LBS

## 2024-01-01 VITALS
DIASTOLIC BLOOD PRESSURE: 72 MMHG | TEMPERATURE: 98.1 F | HEART RATE: 92 BPM | WEIGHT: 143.2 LBS | OXYGEN SATURATION: 98 % | BODY MASS INDEX: 23.01 KG/M2 | SYSTOLIC BLOOD PRESSURE: 124 MMHG | RESPIRATION RATE: 16 BRPM

## 2024-01-01 VITALS
RESPIRATION RATE: 16 BRPM | BODY MASS INDEX: 23.67 KG/M2 | TEMPERATURE: 98.5 F | OXYGEN SATURATION: 97 % | WEIGHT: 147.3 LBS | SYSTOLIC BLOOD PRESSURE: 124 MMHG | DIASTOLIC BLOOD PRESSURE: 76 MMHG | HEART RATE: 84 BPM

## 2024-01-01 VITALS
HEART RATE: 69 BPM | WEIGHT: 144 LBS | BODY MASS INDEX: 23.14 KG/M2 | DIASTOLIC BLOOD PRESSURE: 89 MMHG | OXYGEN SATURATION: 98 % | TEMPERATURE: 98 F | RESPIRATION RATE: 16 BRPM | SYSTOLIC BLOOD PRESSURE: 127 MMHG

## 2024-01-01 VITALS
BODY MASS INDEX: 22.98 KG/M2 | RESPIRATION RATE: 18 BRPM | HEART RATE: 86 BPM | DIASTOLIC BLOOD PRESSURE: 75 MMHG | OXYGEN SATURATION: 98 % | TEMPERATURE: 98.3 F | WEIGHT: 143 LBS | SYSTOLIC BLOOD PRESSURE: 104 MMHG

## 2024-01-01 VITALS
BODY MASS INDEX: 23.3 KG/M2 | RESPIRATION RATE: 16 BRPM | DIASTOLIC BLOOD PRESSURE: 81 MMHG | TEMPERATURE: 98.2 F | OXYGEN SATURATION: 97 % | WEIGHT: 147.6 LBS | SYSTOLIC BLOOD PRESSURE: 144 MMHG | HEART RATE: 81 BPM

## 2024-01-01 VITALS
OXYGEN SATURATION: 97 % | RESPIRATION RATE: 16 BRPM | TEMPERATURE: 98.7 F | DIASTOLIC BLOOD PRESSURE: 84 MMHG | BODY MASS INDEX: 23.38 KG/M2 | SYSTOLIC BLOOD PRESSURE: 136 MMHG | WEIGHT: 145.5 LBS | HEART RATE: 101 BPM

## 2024-01-01 VITALS
RESPIRATION RATE: 16 BRPM | OXYGEN SATURATION: 98 % | HEART RATE: 93 BPM | DIASTOLIC BLOOD PRESSURE: 89 MMHG | SYSTOLIC BLOOD PRESSURE: 150 MMHG | WEIGHT: 148.2 LBS | TEMPERATURE: 98.2 F | BODY MASS INDEX: 23.82 KG/M2

## 2024-01-01 VITALS
HEART RATE: 72 BPM | WEIGHT: 146.3 LBS | RESPIRATION RATE: 14 BRPM | OXYGEN SATURATION: 98 % | SYSTOLIC BLOOD PRESSURE: 123 MMHG | BODY MASS INDEX: 23.51 KG/M2 | DIASTOLIC BLOOD PRESSURE: 79 MMHG | TEMPERATURE: 98.2 F

## 2024-01-01 VITALS
TEMPERATURE: 98.7 F | DIASTOLIC BLOOD PRESSURE: 91 MMHG | BODY MASS INDEX: 23.3 KG/M2 | WEIGHT: 145 LBS | SYSTOLIC BLOOD PRESSURE: 139 MMHG | HEART RATE: 99 BPM | OXYGEN SATURATION: 98 % | RESPIRATION RATE: 18 BRPM

## 2024-01-01 VITALS
HEART RATE: 89 BPM | DIASTOLIC BLOOD PRESSURE: 90 MMHG | RESPIRATION RATE: 18 BRPM | BODY MASS INDEX: 23.95 KG/M2 | SYSTOLIC BLOOD PRESSURE: 132 MMHG | WEIGHT: 149 LBS | TEMPERATURE: 98.1 F | OXYGEN SATURATION: 97 %

## 2024-01-01 VITALS
HEART RATE: 95 BPM | BODY MASS INDEX: 23.23 KG/M2 | HEIGHT: 67 IN | DIASTOLIC BLOOD PRESSURE: 80 MMHG | TEMPERATURE: 99.2 F | WEIGHT: 148 LBS | RESPIRATION RATE: 16 BRPM | SYSTOLIC BLOOD PRESSURE: 127 MMHG | OXYGEN SATURATION: 97 %

## 2024-01-01 DIAGNOSIS — C92.01 ACUTE MYELOID LEUKEMIA IN REMISSION (H): Primary | ICD-10-CM

## 2024-01-01 DIAGNOSIS — Z01.818 PREOP EXAMINATION: ICD-10-CM

## 2024-01-01 DIAGNOSIS — C92.01 ACUTE MYELOID LEUKEMIA IN REMISSION (H): ICD-10-CM

## 2024-01-01 DIAGNOSIS — D45 POLYCYTHEMIA VERA (H): ICD-10-CM

## 2024-01-01 DIAGNOSIS — K12.30 MUCOSITIS: ICD-10-CM

## 2024-01-01 DIAGNOSIS — Z94.81 H/O ALLOGENEIC BONE MARROW TRANSPLANT (H): ICD-10-CM

## 2024-01-01 DIAGNOSIS — C92.00 ACUTE MYELOID LEUKEMIA NOT HAVING ACHIEVED REMISSION (H): Primary | ICD-10-CM

## 2024-01-01 DIAGNOSIS — Z11.59 SCREENING FOR VIRAL DISEASE: ICD-10-CM

## 2024-01-01 DIAGNOSIS — C92.00 AML (ACUTE MYELOGENOUS LEUKEMIA) (H): Primary | ICD-10-CM

## 2024-01-01 DIAGNOSIS — R11.2 CINV (CHEMOTHERAPY-INDUCED NAUSEA AND VOMITING): ICD-10-CM

## 2024-01-01 DIAGNOSIS — I10 PRIMARY HYPERTENSION: ICD-10-CM

## 2024-01-01 DIAGNOSIS — Z86.2 PERSONAL HISTORY OF DISEASES OF BLOOD AND BLOOD-FORMING ORGANS: ICD-10-CM

## 2024-01-01 DIAGNOSIS — Z71.9 VISIT FOR COUNSELING: Primary | ICD-10-CM

## 2024-01-01 DIAGNOSIS — Z94.81 STATUS POST BONE MARROW TRANSPLANT (H): ICD-10-CM

## 2024-01-01 DIAGNOSIS — Z45.2 ENCOUNTER FOR ADJUSTMENT AND MANAGEMENT OF VASCULAR ACCESS DEVICE: ICD-10-CM

## 2024-01-01 DIAGNOSIS — Z94.81 H/O ALLOGENEIC BONE MARROW TRANSPLANT (H): Primary | ICD-10-CM

## 2024-01-01 DIAGNOSIS — Z00.6 RESEARCH STUDY PATIENT: Primary | ICD-10-CM

## 2024-01-01 DIAGNOSIS — D45 POLYCYTHEMIA VERA (H): Primary | ICD-10-CM

## 2024-01-01 DIAGNOSIS — C95.00: Primary | ICD-10-CM

## 2024-01-01 DIAGNOSIS — C92.00 ACUTE MYELOID LEUKEMIA NOT HAVING ACHIEVED REMISSION (H): ICD-10-CM

## 2024-01-01 DIAGNOSIS — Z00.6 EXAMINATION OF PARTICIPANT IN CLINICAL TRIAL: Primary | ICD-10-CM

## 2024-01-01 DIAGNOSIS — T45.1X5A CINV (CHEMOTHERAPY-INDUCED NAUSEA AND VOMITING): ICD-10-CM

## 2024-01-01 LAB
ABO/RH(D): NORMAL
ABSSP COMMENTS: NORMAL
ABSSPTEST METHOD: NORMAL
ACANTHOCYTES BLD QL SMEAR: ABNORMAL
ACANTHOCYTES BLD QL SMEAR: NORMAL
ACANTHOCYTES BLD QL SMEAR: SLIGHT
ADDITIONAL COMMENTS: NORMAL
ALBUMIN SERPL BCG-MCNC: 3.7 G/DL (ref 3.5–5.2)
ALBUMIN SERPL BCG-MCNC: 3.8 G/DL (ref 3.5–5.2)
ALBUMIN SERPL BCG-MCNC: 3.9 G/DL (ref 3.5–5.2)
ALBUMIN SERPL BCG-MCNC: 4.1 G/DL (ref 3.5–5.2)
ALBUMIN SERPL BCG-MCNC: 4.2 G/DL (ref 3.5–5.2)
ALBUMIN SERPL BCG-MCNC: 4.2 G/DL (ref 3.5–5.2)
ALBUMIN SERPL BCG-MCNC: 4.3 G/DL (ref 3.5–5.2)
ALBUMIN SERPL BCG-MCNC: 4.4 G/DL (ref 3.5–5.2)
ALBUMIN SERPL BCG-MCNC: 4.6 G/DL (ref 3.5–5.2)
ALBUMIN UR-MCNC: NEGATIVE MG/DL
ALP SERPL-CCNC: 47 U/L (ref 40–150)
ALP SERPL-CCNC: 51 U/L (ref 40–150)
ALP SERPL-CCNC: 55 U/L (ref 40–150)
ALP SERPL-CCNC: 57 U/L (ref 40–150)
ALP SERPL-CCNC: 58 U/L (ref 40–150)
ALP SERPL-CCNC: 61 U/L (ref 40–150)
ALP SERPL-CCNC: 63 U/L (ref 40–150)
ALP SERPL-CCNC: 63 U/L (ref 40–150)
ALP SERPL-CCNC: 68 U/L (ref 40–150)
ALP SERPL-CCNC: 68 U/L (ref 40–150)
ALP SERPL-CCNC: 69 U/L (ref 40–150)
ALP SERPL-CCNC: 72 U/L (ref 40–150)
ALP SERPL-CCNC: 73 U/L (ref 40–150)
ALP SERPL-CCNC: 73 U/L (ref 40–150)
ALP SERPL-CCNC: 74 U/L (ref 40–150)
ALP SERPL-CCNC: 75 U/L (ref 40–150)
ALP SERPL-CCNC: 77 U/L (ref 40–150)
ALP SERPL-CCNC: 79 U/L (ref 40–150)
ALP SERPL-CCNC: 85 U/L (ref 40–150)
ALT SERPL W P-5'-P-CCNC: 12 U/L (ref 0–70)
ALT SERPL W P-5'-P-CCNC: 15 U/L (ref 0–70)
ALT SERPL W P-5'-P-CCNC: 16 U/L (ref 0–70)
ALT SERPL W P-5'-P-CCNC: 16 U/L (ref 0–70)
ALT SERPL W P-5'-P-CCNC: 17 U/L (ref 0–70)
ALT SERPL W P-5'-P-CCNC: 18 U/L (ref 0–70)
ALT SERPL W P-5'-P-CCNC: 19 U/L (ref 0–70)
ALT SERPL W P-5'-P-CCNC: 20 U/L (ref 0–70)
ALT SERPL W P-5'-P-CCNC: 21 U/L (ref 0–70)
ALT SERPL W P-5'-P-CCNC: 23 U/L (ref 0–70)
ALT SERPL W P-5'-P-CCNC: 26 U/L (ref 0–70)
ALT SERPL W P-5'-P-CCNC: 26 U/L (ref 0–70)
ALT SERPL W P-5'-P-CCNC: 32 U/L (ref 0–70)
ALT SERPL W P-5'-P-CCNC: 42 U/L (ref 0–70)
ALT SERPL W P-5'-P-CCNC: 43 U/L (ref 0–70)
ALT SERPL W P-5'-P-CCNC: 48 U/L (ref 0–70)
ALT SERPL W P-5'-P-CCNC: 53 U/L (ref 0–70)
ANION GAP SERPL CALCULATED.3IONS-SCNC: 10 MMOL/L (ref 7–15)
ANION GAP SERPL CALCULATED.3IONS-SCNC: 11 MMOL/L (ref 7–15)
ANION GAP SERPL CALCULATED.3IONS-SCNC: 13 MMOL/L (ref 7–15)
ANION GAP SERPL CALCULATED.3IONS-SCNC: 6 MMOL/L (ref 7–15)
ANION GAP SERPL CALCULATED.3IONS-SCNC: 7 MMOL/L (ref 7–15)
ANION GAP SERPL CALCULATED.3IONS-SCNC: 8 MMOL/L (ref 7–15)
ANION GAP SERPL CALCULATED.3IONS-SCNC: 9 MMOL/L (ref 7–15)
ANTIBODY SCREEN: NEGATIVE
APPEARANCE CSF: CLEAR
APPEARANCE UR: CLEAR
APTT PPP: 31 SECONDS (ref 22–38)
APTT PPP: 35 SECONDS (ref 22–38)
AST SERPL W P-5'-P-CCNC: 14 U/L (ref 0–45)
AST SERPL W P-5'-P-CCNC: 15 U/L (ref 0–45)
AST SERPL W P-5'-P-CCNC: 18 U/L (ref 0–45)
AST SERPL W P-5'-P-CCNC: 19 U/L (ref 0–45)
AST SERPL W P-5'-P-CCNC: 20 U/L (ref 0–45)
AST SERPL W P-5'-P-CCNC: 21 U/L (ref 0–45)
AST SERPL W P-5'-P-CCNC: 22 U/L (ref 0–45)
AST SERPL W P-5'-P-CCNC: 23 U/L (ref 0–45)
AST SERPL W P-5'-P-CCNC: 25 U/L (ref 0–45)
AST SERPL W P-5'-P-CCNC: 26 U/L (ref 0–45)
AST SERPL W P-5'-P-CCNC: 29 U/L (ref 0–45)
AST SERPL W P-5'-P-CCNC: 30 U/L (ref 0–45)
AST SERPL W P-5'-P-CCNC: 33 U/L (ref 0–45)
ATRIAL RATE - MUSE: 77 BPM
AUER BODIES BLD QL SMEAR: ABNORMAL
AUER BODIES BLD QL SMEAR: NORMAL
BACTERIA SPEC CULT: NORMAL
BASO STIPL BLD QL SMEAR: ABNORMAL
BASO STIPL BLD QL SMEAR: NORMAL
BASOPHILS # BLD AUTO: 0 10E3/UL (ref 0–0.2)
BASOPHILS # BLD AUTO: 0.1 10E3/UL (ref 0–0.2)
BASOPHILS # BLD AUTO: ABNORMAL 10*3/UL
BASOPHILS # BLD MANUAL: 0 10E3/UL (ref 0–0.2)
BASOPHILS # BLD MANUAL: 0.1 10E3/UL (ref 0–0.2)
BASOPHILS NFR BLD AUTO: 0 %
BASOPHILS NFR BLD AUTO: 1 %
BASOPHILS NFR BLD AUTO: 2 %
BASOPHILS NFR BLD AUTO: 3 %
BASOPHILS NFR BLD AUTO: ABNORMAL %
BASOPHILS NFR BLD MANUAL: 0 %
BASOPHILS NFR BLD MANUAL: 1 %
BASOPHILS NFR BLD MANUAL: 2 %
BASOPHILS NFR BLD MANUAL: 4 %
BASOPHILS NFR BLD MANUAL: 4 %
BILIRUB DIRECT SERPL-MCNC: <0.2 MG/DL (ref 0–0.3)
BILIRUB SERPL-MCNC: 0.2 MG/DL
BILIRUB SERPL-MCNC: 0.2 MG/DL
BILIRUB SERPL-MCNC: 0.3 MG/DL
BILIRUB SERPL-MCNC: 0.4 MG/DL
BILIRUB SERPL-MCNC: 0.5 MG/DL
BILIRUB SERPL-MCNC: 0.6 MG/DL
BILIRUB UR QL STRIP: NEGATIVE
BILL ONLY ALLO PBPC PROCESSING: NORMAL
BILL ONLY AUTO PBPC FREEZE: NORMAL
BITE CELLS BLD QL SMEAR: ABNORMAL
BITE CELLS BLD QL SMEAR: NORMAL
BLASTS # BLD MANUAL: 0.1 10E3/UL
BLASTS # BLD MANUAL: 0.3 10E3/UL
BLASTS NFR BLD MANUAL: 13 %
BLASTS NFR BLD MANUAL: 7 %
BLD PROD TYP BPU: NORMAL
BLD PROD TYP BPU: NORMAL
BLISTER CELLS BLD QL SMEAR: ABNORMAL
BLISTER CELLS BLD QL SMEAR: NORMAL
BLOOD COMPONENT TYPE: NORMAL
BLOOD COMPONENT TYPE: NORMAL
BUN SERPL-MCNC: 10 MG/DL (ref 8–23)
BUN SERPL-MCNC: 10.2 MG/DL (ref 8–23)
BUN SERPL-MCNC: 10.3 MG/DL (ref 8–23)
BUN SERPL-MCNC: 10.8 MG/DL (ref 8–23)
BUN SERPL-MCNC: 11.2 MG/DL (ref 8–23)
BUN SERPL-MCNC: 11.7 MG/DL (ref 8–23)
BUN SERPL-MCNC: 11.7 MG/DL (ref 8–23)
BUN SERPL-MCNC: 11.9 MG/DL (ref 8–23)
BUN SERPL-MCNC: 12.6 MG/DL (ref 8–23)
BUN SERPL-MCNC: 12.7 MG/DL (ref 8–23)
BUN SERPL-MCNC: 13 MG/DL (ref 8–23)
BUN SERPL-MCNC: 13 MG/DL (ref 8–23)
BUN SERPL-MCNC: 13.4 MG/DL (ref 8–23)
BUN SERPL-MCNC: 13.7 MG/DL (ref 8–23)
BUN SERPL-MCNC: 13.8 MG/DL (ref 8–23)
BUN SERPL-MCNC: 13.9 MG/DL (ref 8–23)
BUN SERPL-MCNC: 14 MG/DL (ref 8–23)
BUN SERPL-MCNC: 14.2 MG/DL (ref 8–23)
BUN SERPL-MCNC: 14.2 MG/DL (ref 8–23)
BUN SERPL-MCNC: 14.7 MG/DL (ref 8–23)
BUN SERPL-MCNC: 14.8 MG/DL (ref 8–23)
BUN SERPL-MCNC: 14.9 MG/DL (ref 8–23)
BUN SERPL-MCNC: 15 MG/DL (ref 8–23)
BUN SERPL-MCNC: 15.3 MG/DL (ref 8–23)
BUN SERPL-MCNC: 15.8 MG/DL (ref 8–23)
BUN SERPL-MCNC: 16 MG/DL (ref 8–23)
BUN SERPL-MCNC: 16.4 MG/DL (ref 8–23)
BUN SERPL-MCNC: 16.4 MG/DL (ref 8–23)
BUN SERPL-MCNC: 16.6 MG/DL (ref 8–23)
BUN SERPL-MCNC: 17 MG/DL (ref 8–23)
BUN SERPL-MCNC: 17.3 MG/DL (ref 8–23)
BUN SERPL-MCNC: 17.6 MG/DL (ref 8–23)
BUN SERPL-MCNC: 18.4 MG/DL (ref 8–23)
BUN SERPL-MCNC: 18.6 MG/DL (ref 8–23)
BUN SERPL-MCNC: 18.6 MG/DL (ref 8–23)
BUN SERPL-MCNC: 18.7 MG/DL (ref 8–23)
BUN SERPL-MCNC: 19.2 MG/DL (ref 8–23)
BUN SERPL-MCNC: 19.5 MG/DL (ref 8–23)
BUN SERPL-MCNC: 19.7 MG/DL (ref 8–23)
BUN SERPL-MCNC: 20 MG/DL (ref 8–23)
BUN SERPL-MCNC: 9.6 MG/DL (ref 8–23)
BURR CELLS BLD QL SMEAR: ABNORMAL
BURR CELLS BLD QL SMEAR: NORMAL
BUSULFAN SERPL-MCNC: 1218 NG/ML
BUSULFAN SERPL-MCNC: 1653 NG/ML
BUSULFAN SERPL-MCNC: 2140 NG/ML
BUSULFAN SERPL-MCNC: 2307 NG/ML
BUSULFAN SERPL-MCNC: 2709 NG/ML
BUSULFAN SERPL-MCNC: 2859 NG/ML
BUSULFAN SERPL-MCNC: 878 NG/ML
C DIFF TOX B STL QL: NEGATIVE
CALCIUM SERPL-MCNC: 8.1 MG/DL (ref 8.8–10.2)
CALCIUM SERPL-MCNC: 8.2 MG/DL (ref 8.8–10.2)
CALCIUM SERPL-MCNC: 8.4 MG/DL (ref 8.8–10.2)
CALCIUM SERPL-MCNC: 8.5 MG/DL (ref 8.8–10.2)
CALCIUM SERPL-MCNC: 8.5 MG/DL (ref 8.8–10.2)
CALCIUM SERPL-MCNC: 8.6 MG/DL (ref 8.8–10.2)
CALCIUM SERPL-MCNC: 8.7 MG/DL (ref 8.8–10.2)
CALCIUM SERPL-MCNC: 8.8 MG/DL (ref 8.8–10.2)
CALCIUM SERPL-MCNC: 8.9 MG/DL (ref 8.8–10.2)
CALCIUM SERPL-MCNC: 8.9 MG/DL (ref 8.8–10.4)
CALCIUM SERPL-MCNC: 9 MG/DL (ref 8.8–10.2)
CALCIUM SERPL-MCNC: 9 MG/DL (ref 8.8–10.4)
CALCIUM SERPL-MCNC: 9 MG/DL (ref 8.8–10.4)
CALCIUM SERPL-MCNC: 9.1 MG/DL (ref 8.8–10.2)
CALCIUM SERPL-MCNC: 9.1 MG/DL (ref 8.8–10.2)
CALCIUM SERPL-MCNC: 9.1 MG/DL (ref 8.8–10.4)
CALCIUM SERPL-MCNC: 9.2 MG/DL (ref 8.8–10.2)
CALCIUM SERPL-MCNC: 9.2 MG/DL (ref 8.8–10.2)
CALCIUM SERPL-MCNC: 9.2 MG/DL (ref 8.8–10.4)
CALCIUM SERPL-MCNC: 9.2 MG/DL (ref 8.8–10.4)
CALCIUM SERPL-MCNC: 9.3 MG/DL (ref 8.8–10.4)
CALCIUM SERPL-MCNC: 9.5 MG/DL (ref 8.8–10.2)
CALCIUM SERPL-MCNC: 9.5 MG/DL (ref 8.8–10.4)
CALCIUM SERPL-MCNC: 9.5 MG/DL (ref 8.8–10.4)
CHLORIDE SERPL-SCNC: 100 MMOL/L (ref 98–107)
CHLORIDE SERPL-SCNC: 101 MMOL/L (ref 98–107)
CHLORIDE SERPL-SCNC: 102 MMOL/L (ref 98–107)
CHLORIDE SERPL-SCNC: 103 MMOL/L (ref 98–107)
CHLORIDE SERPL-SCNC: 104 MMOL/L (ref 98–107)
CHLORIDE SERPL-SCNC: 105 MMOL/L (ref 98–107)
CMV DNA SPEC NAA+PROBE-ACNC: NOT DETECTED IU/ML
CMV IGG SERPL IA-ACNC: 8.5 U/ML
CMV IGG SERPL IA-ACNC: 8.9 U/ML
CMV IGG SERPL IA-ACNC: ABNORMAL
CMV IGG SERPL IA-ACNC: ABNORMAL
CODING SYSTEM: NORMAL
CODING SYSTEM: NORMAL
COLOR CSF: COLORLESS
COLOR UR AUTO: ABNORMAL
COMMENT VXMB1: NORMAL
COMMENT VXMB2: NORMAL
COMMENT VXMT1: NORMAL
COMMENT VXMT2: NORMAL
CREAT SERPL-MCNC: 0.55 MG/DL (ref 0.67–1.17)
CREAT SERPL-MCNC: 0.56 MG/DL (ref 0.67–1.17)
CREAT SERPL-MCNC: 0.57 MG/DL (ref 0.67–1.17)
CREAT SERPL-MCNC: 0.58 MG/DL (ref 0.67–1.17)
CREAT SERPL-MCNC: 0.59 MG/DL (ref 0.67–1.17)
CREAT SERPL-MCNC: 0.6 MG/DL (ref 0.67–1.17)
CREAT SERPL-MCNC: 0.61 MG/DL (ref 0.67–1.17)
CREAT SERPL-MCNC: 0.61 MG/DL (ref 0.67–1.17)
CREAT SERPL-MCNC: 0.62 MG/DL (ref 0.67–1.17)
CREAT SERPL-MCNC: 0.62 MG/DL (ref 0.67–1.17)
CREAT SERPL-MCNC: 0.63 MG/DL (ref 0.67–1.17)
CREAT SERPL-MCNC: 0.64 MG/DL (ref 0.67–1.17)
CREAT SERPL-MCNC: 0.65 MG/DL (ref 0.67–1.17)
CREAT SERPL-MCNC: 0.66 MG/DL (ref 0.67–1.17)
CREAT SERPL-MCNC: 0.67 MG/DL (ref 0.67–1.17)
CREAT SERPL-MCNC: 0.68 MG/DL (ref 0.67–1.17)
CREAT SERPL-MCNC: 0.69 MG/DL (ref 0.67–1.17)
CREAT SERPL-MCNC: 0.7 MG/DL (ref 0.67–1.17)
CREAT SERPL-MCNC: 0.72 MG/DL (ref 0.67–1.17)
CREAT SERPL-MCNC: 0.76 MG/DL (ref 0.67–1.17)
CREAT SERPL-MCNC: 0.76 MG/DL (ref 0.67–1.17)
CREAT SERPL-MCNC: 0.79 MG/DL (ref 0.67–1.17)
CREAT SERPL-MCNC: 0.8 MG/DL (ref 0.67–1.17)
CREAT SERPL-MCNC: 0.81 MG/DL (ref 0.67–1.17)
CREAT SERPL-MCNC: 0.85 MG/DL (ref 0.67–1.17)
CROSSMATCH: NORMAL
CROSSMATCHDATEVXM: NORMAL
CULTURE HARVEST COMPLETE DATE: NORMAL
DACRYOCYTES BLD QL SMEAR: ABNORMAL
DACRYOCYTES BLD QL SMEAR: ABNORMAL
DACRYOCYTES BLD QL SMEAR: NORMAL
DACRYOCYTES BLD QL SMEAR: SLIGHT
DEPRECATED HCO3 PLAS-SCNC: 22 MMOL/L (ref 22–29)
DEPRECATED HCO3 PLAS-SCNC: 22 MMOL/L (ref 22–29)
DEPRECATED HCO3 PLAS-SCNC: 24 MMOL/L (ref 22–29)
DEPRECATED HCO3 PLAS-SCNC: 24 MMOL/L (ref 22–29)
DEPRECATED HCO3 PLAS-SCNC: 25 MMOL/L (ref 22–29)
DEPRECATED HCO3 PLAS-SCNC: 25 MMOL/L (ref 22–29)
DEPRECATED HCO3 PLAS-SCNC: 26 MMOL/L (ref 22–29)
DEPRECATED HCO3 PLAS-SCNC: 27 MMOL/L (ref 22–29)
DEPRECATED HCO3 PLAS-SCNC: 28 MMOL/L (ref 22–29)
DEPRECATED HCO3 PLAS-SCNC: 28 MMOL/L (ref 22–29)
DIASTOLIC BLOOD PRESSURE - MUSE: NORMAL MMHG
DLCOCOR-%PRED-PRE: 122 %
DLCOCOR-PRE: 29.92 ML/MIN/MMHG
DLCOUNC-%PRED-PRE: 113 %
DLCOUNC-PRE: 27.57 ML/MIN/MMHG
DLCOUNC-PRED: 24.37 ML/MIN/MMHG
DONOR VXM: NORMAL
DRSSP COMMENTS: NORMAL
DRSSPDPA1*: NORMAL
DRSSPDPA1*LOCUS: NORMAL
DRSSPDPB1*2 NMDP: NORMAL
DRSSPDPB1*2: NORMAL
DRSSPDPB1*LOCUS: NORMAL
DRSSPDPB1*LOCUSNMDP: NORMAL
DRSSPTEST METHOD: NORMAL
DSA VXMT1: NORMAL
DSA VXMT2: NORMAL
EBV DNA SERPL NAA+PROBE-ACNC: NOT DETECTED IU/ML
EBV VCA IGG SER IA-ACNC: 206 U/ML
EBV VCA IGG SER IA-ACNC: POSITIVE
EGFRCR SERPLBLD CKD-EPI 2021: >90 ML/MIN/1.73M2
ELLIPTOCYTES BLD QL SMEAR: ABNORMAL
ELLIPTOCYTES BLD QL SMEAR: ABNORMAL
ELLIPTOCYTES BLD QL SMEAR: NORMAL
ELLIPTOCYTES BLD QL SMEAR: SLIGHT
EOSINOPHIL # BLD AUTO: 0 10E3/UL (ref 0–0.7)
EOSINOPHIL # BLD AUTO: 0.1 10E3/UL (ref 0–0.7)
EOSINOPHIL # BLD AUTO: 0.2 10E3/UL (ref 0–0.7)
EOSINOPHIL # BLD AUTO: 0.3 10E3/UL (ref 0–0.7)
EOSINOPHIL # BLD AUTO: ABNORMAL 10*3/UL
EOSINOPHIL # BLD MANUAL: 0 10E3/UL (ref 0–0.7)
EOSINOPHIL # BLD MANUAL: 0.1 10E3/UL (ref 0–0.7)
EOSINOPHIL NFR BLD AUTO: 0 %
EOSINOPHIL NFR BLD AUTO: 1 %
EOSINOPHIL NFR BLD AUTO: 2 %
EOSINOPHIL NFR BLD AUTO: 3 %
EOSINOPHIL NFR BLD AUTO: 5 %
EOSINOPHIL NFR BLD AUTO: 6 %
EOSINOPHIL NFR BLD AUTO: 7 %
EOSINOPHIL NFR BLD AUTO: 8 %
EOSINOPHIL NFR BLD AUTO: 9 %
EOSINOPHIL NFR BLD AUTO: ABNORMAL %
EOSINOPHIL NFR BLD MANUAL: 0 %
EOSINOPHIL NFR BLD MANUAL: 1 %
EOSINOPHIL NFR BLD MANUAL: 2 %
EOSINOPHIL NFR BLD MANUAL: 2 %
EOSINOPHIL NFR BLD MANUAL: 3 %
EOSINOPHIL NFR BLD MANUAL: 7 %
EOSINOPHIL NFR BLD MANUAL: 7 %
EOSINOPHIL NFR BLD MANUAL: 8 %
ERV-%PRED-PRE: 67 %
ERV-PRE: 0.91 L
ERV-PRED: 1.35 L
ERYTHROCYTE [DISTWIDTH] IN BLOOD BY AUTOMATED COUNT: 11 % (ref 10–15)
ERYTHROCYTE [DISTWIDTH] IN BLOOD BY AUTOMATED COUNT: 11.2 % (ref 10–15)
ERYTHROCYTE [DISTWIDTH] IN BLOOD BY AUTOMATED COUNT: 11.2 % (ref 10–15)
ERYTHROCYTE [DISTWIDTH] IN BLOOD BY AUTOMATED COUNT: 11.4 % (ref 10–15)
ERYTHROCYTE [DISTWIDTH] IN BLOOD BY AUTOMATED COUNT: 11.4 % (ref 10–15)
ERYTHROCYTE [DISTWIDTH] IN BLOOD BY AUTOMATED COUNT: 11.8 % (ref 10–15)
ERYTHROCYTE [DISTWIDTH] IN BLOOD BY AUTOMATED COUNT: 11.9 % (ref 10–15)
ERYTHROCYTE [DISTWIDTH] IN BLOOD BY AUTOMATED COUNT: 12 % (ref 10–15)
ERYTHROCYTE [DISTWIDTH] IN BLOOD BY AUTOMATED COUNT: 12.1 % (ref 10–15)
ERYTHROCYTE [DISTWIDTH] IN BLOOD BY AUTOMATED COUNT: 12.3 % (ref 10–15)
ERYTHROCYTE [DISTWIDTH] IN BLOOD BY AUTOMATED COUNT: 12.4 % (ref 10–15)
ERYTHROCYTE [DISTWIDTH] IN BLOOD BY AUTOMATED COUNT: 12.5 % (ref 10–15)
ERYTHROCYTE [DISTWIDTH] IN BLOOD BY AUTOMATED COUNT: 12.7 % (ref 10–15)
ERYTHROCYTE [DISTWIDTH] IN BLOOD BY AUTOMATED COUNT: 12.8 % (ref 10–15)
ERYTHROCYTE [DISTWIDTH] IN BLOOD BY AUTOMATED COUNT: 12.8 % (ref 10–15)
ERYTHROCYTE [DISTWIDTH] IN BLOOD BY AUTOMATED COUNT: 12.9 % (ref 10–15)
ERYTHROCYTE [DISTWIDTH] IN BLOOD BY AUTOMATED COUNT: 13 % (ref 10–15)
ERYTHROCYTE [DISTWIDTH] IN BLOOD BY AUTOMATED COUNT: 13.2 % (ref 10–15)
ERYTHROCYTE [DISTWIDTH] IN BLOOD BY AUTOMATED COUNT: 13.3 % (ref 10–15)
ERYTHROCYTE [DISTWIDTH] IN BLOOD BY AUTOMATED COUNT: 13.7 % (ref 10–15)
ERYTHROCYTE [DISTWIDTH] IN BLOOD BY AUTOMATED COUNT: 13.7 % (ref 10–15)
ERYTHROCYTE [DISTWIDTH] IN BLOOD BY AUTOMATED COUNT: 13.9 % (ref 10–15)
ERYTHROCYTE [DISTWIDTH] IN BLOOD BY AUTOMATED COUNT: 14.1 % (ref 10–15)
ERYTHROCYTE [DISTWIDTH] IN BLOOD BY AUTOMATED COUNT: 14.2 % (ref 10–15)
ERYTHROCYTE [DISTWIDTH] IN BLOOD BY AUTOMATED COUNT: 14.4 % (ref 10–15)
ERYTHROCYTE [DISTWIDTH] IN BLOOD BY AUTOMATED COUNT: 14.5 % (ref 10–15)
ERYTHROCYTE [DISTWIDTH] IN BLOOD BY AUTOMATED COUNT: 14.5 % (ref 10–15)
ERYTHROCYTE [DISTWIDTH] IN BLOOD BY AUTOMATED COUNT: 14.6 % (ref 10–15)
ERYTHROCYTE [DISTWIDTH] IN BLOOD BY AUTOMATED COUNT: 14.6 % (ref 10–15)
ERYTHROCYTE [DISTWIDTH] IN BLOOD BY AUTOMATED COUNT: 14.8 % (ref 10–15)
ERYTHROCYTE [DISTWIDTH] IN BLOOD BY AUTOMATED COUNT: 14.9 % (ref 10–15)
ERYTHROCYTE [DISTWIDTH] IN BLOOD BY AUTOMATED COUNT: 15 % (ref 10–15)
ERYTHROCYTE [DISTWIDTH] IN BLOOD BY AUTOMATED COUNT: 15.2 % (ref 10–15)
ERYTHROCYTE [DISTWIDTH] IN BLOOD BY AUTOMATED COUNT: 15.2 % (ref 10–15)
ERYTHROCYTE [DISTWIDTH] IN BLOOD BY AUTOMATED COUNT: 15.3 % (ref 10–15)
ERYTHROCYTE [DISTWIDTH] IN BLOOD BY AUTOMATED COUNT: 15.4 % (ref 10–15)
ERYTHROCYTE [DISTWIDTH] IN BLOOD BY AUTOMATED COUNT: 15.6 % (ref 10–15)
ERYTHROCYTE [DISTWIDTH] IN BLOOD BY AUTOMATED COUNT: 15.6 % (ref 10–15)
ERYTHROCYTE [DISTWIDTH] IN BLOOD BY AUTOMATED COUNT: 15.7 % (ref 10–15)
ERYTHROCYTE [DISTWIDTH] IN BLOOD BY AUTOMATED COUNT: 15.7 % (ref 10–15)
ERYTHROCYTE [DISTWIDTH] IN BLOOD BY AUTOMATED COUNT: 15.9 % (ref 10–15)
ERYTHROCYTE [DISTWIDTH] IN BLOOD BY AUTOMATED COUNT: 17 % (ref 10–15)
ERYTHROCYTE [DISTWIDTH] IN BLOOD BY AUTOMATED COUNT: 17 % (ref 10–15)
ERYTHROCYTE [DISTWIDTH] IN BLOOD BY AUTOMATED COUNT: 17.2 % (ref 10–15)
ERYTHROCYTE [DISTWIDTH] IN BLOOD BY AUTOMATED COUNT: 17.3 % (ref 10–15)
ERYTHROCYTE [DISTWIDTH] IN BLOOD BY AUTOMATED COUNT: 18.1 % (ref 10–15)
ERYTHROCYTE [DISTWIDTH] IN BLOOD BY AUTOMATED COUNT: 18.4 % (ref 10–15)
ERYTHROCYTE [DISTWIDTH] IN BLOOD BY AUTOMATED COUNT: 18.6 % (ref 10–15)
ERYTHROCYTE [DISTWIDTH] IN BLOOD BY AUTOMATED COUNT: 18.8 % (ref 10–15)
EXPTIME-PRE: 6.17 SEC
FEF2575-%PRED-PRE: 196 %
FEF2575-PRE: 4.8 L/SEC
FEF2575-PRED: 2.44 L/SEC
FEFMAX-%PRED-PRE: 122 %
FEFMAX-PRE: 10.08 L/SEC
FEFMAX-PRED: 8.2 L/SEC
FERRITIN SERPL-MCNC: 1119 NG/ML (ref 31–409)
FEV1-%PRED-PRE: 104 %
FEV1-PRE: 3.03 L
FEV1FEV6-PRE: 87 %
FEV1FEV6-PRED: 79 %
FEV1FVC-PRE: 88 %
FEV1FVC-PRED: 78 %
FEV1SVC-PRE: 87 %
FEV1SVC-PRED: 72 %
FIFMAX-PRE: 4.91 L/SEC
FLOWPRA1 CELL: NORMAL
FLOWPRA1 COMMENTS: NORMAL
FLOWPRA1 RESULT: NORMAL
FLOWPRA1 TEST METHOD: NORMAL
FLOWPRA2 CELL: NORMAL
FLOWPRA2 COMMENTS: NORMAL
FLOWPRA2 RESULT: NORMAL
FLOWPRA2 TEST METHOD: NORMAL
FRAGMENTS BLD QL SMEAR: ABNORMAL
FRAGMENTS BLD QL SMEAR: NORMAL
FRAGMENTS BLD QL SMEAR: SLIGHT
FRCPLETH-%PRED-PRE: 77 %
FRCPLETH-PRE: 2.65 L
FRCPLETH-PRED: 3.43 L
FVC-%PRED-PRE: 93 %
FVC-PRE: 3.45 L
FVC-PRED: 3.67 L
GLUCOSE CSF-MCNC: 61 MG/DL (ref 40–70)
GLUCOSE SERPL-MCNC: 100 MG/DL (ref 70–99)
GLUCOSE SERPL-MCNC: 102 MG/DL (ref 70–99)
GLUCOSE SERPL-MCNC: 105 MG/DL (ref 70–99)
GLUCOSE SERPL-MCNC: 106 MG/DL (ref 70–99)
GLUCOSE SERPL-MCNC: 106 MG/DL (ref 70–99)
GLUCOSE SERPL-MCNC: 107 MG/DL (ref 70–99)
GLUCOSE SERPL-MCNC: 110 MG/DL (ref 70–99)
GLUCOSE SERPL-MCNC: 111 MG/DL (ref 70–99)
GLUCOSE SERPL-MCNC: 112 MG/DL (ref 70–99)
GLUCOSE SERPL-MCNC: 113 MG/DL (ref 70–99)
GLUCOSE SERPL-MCNC: 119 MG/DL (ref 70–99)
GLUCOSE SERPL-MCNC: 120 MG/DL (ref 70–99)
GLUCOSE SERPL-MCNC: 120 MG/DL (ref 70–99)
GLUCOSE SERPL-MCNC: 122 MG/DL (ref 70–99)
GLUCOSE SERPL-MCNC: 122 MG/DL (ref 70–99)
GLUCOSE SERPL-MCNC: 123 MG/DL (ref 70–99)
GLUCOSE SERPL-MCNC: 124 MG/DL (ref 70–99)
GLUCOSE SERPL-MCNC: 124 MG/DL (ref 70–99)
GLUCOSE SERPL-MCNC: 125 MG/DL (ref 70–99)
GLUCOSE SERPL-MCNC: 127 MG/DL (ref 70–99)
GLUCOSE SERPL-MCNC: 140 MG/DL (ref 70–99)
GLUCOSE SERPL-MCNC: 141 MG/DL (ref 70–99)
GLUCOSE SERPL-MCNC: 143 MG/DL (ref 70–99)
GLUCOSE SERPL-MCNC: 146 MG/DL (ref 70–99)
GLUCOSE SERPL-MCNC: 148 MG/DL (ref 70–99)
GLUCOSE SERPL-MCNC: 149 MG/DL (ref 70–99)
GLUCOSE SERPL-MCNC: 150 MG/DL (ref 70–99)
GLUCOSE SERPL-MCNC: 153 MG/DL (ref 70–99)
GLUCOSE SERPL-MCNC: 155 MG/DL (ref 70–99)
GLUCOSE SERPL-MCNC: 155 MG/DL (ref 70–99)
GLUCOSE SERPL-MCNC: 156 MG/DL (ref 70–99)
GLUCOSE SERPL-MCNC: 91 MG/DL (ref 70–99)
GLUCOSE SERPL-MCNC: 94 MG/DL (ref 70–99)
GLUCOSE SERPL-MCNC: 94 MG/DL (ref 70–99)
GLUCOSE SERPL-MCNC: 97 MG/DL (ref 70–99)
GLUCOSE SERPL-MCNC: 99 MG/DL (ref 70–99)
GLUCOSE UR STRIP-MCNC: NEGATIVE MG/DL
HBV CORE AB SERPL QL IA: REACTIVE
HBV DNA SERPL NAA+PROBE-ACNC: NOT DETECTED IU/ML
HBV DNA SERPL QL NAA+PROBE: NORMAL
HBV SURFACE AB SERPL IA-ACNC: 68.7 M[IU]/ML
HBV SURFACE AB SERPL IA-ACNC: REACTIVE M[IU]/ML
HBV SURFACE AG SERPL QL IA: NONREACTIVE
HCO3 SERPL-SCNC: 23 MMOL/L (ref 22–29)
HCO3 SERPL-SCNC: 23 MMOL/L (ref 22–29)
HCO3 SERPL-SCNC: 24 MMOL/L (ref 22–29)
HCO3 SERPL-SCNC: 25 MMOL/L (ref 22–29)
HCO3 SERPL-SCNC: 26 MMOL/L (ref 22–29)
HCO3 SERPL-SCNC: 26 MMOL/L (ref 22–29)
HCT VFR BLD AUTO: 19.5 % (ref 40–53)
HCT VFR BLD AUTO: 19.5 % (ref 40–53)
HCT VFR BLD AUTO: 20 % (ref 40–53)
HCT VFR BLD AUTO: 20.4 % (ref 40–53)
HCT VFR BLD AUTO: 21.4 % (ref 40–53)
HCT VFR BLD AUTO: 22.4 % (ref 40–53)
HCT VFR BLD AUTO: 23.1 % (ref 40–53)
HCT VFR BLD AUTO: 23.7 % (ref 40–53)
HCT VFR BLD AUTO: 23.7 % (ref 40–53)
HCT VFR BLD AUTO: 23.8 % (ref 40–53)
HCT VFR BLD AUTO: 25.8 % (ref 40–53)
HCT VFR BLD AUTO: 26.5 % (ref 40–53)
HCT VFR BLD AUTO: 26.7 % (ref 40–53)
HCT VFR BLD AUTO: 27.1 % (ref 40–53)
HCT VFR BLD AUTO: 27.2 % (ref 40–53)
HCT VFR BLD AUTO: 27.9 % (ref 40–53)
HCT VFR BLD AUTO: 28.5 % (ref 40–53)
HCT VFR BLD AUTO: 28.7 % (ref 40–53)
HCT VFR BLD AUTO: 28.9 % (ref 40–53)
HCT VFR BLD AUTO: 29.3 % (ref 40–53)
HCT VFR BLD AUTO: 30.1 % (ref 40–53)
HCT VFR BLD AUTO: 30.4 % (ref 40–53)
HCT VFR BLD AUTO: 31.9 % (ref 40–53)
HCT VFR BLD AUTO: 32.3 % (ref 40–53)
HCT VFR BLD AUTO: 32.4 % (ref 40–53)
HCT VFR BLD AUTO: 32.5 % (ref 40–53)
HCT VFR BLD AUTO: 33 % (ref 40–53)
HCT VFR BLD AUTO: 33 % (ref 40–53)
HCT VFR BLD AUTO: 33.1 % (ref 40–53)
HCT VFR BLD AUTO: 33.4 % (ref 40–53)
HCT VFR BLD AUTO: 33.5 % (ref 40–53)
HCT VFR BLD AUTO: 33.6 % (ref 40–53)
HCT VFR BLD AUTO: 33.7 % (ref 40–53)
HCT VFR BLD AUTO: 33.7 % (ref 40–53)
HCT VFR BLD AUTO: 33.8 % (ref 40–53)
HCT VFR BLD AUTO: 34.3 % (ref 40–53)
HCT VFR BLD AUTO: 34.7 % (ref 40–53)
HCT VFR BLD AUTO: 34.8 % (ref 40–53)
HCT VFR BLD AUTO: 35 % (ref 40–53)
HCT VFR BLD AUTO: 35 % (ref 40–53)
HCT VFR BLD AUTO: 35.1 % (ref 40–53)
HCT VFR BLD AUTO: 35.3 % (ref 40–53)
HCT VFR BLD AUTO: 35.4 % (ref 40–53)
HCT VFR BLD AUTO: 35.5 % (ref 40–53)
HCT VFR BLD AUTO: 35.8 % (ref 40–53)
HCT VFR BLD AUTO: 35.8 % (ref 40–53)
HCT VFR BLD AUTO: 36.1 % (ref 40–53)
HCT VFR BLD AUTO: 36.3 % (ref 40–53)
HCT VFR BLD AUTO: 36.4 % (ref 40–53)
HCT VFR BLD AUTO: 36.4 % (ref 40–53)
HCT VFR BLD AUTO: 36.6 % (ref 40–53)
HCT VFR BLD AUTO: 37 % (ref 40–53)
HCT VFR BLD AUTO: 37.5 % (ref 40–53)
HCT VFR BLD AUTO: 38.4 % (ref 40–53)
HCT VFR BLD AUTO: 40.3 % (ref 40–53)
HCT VFR BLD AUTO: 40.8 % (ref 40–53)
HCT VFR BLD AUTO: 40.8 % (ref 40–53)
HCV AB SERPL QL IA: NONREACTIVE
HCV RNA SERPL QL NAA+PROBE: NORMAL
HGB BLD-MCNC: 10.1 G/DL (ref 13.3–17.7)
HGB BLD-MCNC: 10.4 G/DL (ref 13.3–17.7)
HGB BLD-MCNC: 10.7 G/DL (ref 13.3–17.7)
HGB BLD-MCNC: 10.8 G/DL (ref 13.3–17.7)
HGB BLD-MCNC: 10.8 G/DL (ref 13.3–17.7)
HGB BLD-MCNC: 11 G/DL (ref 13.3–17.7)
HGB BLD-MCNC: 11 G/DL (ref 13.3–17.7)
HGB BLD-MCNC: 11.1 G/DL (ref 13.3–17.7)
HGB BLD-MCNC: 11.2 G/DL (ref 13.3–17.7)
HGB BLD-MCNC: 11.3 G/DL (ref 13.3–17.7)
HGB BLD-MCNC: 11.5 G/DL (ref 13.3–17.7)
HGB BLD-MCNC: 11.6 G/DL (ref 13.3–17.7)
HGB BLD-MCNC: 11.8 G/DL (ref 13.3–17.7)
HGB BLD-MCNC: 11.8 G/DL (ref 13.3–17.7)
HGB BLD-MCNC: 12 G/DL (ref 13.3–17.7)
HGB BLD-MCNC: 12 G/DL (ref 13.3–17.7)
HGB BLD-MCNC: 12.1 G/DL (ref 13.3–17.7)
HGB BLD-MCNC: 12.2 G/DL (ref 13.3–17.7)
HGB BLD-MCNC: 12.3 G/DL (ref 13.3–17.7)
HGB BLD-MCNC: 12.4 G/DL (ref 13.3–17.7)
HGB BLD-MCNC: 12.5 G/DL (ref 13.3–17.7)
HGB BLD-MCNC: 12.5 G/DL (ref 13.3–17.7)
HGB BLD-MCNC: 12.6 G/DL (ref 13.3–17.7)
HGB BLD-MCNC: 12.8 G/DL (ref 13.3–17.7)
HGB BLD-MCNC: 13.2 G/DL (ref 13.3–17.7)
HGB BLD-MCNC: 13.3 G/DL (ref 13.3–17.7)
HGB BLD-MCNC: 13.4 G/DL (ref 13.3–17.7)
HGB BLD-MCNC: 13.5 G/DL (ref 13.3–17.7)
HGB BLD-MCNC: 14 G/DL (ref 13.3–17.7)
HGB BLD-MCNC: 14.8 G/DL (ref 13.3–17.7)
HGB BLD-MCNC: 14.8 G/DL (ref 13.3–17.7)
HGB BLD-MCNC: 6.9 G/DL (ref 13.3–17.7)
HGB BLD-MCNC: 7.2 G/DL (ref 13.3–17.7)
HGB BLD-MCNC: 7.3 G/DL (ref 13.3–17.7)
HGB BLD-MCNC: 7.3 G/DL (ref 13.3–17.7)
HGB BLD-MCNC: 7.5 G/DL (ref 13.3–17.7)
HGB BLD-MCNC: 7.9 G/DL (ref 13.3–17.7)
HGB BLD-MCNC: 8.1 G/DL (ref 13.3–17.7)
HGB BLD-MCNC: 8.2 G/DL (ref 13.3–17.7)
HGB BLD-MCNC: 8.3 G/DL (ref 13.3–17.7)
HGB BLD-MCNC: 8.7 G/DL (ref 13.3–17.7)
HGB BLD-MCNC: 8.7 G/DL (ref 13.3–17.7)
HGB BLD-MCNC: 9.2 G/DL (ref 13.3–17.7)
HGB BLD-MCNC: 9.3 G/DL (ref 13.3–17.7)
HGB BLD-MCNC: 9.4 G/DL (ref 13.3–17.7)
HGB BLD-MCNC: 9.6 G/DL (ref 13.3–17.7)
HGB BLD-MCNC: 9.6 G/DL (ref 13.3–17.7)
HGB BLD-MCNC: 9.8 G/DL (ref 13.3–17.7)
HGB BLD-MCNC: 9.9 G/DL (ref 13.3–17.7)
HGB C CRYSTALS: ABNORMAL
HGB C CRYSTALS: NORMAL
HGB S BLD QL: NEGATIVE
HGB UR QL STRIP: NEGATIVE
HIV 1+2 AB+HIV1 P24 AG SERPL QL IA: NONREACTIVE
HIV1+2 RNA SERPL QL NAA+PROBE: NORMAL
HOLD SPECIMEN: NORMAL
HOLD SPECIMEN: NORMAL
HOWELL-JOLLY BOD BLD QL SMEAR: ABNORMAL
HOWELL-JOLLY BOD BLD QL SMEAR: NORMAL
HSV1 IGG SERPL QL IA: 54.7 INDEX
HSV1 IGG SERPL QL IA: ABNORMAL
HSV2 IGG SERPL QL IA: 0.08 INDEX
HSV2 IGG SERPL QL IA: ABNORMAL
HTLV I+II AB SER QL IA: NEGATIVE
IC-%PRED-PRE: 95 %
IC-PRE: 2.59 L
IC-PRED: 2.7 L
IMM GRANULOCYTES # BLD: 0 10E3/UL
IMM GRANULOCYTES # BLD: 0.1 10E3/UL
IMM GRANULOCYTES # BLD: 0.2 10E3/UL
IMM GRANULOCYTES # BLD: 0.2 10E3/UL
IMM GRANULOCYTES # BLD: ABNORMAL 10*3/UL
IMM GRANULOCYTES NFR BLD: 0 %
IMM GRANULOCYTES NFR BLD: 1 %
IMM GRANULOCYTES NFR BLD: 2 %
IMM GRANULOCYTES NFR BLD: 4 %
IMM GRANULOCYTES NFR BLD: ABNORMAL %
INR PPP: 1.02 (ref 0.85–1.15)
INR PPP: 1.07 (ref 0.85–1.15)
INR PPP: 1.1 (ref 0.85–1.15)
INR PPP: 1.15 (ref 0.85–1.15)
INR PPP: 1.19 (ref 0.85–1.15)
INTERPRETATION ECG - MUSE: NORMAL
INTERPRETATION: NORMAL
ISCN: NORMAL
ISCN: NORMAL
ISSUE DATE AND TIME: NORMAL
ISSUE DATE AND TIME: NORMAL
KETONES UR STRIP-MCNC: NEGATIVE MG/DL
LAB DIRECTOR DISCLAIMER: NORMAL
LAB DIRECTOR INTERPRETATION: NORMAL
LAB DIRECTOR METHODOLOGY: NORMAL
LAB DIRECTOR RESULTS: NORMAL
LACTATE SERPL-SCNC: 0.8 MMOL/L (ref 0.7–2)
LACTATE SERPL-SCNC: 0.8 MMOL/L (ref 0.7–2)
LACTATE SERPL-SCNC: 1.5 MMOL/L (ref 0.7–2)
LEUKOCYTE ESTERASE UR QL STRIP: NEGATIVE
LOCATION OF TASK: NORMAL
LVEF ECHO: NORMAL
LYMPHOCYTES # BLD AUTO: 0.1 10E3/UL (ref 0.8–5.3)
LYMPHOCYTES # BLD AUTO: 0.2 10E3/UL (ref 0.8–5.3)
LYMPHOCYTES # BLD AUTO: 0.3 10E3/UL (ref 0.8–5.3)
LYMPHOCYTES # BLD AUTO: 0.5 10E3/UL (ref 0.8–5.3)
LYMPHOCYTES # BLD AUTO: 0.6 10E3/UL (ref 0.8–5.3)
LYMPHOCYTES # BLD AUTO: 0.7 10E3/UL (ref 0.8–5.3)
LYMPHOCYTES # BLD AUTO: 0.7 10E3/UL (ref 0.8–5.3)
LYMPHOCYTES # BLD AUTO: 0.8 10E3/UL (ref 0.8–5.3)
LYMPHOCYTES # BLD AUTO: ABNORMAL 10*3/UL
LYMPHOCYTES # BLD MANUAL: 0 10E3/UL (ref 0.8–5.3)
LYMPHOCYTES # BLD MANUAL: 0.1 10E3/UL (ref 0.8–5.3)
LYMPHOCYTES # BLD MANUAL: 0.2 10E3/UL (ref 0.8–5.3)
LYMPHOCYTES # BLD MANUAL: 0.2 10E3/UL (ref 0.8–5.3)
LYMPHOCYTES # BLD MANUAL: 0.3 10E3/UL (ref 0.8–5.3)
LYMPHOCYTES # BLD MANUAL: 0.4 10E3/UL (ref 0.8–5.3)
LYMPHOCYTES # BLD MANUAL: 0.5 10E3/UL (ref 0.8–5.3)
LYMPHOCYTES # BLD MANUAL: 0.8 10E3/UL (ref 0.8–5.3)
LYMPHOCYTES # BLD MANUAL: 0.8 10E3/UL (ref 0.8–5.3)
LYMPHOCYTES # BLD MANUAL: 0.9 10E3/UL (ref 0.8–5.3)
LYMPHOCYTES # BLD MANUAL: 1 10E3/UL (ref 0.8–5.3)
LYMPHOCYTES NFR BLD AUTO: 1 %
LYMPHOCYTES NFR BLD AUTO: 1 %
LYMPHOCYTES NFR BLD AUTO: 10 %
LYMPHOCYTES NFR BLD AUTO: 11 %
LYMPHOCYTES NFR BLD AUTO: 12 %
LYMPHOCYTES NFR BLD AUTO: 18 %
LYMPHOCYTES NFR BLD AUTO: 2 %
LYMPHOCYTES NFR BLD AUTO: 2 %
LYMPHOCYTES NFR BLD AUTO: 24 %
LYMPHOCYTES NFR BLD AUTO: 29 %
LYMPHOCYTES NFR BLD AUTO: 3 %
LYMPHOCYTES NFR BLD AUTO: 30 %
LYMPHOCYTES NFR BLD AUTO: 33 %
LYMPHOCYTES NFR BLD AUTO: 34 %
LYMPHOCYTES NFR BLD AUTO: 37 %
LYMPHOCYTES NFR BLD AUTO: 38 %
LYMPHOCYTES NFR BLD AUTO: 4 %
LYMPHOCYTES NFR BLD AUTO: 4 %
LYMPHOCYTES NFR BLD AUTO: 5 %
LYMPHOCYTES NFR BLD AUTO: 5 %
LYMPHOCYTES NFR BLD AUTO: 6 %
LYMPHOCYTES NFR BLD AUTO: 6 %
LYMPHOCYTES NFR BLD AUTO: 7 %
LYMPHOCYTES NFR BLD AUTO: 8 %
LYMPHOCYTES NFR BLD AUTO: 8 %
LYMPHOCYTES NFR BLD AUTO: 9 %
LYMPHOCYTES NFR BLD AUTO: 9 %
LYMPHOCYTES NFR BLD AUTO: ABNORMAL %
LYMPHOCYTES NFR BLD MANUAL: 11 %
LYMPHOCYTES NFR BLD MANUAL: 12 %
LYMPHOCYTES NFR BLD MANUAL: 15 %
LYMPHOCYTES NFR BLD MANUAL: 2 %
LYMPHOCYTES NFR BLD MANUAL: 2 %
LYMPHOCYTES NFR BLD MANUAL: 22 %
LYMPHOCYTES NFR BLD MANUAL: 24 %
LYMPHOCYTES NFR BLD MANUAL: 26 %
LYMPHOCYTES NFR BLD MANUAL: 29 %
LYMPHOCYTES NFR BLD MANUAL: 37 %
LYMPHOCYTES NFR BLD MANUAL: 4 %
LYMPHOCYTES NFR BLD MANUAL: 4 %
LYMPHOCYTES NFR BLD MANUAL: 45 %
LYMPHOCYTES NFR BLD MANUAL: 5 %
LYMPHOCYTES NFR BLD MANUAL: 5 %
LYMPHOCYTES NFR BLD MANUAL: 9 %
MAGNESIUM SERPL-MCNC: 1.4 MG/DL (ref 1.7–2.3)
MAGNESIUM SERPL-MCNC: 1.5 MG/DL (ref 1.7–2.3)
MAGNESIUM SERPL-MCNC: 1.5 MG/DL (ref 1.7–2.3)
MAGNESIUM SERPL-MCNC: 1.6 MG/DL (ref 1.7–2.3)
MAGNESIUM SERPL-MCNC: 1.7 MG/DL (ref 1.7–2.3)
MAGNESIUM SERPL-MCNC: 1.8 MG/DL (ref 1.7–2.3)
MAGNESIUM SERPL-MCNC: 2 MG/DL (ref 1.7–2.3)
MAGNESIUM SERPL-MCNC: 2.1 MG/DL (ref 1.7–2.3)
MAGNESIUM SERPL-MCNC: 2.2 MG/DL (ref 1.7–2.3)
MAGNESIUM SERPL-MCNC: 2.3 MG/DL (ref 1.7–2.3)
MAGNESIUM SERPL-MCNC: 2.3 MG/DL (ref 1.7–2.3)
MAGNESIUM SERPL-MCNC: 2.4 MG/DL (ref 1.7–2.3)
MAGNESIUM SERPL-MCNC: 2.4 MG/DL (ref 1.7–2.3)
MAGNESIUM SERPL-MCNC: 2.5 MG/DL (ref 1.7–2.3)
MCH RBC QN AUTO: 29.5 PG (ref 26.5–33)
MCH RBC QN AUTO: 29.6 PG (ref 26.5–33)
MCH RBC QN AUTO: 29.6 PG (ref 26.5–33)
MCH RBC QN AUTO: 29.7 PG (ref 26.5–33)
MCH RBC QN AUTO: 29.9 PG (ref 26.5–33)
MCH RBC QN AUTO: 29.9 PG (ref 26.5–33)
MCH RBC QN AUTO: 30 PG (ref 26.5–33)
MCH RBC QN AUTO: 30.1 PG (ref 26.5–33)
MCH RBC QN AUTO: 30.2 PG (ref 26.5–33)
MCH RBC QN AUTO: 30.3 PG (ref 26.5–33)
MCH RBC QN AUTO: 30.4 PG (ref 26.5–33)
MCH RBC QN AUTO: 30.5 PG (ref 26.5–33)
MCH RBC QN AUTO: 30.6 PG (ref 26.5–33)
MCH RBC QN AUTO: 30.7 PG (ref 26.5–33)
MCH RBC QN AUTO: 30.8 PG (ref 26.5–33)
MCH RBC QN AUTO: 30.9 PG (ref 26.5–33)
MCH RBC QN AUTO: 31.1 PG (ref 26.5–33)
MCH RBC QN AUTO: 31.1 PG (ref 26.5–33)
MCH RBC QN AUTO: 31.2 PG (ref 26.5–33)
MCH RBC QN AUTO: 31.4 PG (ref 26.5–33)
MCH RBC QN AUTO: 31.5 PG (ref 26.5–33)
MCH RBC QN AUTO: 31.5 PG (ref 26.5–33)
MCH RBC QN AUTO: 32 PG (ref 26.5–33)
MCH RBC QN AUTO: 32.4 PG (ref 26.5–33)
MCH RBC QN AUTO: 32.5 PG (ref 26.5–33)
MCH RBC QN AUTO: 32.5 PG (ref 26.5–33)
MCH RBC QN AUTO: 34.6 PG (ref 26.5–33)
MCH RBC QN AUTO: 35.2 PG (ref 26.5–33)
MCH RBC QN AUTO: 35.7 PG (ref 26.5–33)
MCH RBC QN AUTO: 35.7 PG (ref 26.5–33)
MCH RBC QN AUTO: 36.2 PG (ref 26.5–33)
MCH RBC QN AUTO: 36.3 PG (ref 26.5–33)
MCH RBC QN AUTO: 36.9 PG (ref 26.5–33)
MCH RBC QN AUTO: 37.2 PG (ref 26.5–33)
MCH RBC QN AUTO: 37.7 PG (ref 26.5–33)
MCH RBC QN AUTO: 37.8 PG (ref 26.5–33)
MCH RBC QN AUTO: 37.9 PG (ref 26.5–33)
MCH RBC QN AUTO: 38.1 PG (ref 26.5–33)
MCHC RBC AUTO-ENTMCNC: 32.3 G/DL (ref 31.5–36.5)
MCHC RBC AUTO-ENTMCNC: 32.9 G/DL (ref 31.5–36.5)
MCHC RBC AUTO-ENTMCNC: 33 G/DL (ref 31.5–36.5)
MCHC RBC AUTO-ENTMCNC: 33.1 G/DL (ref 31.5–36.5)
MCHC RBC AUTO-ENTMCNC: 33.2 G/DL (ref 31.5–36.5)
MCHC RBC AUTO-ENTMCNC: 33.3 G/DL (ref 31.5–36.5)
MCHC RBC AUTO-ENTMCNC: 33.4 G/DL (ref 31.5–36.5)
MCHC RBC AUTO-ENTMCNC: 33.5 G/DL (ref 31.5–36.5)
MCHC RBC AUTO-ENTMCNC: 33.5 G/DL (ref 31.5–36.5)
MCHC RBC AUTO-ENTMCNC: 33.6 G/DL (ref 31.5–36.5)
MCHC RBC AUTO-ENTMCNC: 33.7 G/DL (ref 31.5–36.5)
MCHC RBC AUTO-ENTMCNC: 33.8 G/DL (ref 31.5–36.5)
MCHC RBC AUTO-ENTMCNC: 33.9 G/DL (ref 31.5–36.5)
MCHC RBC AUTO-ENTMCNC: 33.9 G/DL (ref 31.5–36.5)
MCHC RBC AUTO-ENTMCNC: 34.1 G/DL (ref 31.5–36.5)
MCHC RBC AUTO-ENTMCNC: 34.1 G/DL (ref 31.5–36.5)
MCHC RBC AUTO-ENTMCNC: 34.2 G/DL (ref 31.5–36.5)
MCHC RBC AUTO-ENTMCNC: 34.4 G/DL (ref 31.5–36.5)
MCHC RBC AUTO-ENTMCNC: 34.4 G/DL (ref 31.5–36.5)
MCHC RBC AUTO-ENTMCNC: 34.5 G/DL (ref 31.5–36.5)
MCHC RBC AUTO-ENTMCNC: 34.8 G/DL (ref 31.5–36.5)
MCHC RBC AUTO-ENTMCNC: 34.9 G/DL (ref 31.5–36.5)
MCHC RBC AUTO-ENTMCNC: 35 G/DL (ref 31.5–36.5)
MCHC RBC AUTO-ENTMCNC: 35.1 G/DL (ref 31.5–36.5)
MCHC RBC AUTO-ENTMCNC: 35.2 G/DL (ref 31.5–36.5)
MCHC RBC AUTO-ENTMCNC: 35.3 G/DL (ref 31.5–36.5)
MCHC RBC AUTO-ENTMCNC: 35.3 G/DL (ref 31.5–36.5)
MCHC RBC AUTO-ENTMCNC: 35.4 G/DL (ref 31.5–36.5)
MCHC RBC AUTO-ENTMCNC: 35.5 G/DL (ref 31.5–36.5)
MCHC RBC AUTO-ENTMCNC: 35.6 G/DL (ref 31.5–36.5)
MCHC RBC AUTO-ENTMCNC: 35.6 G/DL (ref 31.5–36.5)
MCHC RBC AUTO-ENTMCNC: 35.7 G/DL (ref 31.5–36.5)
MCHC RBC AUTO-ENTMCNC: 35.8 G/DL (ref 31.5–36.5)
MCHC RBC AUTO-ENTMCNC: 35.8 G/DL (ref 31.5–36.5)
MCHC RBC AUTO-ENTMCNC: 35.9 G/DL (ref 31.5–36.5)
MCHC RBC AUTO-ENTMCNC: 36.1 G/DL (ref 31.5–36.5)
MCHC RBC AUTO-ENTMCNC: 36.1 G/DL (ref 31.5–36.5)
MCHC RBC AUTO-ENTMCNC: 36.3 G/DL (ref 31.5–36.5)
MCHC RBC AUTO-ENTMCNC: 36.5 G/DL (ref 31.5–36.5)
MCHC RBC AUTO-ENTMCNC: 36.7 G/DL (ref 31.5–36.5)
MCHC RBC AUTO-ENTMCNC: 36.9 G/DL (ref 31.5–36.5)
MCV RBC AUTO: 100 FL (ref 78–100)
MCV RBC AUTO: 100 FL (ref 78–100)
MCV RBC AUTO: 101 FL (ref 78–100)
MCV RBC AUTO: 101 FL (ref 78–100)
MCV RBC AUTO: 104 FL (ref 78–100)
MCV RBC AUTO: 105 FL (ref 78–100)
MCV RBC AUTO: 105 FL (ref 78–100)
MCV RBC AUTO: 82 FL (ref 78–100)
MCV RBC AUTO: 83 FL (ref 78–100)
MCV RBC AUTO: 84 FL (ref 78–100)
MCV RBC AUTO: 86 FL (ref 78–100)
MCV RBC AUTO: 87 FL (ref 78–100)
MCV RBC AUTO: 88 FL (ref 78–100)
MCV RBC AUTO: 89 FL (ref 78–100)
MCV RBC AUTO: 90 FL (ref 78–100)
MCV RBC AUTO: 91 FL (ref 78–100)
MCV RBC AUTO: 92 FL (ref 78–100)
MCV RBC AUTO: 93 FL (ref 78–100)
MCV RBC AUTO: 93 FL (ref 78–100)
MCV RBC AUTO: 94 FL (ref 78–100)
MCV RBC AUTO: 96 FL (ref 78–100)
MCV RBC AUTO: 98 FL (ref 78–100)
METAMYELOCYTES # BLD MANUAL: 0 10E3/UL
METAMYELOCYTES # BLD MANUAL: 0 10E3/UL
METAMYELOCYTES # BLD MANUAL: 0.1 10E3/UL
METAMYELOCYTES NFR BLD MANUAL: 1 %
METAMYELOCYTES NFR BLD MANUAL: 2 %
METAMYELOCYTES NFR BLD MANUAL: 4 %
METHODS: NORMAL
METHODS: NORMAL
MONOCYTES # BLD AUTO: 0 10E3/UL (ref 0–1.3)
MONOCYTES # BLD AUTO: 0.1 10E3/UL (ref 0–1.3)
MONOCYTES # BLD AUTO: 0.2 10E3/UL (ref 0–1.3)
MONOCYTES # BLD AUTO: 0.3 10E3/UL (ref 0–1.3)
MONOCYTES # BLD AUTO: 0.4 10E3/UL (ref 0–1.3)
MONOCYTES # BLD AUTO: 0.5 10E3/UL (ref 0–1.3)
MONOCYTES # BLD AUTO: 0.6 10E3/UL (ref 0–1.3)
MONOCYTES # BLD AUTO: 0.6 10E3/UL (ref 0–1.3)
MONOCYTES # BLD AUTO: 0.7 10E3/UL (ref 0–1.3)
MONOCYTES # BLD AUTO: 0.8 10E3/UL (ref 0–1.3)
MONOCYTES # BLD AUTO: 0.8 10E3/UL (ref 0–1.3)
MONOCYTES # BLD AUTO: ABNORMAL 10*3/UL
MONOCYTES # BLD MANUAL: 0 10E3/UL (ref 0–1.3)
MONOCYTES # BLD MANUAL: 0.1 10E3/UL (ref 0–1.3)
MONOCYTES # BLD MANUAL: 0.2 10E3/UL (ref 0–1.3)
MONOCYTES # BLD MANUAL: 0.3 10E3/UL (ref 0–1.3)
MONOCYTES # BLD MANUAL: 0.4 10E3/UL (ref 0–1.3)
MONOCYTES # BLD MANUAL: 0.4 10E3/UL (ref 0–1.3)
MONOCYTES # BLD MANUAL: 0.5 10E3/UL (ref 0–1.3)
MONOCYTES # BLD MANUAL: 0.6 10E3/UL (ref 0–1.3)
MONOCYTES # BLD MANUAL: 0.7 10E3/UL (ref 0–1.3)
MONOCYTES # BLD MANUAL: 0.7 10E3/UL (ref 0–1.3)
MONOCYTES # BLD MANUAL: 0.8 10E3/UL (ref 0–1.3)
MONOCYTES NFR BLD AUTO: 1 %
MONOCYTES NFR BLD AUTO: 10 %
MONOCYTES NFR BLD AUTO: 10 %
MONOCYTES NFR BLD AUTO: 11 %
MONOCYTES NFR BLD AUTO: 12 %
MONOCYTES NFR BLD AUTO: 13 %
MONOCYTES NFR BLD AUTO: 13 %
MONOCYTES NFR BLD AUTO: 14 %
MONOCYTES NFR BLD AUTO: 15 %
MONOCYTES NFR BLD AUTO: 15 %
MONOCYTES NFR BLD AUTO: 17 %
MONOCYTES NFR BLD AUTO: 18 %
MONOCYTES NFR BLD AUTO: 18 %
MONOCYTES NFR BLD AUTO: 19 %
MONOCYTES NFR BLD AUTO: 19 %
MONOCYTES NFR BLD AUTO: 2 %
MONOCYTES NFR BLD AUTO: 20 %
MONOCYTES NFR BLD AUTO: 21 %
MONOCYTES NFR BLD AUTO: 21 %
MONOCYTES NFR BLD AUTO: 29 %
MONOCYTES NFR BLD AUTO: 3 %
MONOCYTES NFR BLD AUTO: 3 %
MONOCYTES NFR BLD AUTO: 4 %
MONOCYTES NFR BLD AUTO: 8 %
MONOCYTES NFR BLD AUTO: 9 %
MONOCYTES NFR BLD AUTO: ABNORMAL %
MONOCYTES NFR BLD MANUAL: 0 %
MONOCYTES NFR BLD MANUAL: 10 %
MONOCYTES NFR BLD MANUAL: 15 %
MONOCYTES NFR BLD MANUAL: 16 %
MONOCYTES NFR BLD MANUAL: 18 %
MONOCYTES NFR BLD MANUAL: 20 %
MONOCYTES NFR BLD MANUAL: 21 %
MONOCYTES NFR BLD MANUAL: 29 %
MONOCYTES NFR BLD MANUAL: 34 %
MONOCYTES NFR BLD MANUAL: 7 %
MONOCYTES NFR BLD MANUAL: 8 %
MONOCYTES NFR BLD MANUAL: 9 %
MUCOUS THREADS #/AREA URNS LPF: PRESENT /LPF
MYELOCYTES # BLD MANUAL: 0 10E3/UL
MYELOCYTES # BLD MANUAL: 0.1 10E3/UL
MYELOCYTES NFR BLD MANUAL: 1 %
MYELOCYTES NFR BLD MANUAL: 1 %
MYELOCYTES NFR BLD MANUAL: 2 %
MYELOCYTES NFR BLD MANUAL: 2 %
NEUTROPHILS # BLD AUTO: 0.8 10E3/UL (ref 1.6–8.3)
NEUTROPHILS # BLD AUTO: 0.8 10E3/UL (ref 1.6–8.3)
NEUTROPHILS # BLD AUTO: 0.9 10E3/UL (ref 1.6–8.3)
NEUTROPHILS # BLD AUTO: 1 10E3/UL (ref 1.6–8.3)
NEUTROPHILS # BLD AUTO: 1.1 10E3/UL (ref 1.6–8.3)
NEUTROPHILS # BLD AUTO: 1.2 10E3/UL (ref 1.6–8.3)
NEUTROPHILS # BLD AUTO: 1.2 10E3/UL (ref 1.6–8.3)
NEUTROPHILS # BLD AUTO: 1.3 10E3/UL (ref 1.6–8.3)
NEUTROPHILS # BLD AUTO: 1.4 10E3/UL (ref 1.6–8.3)
NEUTROPHILS # BLD AUTO: 1.4 10E3/UL (ref 1.6–8.3)
NEUTROPHILS # BLD AUTO: 1.5 10E3/UL (ref 1.6–8.3)
NEUTROPHILS # BLD AUTO: 1.5 10E3/UL (ref 1.6–8.3)
NEUTROPHILS # BLD AUTO: 1.6 10E3/UL (ref 1.6–8.3)
NEUTROPHILS # BLD AUTO: 1.7 10E3/UL (ref 1.6–8.3)
NEUTROPHILS # BLD AUTO: 1.8 10E3/UL (ref 1.6–8.3)
NEUTROPHILS # BLD AUTO: 2 10E3/UL (ref 1.6–8.3)
NEUTROPHILS # BLD AUTO: 2.1 10E3/UL (ref 1.6–8.3)
NEUTROPHILS # BLD AUTO: 2.2 10E3/UL (ref 1.6–8.3)
NEUTROPHILS # BLD AUTO: 2.4 10E3/UL (ref 1.6–8.3)
NEUTROPHILS # BLD AUTO: 2.4 10E3/UL (ref 1.6–8.3)
NEUTROPHILS # BLD AUTO: 2.8 10E3/UL (ref 1.6–8.3)
NEUTROPHILS # BLD AUTO: 3.2 10E3/UL (ref 1.6–8.3)
NEUTROPHILS # BLD AUTO: 3.2 10E3/UL (ref 1.6–8.3)
NEUTROPHILS # BLD AUTO: 3.3 10E3/UL (ref 1.6–8.3)
NEUTROPHILS # BLD AUTO: 3.4 10E3/UL (ref 1.6–8.3)
NEUTROPHILS # BLD AUTO: 3.8 10E3/UL (ref 1.6–8.3)
NEUTROPHILS # BLD AUTO: 4.1 10E3/UL (ref 1.6–8.3)
NEUTROPHILS # BLD AUTO: 4.3 10E3/UL (ref 1.6–8.3)
NEUTROPHILS # BLD AUTO: 5.6 10E3/UL (ref 1.6–8.3)
NEUTROPHILS # BLD AUTO: 6.3 10E3/UL (ref 1.6–8.3)
NEUTROPHILS # BLD AUTO: 6.5 10E3/UL (ref 1.6–8.3)
NEUTROPHILS # BLD AUTO: 7.1 10E3/UL (ref 1.6–8.3)
NEUTROPHILS # BLD AUTO: 7.9 10E3/UL (ref 1.6–8.3)
NEUTROPHILS # BLD AUTO: ABNORMAL 10*3/UL
NEUTROPHILS # BLD MANUAL: 0.6 10E3/UL (ref 1.6–8.3)
NEUTROPHILS # BLD MANUAL: 0.8 10E3/UL (ref 1.6–8.3)
NEUTROPHILS # BLD MANUAL: 0.9 10E3/UL (ref 1.6–8.3)
NEUTROPHILS # BLD MANUAL: 1 10E3/UL (ref 1.6–8.3)
NEUTROPHILS # BLD MANUAL: 1.1 10E3/UL (ref 1.6–8.3)
NEUTROPHILS # BLD MANUAL: 1.1 10E3/UL (ref 1.6–8.3)
NEUTROPHILS # BLD MANUAL: 1.4 10E3/UL (ref 1.6–8.3)
NEUTROPHILS # BLD MANUAL: 1.5 10E3/UL (ref 1.6–8.3)
NEUTROPHILS # BLD MANUAL: 1.6 10E3/UL (ref 1.6–8.3)
NEUTROPHILS # BLD MANUAL: 1.6 10E3/UL (ref 1.6–8.3)
NEUTROPHILS # BLD MANUAL: 1.8 10E3/UL (ref 1.6–8.3)
NEUTROPHILS # BLD MANUAL: 2.1 10E3/UL (ref 1.6–8.3)
NEUTROPHILS # BLD MANUAL: 3 10E3/UL (ref 1.6–8.3)
NEUTROPHILS # BLD MANUAL: 6.1 10E3/UL (ref 1.6–8.3)
NEUTROPHILS NFR BLD AUTO: 42 %
NEUTROPHILS NFR BLD AUTO: 45 %
NEUTROPHILS NFR BLD AUTO: 46 %
NEUTROPHILS NFR BLD AUTO: 46 %
NEUTROPHILS NFR BLD AUTO: 55 %
NEUTROPHILS NFR BLD AUTO: 58 %
NEUTROPHILS NFR BLD AUTO: 61 %
NEUTROPHILS NFR BLD AUTO: 64 %
NEUTROPHILS NFR BLD AUTO: 66 %
NEUTROPHILS NFR BLD AUTO: 66 %
NEUTROPHILS NFR BLD AUTO: 67 %
NEUTROPHILS NFR BLD AUTO: 67 %
NEUTROPHILS NFR BLD AUTO: 68 %
NEUTROPHILS NFR BLD AUTO: 71 %
NEUTROPHILS NFR BLD AUTO: 71 %
NEUTROPHILS NFR BLD AUTO: 72 %
NEUTROPHILS NFR BLD AUTO: 72 %
NEUTROPHILS NFR BLD AUTO: 73 %
NEUTROPHILS NFR BLD AUTO: 74 %
NEUTROPHILS NFR BLD AUTO: 74 %
NEUTROPHILS NFR BLD AUTO: 77 %
NEUTROPHILS NFR BLD AUTO: 79 %
NEUTROPHILS NFR BLD AUTO: 81 %
NEUTROPHILS NFR BLD AUTO: 82 %
NEUTROPHILS NFR BLD AUTO: 82 %
NEUTROPHILS NFR BLD AUTO: 84 %
NEUTROPHILS NFR BLD AUTO: 87 %
NEUTROPHILS NFR BLD AUTO: 88 %
NEUTROPHILS NFR BLD AUTO: 90 %
NEUTROPHILS NFR BLD AUTO: 92 %
NEUTROPHILS NFR BLD AUTO: 94 %
NEUTROPHILS NFR BLD AUTO: 95 %
NEUTROPHILS NFR BLD AUTO: 96 %
NEUTROPHILS NFR BLD AUTO: ABNORMAL %
NEUTROPHILS NFR BLD MANUAL: 41 %
NEUTROPHILS NFR BLD MANUAL: 46 %
NEUTROPHILS NFR BLD MANUAL: 53 %
NEUTROPHILS NFR BLD MANUAL: 54 %
NEUTROPHILS NFR BLD MANUAL: 56 %
NEUTROPHILS NFR BLD MANUAL: 56 %
NEUTROPHILS NFR BLD MANUAL: 57 %
NEUTROPHILS NFR BLD MANUAL: 57 %
NEUTROPHILS NFR BLD MANUAL: 66 %
NEUTROPHILS NFR BLD MANUAL: 67 %
NEUTROPHILS NFR BLD MANUAL: 72 %
NEUTROPHILS NFR BLD MANUAL: 74 %
NEUTROPHILS NFR BLD MANUAL: 75 %
NEUTROPHILS NFR BLD MANUAL: 76 %
NEUTROPHILS NFR BLD MANUAL: 86 %
NEUTROPHILS NFR BLD MANUAL: 93 %
NEUTS HYPERSEG BLD QL SMEAR: ABNORMAL
NEUTS HYPERSEG BLD QL SMEAR: NORMAL
NITRATE UR QL: NEGATIVE
NRBC # BLD AUTO: 0 10E3/UL
NRBC # BLD AUTO: 0.1 10E3/UL
NRBC BLD AUTO-RTO: 0 /100
NRBC BLD AUTO-RTO: 1 /100
NRBC BLD AUTO-RTO: 1 /100
NRBC BLD MANUAL-RTO: 1 %
NRBC BLD MANUAL-RTO: 1 %
NRBC BLD MANUAL-RTO: 4 %
OTHER CELLS # BLD MANUAL: 0 10E3/UL
OTHER CELLS # BLD MANUAL: 0.3 10E3/UL
OTHER CELLS NFR BLD MANUAL: 4 %
OTHER CELLS NFR BLD MANUAL: 9 %
P AXIS - MUSE: 43 DEGREES
PATH REPORT.ADDENDUM SPEC: ABNORMAL
PATH REPORT.COMMENTS IMP SPEC: ABNORMAL
PATH REPORT.COMMENTS IMP SPEC: NORMAL
PATH REPORT.COMMENTS IMP SPEC: YES
PATH REPORT.FINAL DX SPEC: ABNORMAL
PATH REPORT.FINAL DX SPEC: NORMAL
PATH REPORT.GROSS SPEC: ABNORMAL
PATH REPORT.GROSS SPEC: NORMAL
PATH REPORT.MICROSCOPIC SPEC OTHER STN: ABNORMAL
PATH REPORT.MICROSCOPIC SPEC OTHER STN: NORMAL
PATH REPORT.RELEVANT HX SPEC: ABNORMAL
PATH REPORT.RELEVANT HX SPEC: NORMAL
PATH REPORT.SITE OF ORIGIN SPEC: ABNORMAL
PATH REV: ABNORMAL
PATH REV: ABNORMAL
PATH REV: NORMAL
PH UR STRIP: 5.5 [PH] (ref 5–7)
PHOSPHATE SERPL-MCNC: 2.6 MG/DL (ref 2.5–4.5)
PHOSPHATE SERPL-MCNC: 3.1 MG/DL (ref 2.5–4.5)
PHOSPHATE SERPL-MCNC: 3.3 MG/DL (ref 2.5–4.5)
PHOSPHATE SERPL-MCNC: 3.4 MG/DL (ref 2.5–4.5)
PHOSPHATE SERPL-MCNC: 3.4 MG/DL (ref 2.5–4.5)
PHOSPHATE SERPL-MCNC: 3.6 MG/DL (ref 2.5–4.5)
PHOSPHATE SERPL-MCNC: 3.7 MG/DL (ref 2.5–4.5)
PHOSPHATE SERPL-MCNC: 3.7 MG/DL (ref 2.5–4.5)
PHOSPHATE SERPL-MCNC: 3.8 MG/DL (ref 2.5–4.5)
PHOSPHATE SERPL-MCNC: 3.8 MG/DL (ref 2.5–4.5)
PHOSPHATE SERPL-MCNC: 3.9 MG/DL (ref 2.5–4.5)
PHOSPHATE SERPL-MCNC: 4 MG/DL (ref 2.5–4.5)
PHOSPHATE SERPL-MCNC: 4.1 MG/DL (ref 2.5–4.5)
PHOSPHATE SERPL-MCNC: 4.3 MG/DL (ref 2.5–4.5)
PHOSPHATE SERPL-MCNC: 4.4 MG/DL (ref 2.5–4.5)
PHOSPHATE SERPL-MCNC: 4.6 MG/DL (ref 2.5–4.5)
PLAT MORPH BLD: ABNORMAL
PLAT MORPH BLD: NORMAL
PLATELET # BLD AUTO: 100 10E3/UL (ref 150–450)
PLATELET # BLD AUTO: 100 10E3/UL (ref 150–450)
PLATELET # BLD AUTO: 104 10E3/UL (ref 150–450)
PLATELET # BLD AUTO: 106 10E3/UL (ref 150–450)
PLATELET # BLD AUTO: 109 10E3/UL (ref 150–450)
PLATELET # BLD AUTO: 115 10E3/UL (ref 150–450)
PLATELET # BLD AUTO: 116 10E3/UL (ref 150–450)
PLATELET # BLD AUTO: 117 10E3/UL (ref 150–450)
PLATELET # BLD AUTO: 118 10E3/UL (ref 150–450)
PLATELET # BLD AUTO: 120 10E3/UL (ref 150–450)
PLATELET # BLD AUTO: 120 10E3/UL (ref 150–450)
PLATELET # BLD AUTO: 126 10E3/UL (ref 150–450)
PLATELET # BLD AUTO: 126 10E3/UL (ref 150–450)
PLATELET # BLD AUTO: 129 10E3/UL (ref 150–450)
PLATELET # BLD AUTO: 129 10E3/UL (ref 150–450)
PLATELET # BLD AUTO: 131 10E3/UL (ref 150–450)
PLATELET # BLD AUTO: 131 10E3/UL (ref 150–450)
PLATELET # BLD AUTO: 132 10E3/UL (ref 150–450)
PLATELET # BLD AUTO: 134 10E3/UL (ref 150–450)
PLATELET # BLD AUTO: 135 10E3/UL (ref 150–450)
PLATELET # BLD AUTO: 136 10E3/UL (ref 150–450)
PLATELET # BLD AUTO: 138 10E3/UL (ref 150–450)
PLATELET # BLD AUTO: 138 10E3/UL (ref 150–450)
PLATELET # BLD AUTO: 15 10E3/UL (ref 150–450)
PLATELET # BLD AUTO: 156 10E3/UL (ref 150–450)
PLATELET # BLD AUTO: 157 10E3/UL (ref 150–450)
PLATELET # BLD AUTO: 157 10E3/UL (ref 150–450)
PLATELET # BLD AUTO: 169 10E3/UL (ref 150–450)
PLATELET # BLD AUTO: 17 10E3/UL (ref 150–450)
PLATELET # BLD AUTO: 170 10E3/UL (ref 150–450)
PLATELET # BLD AUTO: 173 10E3/UL (ref 150–450)
PLATELET # BLD AUTO: 178 10E3/UL (ref 150–450)
PLATELET # BLD AUTO: 18 10E3/UL (ref 150–450)
PLATELET # BLD AUTO: 198 10E3/UL (ref 150–450)
PLATELET # BLD AUTO: 21 10E3/UL (ref 150–450)
PLATELET # BLD AUTO: 22 10E3/UL (ref 150–450)
PLATELET # BLD AUTO: 222 10E3/UL (ref 150–450)
PLATELET # BLD AUTO: 233 10E3/UL (ref 150–450)
PLATELET # BLD AUTO: 24 10E3/UL (ref 150–450)
PLATELET # BLD AUTO: 25 10E3/UL (ref 150–450)
PLATELET # BLD AUTO: 327 10E3/UL (ref 150–450)
PLATELET # BLD AUTO: 36 10E3/UL (ref 150–450)
PLATELET # BLD AUTO: 495 10E3/UL (ref 150–450)
PLATELET # BLD AUTO: 50 10E3/UL (ref 150–450)
PLATELET # BLD AUTO: 514 10E3/UL (ref 150–450)
PLATELET # BLD AUTO: 52 10E3/UL (ref 150–450)
PLATELET # BLD AUTO: 66 10E3/UL (ref 150–450)
PLATELET # BLD AUTO: 69 10E3/UL (ref 150–450)
PLATELET # BLD AUTO: 7 10E3/UL (ref 150–450)
PLATELET # BLD AUTO: 72 10E3/UL (ref 150–450)
PLATELET # BLD AUTO: 81 10E3/UL (ref 150–450)
PLATELET # BLD AUTO: 85 10E3/UL (ref 150–450)
PLATELET # BLD AUTO: 88 10E3/UL (ref 150–450)
PLATELET # BLD AUTO: 90 10E3/UL (ref 150–450)
PLATELET # BLD AUTO: 90 10E3/UL (ref 150–450)
PLATELET # BLD AUTO: 92 10E3/UL (ref 150–450)
PLATELET # BLD AUTO: 99 10E3/UL (ref 150–450)
POLYCHROMASIA BLD QL SMEAR: ABNORMAL
POLYCHROMASIA BLD QL SMEAR: NORMAL
POLYCHROMASIA BLD QL SMEAR: SLIGHT
POTASSIUM SERPL-SCNC: 3.4 MMOL/L (ref 3.4–5.3)
POTASSIUM SERPL-SCNC: 3.4 MMOL/L (ref 3.4–5.3)
POTASSIUM SERPL-SCNC: 3.5 MMOL/L (ref 3.4–5.3)
POTASSIUM SERPL-SCNC: 3.8 MMOL/L (ref 3.4–5.3)
POTASSIUM SERPL-SCNC: 3.9 MMOL/L (ref 3.4–5.3)
POTASSIUM SERPL-SCNC: 4 MMOL/L (ref 3.4–5.3)
POTASSIUM SERPL-SCNC: 4.1 MMOL/L (ref 3.4–5.3)
POTASSIUM SERPL-SCNC: 4.2 MMOL/L (ref 3.4–5.3)
POTASSIUM SERPL-SCNC: 4.3 MMOL/L (ref 3.4–5.3)
POTASSIUM SERPL-SCNC: 4.4 MMOL/L (ref 3.4–5.3)
POTASSIUM SERPL-SCNC: 4.4 MMOL/L (ref 3.4–5.3)
POTASSIUM SERPL-SCNC: 4.5 MMOL/L (ref 3.4–5.3)
POTASSIUM SERPL-SCNC: 4.5 MMOL/L (ref 3.4–5.3)
PR INTERVAL - MUSE: 162 MS
PROMYELOCYTES # BLD MANUAL: 0 10E3/UL
PROMYELOCYTES NFR BLD MANUAL: 1 %
PROT CSF-MCNC: 49.7 MG/DL (ref 15–45)
PROT SERPL-MCNC: 5.6 G/DL (ref 6.4–8.3)
PROT SERPL-MCNC: 5.7 G/DL (ref 6.4–8.3)
PROT SERPL-MCNC: 5.7 G/DL (ref 6.4–8.3)
PROT SERPL-MCNC: 5.9 G/DL (ref 6.4–8.3)
PROT SERPL-MCNC: 6 G/DL (ref 6.4–8.3)
PROT SERPL-MCNC: 6.4 G/DL (ref 6.4–8.3)
PROT SERPL-MCNC: 6.5 G/DL (ref 6.4–8.3)
PROT SERPL-MCNC: 6.6 G/DL (ref 6.4–8.3)
PROT SERPL-MCNC: 6.7 G/DL (ref 6.4–8.3)
PROT SERPL-MCNC: 6.8 G/DL (ref 6.4–8.3)
PROT SERPL-MCNC: 6.8 G/DL (ref 6.4–8.3)
PROT SERPL-MCNC: 7 G/DL (ref 6.4–8.3)
QRS DURATION - MUSE: 100 MS
QT - MUSE: 392 MS
QTC - MUSE: 443 MS
R AXIS - MUSE: -30 DEGREES
RBC # BLD AUTO: 2.33 10E6/UL (ref 4.4–5.9)
RBC # BLD AUTO: 2.38 10E6/UL (ref 4.4–5.9)
RBC # BLD AUTO: 2.41 10E6/UL (ref 4.4–5.9)
RBC # BLD AUTO: 2.46 10E6/UL (ref 4.4–5.9)
RBC # BLD AUTO: 2.5 10E6/UL (ref 4.4–5.9)
RBC # BLD AUTO: 2.67 10E6/UL (ref 4.4–5.9)
RBC # BLD AUTO: 2.69 10E6/UL (ref 4.4–5.9)
RBC # BLD AUTO: 2.73 10E6/UL (ref 4.4–5.9)
RBC # BLD AUTO: 2.76 10E6/UL (ref 4.4–5.9)
RBC # BLD AUTO: 2.87 10E6/UL (ref 4.4–5.9)
RBC # BLD AUTO: 2.87 10E6/UL (ref 4.4–5.9)
RBC # BLD AUTO: 3.04 10E6/UL (ref 4.4–5.9)
RBC # BLD AUTO: 3.04 10E6/UL (ref 4.4–5.9)
RBC # BLD AUTO: 3.12 10E6/UL (ref 4.4–5.9)
RBC # BLD AUTO: 3.18 10E6/UL (ref 4.4–5.9)
RBC # BLD AUTO: 3.21 10E6/UL (ref 4.4–5.9)
RBC # BLD AUTO: 3.21 10E6/UL (ref 4.4–5.9)
RBC # BLD AUTO: 3.25 10E6/UL (ref 4.4–5.9)
RBC # BLD AUTO: 3.27 10E6/UL (ref 4.4–5.9)
RBC # BLD AUTO: 3.3 10E6/UL (ref 4.4–5.9)
RBC # BLD AUTO: 3.39 10E6/UL (ref 4.4–5.9)
RBC # BLD AUTO: 3.4 10E6/UL (ref 4.4–5.9)
RBC # BLD AUTO: 3.47 10E6/UL (ref 4.4–5.9)
RBC # BLD AUTO: 3.48 10E6/UL (ref 4.4–5.9)
RBC # BLD AUTO: 3.5 10E6/UL (ref 4.4–5.9)
RBC # BLD AUTO: 3.52 10E6/UL (ref 4.4–5.9)
RBC # BLD AUTO: 3.53 10E6/UL (ref 4.4–5.9)
RBC # BLD AUTO: 3.54 10E6/UL (ref 4.4–5.9)
RBC # BLD AUTO: 3.54 10E6/UL (ref 4.4–5.9)
RBC # BLD AUTO: 3.55 10E6/UL (ref 4.4–5.9)
RBC # BLD AUTO: 3.59 10E6/UL (ref 4.4–5.9)
RBC # BLD AUTO: 3.6 10E6/UL (ref 4.4–5.9)
RBC # BLD AUTO: 3.63 10E6/UL (ref 4.4–5.9)
RBC # BLD AUTO: 3.63 10E6/UL (ref 4.4–5.9)
RBC # BLD AUTO: 3.64 10E6/UL (ref 4.4–5.9)
RBC # BLD AUTO: 3.67 10E6/UL (ref 4.4–5.9)
RBC # BLD AUTO: 3.68 10E6/UL (ref 4.4–5.9)
RBC # BLD AUTO: 3.75 10E6/UL (ref 4.4–5.9)
RBC # BLD AUTO: 3.75 10E6/UL (ref 4.4–5.9)
RBC # BLD AUTO: 3.77 10E6/UL (ref 4.4–5.9)
RBC # BLD AUTO: 3.78 10E6/UL (ref 4.4–5.9)
RBC # BLD AUTO: 3.79 10E6/UL (ref 4.4–5.9)
RBC # BLD AUTO: 3.79 10E6/UL (ref 4.4–5.9)
RBC # BLD AUTO: 3.82 10E6/UL (ref 4.4–5.9)
RBC # BLD AUTO: 3.82 10E6/UL (ref 4.4–5.9)
RBC # BLD AUTO: 3.91 10E6/UL (ref 4.4–5.9)
RBC # BLD AUTO: 3.92 10E6/UL (ref 4.4–5.9)
RBC # BLD AUTO: 3.92 10E6/UL (ref 4.4–5.9)
RBC # BLD AUTO: 3.93 10E6/UL (ref 4.4–5.9)
RBC # BLD AUTO: 3.94 10E6/UL (ref 4.4–5.9)
RBC # BLD AUTO: 3.95 10E6/UL (ref 4.4–5.9)
RBC # BLD AUTO: 3.95 10E6/UL (ref 4.4–5.9)
RBC # BLD AUTO: 3.97 10E6/UL (ref 4.4–5.9)
RBC # BLD AUTO: 4.02 10E6/UL (ref 4.4–5.9)
RBC # BLD AUTO: 4.45 10E6/UL (ref 4.4–5.9)
RBC # CSF MANUAL: 0 /UL (ref 0–2)
RBC AGGLUT BLD QL: ABNORMAL
RBC AGGLUT BLD QL: NORMAL
RBC MORPH BLD: ABNORMAL
RBC MORPH BLD: NORMAL
RBC URINE: <1 /HPF
RESULT VXM B1: NORMAL
RESULT VXM B2: NORMAL
RESULT VXM T1: NORMAL
RESULT VXM T2: NORMAL
RETICS # AUTO: 0.08 10E6/UL (ref 0.03–0.1)
RETICS/RBC NFR AUTO: 2.1 % (ref 0.5–2)
ROULEAUX BLD QL SMEAR: ABNORMAL
ROULEAUX BLD QL SMEAR: NORMAL
RVPLETH-%PRED-PRE: 73 %
RVPLETH-PRE: 1.74 L
RVPLETH-PRED: 2.37 L
SA 1  COMMENTS: NORMAL
SA 1  COMMENTS: NORMAL
SA 1 CELL: NORMAL
SA 1 CELL: NORMAL
SA 1 TEST METHOD: NORMAL
SA 1 TEST METHOD: NORMAL
SA1 HI RISK ABY: NORMAL
SA1 HI RISK ABY: NORMAL
SA1 MOD RISK ABY: NORMAL
SA1 MOD RISK ABY: NORMAL
SCR 1 TEST METHOD: NORMAL
SCR1 CELL: NORMAL
SCR1 COMMENTS: NORMAL
SCR1 RESULT: NORMAL
SCR2 CELL: NORMAL
SCR2 COMMENTS: NORMAL
SCR2 RESULT: NORMAL
SCR2 TEST METHOD: NORMAL
SERUM DATE VXM B1: NORMAL
SERUM DATE VXM B2: NORMAL
SERUM DATE VXM T1: NORMAL
SERUM DATE VXM T2: NORMAL
SICKLE CELLS BLD QL SMEAR: ABNORMAL
SICKLE CELLS BLD QL SMEAR: NORMAL
SIGNIFICANT RESULTS: NORMAL
SMUDGE CELLS BLD QL SMEAR: ABNORMAL
SMUDGE CELLS BLD QL SMEAR: NORMAL
SMUDGE CELLS BLD QL SMEAR: PRESENT
SODIUM SERPL-SCNC: 134 MMOL/L (ref 135–145)
SODIUM SERPL-SCNC: 135 MMOL/L (ref 135–145)
SODIUM SERPL-SCNC: 136 MMOL/L (ref 135–145)
SODIUM SERPL-SCNC: 137 MMOL/L (ref 135–145)
SODIUM SERPL-SCNC: 138 MMOL/L (ref 135–145)
SODIUM SERPL-SCNC: 139 MMOL/L (ref 135–145)
SODIUM SERPL-SCNC: 139 MMOL/L (ref 135–145)
SP GR UR STRIP: 1.01 (ref 1–1.03)
SPECIMEN DESCRIPTION: NORMAL
SPECIMEN EXPIRATION DATE: NORMAL
SPECIMEN STATUS: NORMAL
SPHEROCYTES BLD QL SMEAR: ABNORMAL
SPHEROCYTES BLD QL SMEAR: NORMAL
SSP COMMENTS: NORMAL
SSPA* LOCUS: NORMAL
SSPB* LOCUS: NORMAL
SSPB*: NORMAL
SSPBW-1: NORMAL
SSPBW-2: NORMAL
SSPC* LOCUS: NORMAL
SSPDQA1*: NORMAL
SSPDQA1*LOCUS: NORMAL
SSPDQB1*: NORMAL
SSPDQB1*LOCUS: NORMAL
SSPDRB1* LOCUS: NORMAL
SSPDRB1*: NORMAL
SSPDRB4* LOCUS: NORMAL
SSPTEST METHOD: NORMAL
STOMATOCYTES BLD QL SMEAR: ABNORMAL
STOMATOCYTES BLD QL SMEAR: NORMAL
SYSTOLIC BLOOD PRESSURE - MUSE: NORMAL MMHG
T AXIS - MUSE: 37 DEGREES
T GONDII IGG SER-ACNC: <3 IU/ML
T GONDII IGM SER-ACNC: <3 AU/ML
T PALLIDUM AB SER QL: NONREACTIVE
TACROLIMUS BLD-MCNC: 12 UG/L (ref 5–15)
TACROLIMUS BLD-MCNC: 4.8 UG/L (ref 5–15)
TACROLIMUS BLD-MCNC: 5.2 UG/L (ref 5–15)
TACROLIMUS BLD-MCNC: 5.4 UG/L (ref 5–15)
TACROLIMUS BLD-MCNC: 5.5 UG/L (ref 5–15)
TACROLIMUS BLD-MCNC: 5.6 UG/L (ref 5–15)
TACROLIMUS BLD-MCNC: 6.1 UG/L (ref 5–15)
TACROLIMUS BLD-MCNC: 6.2 UG/L (ref 5–15)
TACROLIMUS BLD-MCNC: 7 UG/L (ref 5–15)
TACROLIMUS BLD-MCNC: 7.1 UG/L (ref 5–15)
TACROLIMUS BLD-MCNC: 7.2 UG/L (ref 5–15)
TACROLIMUS BLD-MCNC: 7.5 UG/L (ref 5–15)
TACROLIMUS BLD-MCNC: 7.8 UG/L (ref 5–15)
TACROLIMUS BLD-MCNC: 8 UG/L (ref 5–15)
TACROLIMUS BLD-MCNC: 84.2 UG/L (ref 5–15)
TARGETS BLD QL SMEAR: ABNORMAL
TARGETS BLD QL SMEAR: NORMAL
TEST DETAILS, MDL: NORMAL
TLCPLETH-%PRED-PRE: 81 %
TLCPLETH-PRE: 5.23 L
TLCPLETH-PRED: 6.42 L
TME LAST DOSE: ABNORMAL H
TME LAST DOSE: ABNORMAL H
TME LAST DOSE: NORMAL H
TOXIC GRANULES BLD QL SMEAR: ABNORMAL
TOXIC GRANULES BLD QL SMEAR: NORMAL
TOXIC GRANULES BLD QL SMEAR: PRESENT
TOXIC GRANULES BLD QL SMEAR: PRESENT
TRYPANOSOMA CRUZI: NORMAL
TUBE # CSF: 3
UNIT ABO/RH: NORMAL
UNIT ABO/RH: NORMAL
UNIT NUMBER: NORMAL
UNIT NUMBER: NORMAL
UNIT STATUS: NORMAL
UNIT STATUS: NORMAL
UNIT TYPE ISBT: 7300
UNIT TYPE ISBT: 7300
URATE SERPL-MCNC: 4.1 MG/DL (ref 3.4–7)
URATE SERPL-MCNC: 4.3 MG/DL (ref 3.4–7)
URATE SERPL-MCNC: 5.3 MG/DL (ref 3.4–7)
URATE SERPL-MCNC: 5.6 MG/DL (ref 3.4–7)
UROBILINOGEN UR STRIP-MCNC: NORMAL MG/DL
VA-%PRED-PRE: 90 %
VA-PRE: 5.18 L
VARIANT LYMPHS BLD QL SMEAR: ABNORMAL
VARIANT LYMPHS BLD QL SMEAR: NORMAL
VC-%PRED-PRE: 86 %
VC-PRE: 3.49 L
VC-PRED: 4.02 L
VENTRICULAR RATE- MUSE: 77 BPM
VZV IGG SER QL IA: 2615 INDEX
VZV IGG SER QL IA: POSITIVE
WBC # BLD AUTO: 0.1 10E3/UL (ref 4–11)
WBC # BLD AUTO: 0.4 10E3/UL (ref 4–11)
WBC # BLD AUTO: 0.4 10E3/UL (ref 4–11)
WBC # BLD AUTO: 0.9 10E3/UL (ref 4–11)
WBC # BLD AUTO: 1.2 10E3/UL (ref 4–11)
WBC # BLD AUTO: 1.2 10E3/UL (ref 4–11)
WBC # BLD AUTO: 1.3 10E3/UL (ref 4–11)
WBC # BLD AUTO: 1.6 10E3/UL (ref 4–11)
WBC # BLD AUTO: 1.6 10E3/UL (ref 4–11)
WBC # BLD AUTO: 1.7 10E3/UL (ref 4–11)
WBC # BLD AUTO: 1.8 10E3/UL (ref 4–11)
WBC # BLD AUTO: 1.9 10E3/UL (ref 4–11)
WBC # BLD AUTO: 2 10E3/UL (ref 4–11)
WBC # BLD AUTO: 2.1 10E3/UL (ref 4–11)
WBC # BLD AUTO: 2.1 10E3/UL (ref 4–11)
WBC # BLD AUTO: 2.2 10E3/UL (ref 4–11)
WBC # BLD AUTO: 2.3 10E3/UL (ref 4–11)
WBC # BLD AUTO: 2.4 10E3/UL (ref 4–11)
WBC # BLD AUTO: 2.5 10E3/UL (ref 4–11)
WBC # BLD AUTO: 2.5 10E3/UL (ref 4–11)
WBC # BLD AUTO: 2.6 10E3/UL (ref 4–11)
WBC # BLD AUTO: 2.8 10E3/UL (ref 4–11)
WBC # BLD AUTO: 2.8 10E3/UL (ref 4–11)
WBC # BLD AUTO: 2.9 10E3/UL (ref 4–11)
WBC # BLD AUTO: 2.9 10E3/UL (ref 4–11)
WBC # BLD AUTO: 3.2 10E3/UL (ref 4–11)
WBC # BLD AUTO: 4 10E3/UL (ref 4–11)
WBC # BLD AUTO: 4.1 10E3/UL (ref 4–11)
WBC # BLD AUTO: 4.2 10E3/UL (ref 4–11)
WBC # BLD AUTO: 4.3 10E3/UL (ref 4–11)
WBC # BLD AUTO: 4.4 10E3/UL (ref 4–11)
WBC # BLD AUTO: 4.5 10E3/UL (ref 4–11)
WBC # BLD AUTO: 4.5 10E3/UL (ref 4–11)
WBC # BLD AUTO: 5 10E3/UL (ref 4–11)
WBC # BLD AUTO: 5.2 10E3/UL (ref 4–11)
WBC # BLD AUTO: 6.2 10E3/UL (ref 4–11)
WBC # BLD AUTO: 6.6 10E3/UL (ref 4–11)
WBC # BLD AUTO: 6.7 10E3/UL (ref 4–11)
WBC # BLD AUTO: 7.1 10E3/UL (ref 4–11)
WBC # BLD AUTO: 7.7 10E3/UL (ref 4–11)
WBC # BLD AUTO: 8.3 10E3/UL (ref 4–11)
WBC # CSF MANUAL: 3 /UL (ref 0–5)
WBC URINE: 1 /HPF
WNV RNA SERPL DONR QL NAA+PROBE: NORMAL

## 2024-01-01 PROCEDURE — 36415 COLL VENOUS BLD VENIPUNCTURE: CPT

## 2024-01-01 PROCEDURE — 250N000012 HC RX MED GY IP 250 OP 636 PS 637

## 2024-01-01 PROCEDURE — 250N000013 HC RX MED GY IP 250 OP 250 PS 637

## 2024-01-01 PROCEDURE — 99213 OFFICE O/P EST LOW 20 MIN: CPT

## 2024-01-01 PROCEDURE — 258N000003 HC RX IP 258 OP 636

## 2024-01-01 PROCEDURE — 93356 MYOCRD STRAIN IMG SPCKL TRCK: CPT

## 2024-01-01 PROCEDURE — 85025 COMPLETE CBC W/AUTO DIFF WBC: CPT

## 2024-01-01 PROCEDURE — 97110 THERAPEUTIC EXERCISES: CPT | Mod: GP

## 2024-01-01 PROCEDURE — 250N000011 HC RX IP 250 OP 636: Performed by: PHYSICIAN ASSISTANT

## 2024-01-01 PROCEDURE — 99207 PR NO CHARGE LOS: CPT

## 2024-01-01 PROCEDURE — 85027 COMPLETE CBC AUTOMATED: CPT | Performed by: PATHOLOGY

## 2024-01-01 PROCEDURE — 36415 COLL VENOUS BLD VENIPUNCTURE: CPT | Performed by: PATHOLOGY

## 2024-01-01 PROCEDURE — 36592 COLLECT BLOOD FROM PICC: CPT

## 2024-01-01 PROCEDURE — 80053 COMPREHEN METABOLIC PANEL: CPT

## 2024-01-01 PROCEDURE — 83735 ASSAY OF MAGNESIUM: CPT

## 2024-01-01 PROCEDURE — 76937 US GUIDE VASCULAR ACCESS: CPT | Mod: 26 | Performed by: PHYSICIAN ASSISTANT

## 2024-01-01 PROCEDURE — 250N000011 HC RX IP 250 OP 636: Mod: JZ | Performed by: INTERNAL MEDICINE

## 2024-01-01 PROCEDURE — 99233 SBSQ HOSP IP/OBS HIGH 50: CPT | Performed by: INTERNAL MEDICINE

## 2024-01-01 PROCEDURE — 999N001093 HLA VIRTUAL CROSSMATCH (VXM), LIVING DONOR: Performed by: INTERNAL MEDICINE

## 2024-01-01 PROCEDURE — 250N000011 HC RX IP 250 OP 636

## 2024-01-01 PROCEDURE — 85004 AUTOMATED DIFF WBC COUNT: CPT

## 2024-01-01 PROCEDURE — 83735 ASSAY OF MAGNESIUM: CPT | Performed by: INTERNAL MEDICINE

## 2024-01-01 PROCEDURE — 85007 BL SMEAR W/DIFF WBC COUNT: CPT

## 2024-01-01 PROCEDURE — 81450 HL NEO GSAP 5-50DNA/DNA&RNA: CPT

## 2024-01-01 PROCEDURE — 84100 ASSAY OF PHOSPHORUS: CPT

## 2024-01-01 PROCEDURE — 80048 BASIC METABOLIC PNL TOTAL CA: CPT

## 2024-01-01 PROCEDURE — 250N000011 HC RX IP 250 OP 636: Mod: JW | Performed by: INTERNAL MEDICINE

## 2024-01-01 PROCEDURE — 99233 SBSQ HOSP IP/OBS HIGH 50: CPT | Mod: FS

## 2024-01-01 PROCEDURE — 86900 BLOOD TYPING SEROLOGIC ABO: CPT

## 2024-01-01 PROCEDURE — 250N000011 HC RX IP 250 OP 636: Performed by: NURSE PRACTITIONER

## 2024-01-01 PROCEDURE — 86790 VIRUS ANTIBODY NOS: CPT

## 2024-01-01 PROCEDURE — 258N000003 HC RX IP 258 OP 636: Mod: JZ | Performed by: INTERNAL MEDICINE

## 2024-01-01 PROCEDURE — 83605 ASSAY OF LACTIC ACID: CPT | Performed by: INTERNAL MEDICINE

## 2024-01-01 PROCEDURE — 80299 QUANTITATIVE ASSAY DRUG: CPT

## 2024-01-01 PROCEDURE — 258N000003 HC RX IP 258 OP 636: Performed by: STUDENT IN AN ORGANIZED HEALTH CARE EDUCATION/TRAINING PROGRAM

## 2024-01-01 PROCEDURE — 258N000003 HC RX IP 258 OP 636: Performed by: PHYSICIAN ASSISTANT

## 2024-01-01 PROCEDURE — 81268 CHIMERISM ANAL W/CELL SELECT: CPT | Performed by: INTERNAL MEDICINE

## 2024-01-01 PROCEDURE — 87517 HEPATITIS B DNA QUANT: CPT

## 2024-01-01 PROCEDURE — 76937 US GUIDE VASCULAR ACCESS: CPT

## 2024-01-01 PROCEDURE — 83735 ASSAY OF MAGNESIUM: CPT | Performed by: STUDENT IN AN ORGANIZED HEALTH CARE EDUCATION/TRAINING PROGRAM

## 2024-01-01 PROCEDURE — 97530 THERAPEUTIC ACTIVITIES: CPT | Mod: GP

## 2024-01-01 PROCEDURE — 250N000011 HC RX IP 250 OP 636: Performed by: STUDENT IN AN ORGANIZED HEALTH CARE EDUCATION/TRAINING PROGRAM

## 2024-01-01 PROCEDURE — 250N000009 HC RX 250

## 2024-01-01 PROCEDURE — 81268 CHIMERISM ANAL W/CELL SELECT: CPT

## 2024-01-01 PROCEDURE — 86828 HLA CLASS I&II ANTIBODY QUAL: CPT

## 2024-01-01 PROCEDURE — 206N000001 HC R&B BMT UMMC

## 2024-01-01 PROCEDURE — 250N000013 HC RX MED GY IP 250 OP 250 PS 637: Performed by: NURSE PRACTITIONER

## 2024-01-01 PROCEDURE — 86803 HEPATITIS C AB TEST: CPT

## 2024-01-01 PROCEDURE — 85025 COMPLETE CBC W/AUTO DIFF WBC: CPT | Performed by: PATHOLOGY

## 2024-01-01 PROCEDURE — 85041 AUTOMATED RBC COUNT: CPT

## 2024-01-01 PROCEDURE — 250N000011 HC RX IP 250 OP 636: Mod: JZ | Performed by: PHYSICIAN ASSISTANT

## 2024-01-01 PROCEDURE — 272N000504 HC NEEDLE CR4

## 2024-01-01 PROCEDURE — 77001 FLUOROGUIDE FOR VEIN DEVICE: CPT | Mod: 26 | Performed by: PHYSICIAN ASSISTANT

## 2024-01-01 PROCEDURE — 71250 CT THORAX DX C-: CPT | Mod: 26 | Performed by: RADIOLOGY

## 2024-01-01 PROCEDURE — 85060 BLOOD SMEAR INTERPRETATION: CPT | Mod: GC | Performed by: STUDENT IN AN ORGANIZED HEALTH CARE EDUCATION/TRAINING PROGRAM

## 2024-01-01 PROCEDURE — 85610 PROTHROMBIN TIME: CPT

## 2024-01-01 PROCEDURE — 250N000013 HC RX MED GY IP 250 OP 250 PS 637: Performed by: INTERNAL MEDICINE

## 2024-01-01 PROCEDURE — 80197 ASSAY OF TACROLIMUS: CPT

## 2024-01-01 PROCEDURE — 88237 TISSUE CULTURE BONE MARROW: CPT

## 2024-01-01 PROCEDURE — 99213 OFFICE O/P EST LOW 20 MIN: CPT | Performed by: INTERNAL MEDICINE

## 2024-01-01 PROCEDURE — 62270 DX LMBR SPI PNXR: CPT | Performed by: NURSE PRACTITIONER

## 2024-01-01 PROCEDURE — 96401 CHEMO ANTI-NEOPL SQ/IM: CPT

## 2024-01-01 PROCEDURE — G2211 COMPLEX E/M VISIT ADD ON: HCPCS

## 2024-01-01 PROCEDURE — 86900 BLOOD TYPING SEROLOGIC ABO: CPT | Performed by: INTERNAL MEDICINE

## 2024-01-01 PROCEDURE — 97116 GAIT TRAINING THERAPY: CPT | Mod: GP

## 2024-01-01 PROCEDURE — 250N000013 HC RX MED GY IP 250 OP 250 PS 637: Performed by: STUDENT IN AN ORGANIZED HEALTH CARE EDUCATION/TRAINING PROGRAM

## 2024-01-01 PROCEDURE — S5010 5% DEXTROSE AND 0.45% SALINE: HCPCS

## 2024-01-01 PROCEDURE — 88341 IMHCHEM/IMCYTCHM EA ADD ANTB: CPT | Mod: 26 | Performed by: STUDENT IN AN ORGANIZED HEALTH CARE EDUCATION/TRAINING PROGRAM

## 2024-01-01 PROCEDURE — 88311 DECALCIFY TISSUE: CPT | Mod: 26 | Performed by: PATHOLOGY

## 2024-01-01 PROCEDURE — 85027 COMPLETE CBC AUTOMATED: CPT

## 2024-01-01 PROCEDURE — 88313 SPECIAL STAINS GROUP 2: CPT | Mod: 26 | Performed by: PATHOLOGY

## 2024-01-01 PROCEDURE — 87340 HEPATITIS B SURFACE AG IA: CPT

## 2024-01-01 PROCEDURE — 88185 FLOWCYTOMETRY/TC ADD-ON: CPT | Mod: XU

## 2024-01-01 PROCEDURE — 94729 DIFFUSING CAPACITY: CPT | Performed by: INTERNAL MEDICINE

## 2024-01-01 PROCEDURE — 80197 ASSAY OF TACROLIMUS: CPT | Performed by: STUDENT IN AN ORGANIZED HEALTH CARE EDUCATION/TRAINING PROGRAM

## 2024-01-01 PROCEDURE — 250N000011 HC RX IP 250 OP 636: Performed by: INTERNAL MEDICINE

## 2024-01-01 PROCEDURE — 97161 PT EVAL LOW COMPLEX 20 MIN: CPT | Mod: GP

## 2024-01-01 PROCEDURE — 99214 OFFICE O/P EST MOD 30 MIN: CPT

## 2024-01-01 PROCEDURE — 84100 ASSAY OF PHOSPHORUS: CPT | Performed by: STUDENT IN AN ORGANIZED HEALTH CARE EDUCATION/TRAINING PROGRAM

## 2024-01-01 PROCEDURE — 86753 PROTOZOA ANTIBODY NOS: CPT

## 2024-01-01 PROCEDURE — G0452 MOLECULAR PATHOLOGY INTERPR: HCPCS | Mod: 26 | Performed by: PATHOLOGY

## 2024-01-01 PROCEDURE — 84132 ASSAY OF SERUM POTASSIUM: CPT

## 2024-01-01 PROCEDURE — 250N000012 HC RX MED GY IP 250 OP 636 PS 637: Performed by: STUDENT IN AN ORGANIZED HEALTH CARE EDUCATION/TRAINING PROGRAM

## 2024-01-01 PROCEDURE — 99233 SBSQ HOSP IP/OBS HIGH 50: CPT | Mod: FS | Performed by: PHYSICIAN ASSISTANT

## 2024-01-01 PROCEDURE — 99417 PROLNG OP E/M EACH 15 MIN: CPT | Performed by: INTERNAL MEDICINE

## 2024-01-01 PROCEDURE — 99152 MOD SED SAME PHYS/QHP 5/>YRS: CPT | Performed by: PHYSICIAN ASSISTANT

## 2024-01-01 PROCEDURE — 250N000013 HC RX MED GY IP 250 OP 250 PS 637: Performed by: PHYSICIAN ASSISTANT

## 2024-01-01 PROCEDURE — 86644 CMV ANTIBODY: CPT

## 2024-01-01 PROCEDURE — 88291 CYTO/MOLECULAR REPORT: CPT | Performed by: MEDICAL GENETICS

## 2024-01-01 PROCEDURE — 99418 PROLNG IP/OBS E/M EA 15 MIN: CPT

## 2024-01-01 PROCEDURE — 38222 DX BONE MARROW BX & ASPIR: CPT | Mod: LT | Performed by: PHYSICIAN ASSISTANT

## 2024-01-01 PROCEDURE — 83605 ASSAY OF LACTIC ACID: CPT | Performed by: PHYSICIAN ASSISTANT

## 2024-01-01 PROCEDURE — 88341 IMHCHEM/IMCYTCHM EA ADD ANTB: CPT | Mod: 26 | Performed by: PATHOLOGY

## 2024-01-01 PROCEDURE — 85007 BL SMEAR W/DIFF WBC COUNT: CPT | Performed by: PATHOLOGY

## 2024-01-01 PROCEDURE — 84295 ASSAY OF SERUM SODIUM: CPT

## 2024-01-01 PROCEDURE — 88342 IMHCHEM/IMCYTCHM 1ST ANTB: CPT | Mod: 26 | Performed by: STUDENT IN AN ORGANIZED HEALTH CARE EDUCATION/TRAINING PROGRAM

## 2024-01-01 PROCEDURE — 84100 ASSAY OF PHOSPHORUS: CPT | Performed by: INTERNAL MEDICINE

## 2024-01-01 PROCEDURE — 88305 TISSUE EXAM BY PATHOLOGIST: CPT | Mod: 26 | Performed by: STUDENT IN AN ORGANIZED HEALTH CARE EDUCATION/TRAINING PROGRAM

## 2024-01-01 PROCEDURE — 85025 COMPLETE CBC W/AUTO DIFF WBC: CPT | Performed by: INTERNAL MEDICINE

## 2024-01-01 PROCEDURE — 99233 SBSQ HOSP IP/OBS HIGH 50: CPT | Mod: 24

## 2024-01-01 PROCEDURE — 87799 DETECT AGENT NOS DNA QUANT: CPT

## 2024-01-01 PROCEDURE — 88342 IMHCHEM/IMCYTCHM 1ST ANTB: CPT | Mod: TC

## 2024-01-01 PROCEDURE — 94640 AIRWAY INHALATION TREATMENT: CPT | Performed by: INTERNAL MEDICINE

## 2024-01-01 PROCEDURE — 88184 FLOWCYTOMETRY/ TC 1 MARKER: CPT | Performed by: STUDENT IN AN ORGANIZED HEALTH CARE EDUCATION/TRAINING PROGRAM

## 2024-01-01 PROCEDURE — 258N000003 HC RX IP 258 OP 636: Performed by: INTERNAL MEDICINE

## 2024-01-01 PROCEDURE — 88189 FLOWCYTOMETRY/READ 16 & >: CPT | Mod: GC | Performed by: STUDENT IN AN ORGANIZED HEALTH CARE EDUCATION/TRAINING PROGRAM

## 2024-01-01 PROCEDURE — 99000 SPECIMEN HANDLING OFFICE-LAB: CPT | Performed by: PATHOLOGY

## 2024-01-01 PROCEDURE — 88108 CYTOPATH CONCENTRATE TECH: CPT | Mod: TC

## 2024-01-01 PROCEDURE — 85060 BLOOD SMEAR INTERPRETATION: CPT | Performed by: PATHOLOGY

## 2024-01-01 PROCEDURE — 82374 ASSAY BLOOD CARBON DIOXIDE: CPT

## 2024-01-01 PROCEDURE — 99417 PROLNG OP E/M EACH 15 MIN: CPT

## 2024-01-01 PROCEDURE — 38222 DX BONE MARROW BX & ASPIR: CPT | Performed by: PHYSICIAN ASSISTANT

## 2024-01-01 PROCEDURE — 86832 HLA CLASS I HIGH DEFIN QUAL: CPT | Performed by: INTERNAL MEDICINE

## 2024-01-01 PROCEDURE — C1769 GUIDE WIRE: HCPCS

## 2024-01-01 PROCEDURE — 81267 CHIMERISM ANAL NO CELL SELEC: CPT

## 2024-01-01 PROCEDURE — 87799 DETECT AGENT NOS DNA QUANT: CPT | Mod: 91

## 2024-01-01 PROCEDURE — 30243Y3 TRANSFUSION OF ALLOGENEIC UNRELATED HEMATOPOIETIC STEM CELLS INTO CENTRAL VEIN, PERCUTANEOUS APPROACH: ICD-10-PCS | Performed by: INTERNAL MEDICINE

## 2024-01-01 PROCEDURE — 88185 FLOWCYTOMETRY/TC ADD-ON: CPT

## 2024-01-01 PROCEDURE — 250N000011 HC RX IP 250 OP 636: Mod: JZ

## 2024-01-01 PROCEDURE — 272N000192 HC ACCESSORY CR2

## 2024-01-01 PROCEDURE — 87516 HEPATITIS B DNA AMP PROBE: CPT

## 2024-01-01 PROCEDURE — 85097 BONE MARROW INTERPRETATION: CPT | Mod: GC | Performed by: STUDENT IN AN ORGANIZED HEALTH CARE EDUCATION/TRAINING PROGRAM

## 2024-01-01 PROCEDURE — P9040 RBC LEUKOREDUCED IRRADIATED: HCPCS

## 2024-01-01 PROCEDURE — 85049 AUTOMATED PLATELET COUNT: CPT

## 2024-01-01 PROCEDURE — 99233 SBSQ HOSP IP/OBS HIGH 50: CPT | Mod: FS | Performed by: INTERNAL MEDICINE

## 2024-01-01 PROCEDURE — 82040 ASSAY OF SERUM ALBUMIN: CPT | Performed by: INTERNAL MEDICINE

## 2024-01-01 PROCEDURE — 36591 DRAW BLOOD OFF VENOUS DEVICE: CPT

## 2024-01-01 PROCEDURE — 81401 MOPATH PROCEDURE LEVEL 2: CPT

## 2024-01-01 PROCEDURE — 84550 ASSAY OF BLOOD/URIC ACID: CPT

## 2024-01-01 PROCEDURE — 258N000003 HC RX IP 258 OP 636: Mod: JZ

## 2024-01-01 PROCEDURE — 86780 TREPONEMA PALLIDUM: CPT

## 2024-01-01 PROCEDURE — 82040 ASSAY OF SERUM ALBUMIN: CPT

## 2024-01-01 PROCEDURE — 84132 ASSAY OF SERUM POTASSIUM: CPT | Performed by: INTERNAL MEDICINE

## 2024-01-01 PROCEDURE — 81375 HLA II TYPING AG EQUIV LR: CPT

## 2024-01-01 PROCEDURE — 84550 ASSAY OF BLOOD/URIC ACID: CPT | Performed by: INTERNAL MEDICINE

## 2024-01-01 PROCEDURE — 93010 ELECTROCARDIOGRAM REPORT: CPT | Performed by: INTERNAL MEDICINE

## 2024-01-01 PROCEDURE — 80053 COMPREHEN METABOLIC PANEL: CPT | Performed by: PHYSICIAN ASSISTANT

## 2024-01-01 PROCEDURE — 99213 OFFICE O/P EST LOW 20 MIN: CPT | Mod: 25

## 2024-01-01 PROCEDURE — 88305 TISSUE EXAM BY PATHOLOGIST: CPT | Mod: 26 | Performed by: PATHOLOGY

## 2024-01-01 PROCEDURE — 85097 BONE MARROW INTERPRETATION: CPT | Performed by: PATHOLOGY

## 2024-01-01 PROCEDURE — 38207 CRYOPRESERVE STEM CELLS: CPT | Performed by: INTERNAL MEDICINE

## 2024-01-01 PROCEDURE — P9037 PLATE PHERES LEUKOREDU IRRAD: HCPCS

## 2024-01-01 PROCEDURE — 93005 ELECTROCARDIOGRAM TRACING: CPT

## 2024-01-01 PROCEDURE — 99215 OFFICE O/P EST HI 40 MIN: CPT | Performed by: INTERNAL MEDICINE

## 2024-01-01 PROCEDURE — 81372 HLA I TYPING COMPLETE LR: CPT

## 2024-01-01 PROCEDURE — 86787 VARICELLA-ZOSTER ANTIBODY: CPT

## 2024-01-01 PROCEDURE — 94375 RESPIRATORY FLOW VOLUME LOOP: CPT | Performed by: INTERNAL MEDICINE

## 2024-01-01 PROCEDURE — 88189 FLOWCYTOMETRY/READ 16 & >: CPT | Performed by: STUDENT IN AN ORGANIZED HEALTH CARE EDUCATION/TRAINING PROGRAM

## 2024-01-01 PROCEDURE — 999N000133 HC STATISTIC PP CARE STAGE 2

## 2024-01-01 PROCEDURE — 87389 HIV-1 AG W/HIV-1&-2 AB AG IA: CPT

## 2024-01-01 PROCEDURE — 85007 BL SMEAR W/DIFF WBC COUNT: CPT | Performed by: INTERNAL MEDICINE

## 2024-01-01 PROCEDURE — C1751 CATH, INF, PER/CENT/MIDLINE: HCPCS

## 2024-01-01 PROCEDURE — 85048 AUTOMATED LEUKOCYTE COUNT: CPT

## 2024-01-01 PROCEDURE — 86777 TOXOPLASMA ANTIBODY: CPT

## 2024-01-01 PROCEDURE — 86923 COMPATIBILITY TEST ELECTRIC: CPT

## 2024-01-01 PROCEDURE — 81267 CHIMERISM ANAL NO CELL SELEC: CPT | Mod: 91

## 2024-01-01 PROCEDURE — 88188 FLOWCYTOMETRY/READ 9-15: CPT | Mod: GC | Performed by: STUDENT IN AN ORGANIZED HEALTH CARE EDUCATION/TRAINING PROGRAM

## 2024-01-01 PROCEDURE — 258N000003 HC RX IP 258 OP 636: Mod: JZ | Performed by: NURSE PRACTITIONER

## 2024-01-01 PROCEDURE — 99215 OFFICE O/P EST HI 40 MIN: CPT

## 2024-01-01 PROCEDURE — 94642 AEROSOL INHALATION TREATMENT: CPT | Performed by: INTERNAL MEDICINE

## 2024-01-01 PROCEDURE — 82248 BILIRUBIN DIRECT: CPT

## 2024-01-01 PROCEDURE — 99233 SBSQ HOSP IP/OBS HIGH 50: CPT | Mod: 24 | Performed by: INTERNAL MEDICINE

## 2024-01-01 PROCEDURE — 250N000011 HC RX IP 250 OP 636: Mod: JZ | Performed by: NURSE PRACTITIONER

## 2024-01-01 PROCEDURE — 38214 VOLUME DEPLETE OF HARVEST: CPT | Performed by: INTERNAL MEDICINE

## 2024-01-01 PROCEDURE — 88280 CHROMOSOME KARYOTYPE STUDY: CPT

## 2024-01-01 PROCEDURE — 89050 BODY FLUID CELL COUNT: CPT

## 2024-01-01 PROCEDURE — 02H633Z INSERTION OF INFUSION DEVICE INTO RIGHT ATRIUM, PERCUTANEOUS APPROACH: ICD-10-PCS | Performed by: PHYSICIAN ASSISTANT

## 2024-01-01 PROCEDURE — 84157 ASSAY OF PROTEIN OTHER: CPT

## 2024-01-01 PROCEDURE — 38222 DX BONE MARROW BX & ASPIR: CPT

## 2024-01-01 PROCEDURE — 85027 COMPLETE CBC AUTOMATED: CPT | Performed by: INTERNAL MEDICINE

## 2024-01-01 PROCEDURE — 86706 HEP B SURFACE ANTIBODY: CPT

## 2024-01-01 PROCEDURE — 85730 THROMBOPLASTIN TIME PARTIAL: CPT

## 2024-01-01 PROCEDURE — 88342 IMHCHEM/IMCYTCHM 1ST ANTB: CPT | Mod: 26 | Performed by: PATHOLOGY

## 2024-01-01 PROCEDURE — 85045 AUTOMATED RETICULOCYTE COUNT: CPT | Performed by: PATHOLOGY

## 2024-01-01 PROCEDURE — 87799 DETECT AGENT NOS DNA QUANT: CPT | Performed by: STUDENT IN AN ORGANIZED HEALTH CARE EDUCATION/TRAINING PROGRAM

## 2024-01-01 PROCEDURE — 88271 CYTOGENETICS DNA PROBE: CPT

## 2024-01-01 PROCEDURE — 0JH63XZ INSERTION OF TUNNELED VASCULAR ACCESS DEVICE INTO CHEST SUBCUTANEOUS TISSUE AND FASCIA, PERCUTANEOUS APPROACH: ICD-10-PCS | Performed by: PHYSICIAN ASSISTANT

## 2024-01-01 PROCEDURE — 99223 1ST HOSP IP/OBS HIGH 75: CPT | Mod: 25

## 2024-01-01 PROCEDURE — 88275 CYTOGENETICS 100-300: CPT

## 2024-01-01 PROCEDURE — 88184 FLOWCYTOMETRY/ TC 1 MARKER: CPT | Mod: XU

## 2024-01-01 PROCEDURE — G2211 COMPLEX E/M VISIT ADD ON: HCPCS | Performed by: INTERNAL MEDICINE

## 2024-01-01 PROCEDURE — 85048 AUTOMATED LEUKOCYTE COUNT: CPT | Performed by: INTERNAL MEDICINE

## 2024-01-01 PROCEDURE — 81351 TP53 GENE FULL GENE SEQUENCE: CPT

## 2024-01-01 PROCEDURE — 81270 JAK2 GENE: CPT

## 2024-01-01 PROCEDURE — 99239 HOSP IP/OBS DSCHRG MGMT >30: CPT | Mod: FS | Performed by: STUDENT IN AN ORGANIZED HEALTH CARE EDUCATION/TRAINING PROGRAM

## 2024-01-01 PROCEDURE — 87493 C DIFF AMPLIFIED PROBE: CPT | Performed by: INTERNAL MEDICINE

## 2024-01-01 PROCEDURE — 88311 DECALCIFY TISSUE: CPT | Mod: TC

## 2024-01-01 PROCEDURE — 250N000013 HC RX MED GY IP 250 OP 250 PS 637: Performed by: PEDIATRICS

## 2024-01-01 PROCEDURE — 815N000004 HC ACQUISITION BONE MARROW NMDP

## 2024-01-01 PROCEDURE — 81382 HLA II TYPING 1 LOC HR: CPT

## 2024-01-01 PROCEDURE — 93356 MYOCRD STRAIN IMG SPCKL TRCK: CPT | Performed by: INTERNAL MEDICINE

## 2024-01-01 PROCEDURE — 85060 BLOOD SMEAR INTERPRETATION: CPT | Performed by: STUDENT IN AN ORGANIZED HEALTH CARE EDUCATION/TRAINING PROGRAM

## 2024-01-01 PROCEDURE — 99152 MOD SED SAME PHYS/QHP 5/>YRS: CPT

## 2024-01-01 PROCEDURE — 87081 CULTURE SCREEN ONLY: CPT

## 2024-01-01 PROCEDURE — 86832 HLA CLASS I HIGH DEFIN QUAL: CPT

## 2024-01-01 PROCEDURE — 71046 X-RAY EXAM CHEST 2 VIEWS: CPT | Mod: 26 | Performed by: RADIOLOGY

## 2024-01-01 PROCEDURE — 71046 X-RAY EXAM CHEST 2 VIEWS: CPT

## 2024-01-01 PROCEDURE — 82728 ASSAY OF FERRITIN: CPT

## 2024-01-01 PROCEDURE — 250N000009 HC RX 250: Performed by: PHYSICIAN ASSISTANT

## 2024-01-01 PROCEDURE — 88189 FLOWCYTOMETRY/READ 16 & >: CPT | Mod: GC | Performed by: PATHOLOGY

## 2024-01-01 PROCEDURE — 80197 ASSAY OF TACROLIMUS: CPT | Performed by: PHYSICIAN ASSISTANT

## 2024-01-01 PROCEDURE — 36558 INSERT TUNNELED CV CATH: CPT | Mod: LT | Performed by: PHYSICIAN ASSISTANT

## 2024-01-01 PROCEDURE — 86828 HLA CLASS I&II ANTIBODY QUAL: CPT | Performed by: INTERNAL MEDICINE

## 2024-01-01 PROCEDURE — 82945 GLUCOSE OTHER FLUID: CPT

## 2024-01-01 PROCEDURE — 86644 CMV ANTIBODY: CPT | Performed by: INTERNAL MEDICINE

## 2024-01-01 PROCEDURE — 80053 COMPREHEN METABOLIC PANEL: CPT | Performed by: INTERNAL MEDICINE

## 2024-01-01 PROCEDURE — 999N000142 HC STATISTIC PROCEDURE PREP ONLY

## 2024-01-01 PROCEDURE — 99211 OFF/OP EST MAY X REQ PHY/QHP: CPT | Mod: 25

## 2024-01-01 PROCEDURE — 81001 URINALYSIS AUTO W/SCOPE: CPT

## 2024-01-01 PROCEDURE — 36415 COLL VENOUS BLD VENIPUNCTURE: CPT | Performed by: INTERNAL MEDICINE

## 2024-01-01 PROCEDURE — 86704 HEP B CORE ANTIBODY TOTAL: CPT

## 2024-01-01 PROCEDURE — 88184 FLOWCYTOMETRY/ TC 1 MARKER: CPT

## 2024-01-01 PROCEDURE — 999N000111 HC STATISTIC OT IP EVAL DEFER

## 2024-01-01 PROCEDURE — 88311 DECALCIFY TISSUE: CPT | Mod: 26 | Performed by: STUDENT IN AN ORGANIZED HEALTH CARE EDUCATION/TRAINING PROGRAM

## 2024-01-01 PROCEDURE — 86696 HERPES SIMPLEX TYPE 2 TEST: CPT

## 2024-01-01 PROCEDURE — 36592 COLLECT BLOOD FROM PICC: CPT | Performed by: INTERNAL MEDICINE

## 2024-01-01 PROCEDURE — 88321 CONSLTJ&REPRT SLD PREP ELSWR: CPT | Performed by: STUDENT IN AN ORGANIZED HEALTH CARE EDUCATION/TRAINING PROGRAM

## 2024-01-01 PROCEDURE — 94726 PLETHYSMOGRAPHY LUNG VOLUMES: CPT | Performed by: INTERNAL MEDICINE

## 2024-01-01 PROCEDURE — 93306 TTE W/DOPPLER COMPLETE: CPT | Mod: 26 | Performed by: INTERNAL MEDICINE

## 2024-01-01 PROCEDURE — 81175 ASXL1 FULL GENE SEQUENCE: CPT

## 2024-01-01 PROCEDURE — 86828 HLA CLASS I&II ANTIBODY QUAL: CPT | Mod: XU | Performed by: INTERNAL MEDICINE

## 2024-01-01 PROCEDURE — 88184 FLOWCYTOMETRY/ TC 1 MARKER: CPT | Performed by: INTERNAL MEDICINE

## 2024-01-01 PROCEDURE — 88108 CYTOPATH CONCENTRATE TECH: CPT | Mod: 26 | Performed by: PATHOLOGY

## 2024-01-01 PROCEDURE — 71250 CT THORAX DX C-: CPT

## 2024-01-01 PROCEDURE — 81378 HLA I & II TYPING HR: CPT | Performed by: INTERNAL MEDICINE

## 2024-01-01 PROCEDURE — 99214 OFFICE O/P EST MOD 30 MIN: CPT | Performed by: INTERNAL MEDICINE

## 2024-01-01 PROCEDURE — 82565 ASSAY OF CREATININE: CPT

## 2024-01-01 PROCEDURE — 86665 EPSTEIN-BARR CAPSID VCA: CPT

## 2024-01-01 PROCEDURE — 83020 HEMOGLOBIN ELECTROPHORESIS: CPT

## 2024-01-01 PROCEDURE — G0452 MOLECULAR PATHOLOGY INTERPR: HCPCS | Mod: 26 | Performed by: STUDENT IN AN ORGANIZED HEALTH CARE EDUCATION/TRAINING PROGRAM

## 2024-01-01 PROCEDURE — 99418 PROLNG IP/OBS E/M EA 15 MIN: CPT | Performed by: PHYSICIAN ASSISTANT

## 2024-01-01 PROCEDURE — 83605 ASSAY OF LACTIC ACID: CPT | Performed by: STUDENT IN AN ORGANIZED HEALTH CARE EDUCATION/TRAINING PROGRAM

## 2024-01-01 PROCEDURE — 88368 INSITU HYBRIDIZATION MANUAL: CPT | Mod: 26 | Performed by: MEDICAL GENETICS

## 2024-01-01 PROCEDURE — 88313 SPECIAL STAINS GROUP 2: CPT | Mod: 26 | Performed by: STUDENT IN AN ORGANIZED HEALTH CARE EDUCATION/TRAINING PROGRAM

## 2024-01-01 PROCEDURE — 82374 ASSAY BLOOD CARBON DIOXIDE: CPT | Performed by: INTERNAL MEDICINE

## 2024-01-01 PROCEDURE — 81265 STR MARKERS SPECIMEN ANAL: CPT

## 2024-01-01 PROCEDURE — 88185 FLOWCYTOMETRY/TC ADD-ON: CPT | Performed by: INTERNAL MEDICINE

## 2024-01-01 PROCEDURE — 3E04305 INTRODUCTION OF OTHER ANTINEOPLASTIC INTO CENTRAL VEIN, PERCUTANEOUS APPROACH: ICD-10-PCS | Performed by: INTERNAL MEDICINE

## 2024-01-01 PROCEDURE — 84155 ASSAY OF PROTEIN SERUM: CPT

## 2024-01-01 RX ORDER — LORAZEPAM 0.5 MG/1
.5-1 TABLET ORAL EVERY 4 HOURS PRN
Status: DISCONTINUED | OUTPATIENT
Start: 2024-01-01 | End: 2024-01-01

## 2024-01-01 RX ORDER — URSODIOL 300 MG/1
300 CAPSULE ORAL 3 TIMES DAILY
Status: DISCONTINUED | OUTPATIENT
Start: 2024-01-01 | End: 2024-01-01 | Stop reason: HOSPADM

## 2024-01-01 RX ORDER — NALOXONE HYDROCHLORIDE 0.4 MG/ML
0.2 INJECTION, SOLUTION INTRAMUSCULAR; INTRAVENOUS; SUBCUTANEOUS
Status: DISCONTINUED | OUTPATIENT
Start: 2024-01-01 | End: 2024-01-01

## 2024-01-01 RX ORDER — GABAPENTIN 100 MG/1
100 CAPSULE ORAL AT BEDTIME
Qty: 30 CAPSULE | Refills: 1 | Status: SHIPPED | OUTPATIENT
Start: 2024-01-01 | End: 2024-01-01

## 2024-01-01 RX ORDER — HEPARIN SODIUM,PORCINE 10 UNIT/ML
5 VIAL (ML) INTRAVENOUS ONCE
Status: COMPLETED | OUTPATIENT
Start: 2024-01-01 | End: 2024-01-01

## 2024-01-01 RX ORDER — METHYLPREDNISOLONE SODIUM SUCCINATE 125 MG/2ML
125 INJECTION, POWDER, LYOPHILIZED, FOR SOLUTION INTRAMUSCULAR; INTRAVENOUS
Status: CANCELLED
Start: 2024-01-01

## 2024-01-01 RX ORDER — MEPERIDINE HYDROCHLORIDE 25 MG/ML
25 INJECTION INTRAMUSCULAR; INTRAVENOUS; SUBCUTANEOUS EVERY 30 MIN PRN
OUTPATIENT
Start: 2024-01-01

## 2024-01-01 RX ORDER — LEVOFLOXACIN 250 MG/1
250 TABLET, FILM COATED ORAL DAILY
Qty: 30 TABLET | Refills: 3 | Status: SHIPPED | OUTPATIENT
Start: 2024-01-01

## 2024-01-01 RX ORDER — TRAZODONE HYDROCHLORIDE 100 MG/1
100 TABLET ORAL AT BEDTIME
Qty: 30 TABLET | Refills: 0 | Status: SHIPPED | OUTPATIENT
Start: 2024-01-01

## 2024-01-01 RX ORDER — FLUCONAZOLE 200 MG/1
2 TABLET ORAL DAILY
COMMUNITY
Start: 2024-01-01 | End: 2024-01-01

## 2024-01-01 RX ORDER — AA/PROT/LYSINE/METHIO/VIT C/B6 50-12.5 MG
266 TABLET ORAL 2 TIMES DAILY
COMMUNITY
Start: 2024-01-01 | End: 2024-01-01

## 2024-01-01 RX ORDER — TACROLIMUS 1 MG/1
CAPSULE ORAL
Qty: 120 CAPSULE | Refills: 0 | Status: SHIPPED | OUTPATIENT
Start: 2024-01-01 | End: 2024-01-01

## 2024-01-01 RX ORDER — HEPARIN SODIUM (PORCINE) LOCK FLUSH IV SOLN 100 UNIT/ML 100 UNIT/ML
5 SOLUTION INTRAVENOUS
Status: CANCELLED | OUTPATIENT
Start: 2024-01-01

## 2024-01-01 RX ORDER — OXYCODONE HYDROCHLORIDE 5 MG/1
5-10 TABLET ORAL EVERY 6 HOURS PRN
Qty: 16 TABLET | Refills: 0 | Status: SHIPPED | OUTPATIENT
Start: 2024-01-01 | End: 2024-01-01

## 2024-01-01 RX ORDER — TRAMADOL HYDROCHLORIDE 50 MG/1
50 TABLET ORAL EVERY 6 HOURS PRN
Status: DISCONTINUED | OUTPATIENT
Start: 2024-01-01 | End: 2024-01-01

## 2024-01-01 RX ORDER — HEPARIN SODIUM,PORCINE 10 UNIT/ML
5-20 VIAL (ML) INTRAVENOUS DAILY PRN
OUTPATIENT
Start: 2024-01-01

## 2024-01-01 RX ORDER — HYDROMORPHONE HYDROCHLORIDE 1 MG/ML
0.5 INJECTION, SOLUTION INTRAMUSCULAR; INTRAVENOUS; SUBCUTANEOUS ONCE
Status: COMPLETED | OUTPATIENT
Start: 2024-01-01 | End: 2024-01-01

## 2024-01-01 RX ORDER — AA/PROT/LYSINE/METHIO/VIT C/B6 50-12.5 MG
399 TABLET ORAL 2 TIMES DAILY
Qty: 120 TABLET | Refills: 2 | Status: SHIPPED | OUTPATIENT
Start: 2024-01-01 | End: 2024-01-01

## 2024-01-01 RX ORDER — METHYLPREDNISOLONE SODIUM SUCCINATE 125 MG/2ML
125 INJECTION, POWDER, LYOPHILIZED, FOR SOLUTION INTRAMUSCULAR; INTRAVENOUS
Start: 2024-01-01

## 2024-01-01 RX ORDER — MAGNESIUM SULFATE HEPTAHYDRATE 40 MG/ML
4 INJECTION, SOLUTION INTRAVENOUS ONCE
Status: COMPLETED | OUTPATIENT
Start: 2024-01-01 | End: 2024-01-01

## 2024-01-01 RX ORDER — EPINEPHRINE 1 MG/ML
0.3 INJECTION, SOLUTION INTRAMUSCULAR; SUBCUTANEOUS EVERY 5 MIN PRN
OUTPATIENT
Start: 2024-01-01

## 2024-01-01 RX ORDER — POTASSIUM CHLORIDE 750 MG/1
20 TABLET, EXTENDED RELEASE ORAL ONCE
Status: COMPLETED | OUTPATIENT
Start: 2024-01-01 | End: 2024-01-01

## 2024-01-01 RX ORDER — HEPARIN SODIUM,PORCINE 10 UNIT/ML
5-20 VIAL (ML) INTRAVENOUS DAILY PRN
Status: CANCELLED | OUTPATIENT
Start: 2024-01-01

## 2024-01-01 RX ORDER — FLUMAZENIL 0.1 MG/ML
0.2 INJECTION, SOLUTION INTRAVENOUS
Status: DISCONTINUED | OUTPATIENT
Start: 2024-01-01 | End: 2024-01-01

## 2024-01-01 RX ORDER — ONDANSETRON 8 MG/1
8 TABLET, FILM COATED ORAL EVERY 8 HOURS PRN
Status: ON HOLD | COMMUNITY
Start: 2024-01-01 | End: 2024-01-01

## 2024-01-01 RX ORDER — TRAZODONE HYDROCHLORIDE 100 MG/1
100 TABLET ORAL AT BEDTIME
Status: ON HOLD | COMMUNITY
Start: 2024-01-01 | End: 2024-01-01

## 2024-01-01 RX ORDER — ALBUTEROL SULFATE 0.83 MG/ML
2.5 SOLUTION RESPIRATORY (INHALATION)
OUTPATIENT
Start: 2024-01-01

## 2024-01-01 RX ORDER — MYCOPHENOLATE MOFETIL 500 MG/1
1000 TABLET ORAL EVERY 8 HOURS SCHEDULED
Status: DISCONTINUED | OUTPATIENT
Start: 2024-01-01 | End: 2024-01-01 | Stop reason: HOSPADM

## 2024-01-01 RX ORDER — HEPARIN SODIUM (PORCINE) LOCK FLUSH IV SOLN 100 UNIT/ML 100 UNIT/ML
5 SOLUTION INTRAVENOUS
OUTPATIENT
Start: 2024-01-01

## 2024-01-01 RX ORDER — OXYCODONE HYDROCHLORIDE 5 MG/1
5 TABLET ORAL EVERY 4 HOURS PRN
Status: DISCONTINUED | OUTPATIENT
Start: 2024-01-01 | End: 2024-01-01

## 2024-01-01 RX ORDER — ONDANSETRON 8 MG/1
8 TABLET, FILM COATED ORAL EVERY 8 HOURS
Status: DISCONTINUED | OUTPATIENT
Start: 2024-01-01 | End: 2024-01-01

## 2024-01-01 RX ORDER — MEPERIDINE HYDROCHLORIDE 25 MG/ML
25 INJECTION INTRAMUSCULAR; INTRAVENOUS; SUBCUTANEOUS EVERY 30 MIN PRN
Status: CANCELLED | OUTPATIENT
Start: 2024-01-01

## 2024-01-01 RX ORDER — PENTAMIDINE ISETHIONATE 300 MG/300MG
300 INHALANT RESPIRATORY (INHALATION)
OUTPATIENT
Start: 2024-01-01

## 2024-01-01 RX ORDER — AMOXICILLIN 250 MG
1 CAPSULE ORAL 2 TIMES DAILY
Status: CANCELLED | OUTPATIENT
Start: 2024-01-01

## 2024-01-01 RX ORDER — CEFEPIME HYDROCHLORIDE 2 G/1
2 INJECTION, POWDER, FOR SOLUTION INTRAVENOUS
Status: DISCONTINUED | OUTPATIENT
Start: 2024-01-01 | End: 2024-01-01 | Stop reason: HOSPADM

## 2024-01-01 RX ORDER — ONDANSETRON 8 MG/1
8 TABLET, FILM COATED ORAL EVERY 8 HOURS PRN
Qty: 30 TABLET | Refills: 1 | Status: SHIPPED | OUTPATIENT
Start: 2024-01-01 | End: 2024-01-01

## 2024-01-01 RX ORDER — PENTAMIDINE ISETHIONATE 300 MG/300MG
300 INHALANT RESPIRATORY (INHALATION)
Status: CANCELLED | OUTPATIENT
Start: 2024-01-01 | End: 2024-01-01

## 2024-01-01 RX ORDER — MUSCLE RUB CREAM 100; 150 MG/G; MG/G
CREAM TOPICAL EVERY 6 HOURS PRN
Status: DISCONTINUED | OUTPATIENT
Start: 2024-01-01 | End: 2024-01-01

## 2024-01-01 RX ORDER — PROCHLORPERAZINE MALEATE 5 MG
5-10 TABLET ORAL
Status: DISCONTINUED | OUTPATIENT
Start: 2024-01-01 | End: 2024-01-01

## 2024-01-01 RX ORDER — HEPARIN SODIUM,PORCINE 10 UNIT/ML
5-10 VIAL (ML) INTRAVENOUS
Status: DISCONTINUED | OUTPATIENT
Start: 2024-01-01 | End: 2024-01-01 | Stop reason: HOSPADM

## 2024-01-01 RX ORDER — HEPARIN SODIUM,PORCINE 10 UNIT/ML
5 VIAL (ML) INTRAVENOUS
Status: DISCONTINUED | OUTPATIENT
Start: 2024-01-01 | End: 2024-01-01 | Stop reason: HOSPADM

## 2024-01-01 RX ORDER — LEVOFLOXACIN 250 MG/1
250 TABLET, FILM COATED ORAL
Status: DISCONTINUED | OUTPATIENT
Start: 2024-01-01 | End: 2024-01-01

## 2024-01-01 RX ORDER — ALBUTEROL SULFATE 0.83 MG/ML
2.5 SOLUTION RESPIRATORY (INHALATION)
Status: CANCELLED | OUTPATIENT
Start: 2024-01-01

## 2024-01-01 RX ORDER — NALOXONE HYDROCHLORIDE 0.4 MG/ML
0.2 INJECTION, SOLUTION INTRAMUSCULAR; INTRAVENOUS; SUBCUTANEOUS
Status: DISCONTINUED | OUTPATIENT
Start: 2024-01-01 | End: 2024-01-01 | Stop reason: HOSPADM

## 2024-01-01 RX ORDER — LIDOCAINE 40 MG/G
CREAM TOPICAL
Status: DISCONTINUED | OUTPATIENT
Start: 2024-01-01 | End: 2024-01-01

## 2024-01-01 RX ORDER — TENOFOVIR DISOPROXIL FUMARATE 300 MG/1
300 TABLET, FILM COATED ORAL DAILY
Qty: 30 TABLET | Refills: 0 | Status: SHIPPED | OUTPATIENT
Start: 2024-01-01 | End: 2024-01-01

## 2024-01-01 RX ORDER — NALOXONE HYDROCHLORIDE 0.4 MG/ML
0.4 INJECTION, SOLUTION INTRAMUSCULAR; INTRAVENOUS; SUBCUTANEOUS
Status: DISCONTINUED | OUTPATIENT
Start: 2024-01-01 | End: 2024-01-01

## 2024-01-01 RX ORDER — OXYCODONE HYDROCHLORIDE 5 MG/1
5-10 TABLET ORAL EVERY 4 HOURS PRN
Status: DISCONTINUED | OUTPATIENT
Start: 2024-01-01 | End: 2024-01-01 | Stop reason: HOSPADM

## 2024-01-01 RX ORDER — ONDANSETRON 8 MG/1
8 TABLET, FILM COATED ORAL EVERY 8 HOURS PRN
Status: DISCONTINUED | OUTPATIENT
Start: 2024-01-01 | End: 2024-01-01 | Stop reason: HOSPADM

## 2024-01-01 RX ORDER — DIPHENHYDRAMINE HYDROCHLORIDE, ZINC ACETATE 2; .1 G/100G; G/100G
CREAM TOPICAL 3 TIMES DAILY PRN
Status: DISCONTINUED | OUTPATIENT
Start: 2024-01-01 | End: 2024-01-01 | Stop reason: HOSPADM

## 2024-01-01 RX ORDER — CEFAZOLIN SODIUM 2 G/100ML
2 INJECTION, SOLUTION INTRAVENOUS
Status: COMPLETED | OUTPATIENT
Start: 2024-01-01 | End: 2024-01-01

## 2024-01-01 RX ORDER — PANTOPRAZOLE SODIUM 40 MG/1
40 TABLET, DELAYED RELEASE ORAL
Qty: 60 TABLET | Refills: 1 | Status: SHIPPED | OUTPATIENT
Start: 2024-01-01 | End: 2024-01-01

## 2024-01-01 RX ORDER — ACYCLOVIR 200 MG/1
800 CAPSULE ORAL 2 TIMES DAILY
Qty: 240 CAPSULE | Refills: 2 | Status: SHIPPED | OUTPATIENT
Start: 2024-01-01 | End: 2024-01-01

## 2024-01-01 RX ORDER — EPINEPHRINE 1 MG/ML
0.3 INJECTION, SOLUTION INTRAMUSCULAR; SUBCUTANEOUS EVERY 5 MIN PRN
Status: CANCELLED | OUTPATIENT
Start: 2024-01-01

## 2024-01-01 RX ORDER — IBUPROFEN 200 MG
950 CAPSULE ORAL DAILY
Status: DISCONTINUED | OUTPATIENT
Start: 2024-01-01 | End: 2024-01-01 | Stop reason: HOSPADM

## 2024-01-01 RX ORDER — PANTOPRAZOLE SODIUM 40 MG/1
40 TABLET, DELAYED RELEASE ORAL DAILY
Status: DISCONTINUED | OUTPATIENT
Start: 2024-01-01 | End: 2024-01-01

## 2024-01-01 RX ORDER — TACROLIMUS 0.5 MG/1
CAPSULE ORAL
Qty: 60 CAPSULE | Refills: 0 | Status: SHIPPED | OUTPATIENT
Start: 2024-01-01 | End: 2024-01-01

## 2024-01-01 RX ORDER — LOPERAMIDE HCL 2 MG
2-4 CAPSULE ORAL 4 TIMES DAILY PRN
Status: DISCONTINUED | OUTPATIENT
Start: 2024-01-01 | End: 2024-01-01 | Stop reason: HOSPADM

## 2024-01-01 RX ORDER — AMLODIPINE BESYLATE 5 MG/1
5 TABLET ORAL EVERY EVENING
Qty: 90 TABLET | OUTPATIENT
Start: 2024-01-01

## 2024-01-01 RX ORDER — TRAZODONE HYDROCHLORIDE 50 MG/1
100 TABLET, FILM COATED ORAL AT BEDTIME
Status: DISCONTINUED | OUTPATIENT
Start: 2024-01-01 | End: 2024-01-01

## 2024-01-01 RX ORDER — ALBUTEROL SULFATE 90 UG/1
1-2 AEROSOL, METERED RESPIRATORY (INHALATION)
Status: CANCELLED
Start: 2024-01-01

## 2024-01-01 RX ORDER — PROCHLORPERAZINE MALEATE 5 MG
5 TABLET ORAL EVERY 6 HOURS PRN
Qty: 30 TABLET | Refills: 0 | Status: SHIPPED | OUTPATIENT
Start: 2024-01-01 | End: 2024-01-01

## 2024-01-01 RX ORDER — ACETAMINOPHEN 500 MG
500 TABLET ORAL ONCE
Status: COMPLETED | OUTPATIENT
Start: 2024-01-01 | End: 2024-01-01

## 2024-01-01 RX ORDER — LEVOFLOXACIN 5 MG/ML
250 INJECTION, SOLUTION INTRAVENOUS EVERY 24 HOURS
Status: DISCONTINUED | OUTPATIENT
Start: 2024-01-01 | End: 2024-01-01 | Stop reason: HOSPADM

## 2024-01-01 RX ORDER — DEXAMETHASONE 4 MG/1
8 TABLET ORAL ONCE
Status: COMPLETED | OUTPATIENT
Start: 2024-01-01 | End: 2024-01-01

## 2024-01-01 RX ORDER — PENTAMIDINE ISETHIONATE 300 MG/300MG
300 INHALANT RESPIRATORY (INHALATION)
Status: CANCELLED | OUTPATIENT
Start: 2024-01-01

## 2024-01-01 RX ORDER — ALBUTEROL SULFATE 90 UG/1
1-2 AEROSOL, METERED RESPIRATORY (INHALATION)
Start: 2024-01-01

## 2024-01-01 RX ORDER — DIPHENHYDRAMINE HYDROCHLORIDE 50 MG/ML
50 INJECTION INTRAMUSCULAR; INTRAVENOUS
Start: 2024-01-01

## 2024-01-01 RX ORDER — AA/PROT/LYSINE/METHIO/VIT C/B6 50-12.5 MG
266 TABLET ORAL 2 TIMES DAILY
Status: DISCONTINUED | OUTPATIENT
Start: 2024-01-01 | End: 2024-01-01

## 2024-01-01 RX ORDER — OLANZAPINE 2.5 MG/1
5 TABLET, FILM COATED ORAL EVERY EVENING
Status: DISCONTINUED | OUTPATIENT
Start: 2024-01-01 | End: 2024-01-01

## 2024-01-01 RX ORDER — SUMATRIPTAN 25 MG/1
25 TABLET, FILM COATED ORAL 2 TIMES DAILY PRN
Status: DISCONTINUED | OUTPATIENT
Start: 2024-01-01 | End: 2024-01-01 | Stop reason: HOSPADM

## 2024-01-01 RX ORDER — PSYLLIUM SEED (WITH DEXTROSE)
2 POWDER (GRAM) ORAL DAILY
Qty: 24 WAFER | Refills: 0 | Status: SHIPPED | OUTPATIENT
Start: 2024-01-01 | End: 2024-01-01

## 2024-01-01 RX ORDER — NALOXONE HYDROCHLORIDE 0.4 MG/ML
0.4 INJECTION, SOLUTION INTRAMUSCULAR; INTRAVENOUS; SUBCUTANEOUS
Status: DISCONTINUED | OUTPATIENT
Start: 2024-01-01 | End: 2024-01-01 | Stop reason: HOSPADM

## 2024-01-01 RX ORDER — OLANZAPINE 2.5 MG/1
5 TABLET, FILM COATED ORAL
Status: DISCONTINUED | OUTPATIENT
Start: 2024-01-01 | End: 2024-01-01 | Stop reason: HOSPADM

## 2024-01-01 RX ORDER — LIDOCAINE HYDROCHLORIDE 20 MG/ML
5 SOLUTION OROPHARYNGEAL
Qty: 100 ML | Refills: 0 | Status: SHIPPED | OUTPATIENT
Start: 2024-01-01 | End: 2024-01-01

## 2024-01-01 RX ORDER — OLANZAPINE 2.5 MG/1
5 TABLET, FILM COATED ORAL EVERY 24 HOURS
Status: DISCONTINUED | OUTPATIENT
Start: 2024-01-01 | End: 2024-01-01

## 2024-01-01 RX ORDER — LOPERAMIDE HCL 2 MG
2-4 CAPSULE ORAL 4 TIMES DAILY PRN
Qty: 30 CAPSULE | Refills: 0 | Status: SHIPPED | OUTPATIENT
Start: 2024-01-01 | End: 2024-01-01

## 2024-01-01 RX ORDER — ACETAMINOPHEN 325 MG/1
325-650 TABLET ORAL EVERY 4 HOURS PRN
Status: DISCONTINUED | OUTPATIENT
Start: 2024-01-01 | End: 2024-01-01

## 2024-01-01 RX ORDER — PROCHLORPERAZINE MALEATE 5 MG
5-10 TABLET ORAL EVERY 6 HOURS PRN
Status: DISCONTINUED | OUTPATIENT
Start: 2024-01-01 | End: 2024-01-01 | Stop reason: HOSPADM

## 2024-01-01 RX ORDER — POTASSIUM CHLORIDE 29.8 MG/ML
20 INJECTION INTRAVENOUS ONCE
Status: COMPLETED | OUTPATIENT
Start: 2024-01-01 | End: 2024-01-01

## 2024-01-01 RX ORDER — LIDOCAINE HYDROCHLORIDE 20 MG/ML
5 SOLUTION OROPHARYNGEAL
Status: DISCONTINUED | OUTPATIENT
Start: 2024-01-01 | End: 2024-01-01 | Stop reason: HOSPADM

## 2024-01-01 RX ORDER — LEVETIRACETAM 500 MG/1
500 TABLET ORAL 2 TIMES DAILY
Status: COMPLETED | OUTPATIENT
Start: 2024-01-01 | End: 2024-01-01

## 2024-01-01 RX ORDER — BUTALBITAL, ACETAMINOPHEN AND CAFFEINE 50; 325; 40 MG/1; MG/1; MG/1
1-2 TABLET ORAL EVERY 4 HOURS PRN
Status: DISCONTINUED | OUTPATIENT
Start: 2024-01-01 | End: 2024-01-01 | Stop reason: HOSPADM

## 2024-01-01 RX ORDER — HEPARIN SODIUM,PORCINE 10 UNIT/ML
3 VIAL (ML) INTRAVENOUS
Status: DISCONTINUED | OUTPATIENT
Start: 2024-01-01 | End: 2024-01-01 | Stop reason: HOSPADM

## 2024-01-01 RX ORDER — TENOFOVIR DISOPROXIL FUMARATE 300 MG/1
300 TABLET, FILM COATED ORAL DAILY
Status: ON HOLD | COMMUNITY
End: 2024-01-01

## 2024-01-01 RX ORDER — DIPHENHYDRAMINE HYDROCHLORIDE AND LIDOCAINE HYDROCHLORIDE AND ALUMINUM HYDROXIDE AND MAGNESIUM HYDRO
10 KIT EVERY 6 HOURS PRN
Qty: 119 ML | Refills: 0 | Status: SHIPPED | OUTPATIENT
Start: 2024-01-01 | End: 2024-01-01

## 2024-01-01 RX ORDER — LORAZEPAM 0.5 MG/1
0.5 TABLET ORAL EVERY 4 HOURS PRN
Status: DISCONTINUED | OUTPATIENT
Start: 2024-01-01 | End: 2024-01-01 | Stop reason: HOSPADM

## 2024-01-01 RX ORDER — PENTAMIDINE ISETHIONATE 300 MG/300MG
300 INHALANT RESPIRATORY (INHALATION)
Status: DISCONTINUED | OUTPATIENT
Start: 2024-01-01 | End: 2024-01-01 | Stop reason: HOSPADM

## 2024-01-01 RX ORDER — MYCOPHENOLATE MOFETIL 500 MG/1
1000 TABLET, FILM COATED ORAL
Status: DISCONTINUED | OUTPATIENT
Start: 2024-01-01 | End: 2024-01-01

## 2024-01-01 RX ORDER — AMLODIPINE BESYLATE 5 MG/1
5 TABLET ORAL EVERY EVENING
Qty: 30 TABLET | Refills: 1 | Status: SHIPPED | OUTPATIENT
Start: 2024-01-01 | End: 2024-01-01

## 2024-01-01 RX ORDER — LORATADINE 10 MG/1
10 TABLET ORAL DAILY
Qty: 30 TABLET | Refills: 1 | Status: SHIPPED | OUTPATIENT
Start: 2024-01-01 | End: 2024-01-01

## 2024-01-01 RX ORDER — HEPARIN SODIUM (PORCINE) LOCK FLUSH IV SOLN 100 UNIT/ML 100 UNIT/ML
5 SOLUTION INTRAVENOUS ONCE
Status: COMPLETED | OUTPATIENT
Start: 2024-01-01 | End: 2024-01-01

## 2024-01-01 RX ORDER — LORAZEPAM 0.5 MG/1
0.5 TABLET ORAL EVERY 6 HOURS PRN
Qty: 30 TABLET | Refills: 0 | Status: SHIPPED | OUTPATIENT
Start: 2024-01-01

## 2024-01-01 RX ORDER — POLYETHYLENE GLYCOL 3350 17 G/17G
17 POWDER, FOR SOLUTION ORAL 2 TIMES DAILY PRN
Status: DISCONTINUED | OUTPATIENT
Start: 2024-01-01 | End: 2024-01-01 | Stop reason: HOSPADM

## 2024-01-01 RX ORDER — CAFFEINE 200 MG
200 TABLET ORAL DAILY PRN
Status: DISCONTINUED | OUTPATIENT
Start: 2024-01-01 | End: 2024-01-01 | Stop reason: HOSPADM

## 2024-01-01 RX ORDER — HEPARIN SODIUM,PORCINE 10 UNIT/ML
5-20 VIAL (ML) INTRAVENOUS
Status: DISCONTINUED | OUTPATIENT
Start: 2024-01-01 | End: 2024-01-01 | Stop reason: HOSPADM

## 2024-01-01 RX ORDER — FLUCONAZOLE 200 MG/1
400 TABLET ORAL DAILY
Qty: 30 TABLET | Refills: 3 | Status: ON HOLD | OUTPATIENT
Start: 2024-01-01 | End: 2024-01-01

## 2024-01-01 RX ORDER — ALBUTEROL SULFATE 90 UG/1
2 AEROSOL, METERED RESPIRATORY (INHALATION)
Status: DISCONTINUED | OUTPATIENT
Start: 2024-01-01 | End: 2024-01-01

## 2024-01-01 RX ORDER — OXYCODONE HYDROCHLORIDE 5 MG/1
5-10 TABLET ORAL EVERY 4 HOURS PRN
Status: DISCONTINUED | OUTPATIENT
Start: 2024-01-01 | End: 2024-01-01

## 2024-01-01 RX ORDER — LEVOFLOXACIN 250 MG/1
250 TABLET, FILM COATED ORAL DAILY
Qty: 30 TABLET | Refills: 0 | Status: ON HOLD | OUTPATIENT
Start: 2024-01-01 | End: 2024-01-01

## 2024-01-01 RX ORDER — FUROSEMIDE 10 MG/ML
20 INJECTION INTRAMUSCULAR; INTRAVENOUS EVERY 8 HOURS PRN
Status: ACTIVE | OUTPATIENT
Start: 2024-01-01 | End: 2024-01-01

## 2024-01-01 RX ORDER — LORAZEPAM 1 MG/1
1 TABLET ORAL
Status: ON HOLD | COMMUNITY
Start: 2024-01-01 | End: 2024-01-01

## 2024-01-01 RX ORDER — DIAPER,BRIEF,INFANT-TODD,DISP
EACH MISCELLANEOUS 2 TIMES DAILY PRN
Status: DISCONTINUED | OUTPATIENT
Start: 2024-01-01 | End: 2024-01-01 | Stop reason: HOSPADM

## 2024-01-01 RX ORDER — POLYETHYLENE GLYCOL 3350 17 G/17G
17 POWDER, FOR SOLUTION ORAL DAILY
Status: CANCELLED | OUTPATIENT
Start: 2024-01-01

## 2024-01-01 RX ORDER — DIPHENHYDRAMINE HYDROCHLORIDE 50 MG/ML
50 INJECTION INTRAMUSCULAR; INTRAVENOUS
Status: CANCELLED
Start: 2024-01-01

## 2024-01-01 RX ORDER — ALLOPURINOL 300 MG/1
300 TABLET ORAL DAILY
Status: COMPLETED | OUTPATIENT
Start: 2024-01-01 | End: 2024-01-01

## 2024-01-01 RX ORDER — TRAZODONE HYDROCHLORIDE 50 MG/1
100 TABLET, FILM COATED ORAL AT BEDTIME
Status: DISCONTINUED | OUTPATIENT
Start: 2024-01-01 | End: 2024-01-01 | Stop reason: HOSPADM

## 2024-01-01 RX ORDER — HEPARIN SODIUM (PORCINE) LOCK FLUSH IV SOLN 100 UNIT/ML 100 UNIT/ML
5 SOLUTION INTRAVENOUS
Status: COMPLETED | OUTPATIENT
Start: 2024-01-01 | End: 2024-01-01

## 2024-01-01 RX ORDER — ALBUTEROL SULFATE 0.83 MG/ML
2.5 SOLUTION RESPIRATORY (INHALATION)
Status: DISCONTINUED | OUTPATIENT
Start: 2024-01-01 | End: 2024-01-01 | Stop reason: HOSPADM

## 2024-01-01 RX ORDER — HEPARIN SODIUM (PORCINE) LOCK FLUSH IV SOLN 100 UNIT/ML 100 UNIT/ML
5-10 SOLUTION INTRAVENOUS
Status: DISCONTINUED | OUTPATIENT
Start: 2024-01-01 | End: 2024-01-01 | Stop reason: HOSPADM

## 2024-01-01 RX ORDER — ACYCLOVIR 200 MG/1
200 CAPSULE ORAL
COMMUNITY
End: 2024-01-01

## 2024-01-01 RX ORDER — LOPERAMIDE HCL 2 MG
2 CAPSULE ORAL 4 TIMES DAILY PRN
Status: DISCONTINUED | OUTPATIENT
Start: 2024-01-01 | End: 2024-01-01

## 2024-01-01 RX ORDER — ONDANSETRON 8 MG/1
8 TABLET, FILM COATED ORAL EVERY 8 HOURS PRN
Qty: 30 TABLET | Refills: 0 | Status: SHIPPED | OUTPATIENT
Start: 2024-01-01 | End: 2024-01-01

## 2024-01-01 RX ORDER — SENNOSIDES 8.6 MG
1-2 TABLET ORAL 2 TIMES DAILY PRN
Status: DISCONTINUED | OUTPATIENT
Start: 2024-01-01 | End: 2024-01-01 | Stop reason: HOSPADM

## 2024-01-01 RX ORDER — HEPARIN SODIUM,PORCINE 10 UNIT/ML
3 VIAL (ML) INTRAVENOUS ONCE
Status: COMPLETED | OUTPATIENT
Start: 2024-01-01 | End: 2024-01-01

## 2024-01-01 RX ORDER — OLANZAPINE 2.5 MG/1
TABLET, FILM COATED ORAL
Qty: 90 TABLET | Refills: 1 | Status: SHIPPED | OUTPATIENT
Start: 2024-01-01 | End: 2024-01-01

## 2024-01-01 RX ORDER — MYCOPHENOLATE MOFETIL 500 MG/1
1000 TABLET ORAL EVERY 8 HOURS
Qty: 120 TABLET | Refills: 0 | Status: SHIPPED | OUTPATIENT
Start: 2024-01-01 | End: 2024-01-01

## 2024-01-01 RX ORDER — HEPARIN SODIUM,PORCINE 10 UNIT/ML
5-20 VIAL (ML) INTRAVENOUS EVERY 24 HOURS
Status: DISCONTINUED | OUTPATIENT
Start: 2024-01-01 | End: 2024-01-01 | Stop reason: HOSPADM

## 2024-01-01 RX ORDER — DEXTROSE MONOHYDRATE AND SODIUM CHLORIDE 5; .45 G/100ML; G/100ML
1000 INJECTION, SOLUTION INTRAVENOUS CONTINUOUS
Status: DISPENSED | OUTPATIENT
Start: 2024-01-01 | End: 2024-01-01

## 2024-01-01 RX ORDER — SODIUM CHLORIDE 9 MG/ML
INJECTION, SOLUTION INTRAVENOUS CONTINUOUS
Status: DISCONTINUED | OUTPATIENT
Start: 2024-01-01 | End: 2024-01-01

## 2024-01-01 RX ORDER — FENTANYL CITRATE 50 UG/ML
25-50 INJECTION, SOLUTION INTRAMUSCULAR; INTRAVENOUS EVERY 5 MIN PRN
Status: DISCONTINUED | OUTPATIENT
Start: 2024-01-01 | End: 2024-01-01

## 2024-01-01 RX ORDER — ACYCLOVIR 800 MG/1
800 TABLET ORAL 2 TIMES DAILY
Status: DISCONTINUED | OUTPATIENT
Start: 2024-01-01 | End: 2024-01-01

## 2024-01-01 RX ORDER — URSODIOL 300 MG/1
300 CAPSULE ORAL 3 TIMES DAILY
Qty: 90 CAPSULE | Refills: 0 | Status: SHIPPED | OUTPATIENT
Start: 2024-01-01 | End: 2024-01-01

## 2024-01-01 RX ORDER — ACYCLOVIR 200 MG/1
800 CAPSULE ORAL 2 TIMES DAILY
Qty: 240 CAPSULE | Refills: 2 | Status: SHIPPED | OUTPATIENT
Start: 2024-01-01

## 2024-01-01 RX ORDER — ACETAMINOPHEN 500 MG
500-1000 TABLET ORAL EVERY 6 HOURS PRN
Status: ON HOLD | COMMUNITY
End: 2024-01-01

## 2024-01-01 RX ORDER — FUROSEMIDE 10 MG/ML
10 INJECTION INTRAMUSCULAR; INTRAVENOUS
Status: DISPENSED | OUTPATIENT
Start: 2024-01-01 | End: 2024-01-01

## 2024-01-01 RX ORDER — HYDROMORPHONE HCL IN WATER/PF 6 MG/30 ML
0.2 PATIENT CONTROLLED ANALGESIA SYRINGE INTRAVENOUS EVERY 6 HOURS PRN
Status: DISCONTINUED | OUTPATIENT
Start: 2024-01-01 | End: 2024-01-01 | Stop reason: HOSPADM

## 2024-01-01 RX ORDER — HEPARIN SODIUM,PORCINE 10 UNIT/ML
5 VIAL (ML) INTRAVENOUS
Status: COMPLETED | OUTPATIENT
Start: 2024-01-01 | End: 2024-01-01

## 2024-01-01 RX ORDER — AMOXICILLIN 250 MG
2 CAPSULE ORAL 2 TIMES DAILY
Status: CANCELLED | OUTPATIENT
Start: 2024-01-01

## 2024-01-01 RX ORDER — ONDANSETRON 8 MG/1
8 TABLET, FILM COATED ORAL EVERY 8 HOURS PRN
Qty: 30 TABLET | Refills: 1 | Status: SHIPPED | OUTPATIENT
Start: 2024-01-01

## 2024-01-01 RX ORDER — ACYCLOVIR 800 MG/1
800 TABLET ORAL 2 TIMES DAILY
Status: DISCONTINUED | OUTPATIENT
Start: 2024-01-01 | End: 2024-01-01 | Stop reason: HOSPADM

## 2024-01-01 RX ORDER — LIDOCAINE HYDROCHLORIDE 20 MG/ML
JELLY TOPICAL EVERY 4 HOURS PRN
Status: DISCONTINUED | OUTPATIENT
Start: 2024-01-01 | End: 2024-01-01 | Stop reason: HOSPADM

## 2024-01-01 RX ORDER — SENNOSIDES 8.6 MG
1-2 TABLET ORAL 2 TIMES DAILY PRN
Qty: 30 TABLET | Refills: 0 | Status: SHIPPED | OUTPATIENT
Start: 2024-01-01 | End: 2024-01-01

## 2024-01-01 RX ORDER — LEVOFLOXACIN 500 MG/1
500 TABLET, FILM COATED ORAL
COMMUNITY
Start: 2024-01-01 | End: 2024-01-01

## 2024-01-01 RX ORDER — AMLODIPINE BESYLATE 5 MG/1
5 TABLET ORAL EVERY EVENING
Qty: 30 TABLET | Refills: 1 | Status: SHIPPED | OUTPATIENT
Start: 2024-01-01

## 2024-01-01 RX ORDER — MYCOPHENOLATE MOFETIL 500 MG/1
1000 TABLET ORAL EVERY 8 HOURS
Qty: 42 TABLET | Refills: 0 | Status: SHIPPED | OUTPATIENT
Start: 2024-01-01 | End: 2024-01-01

## 2024-01-01 RX ORDER — PROCHLORPERAZINE MALEATE 5 MG
5-10 TABLET ORAL EVERY 6 HOURS PRN
Status: DISCONTINUED | OUTPATIENT
Start: 2024-01-01 | End: 2024-01-01

## 2024-01-01 RX ORDER — TENOFOVIR DISOPROXIL FUMARATE 300 MG/1
300 TABLET, FILM COATED ORAL DAILY
Status: DISCONTINUED | OUTPATIENT
Start: 2024-01-01 | End: 2024-01-01 | Stop reason: HOSPADM

## 2024-01-01 RX ORDER — TRAMADOL HYDROCHLORIDE 50 MG/1
50 TABLET ORAL EVERY 6 HOURS PRN
Status: DISCONTINUED | OUTPATIENT
Start: 2024-01-01 | End: 2024-01-01 | Stop reason: HOSPADM

## 2024-01-01 RX ORDER — LORAZEPAM 2 MG/ML
.5-1 INJECTION INTRAMUSCULAR EVERY 4 HOURS PRN
Status: DISCONTINUED | OUTPATIENT
Start: 2024-01-01 | End: 2024-01-01

## 2024-01-01 RX ORDER — URSODIOL 300 MG/1
300 CAPSULE ORAL 3 TIMES DAILY
Qty: 135 CAPSULE | Refills: 0 | Status: SHIPPED | OUTPATIENT
Start: 2024-01-01 | End: 2024-01-01

## 2024-01-01 RX ORDER — DIPHENHYDRAMINE HCL 25 MG
25 CAPSULE ORAL ONCE
Status: COMPLETED | OUTPATIENT
Start: 2024-01-01 | End: 2024-01-01

## 2024-01-01 RX ORDER — DEXTROSE MONOHYDRATE 50 MG/ML
10-20 INJECTION, SOLUTION INTRAVENOUS
Status: DISCONTINUED | OUTPATIENT
Start: 2024-01-01 | End: 2024-01-01

## 2024-01-01 RX ORDER — EPINEPHRINE 1 MG/ML
0.3 INJECTION, SOLUTION, CONCENTRATE INTRAVENOUS EVERY 5 MIN PRN
OUTPATIENT
Start: 2024-01-01

## 2024-01-01 RX ORDER — PENTAMIDINE ISETHIONATE 300 MG/300MG
300 INHALANT RESPIRATORY (INHALATION)
Status: DISCONTINUED | OUTPATIENT
Start: 2024-01-01 | End: 2024-01-01

## 2024-01-01 RX ORDER — ACYCLOVIR 800 MG/1
800 TABLET ORAL 2 TIMES DAILY
Qty: 120 TABLET | Refills: 1 | Status: SHIPPED | OUTPATIENT
Start: 2024-01-01 | End: 2024-01-01

## 2024-01-01 RX ORDER — ACYCLOVIR 800 MG/1
800 TABLET ORAL
Status: DISCONTINUED | OUTPATIENT
Start: 2024-01-01 | End: 2024-01-01

## 2024-01-01 RX ORDER — TENOFOVIR DISOPROXIL FUMARATE 300 MG/1
300 TABLET, FILM COATED ORAL DAILY
Qty: 30 TABLET | Refills: 3 | Status: SHIPPED | OUTPATIENT
Start: 2024-01-01

## 2024-01-01 RX ORDER — LORATADINE 10 MG/1
10 TABLET ORAL DAILY
Status: DISCONTINUED | OUTPATIENT
Start: 2024-01-01 | End: 2024-01-01 | Stop reason: HOSPADM

## 2024-01-01 RX ORDER — TRIAMCINOLONE ACETONIDE 1 MG/G
OINTMENT TOPICAL 3 TIMES DAILY PRN
Qty: 80 G | Refills: 0 | Status: SHIPPED | OUTPATIENT
Start: 2024-01-01

## 2024-01-01 RX ORDER — AA/PROT/LYSINE/METHIO/VIT C/B6 50-12.5 MG
399 TABLET ORAL 2 TIMES DAILY
Status: DISCONTINUED | OUTPATIENT
Start: 2024-01-01 | End: 2024-01-01 | Stop reason: HOSPADM

## 2024-01-01 RX ORDER — PSYLLIUM SEED (WITH DEXTROSE)
2 POWDER (GRAM) ORAL DAILY
Status: DISCONTINUED | OUTPATIENT
Start: 2024-01-01 | End: 2024-01-01 | Stop reason: HOSPADM

## 2024-01-01 RX ORDER — MEPERIDINE HYDROCHLORIDE 25 MG/ML
25-50 INJECTION INTRAMUSCULAR; INTRAVENOUS; SUBCUTANEOUS
Status: ACTIVE | OUTPATIENT
Start: 2024-01-01 | End: 2024-01-01

## 2024-01-01 RX ORDER — IBUPROFEN 200 MG
950 CAPSULE ORAL DAILY
Qty: 60 TABLET | Refills: 1 | Status: SHIPPED | OUTPATIENT
Start: 2024-01-01 | End: 2024-01-01

## 2024-01-01 RX ORDER — POTASSIUM CHLORIDE 29.8 MG/ML
20 INJECTION INTRAVENOUS
Status: COMPLETED | OUTPATIENT
Start: 2024-01-01 | End: 2024-01-01

## 2024-01-01 RX ORDER — AMLODIPINE BESYLATE 5 MG/1
5 TABLET ORAL EVERY EVENING
Status: DISCONTINUED | OUTPATIENT
Start: 2024-01-01 | End: 2024-01-01 | Stop reason: HOSPADM

## 2024-01-01 RX ORDER — ACETAMINOPHEN 325 MG/1
650 TABLET ORAL EVERY 4 HOURS PRN
Status: DISCONTINUED | OUTPATIENT
Start: 2024-01-01 | End: 2024-01-01 | Stop reason: HOSPADM

## 2024-01-01 RX ORDER — LORAZEPAM 2 MG/ML
0.5 INJECTION INTRAMUSCULAR EVERY 4 HOURS PRN
Status: DISCONTINUED | OUTPATIENT
Start: 2024-01-01 | End: 2024-01-01 | Stop reason: HOSPADM

## 2024-01-01 RX ORDER — PANTOPRAZOLE SODIUM 40 MG/1
40 TABLET, DELAYED RELEASE ORAL
Status: DISCONTINUED | OUTPATIENT
Start: 2024-01-01 | End: 2024-01-01 | Stop reason: HOSPADM

## 2024-01-01 RX ORDER — AMLODIPINE BESYLATE 5 MG/1
5 TABLET ORAL EVERY EVENING
Qty: 30 TABLET | Refills: 1 | OUTPATIENT
Start: 2024-01-01

## 2024-01-01 RX ORDER — DIPHENHYDRAMINE HYDROCHLORIDE AND LIDOCAINE HYDROCHLORIDE AND ALUMINUM HYDROXIDE AND MAGNESIUM HYDRO
10 KIT EVERY 6 HOURS PRN
Status: DISCONTINUED | OUTPATIENT
Start: 2024-01-01 | End: 2024-01-01 | Stop reason: HOSPADM

## 2024-01-01 RX ORDER — LORAZEPAM 0.5 MG/1
0.5 TABLET ORAL EVERY 6 HOURS PRN
Qty: 30 TABLET | Refills: 0 | Status: SHIPPED | OUTPATIENT
Start: 2024-01-01 | End: 2024-01-01

## 2024-01-01 RX ORDER — HEPARIN SODIUM,PORCINE 10 UNIT/ML
5-20 VIAL (ML) INTRAVENOUS DAILY PRN
Status: DISCONTINUED | OUTPATIENT
Start: 2024-01-01 | End: 2024-01-01 | Stop reason: HOSPADM

## 2024-01-01 RX ORDER — ACYCLOVIR 200 MG/1
400 CAPSULE ORAL 2 TIMES DAILY
Qty: 60 CAPSULE | Refills: 3 | Status: ON HOLD | OUTPATIENT
Start: 2024-01-01 | End: 2024-01-01

## 2024-01-01 RX ADMIN — TRAMADOL HYDROCHLORIDE 50 MG: 50 TABLET, COATED ORAL at 22:28

## 2024-01-01 RX ADMIN — URSODIOL 300 MG: 300 CAPSULE ORAL at 21:33

## 2024-01-01 RX ADMIN — LORAZEPAM 0.5 MG: 0.5 TABLET ORAL at 23:42

## 2024-01-01 RX ADMIN — MICAFUNGIN SODIUM 50 MG: 50 INJECTION, POWDER, LYOPHILIZED, FOR SOLUTION INTRAVENOUS at 20:09

## 2024-01-01 RX ADMIN — Medication 1 LOZENGE: at 12:55

## 2024-01-01 RX ADMIN — LEVOFLOXACIN 250 MG: 5 INJECTION, SOLUTION INTRAVENOUS at 14:33

## 2024-01-01 RX ADMIN — FLUDARABINE PHOSPHATE 50 MG: 25 INJECTION, SOLUTION INTRAVENOUS at 23:53

## 2024-01-01 RX ADMIN — SENNOSIDES 2 TABLET: 8.6 TABLET, FILM COATED ORAL at 19:53

## 2024-01-01 RX ADMIN — URSODIOL 300 MG: 300 CAPSULE ORAL at 14:11

## 2024-01-01 RX ADMIN — URSODIOL 300 MG: 300 CAPSULE ORAL at 09:28

## 2024-01-01 RX ADMIN — DEXTROSE AND SODIUM CHLORIDE 1000 ML: 5; 450 INJECTION, SOLUTION INTRAVENOUS at 23:39

## 2024-01-01 RX ADMIN — ONDANSETRON HYDROCHLORIDE 8 MG: 8 TABLET, FILM COATED ORAL at 08:19

## 2024-01-01 RX ADMIN — GLYCERIN, PETROLATUM, PHENYLEPHRINE HCL, PRAMOXINE HCL: 144; 2.5; 10; 15 CREAM TOPICAL at 09:31

## 2024-01-01 RX ADMIN — LORAZEPAM 0.5 MG: 0.5 TABLET ORAL at 22:59

## 2024-01-01 RX ADMIN — Medication 5 ML: at 13:53

## 2024-01-01 RX ADMIN — URSODIOL 300 MG: 300 CAPSULE ORAL at 08:41

## 2024-01-01 RX ADMIN — Medication 950 MG: at 09:01

## 2024-01-01 RX ADMIN — GLYCERIN, PETROLATUM, PHENYLEPHRINE HCL, PRAMOXINE HCL: 144; 2.5; 10; 15 CREAM TOPICAL at 08:45

## 2024-01-01 RX ADMIN — AMLODIPINE BESYLATE 5 MG: 5 TABLET ORAL at 21:53

## 2024-01-01 RX ADMIN — URSODIOL 300 MG: 300 CAPSULE ORAL at 14:02

## 2024-01-01 RX ADMIN — Medication 2 WAFER: at 07:35

## 2024-01-01 RX ADMIN — TENOFOVIR DISOPROXIL FUMARATE 300 MG: 300 TABLET, FILM COATED ORAL at 08:01

## 2024-01-01 RX ADMIN — GLYCERIN, PETROLATUM, PHENYLEPHRINE HCL, PRAMOXINE HCL: 144; 2.5; 10; 15 CREAM TOPICAL at 14:26

## 2024-01-01 RX ADMIN — Medication 1 LOZENGE: at 15:55

## 2024-01-01 RX ADMIN — TRAMADOL HYDROCHLORIDE 50 MG: 50 TABLET ORAL at 16:51

## 2024-01-01 RX ADMIN — AMLODIPINE BESYLATE 5 MG: 5 TABLET ORAL at 21:47

## 2024-01-01 RX ADMIN — URSODIOL 300 MG: 300 CAPSULE ORAL at 08:03

## 2024-01-01 RX ADMIN — FILGRASTIM-AAFI 300 MCG: 300 INJECTION, SOLUTION INTRAVENOUS; SUBCUTANEOUS at 19:57

## 2024-01-01 RX ADMIN — Medication 5 ML: at 08:56

## 2024-01-01 RX ADMIN — Medication 5 ML: at 04:42

## 2024-01-01 RX ADMIN — MYCOPHENOLATE MOFETIL 1000 MG: 500 INJECTION, POWDER, LYOPHILIZED, FOR SOLUTION INTRAVENOUS at 14:48

## 2024-01-01 RX ADMIN — MICAFUNGIN SODIUM 50 MG: 50 INJECTION, POWDER, LYOPHILIZED, FOR SOLUTION INTRAVENOUS at 07:43

## 2024-01-01 RX ADMIN — URSODIOL 300 MG: 300 CAPSULE ORAL at 22:12

## 2024-01-01 RX ADMIN — Medication 950 MG: at 08:41

## 2024-01-01 RX ADMIN — URSODIOL 300 MG: 300 CAPSULE ORAL at 14:22

## 2024-01-01 RX ADMIN — MYCOPHENOLATE MOFETIL 1000 MG: 500 INJECTION, POWDER, LYOPHILIZED, FOR SOLUTION INTRAVENOUS at 14:22

## 2024-01-01 RX ADMIN — ACYCLOVIR 800 MG: 800 TABLET ORAL at 09:38

## 2024-01-01 RX ADMIN — AMLODIPINE BESYLATE 5 MG: 5 TABLET ORAL at 22:32

## 2024-01-01 RX ADMIN — ONDANSETRON HYDROCHLORIDE 8 MG: 8 TABLET, FILM COATED ORAL at 07:36

## 2024-01-01 RX ADMIN — PHENOL 1 ML: 1.5 LIQUID ORAL at 23:04

## 2024-01-01 RX ADMIN — AMLODIPINE BESYLATE 5 MG: 5 TABLET ORAL at 20:08

## 2024-01-01 RX ADMIN — ACYCLOVIR 800 MG: 800 TABLET ORAL at 17:35

## 2024-01-01 RX ADMIN — LEVETIRACETAM 500 MG: 500 TABLET, FILM COATED ORAL at 07:39

## 2024-01-01 RX ADMIN — MESNA 1600 MG: 100 INJECTION, SOLUTION INTRAVENOUS at 07:53

## 2024-01-01 RX ADMIN — ACYCLOVIR 800 MG: 800 TABLET ORAL at 21:52

## 2024-01-01 RX ADMIN — MYCOPHENOLATE MOFETIL 1000 MG: 500 INJECTION, POWDER, LYOPHILIZED, FOR SOLUTION INTRAVENOUS at 05:53

## 2024-01-01 RX ADMIN — ACYCLOVIR 800 MG: 800 TABLET ORAL at 13:33

## 2024-01-01 RX ADMIN — FLUDARABINE PHOSPHATE 50 MG: 25 INJECTION, SOLUTION INTRAVENOUS at 23:37

## 2024-01-01 RX ADMIN — MYCOPHENOLATE MOFETIL 1000 MG: 500 INJECTION, POWDER, LYOPHILIZED, FOR SOLUTION INTRAVENOUS at 14:17

## 2024-01-01 RX ADMIN — URSODIOL 300 MG: 300 CAPSULE ORAL at 14:48

## 2024-01-01 RX ADMIN — ACYCLOVIR 800 MG: 800 TABLET ORAL at 09:27

## 2024-01-01 RX ADMIN — ALLOPURINOL 300 MG: 300 TABLET ORAL at 08:33

## 2024-01-01 RX ADMIN — TACROLIMUS 110 MCG/HR: 5 INJECTION, SOLUTION INTRAVENOUS at 19:49

## 2024-01-01 RX ADMIN — PANTOPRAZOLE SODIUM 40 MG: 40 TABLET, DELAYED RELEASE ORAL at 10:14

## 2024-01-01 RX ADMIN — ONDANSETRON HYDROCHLORIDE 8 MG: 8 TABLET, FILM COATED ORAL at 07:31

## 2024-01-01 RX ADMIN — DIPHENHYDRAMINE HYDROCHLORIDE AND LIDOCAINE HYDROCHLORIDE AND ALUMINUM HYDROXIDE AND MAGNESIUM HYDRO 10 ML: KIT at 08:03

## 2024-01-01 RX ADMIN — ALLOPURINOL 300 MG: 300 TABLET ORAL at 08:22

## 2024-01-01 RX ADMIN — URSODIOL 300 MG: 300 CAPSULE ORAL at 22:31

## 2024-01-01 RX ADMIN — ONDANSETRON HYDROCHLORIDE 8 MG: 8 TABLET, FILM COATED ORAL at 08:23

## 2024-01-01 RX ADMIN — PANTOPRAZOLE SODIUM 40 MG: 40 TABLET, DELAYED RELEASE ORAL at 08:00

## 2024-01-01 RX ADMIN — SENNOSIDES 2 TABLET: 8.6 TABLET, FILM COATED ORAL at 08:56

## 2024-01-01 RX ADMIN — GLYCERIN, PETROLATUM, PHENYLEPHRINE HCL, PRAMOXINE HCL: 144; 2.5; 10; 15 CREAM TOPICAL at 14:17

## 2024-01-01 RX ADMIN — TACROLIMUS 100 MCG/HR: 5 INJECTION, SOLUTION INTRAVENOUS at 18:09

## 2024-01-01 RX ADMIN — AMLODIPINE BESYLATE 5 MG: 5 TABLET ORAL at 20:02

## 2024-01-01 RX ADMIN — TACROLIMUS 2.5 MG: 1 CAPSULE ORAL at 09:27

## 2024-01-01 RX ADMIN — TENOFOVIR DISOPROXIL FUMARATE 300 MG: 300 TABLET, FILM COATED ORAL at 08:44

## 2024-01-01 RX ADMIN — TENOFOVIR DISOPROXIL FUMARATE 300 MG: 300 TABLET, FILM COATED ORAL at 10:13

## 2024-01-01 RX ADMIN — Medication 5 ML: at 11:10

## 2024-01-01 RX ADMIN — ACYCLOVIR 800 MG: 800 TABLET ORAL at 11:14

## 2024-01-01 RX ADMIN — LORAZEPAM 0.5 MG: 0.5 TABLET ORAL at 22:31

## 2024-01-01 RX ADMIN — Medication 5 ML: at 21:32

## 2024-01-01 RX ADMIN — GLYCERIN, PETROLATUM, PHENYLEPHRINE HCL, PRAMOXINE HCL: 144; 2.5; 10; 15 CREAM TOPICAL at 14:06

## 2024-01-01 RX ADMIN — Medication 5 ML: at 10:08

## 2024-01-01 RX ADMIN — LORATADINE 10 MG: 10 TABLET ORAL at 09:27

## 2024-01-01 RX ADMIN — TENOFOVIR DISOPROXIL FUMARATE 300 MG: 300 TABLET, FILM COATED ORAL at 08:24

## 2024-01-01 RX ADMIN — LEVOFLOXACIN 250 MG: 250 TABLET, FILM COATED ORAL at 11:25

## 2024-01-01 RX ADMIN — GLYCERIN, PETROLATUM, PHENYLEPHRINE HCL, PRAMOXINE HCL: 144; 2.5; 10; 15 CREAM TOPICAL at 19:45

## 2024-01-01 RX ADMIN — ONDANSETRON HYDROCHLORIDE 8 MG: 8 TABLET, FILM COATED ORAL at 22:55

## 2024-01-01 RX ADMIN — MYCOPHENOLATE MOFETIL 1000 MG: 500 INJECTION, POWDER, LYOPHILIZED, FOR SOLUTION INTRAVENOUS at 21:46

## 2024-01-01 RX ADMIN — URSODIOL 300 MG: 300 CAPSULE ORAL at 21:35

## 2024-01-01 RX ADMIN — MYCOPHENOLATE MOFETIL 1000 MG: 500 INJECTION, POWDER, LYOPHILIZED, FOR SOLUTION INTRAVENOUS at 05:40

## 2024-01-01 RX ADMIN — Medication 5 ML: at 04:47

## 2024-01-01 RX ADMIN — TRAMADOL HYDROCHLORIDE 50 MG: 50 TABLET ORAL at 11:04

## 2024-01-01 RX ADMIN — LETERMOVIR 480 MG: 480 TABLET, FILM COATED ORAL at 09:27

## 2024-01-01 RX ADMIN — URSODIOL 300 MG: 300 CAPSULE ORAL at 13:53

## 2024-01-01 RX ADMIN — AZACITIDINE 35 MG: 100 INJECTION, POWDER, LYOPHILIZED, FOR SOLUTION INTRAVENOUS; SUBCUTANEOUS at 11:12

## 2024-01-01 RX ADMIN — FUROSEMIDE 10 MG: 10 INJECTION, SOLUTION INTRAMUSCULAR; INTRAVENOUS at 17:35

## 2024-01-01 RX ADMIN — MICAFUNGIN SODIUM 150 MG: 50 INJECTION, POWDER, LYOPHILIZED, FOR SOLUTION INTRAVENOUS at 09:26

## 2024-01-01 RX ADMIN — ACYCLOVIR 800 MG: 800 TABLET ORAL at 13:53

## 2024-01-01 RX ADMIN — ACYCLOVIR 800 MG: 800 TABLET ORAL at 18:14

## 2024-01-01 RX ADMIN — URSODIOL 300 MG: 300 CAPSULE ORAL at 07:42

## 2024-01-01 RX ADMIN — ACYCLOVIR 800 MG: 800 TABLET ORAL at 14:47

## 2024-01-01 RX ADMIN — Medication 1 LOZENGE: at 20:00

## 2024-01-01 RX ADMIN — URSODIOL 300 MG: 300 CAPSULE ORAL at 14:17

## 2024-01-01 RX ADMIN — AMLODIPINE BESYLATE 5 MG: 5 TABLET ORAL at 21:33

## 2024-01-01 RX ADMIN — TENOFOVIR DISOPROXIL FUMARATE 300 MG: 300 TABLET, FILM COATED ORAL at 08:02

## 2024-01-01 RX ADMIN — AZACITIDINE 35 MG: 100 INJECTION, POWDER, LYOPHILIZED, FOR SOLUTION INTRAVENOUS; SUBCUTANEOUS at 10:33

## 2024-01-01 RX ADMIN — Medication 5 ML: at 14:34

## 2024-01-01 RX ADMIN — TRAZODONE HYDROCHLORIDE 100 MG: 50 TABLET ORAL at 22:09

## 2024-01-01 RX ADMIN — ONDANSETRON HYDROCHLORIDE 8 MG: 8 TABLET, FILM COATED ORAL at 07:56

## 2024-01-01 RX ADMIN — ONDANSETRON HYDROCHLORIDE 8 MG: 8 TABLET, FILM COATED ORAL at 08:02

## 2024-01-01 RX ADMIN — MYCOPHENOLATE MOFETIL 1000 MG: 500 INJECTION, POWDER, LYOPHILIZED, FOR SOLUTION INTRAVENOUS at 06:07

## 2024-01-01 RX ADMIN — PANTOPRAZOLE SODIUM 40 MG: 40 TABLET, DELAYED RELEASE ORAL at 08:23

## 2024-01-01 RX ADMIN — MICAFUNGIN SODIUM 150 MG: 50 INJECTION, POWDER, LYOPHILIZED, FOR SOLUTION INTRAVENOUS at 09:28

## 2024-01-01 RX ADMIN — Medication 5 ML: at 08:57

## 2024-01-01 RX ADMIN — LORATADINE 10 MG: 10 TABLET ORAL at 08:28

## 2024-01-01 RX ADMIN — LEVOFLOXACIN 250 MG: 250 TABLET, FILM COATED ORAL at 08:38

## 2024-01-01 RX ADMIN — URSODIOL 300 MG: 300 CAPSULE ORAL at 20:07

## 2024-01-01 RX ADMIN — TENOFOVIR DISOPROXIL FUMARATE 300 MG: 300 TABLET, FILM COATED ORAL at 08:42

## 2024-01-01 RX ADMIN — DIPHENHYDRAMINE HYDROCHLORIDE AND LIDOCAINE HYDROCHLORIDE AND ALUMINUM HYDROXIDE AND MAGNESIUM HYDRO 10 ML: KIT at 08:42

## 2024-01-01 RX ADMIN — ACYCLOVIR 800 MG: 800 TABLET ORAL at 11:10

## 2024-01-01 RX ADMIN — ACYCLOVIR 800 MG: 800 TABLET ORAL at 23:36

## 2024-01-01 RX ADMIN — LEVOFLOXACIN 250 MG: 250 TABLET, FILM COATED ORAL at 14:47

## 2024-01-01 RX ADMIN — ACYCLOVIR 800 MG: 800 TABLET ORAL at 11:57

## 2024-01-01 RX ADMIN — ACYCLOVIR SODIUM 650 MG: 50 INJECTION, SOLUTION INTRAVENOUS at 12:00

## 2024-01-01 RX ADMIN — FUROSEMIDE 10 MG: 10 INJECTION, SOLUTION INTRAMUSCULAR; INTRAVENOUS at 22:49

## 2024-01-01 RX ADMIN — LEVOFLOXACIN 250 MG: 250 TABLET, FILM COATED ORAL at 14:24

## 2024-01-01 RX ADMIN — LEVOFLOXACIN 250 MG: 250 TABLET, FILM COATED ORAL at 08:02

## 2024-01-01 RX ADMIN — PANTOPRAZOLE SODIUM 40 MG: 40 TABLET, DELAYED RELEASE ORAL at 14:03

## 2024-01-01 RX ADMIN — DEXTROSE AND SODIUM CHLORIDE 1000 ML: 5; 450 INJECTION, SOLUTION INTRAVENOUS at 22:12

## 2024-01-01 RX ADMIN — Medication 5 ML: at 13:17

## 2024-01-01 RX ADMIN — FILGRASTIM-AAFI 300 MCG: 300 INJECTION, SOLUTION INTRAVENOUS; SUBCUTANEOUS at 20:44

## 2024-01-01 RX ADMIN — GLYCERIN, PETROLATUM, PHENYLEPHRINE HCL, PRAMOXINE HCL: 144; 2.5; 10; 15 CREAM TOPICAL at 16:08

## 2024-01-01 RX ADMIN — URSODIOL 300 MG: 300 CAPSULE ORAL at 20:24

## 2024-01-01 RX ADMIN — AMLODIPINE BESYLATE 5 MG: 5 TABLET ORAL at 22:46

## 2024-01-01 RX ADMIN — PANTOPRAZOLE SODIUM 40 MG: 40 TABLET, DELAYED RELEASE ORAL at 07:31

## 2024-01-01 RX ADMIN — LORATADINE 10 MG: 10 TABLET ORAL at 08:41

## 2024-01-01 RX ADMIN — FLUDARABINE PHOSPHATE 50 MG: 25 INJECTION, SOLUTION INTRAVENOUS at 23:38

## 2024-01-01 RX ADMIN — PANTOPRAZOLE SODIUM 40 MG: 40 TABLET, DELAYED RELEASE ORAL at 08:41

## 2024-01-01 RX ADMIN — MYCOPHENOLATE MOFETIL 1000 MG: 500 INJECTION, POWDER, LYOPHILIZED, FOR SOLUTION INTRAVENOUS at 21:31

## 2024-01-01 RX ADMIN — ACYCLOVIR SODIUM 650 MG: 50 INJECTION, SOLUTION INTRAVENOUS at 20:17

## 2024-01-01 RX ADMIN — ONDANSETRON HYDROCHLORIDE 8 MG: 8 TABLET, FILM COATED ORAL at 23:44

## 2024-01-01 RX ADMIN — LOPERAMIDE HYDROCHLORIDE 2 MG: 2 CAPSULE ORAL at 13:01

## 2024-01-01 RX ADMIN — Medication 10 ML: at 15:00

## 2024-01-01 RX ADMIN — URSODIOL 300 MG: 300 CAPSULE ORAL at 13:57

## 2024-01-01 RX ADMIN — ACYCLOVIR 800 MG: 800 TABLET ORAL at 17:34

## 2024-01-01 RX ADMIN — ONDANSETRON HYDROCHLORIDE 8 MG: 8 TABLET, FILM COATED ORAL at 23:16

## 2024-01-01 RX ADMIN — ACYCLOVIR 800 MG: 800 TABLET ORAL at 18:44

## 2024-01-01 RX ADMIN — MICAFUNGIN SODIUM 150 MG: 50 INJECTION, POWDER, LYOPHILIZED, FOR SOLUTION INTRAVENOUS at 08:27

## 2024-01-01 RX ADMIN — Medication 5 ML: at 03:32

## 2024-01-01 RX ADMIN — MYCOPHENOLATE MOFETIL 1000 MG: 500 INJECTION, POWDER, LYOPHILIZED, FOR SOLUTION INTRAVENOUS at 21:48

## 2024-01-01 RX ADMIN — MYCOPHENOLATE MOFETIL 1000 MG: 500 INJECTION, POWDER, LYOPHILIZED, FOR SOLUTION INTRAVENOUS at 14:24

## 2024-01-01 RX ADMIN — GLYCERIN, PETROLATUM, PHENYLEPHRINE HCL, PRAMOXINE HCL: 144; 2.5; 10; 15 CREAM TOPICAL at 20:01

## 2024-01-01 RX ADMIN — TENOFOVIR DISOPROXIL FUMARATE 300 MG: 300 TABLET, FILM COATED ORAL at 07:43

## 2024-01-01 RX ADMIN — MYCOPHENOLATE MOFETIL 1000 MG: 500 INJECTION, POWDER, LYOPHILIZED, FOR SOLUTION INTRAVENOUS at 05:24

## 2024-01-01 RX ADMIN — TACROLIMUS 82 MCG/HR: 5 INJECTION, SOLUTION INTRAVENOUS at 20:35

## 2024-01-01 RX ADMIN — TRAZODONE HYDROCHLORIDE 100 MG: 50 TABLET ORAL at 21:56

## 2024-01-01 RX ADMIN — Medication 3 ML: at 10:09

## 2024-01-01 RX ADMIN — PANTOPRAZOLE SODIUM 40 MG: 40 TABLET, DELAYED RELEASE ORAL at 08:03

## 2024-01-01 RX ADMIN — HYDROMORPHONE HYDROCHLORIDE 0.5 MG: 0.5 INJECTION, SOLUTION INTRAMUSCULAR; INTRAVENOUS; SUBCUTANEOUS at 11:49

## 2024-01-01 RX ADMIN — URSODIOL 300 MG: 300 CAPSULE ORAL at 21:53

## 2024-01-01 RX ADMIN — SENNOSIDES 2 TABLET: 8.6 TABLET, FILM COATED ORAL at 16:55

## 2024-01-01 RX ADMIN — MESNA 1600 MG: 100 INJECTION, SOLUTION INTRAVENOUS at 07:11

## 2024-01-01 RX ADMIN — DIPHENHYDRAMINE HYDROCHLORIDE 25 MG: 25 CAPSULE ORAL at 11:55

## 2024-01-01 RX ADMIN — ACYCLOVIR SODIUM 650 MG: 50 INJECTION, SOLUTION INTRAVENOUS at 19:48

## 2024-01-01 RX ADMIN — CEFAZOLIN SODIUM 2 G: 2 INJECTION, SOLUTION INTRAVENOUS at 08:03

## 2024-01-01 RX ADMIN — DEXAMETHASONE 10 MG: 2 TABLET ORAL at 23:07

## 2024-01-01 RX ADMIN — Medication 5 ML: at 12:38

## 2024-01-01 RX ADMIN — TRAZODONE HYDROCHLORIDE 100 MG: 50 TABLET ORAL at 21:32

## 2024-01-01 RX ADMIN — ACYCLOVIR 800 MG: 800 TABLET ORAL at 08:01

## 2024-01-01 RX ADMIN — Medication 5 ML: at 04:10

## 2024-01-01 RX ADMIN — URSODIOL 300 MG: 300 CAPSULE ORAL at 08:43

## 2024-01-01 RX ADMIN — TACROLIMUS 82 MCG/HR: 5 INJECTION, SOLUTION INTRAVENOUS at 08:09

## 2024-01-01 RX ADMIN — GLYCERIN, PETROLATUM, PHENYLEPHRINE HCL, PRAMOXINE HCL: 144; 2.5; 10; 15 CREAM TOPICAL at 23:41

## 2024-01-01 RX ADMIN — LEVETIRACETAM 500 MG: 500 TABLET, FILM COATED ORAL at 10:13

## 2024-01-01 RX ADMIN — TRAZODONE HYDROCHLORIDE 100 MG: 50 TABLET ORAL at 22:59

## 2024-01-01 RX ADMIN — ACYCLOVIR 800 MG: 800 TABLET ORAL at 18:01

## 2024-01-01 RX ADMIN — FUROSEMIDE 10 MG: 10 INJECTION, SOLUTION INTRAMUSCULAR; INTRAVENOUS at 13:13

## 2024-01-01 RX ADMIN — ACYCLOVIR 800 MG: 800 TABLET ORAL at 23:07

## 2024-01-01 RX ADMIN — ONDANSETRON HYDROCHLORIDE 8 MG: 8 TABLET, FILM COATED ORAL at 16:50

## 2024-01-01 RX ADMIN — DEXAMETHASONE 10 MG: 2 TABLET ORAL at 23:03

## 2024-01-01 RX ADMIN — SENNOSIDES 1 TABLET: 8.6 TABLET, FILM COATED ORAL at 23:56

## 2024-01-01 RX ADMIN — MYCOPHENOLATE MOFETIL 1000 MG: 500 INJECTION, POWDER, LYOPHILIZED, FOR SOLUTION INTRAVENOUS at 06:15

## 2024-01-01 RX ADMIN — TENOFOVIR DISOPROXIL FUMARATE 300 MG: 300 TABLET, FILM COATED ORAL at 08:41

## 2024-01-01 RX ADMIN — TRAMADOL HYDROCHLORIDE 50 MG: 50 TABLET, COATED ORAL at 23:42

## 2024-01-01 RX ADMIN — MAGNESIUM SULFATE IN WATER 4 G: 40 INJECTION, SOLUTION INTRAVENOUS at 08:26

## 2024-01-01 RX ADMIN — PANTOPRAZOLE SODIUM 40 MG: 40 INJECTION, POWDER, FOR SOLUTION INTRAVENOUS at 08:44

## 2024-01-01 RX ADMIN — Medication 5 ML: at 08:03

## 2024-01-01 RX ADMIN — GLYCERIN, PETROLATUM, PHENYLEPHRINE HCL, PRAMOXINE HCL: 144; 2.5; 10; 15 CREAM TOPICAL at 13:57

## 2024-01-01 RX ADMIN — ACYCLOVIR 800 MG: 800 TABLET ORAL at 14:11

## 2024-01-01 RX ADMIN — MYCOPHENOLATE MOFETIL 1000 MG: 500 TABLET, FILM COATED ORAL at 14:13

## 2024-01-01 RX ADMIN — FUROSEMIDE 10 MG: 10 INJECTION, SOLUTION INTRAMUSCULAR; INTRAVENOUS at 11:45

## 2024-01-01 RX ADMIN — ONDANSETRON HYDROCHLORIDE 8 MG: 8 TABLET, FILM COATED ORAL at 13:34

## 2024-01-01 RX ADMIN — ONDANSETRON HYDROCHLORIDE 8 MG: 8 TABLET, FILM COATED ORAL at 15:41

## 2024-01-01 RX ADMIN — PANTOPRAZOLE SODIUM 40 MG: 40 INJECTION, POWDER, FOR SOLUTION INTRAVENOUS at 08:56

## 2024-01-01 RX ADMIN — TRAZODONE HYDROCHLORIDE 100 MG: 50 TABLET ORAL at 22:31

## 2024-01-01 RX ADMIN — LEVETIRACETAM 500 MG: 500 TABLET, FILM COATED ORAL at 20:11

## 2024-01-01 RX ADMIN — DEXAMETHASONE 10 MG: 2 TABLET ORAL at 22:55

## 2024-01-01 RX ADMIN — ONDANSETRON HYDROCHLORIDE 8 MG: 8 TABLET, FILM COATED ORAL at 16:05

## 2024-01-01 RX ADMIN — ACYCLOVIR 800 MG: 800 TABLET ORAL at 07:36

## 2024-01-01 RX ADMIN — ONDANSETRON HYDROCHLORIDE 8 MG: 8 TABLET, FILM COATED ORAL at 23:36

## 2024-01-01 RX ADMIN — ACYCLOVIR 800 MG: 800 TABLET ORAL at 11:22

## 2024-01-01 RX ADMIN — Medication 5 ML: at 13:13

## 2024-01-01 RX ADMIN — TENOFOVIR DISOPROXIL FUMARATE 300 MG: 300 TABLET, FILM COATED ORAL at 09:27

## 2024-01-01 RX ADMIN — ACYCLOVIR 800 MG: 800 TABLET ORAL at 22:12

## 2024-01-01 RX ADMIN — TENOFOVIR DISOPROXIL FUMARATE 300 MG: 300 TABLET, FILM COATED ORAL at 08:27

## 2024-01-01 RX ADMIN — Medication 5 ML: at 15:13

## 2024-01-01 RX ADMIN — Medication 5 ML: at 05:06

## 2024-01-01 RX ADMIN — MYCOPHENOLATE MOFETIL 1000 MG: 500 INJECTION, POWDER, LYOPHILIZED, FOR SOLUTION INTRAVENOUS at 05:20

## 2024-01-01 RX ADMIN — Medication 5 ML: at 06:20

## 2024-01-01 RX ADMIN — MYCOPHENOLATE MOFETIL 1000 MG: 500 INJECTION, POWDER, LYOPHILIZED, FOR SOLUTION INTRAVENOUS at 22:10

## 2024-01-01 RX ADMIN — SENNOSIDES 2 TABLET: 8.6 TABLET, FILM COATED ORAL at 08:22

## 2024-01-01 RX ADMIN — LORAZEPAM 0.5 MG: 0.5 TABLET ORAL at 01:11

## 2024-01-01 RX ADMIN — Medication 5 ML: at 11:29

## 2024-01-01 RX ADMIN — PENTAMIDINE ISETHIONATE 300 MG: 300 INHALANT RESPIRATORY (INHALATION) at 09:55

## 2024-01-01 RX ADMIN — URSODIOL 300 MG: 300 CAPSULE ORAL at 23:05

## 2024-01-01 RX ADMIN — HYDROCORTISONE: 10 OINTMENT TOPICAL at 18:09

## 2024-01-01 RX ADMIN — ONDANSETRON HYDROCHLORIDE 8 MG: 8 TABLET, FILM COATED ORAL at 16:14

## 2024-01-01 RX ADMIN — POTASSIUM CHLORIDE 20 MEQ: 750 TABLET, EXTENDED RELEASE ORAL at 05:17

## 2024-01-01 RX ADMIN — LORATADINE 10 MG: 10 TABLET ORAL at 08:19

## 2024-01-01 RX ADMIN — URSODIOL 300 MG: 300 CAPSULE ORAL at 14:24

## 2024-01-01 RX ADMIN — ACYCLOVIR 800 MG: 800 TABLET ORAL at 07:31

## 2024-01-01 RX ADMIN — ACYCLOVIR 800 MG: 800 TABLET ORAL at 13:16

## 2024-01-01 RX ADMIN — ACYCLOVIR 800 MG: 800 TABLET ORAL at 22:55

## 2024-01-01 RX ADMIN — URSODIOL 300 MG: 300 CAPSULE ORAL at 14:13

## 2024-01-01 RX ADMIN — URSODIOL 300 MG: 300 CAPSULE ORAL at 15:34

## 2024-01-01 RX ADMIN — BUSULFAN 234 MG: 6 INJECTION INTRAVENOUS at 00:55

## 2024-01-01 RX ADMIN — PROCHLORPERAZINE EDISYLATE 10 MG: 5 INJECTION INTRAMUSCULAR; INTRAVENOUS at 16:38

## 2024-01-01 RX ADMIN — Medication 5 ML: at 09:26

## 2024-01-01 RX ADMIN — Medication 5 ML: at 12:55

## 2024-01-01 RX ADMIN — LOPERAMIDE HYDROCHLORIDE 2 MG: 2 CAPSULE ORAL at 23:47

## 2024-01-01 RX ADMIN — PANTOPRAZOLE SODIUM 40 MG: 40 TABLET, DELAYED RELEASE ORAL at 08:01

## 2024-01-01 RX ADMIN — LOPERAMIDE HYDROCHLORIDE 2 MG: 2 CAPSULE ORAL at 19:00

## 2024-01-01 RX ADMIN — URSODIOL 300 MG: 300 CAPSULE ORAL at 13:27

## 2024-01-01 RX ADMIN — DEXTROSE AND SODIUM CHLORIDE 1000 ML: 5; 450 INJECTION, SOLUTION INTRAVENOUS at 12:13

## 2024-01-01 RX ADMIN — ONDANSETRON HYDROCHLORIDE 8 MG: 8 TABLET, FILM COATED ORAL at 23:20

## 2024-01-01 RX ADMIN — Medication 5 ML: at 11:09

## 2024-01-01 RX ADMIN — DEXAMETHASONE 6 MG: 2 TABLET ORAL at 13:33

## 2024-01-01 RX ADMIN — DEXTROSE MONOHYDRATE 10 ML: 50 INJECTION, SOLUTION INTRAVENOUS at 21:50

## 2024-01-01 RX ADMIN — TENOFOVIR DISOPROXIL FUMARATE 300 MG: 300 TABLET, FILM COATED ORAL at 07:55

## 2024-01-01 RX ADMIN — ACYCLOVIR SODIUM 650 MG: 50 INJECTION, SOLUTION INTRAVENOUS at 12:53

## 2024-01-01 RX ADMIN — ACYCLOVIR SODIUM 650 MG: 50 INJECTION, SOLUTION INTRAVENOUS at 19:33

## 2024-01-01 RX ADMIN — ALLOPURINOL 300 MG: 300 TABLET ORAL at 10:13

## 2024-01-01 RX ADMIN — ACYCLOVIR 800 MG: 800 TABLET ORAL at 08:02

## 2024-01-01 RX ADMIN — Medication 2 WAFER: at 08:44

## 2024-01-01 RX ADMIN — URSODIOL 300 MG: 300 CAPSULE ORAL at 08:01

## 2024-01-01 RX ADMIN — TRAZODONE HYDROCHLORIDE 100 MG: 50 TABLET ORAL at 21:41

## 2024-01-01 RX ADMIN — CYCLOPHOSPHAMIDE 1600 MG: 2 INJECTION, POWDER, FOR SOLUTION INTRAVENOUS; ORAL at 09:32

## 2024-01-01 RX ADMIN — Medication 950 MG: at 07:55

## 2024-01-01 RX ADMIN — AMLODIPINE BESYLATE 5 MG: 5 TABLET ORAL at 20:21

## 2024-01-01 RX ADMIN — MYCOPHENOLATE MOFETIL 1000 MG: 500 INJECTION, POWDER, LYOPHILIZED, FOR SOLUTION INTRAVENOUS at 13:57

## 2024-01-01 RX ADMIN — POTASSIUM CHLORIDE 20 MEQ: 750 TABLET, EXTENDED RELEASE ORAL at 07:56

## 2024-01-01 RX ADMIN — Medication 399 MG: at 09:27

## 2024-01-01 RX ADMIN — URSODIOL 300 MG: 300 CAPSULE ORAL at 19:45

## 2024-01-01 RX ADMIN — TRAZODONE HYDROCHLORIDE 100 MG: 50 TABLET ORAL at 22:11

## 2024-01-01 RX ADMIN — ONDANSETRON HYDROCHLORIDE 8 MG: 8 TABLET, FILM COATED ORAL at 10:13

## 2024-01-01 RX ADMIN — TENOFOVIR DISOPROXIL FUMARATE 300 MG: 300 TABLET, FILM COATED ORAL at 11:18

## 2024-01-01 RX ADMIN — AZACITIDINE 35 MG: 100 INJECTION, POWDER, LYOPHILIZED, FOR SOLUTION INTRAVENOUS; SUBCUTANEOUS at 09:29

## 2024-01-01 RX ADMIN — LORATADINE 10 MG: 10 TABLET ORAL at 10:25

## 2024-01-01 RX ADMIN — LEVETIRACETAM 500 MG: 500 TABLET, FILM COATED ORAL at 20:02

## 2024-01-01 RX ADMIN — TRAZODONE HYDROCHLORIDE 100 MG: 50 TABLET ORAL at 22:30

## 2024-01-01 RX ADMIN — FUROSEMIDE 10 MG: 10 INJECTION, SOLUTION INTRAMUSCULAR; INTRAVENOUS at 22:16

## 2024-01-01 RX ADMIN — TACROLIMUS 110 MCG/HR: 5 INJECTION, SOLUTION INTRAVENOUS at 20:16

## 2024-01-01 RX ADMIN — ONDANSETRON HYDROCHLORIDE 8 MG: 8 TABLET, FILM COATED ORAL at 17:08

## 2024-01-01 RX ADMIN — Medication 5 ML: at 08:40

## 2024-01-01 RX ADMIN — PROCHLORPERAZINE MALEATE 5 MG: 5 TABLET ORAL at 18:25

## 2024-01-01 RX ADMIN — ONDANSETRON HYDROCHLORIDE 8 MG: 8 TABLET, FILM COATED ORAL at 13:19

## 2024-01-01 RX ADMIN — URSODIOL 300 MG: 300 CAPSULE ORAL at 14:03

## 2024-01-01 RX ADMIN — URSODIOL 300 MG: 300 CAPSULE ORAL at 07:36

## 2024-01-01 RX ADMIN — Medication 2 WAFER: at 07:30

## 2024-01-01 RX ADMIN — ONDANSETRON HYDROCHLORIDE 8 MG: 8 TABLET, FILM COATED ORAL at 15:03

## 2024-01-01 RX ADMIN — Medication 2 WAFER: at 08:35

## 2024-01-01 RX ADMIN — Medication 5 ML: at 03:37

## 2024-01-01 RX ADMIN — ACETAMINOPHEN 650 MG: 325 TABLET, FILM COATED ORAL at 08:00

## 2024-01-01 RX ADMIN — PANTOPRAZOLE SODIUM 40 MG: 40 TABLET, DELAYED RELEASE ORAL at 08:27

## 2024-01-01 RX ADMIN — Medication 5 ML: at 14:11

## 2024-01-01 RX ADMIN — ACYCLOVIR SODIUM 650 MG: 50 INJECTION, SOLUTION INTRAVENOUS at 19:44

## 2024-01-01 RX ADMIN — TACROLIMUS 80 MCG/HR: 5 INJECTION, SOLUTION INTRAVENOUS at 19:46

## 2024-01-01 RX ADMIN — MICAFUNGIN SODIUM 50 MG: 50 INJECTION, POWDER, LYOPHILIZED, FOR SOLUTION INTRAVENOUS at 07:58

## 2024-01-01 RX ADMIN — ONDANSETRON HYDROCHLORIDE 8 MG: 8 TABLET, FILM COATED ORAL at 19:01

## 2024-01-01 RX ADMIN — URSODIOL 300 MG: 300 CAPSULE ORAL at 13:33

## 2024-01-01 RX ADMIN — Medication 950 MG: at 09:26

## 2024-01-01 RX ADMIN — TACROLIMUS 82 MCG/HR: 5 INJECTION, SOLUTION INTRAVENOUS at 19:37

## 2024-01-01 RX ADMIN — LETERMOVIR 480 MG: 480 TABLET, FILM COATED ORAL at 09:26

## 2024-01-01 RX ADMIN — Medication 5 ML: at 15:11

## 2024-01-01 RX ADMIN — Medication 2 WAFER: at 08:01

## 2024-01-01 RX ADMIN — ACYCLOVIR 800 MG: 800 TABLET ORAL at 17:08

## 2024-01-01 RX ADMIN — URSODIOL 300 MG: 300 CAPSULE ORAL at 08:00

## 2024-01-01 RX ADMIN — PHENOL 1 ML: 1.5 LIQUID ORAL at 08:16

## 2024-01-01 RX ADMIN — MYCOPHENOLATE MOFETIL 1000 MG: 500 INJECTION, POWDER, LYOPHILIZED, FOR SOLUTION INTRAVENOUS at 06:12

## 2024-01-01 RX ADMIN — Medication 5 ML: at 14:24

## 2024-01-01 RX ADMIN — FILGRASTIM-AAFI 300 MCG: 300 INJECTION, SOLUTION INTRAVENOUS; SUBCUTANEOUS at 20:16

## 2024-01-01 RX ADMIN — PANTOPRAZOLE SODIUM 40 MG: 40 TABLET, DELAYED RELEASE ORAL at 09:27

## 2024-01-01 RX ADMIN — ONDANSETRON HYDROCHLORIDE 8 MG: 8 TABLET, FILM COATED ORAL at 15:37

## 2024-01-01 RX ADMIN — Medication 5 ML: at 10:54

## 2024-01-01 RX ADMIN — ACYCLOVIR 800 MG: 800 TABLET ORAL at 14:24

## 2024-01-01 RX ADMIN — ONDANSETRON HYDROCHLORIDE 8 MG: 8 TABLET, FILM COATED ORAL at 23:38

## 2024-01-01 RX ADMIN — ACYCLOVIR 800 MG: 800 TABLET ORAL at 18:00

## 2024-01-01 RX ADMIN — Medication 5 ML: at 08:38

## 2024-01-01 RX ADMIN — GLYCERIN, PETROLATUM, PHENYLEPHRINE HCL, PRAMOXINE HCL: 144; 2.5; 10; 15 CREAM TOPICAL at 13:40

## 2024-01-01 RX ADMIN — PROCHLORPERAZINE MALEATE 5 MG: 5 TABLET ORAL at 13:53

## 2024-01-01 RX ADMIN — ACYCLOVIR 800 MG: 800 TABLET ORAL at 08:22

## 2024-01-01 RX ADMIN — MICAFUNGIN SODIUM 50 MG: 50 INJECTION, POWDER, LYOPHILIZED, FOR SOLUTION INTRAVENOUS at 08:36

## 2024-01-01 RX ADMIN — ACYCLOVIR 800 MG: 800 TABLET ORAL at 08:00

## 2024-01-01 RX ADMIN — URSODIOL 300 MG: 300 CAPSULE ORAL at 09:26

## 2024-01-01 RX ADMIN — FUROSEMIDE 10 MG: 10 INJECTION, SOLUTION INTRAMUSCULAR; INTRAVENOUS at 03:14

## 2024-01-01 RX ADMIN — ACYCLOVIR 800 MG: 800 TABLET ORAL at 21:53

## 2024-01-01 RX ADMIN — TENOFOVIR DISOPROXIL FUMARATE 300 MG: 300 TABLET, FILM COATED ORAL at 07:57

## 2024-01-01 RX ADMIN — MYCOPHENOLATE MOFETIL 1000 MG: 500 INJECTION, POWDER, LYOPHILIZED, FOR SOLUTION INTRAVENOUS at 06:14

## 2024-01-01 RX ADMIN — PANTOPRAZOLE SODIUM 40 MG: 40 TABLET, DELAYED RELEASE ORAL at 07:40

## 2024-01-01 RX ADMIN — LOPERAMIDE HYDROCHLORIDE 2 MG: 2 CAPSULE ORAL at 00:09

## 2024-01-01 RX ADMIN — PANTOPRAZOLE SODIUM 40 MG: 40 TABLET, DELAYED RELEASE ORAL at 08:34

## 2024-01-01 RX ADMIN — TRAZODONE HYDROCHLORIDE 100 MG: 50 TABLET ORAL at 21:53

## 2024-01-01 RX ADMIN — BUSULFAN 234 MG: 6 INJECTION INTRAVENOUS at 00:49

## 2024-01-01 RX ADMIN — Medication 2 WAFER: at 07:54

## 2024-01-01 RX ADMIN — ONDANSETRON HYDROCHLORIDE 8 MG: 8 TABLET, FILM COATED ORAL at 20:29

## 2024-01-01 RX ADMIN — Medication 5 ML: at 08:01

## 2024-01-01 RX ADMIN — Medication 5 ML: at 04:39

## 2024-01-01 RX ADMIN — URSODIOL 300 MG: 300 CAPSULE ORAL at 07:55

## 2024-01-01 RX ADMIN — ONDANSETRON HYDROCHLORIDE 8 MG: 8 TABLET, FILM COATED ORAL at 21:33

## 2024-01-01 RX ADMIN — TRAZODONE HYDROCHLORIDE 100 MG: 50 TABLET ORAL at 22:55

## 2024-01-01 RX ADMIN — Medication 1 LOZENGE: at 14:34

## 2024-01-01 RX ADMIN — URSODIOL 300 MG: 300 CAPSULE ORAL at 22:26

## 2024-01-01 RX ADMIN — PROCHLORPERAZINE MALEATE 5 MG: 5 TABLET ORAL at 11:22

## 2024-01-01 RX ADMIN — AMLODIPINE BESYLATE 5 MG: 5 TABLET ORAL at 22:16

## 2024-01-01 RX ADMIN — OXYCODONE HYDROCHLORIDE 10 MG: 5 TABLET ORAL at 20:26

## 2024-01-01 RX ADMIN — MYCOPHENOLATE MOFETIL 1000 MG: 500 INJECTION, POWDER, LYOPHILIZED, FOR SOLUTION INTRAVENOUS at 06:03

## 2024-01-01 RX ADMIN — ONDANSETRON HYDROCHLORIDE 8 MG: 8 TABLET, FILM COATED ORAL at 15:34

## 2024-01-01 RX ADMIN — LOPERAMIDE HYDROCHLORIDE 2 MG: 2 CAPSULE ORAL at 11:41

## 2024-01-01 RX ADMIN — MICAFUNGIN SODIUM 100 MG: 50 INJECTION, POWDER, LYOPHILIZED, FOR SOLUTION INTRAVENOUS at 08:05

## 2024-01-01 RX ADMIN — GLYCERIN, PETROLATUM, PHENYLEPHRINE HCL, PRAMOXINE HCL: 144; 2.5; 10; 15 CREAM TOPICAL at 21:23

## 2024-01-01 RX ADMIN — MICAFUNGIN SODIUM 150 MG: 50 INJECTION, POWDER, LYOPHILIZED, FOR SOLUTION INTRAVENOUS at 08:39

## 2024-01-01 RX ADMIN — Medication 5 ML: at 04:29

## 2024-01-01 RX ADMIN — SENNOSIDES 2 TABLET: 8.6 TABLET, FILM COATED ORAL at 08:45

## 2024-01-01 RX ADMIN — LEVOFLOXACIN 250 MG: 250 TABLET, FILM COATED ORAL at 08:40

## 2024-01-01 RX ADMIN — PANTOPRAZOLE SODIUM 40 MG: 40 TABLET, DELAYED RELEASE ORAL at 07:36

## 2024-01-01 RX ADMIN — ACYCLOVIR 800 MG: 800 TABLET ORAL at 14:04

## 2024-01-01 RX ADMIN — ONDANSETRON HYDROCHLORIDE 8 MG: 8 TABLET, FILM COATED ORAL at 23:07

## 2024-01-01 RX ADMIN — ACYCLOVIR 800 MG: 800 TABLET ORAL at 07:42

## 2024-01-01 RX ADMIN — ACYCLOVIR 800 MG: 800 TABLET ORAL at 15:34

## 2024-01-01 RX ADMIN — MICAFUNGIN SODIUM 150 MG: 50 INJECTION, POWDER, LYOPHILIZED, FOR SOLUTION INTRAVENOUS at 08:15

## 2024-01-01 RX ADMIN — URSODIOL 300 MG: 300 CAPSULE ORAL at 08:23

## 2024-01-01 RX ADMIN — ACYCLOVIR 800 MG: 800 TABLET ORAL at 17:54

## 2024-01-01 RX ADMIN — Medication 950 MG: at 09:28

## 2024-01-01 RX ADMIN — Medication 5 ML: at 08:00

## 2024-01-01 RX ADMIN — Medication 5 ML: at 03:43

## 2024-01-01 RX ADMIN — AMLODIPINE BESYLATE 5 MG: 5 TABLET ORAL at 20:24

## 2024-01-01 RX ADMIN — Medication 950 MG: at 08:18

## 2024-01-01 RX ADMIN — TRAZODONE HYDROCHLORIDE 100 MG: 50 TABLET ORAL at 23:36

## 2024-01-01 RX ADMIN — LEVOFLOXACIN 250 MG: 5 INJECTION, SOLUTION INTRAVENOUS at 14:02

## 2024-01-01 RX ADMIN — DIPHENHYDRAMINE HYDROCHLORIDE AND LIDOCAINE HYDROCHLORIDE AND ALUMINUM HYDROXIDE AND MAGNESIUM HYDRO 10 ML: KIT at 06:20

## 2024-01-01 RX ADMIN — TRAZODONE HYDROCHLORIDE 100 MG: 50 TABLET ORAL at 22:12

## 2024-01-01 RX ADMIN — MICAFUNGIN SODIUM 50 MG: 50 INJECTION, POWDER, LYOPHILIZED, FOR SOLUTION INTRAVENOUS at 08:32

## 2024-01-01 RX ADMIN — ONDANSETRON HYDROCHLORIDE 8 MG: 8 TABLET, FILM COATED ORAL at 22:13

## 2024-01-01 RX ADMIN — DEXAMETHASONE 10 MG: 2 TABLET ORAL at 23:16

## 2024-01-01 RX ADMIN — AMLODIPINE BESYLATE 5 MG: 5 TABLET ORAL at 22:12

## 2024-01-01 RX ADMIN — MYCOPHENOLATE MOFETIL 1000 MG: 500 INJECTION, POWDER, LYOPHILIZED, FOR SOLUTION INTRAVENOUS at 14:05

## 2024-01-01 RX ADMIN — PHENOL 1 ML: 1.5 LIQUID ORAL at 08:01

## 2024-01-01 RX ADMIN — OXYCODONE HYDROCHLORIDE 5 MG: 5 TABLET ORAL at 15:55

## 2024-01-01 RX ADMIN — DEXTROSE AND SODIUM CHLORIDE 1000 ML: 5; 450 INJECTION, SOLUTION INTRAVENOUS at 17:35

## 2024-01-01 RX ADMIN — DEXTROSE MONOHYDRATE 330 MCG: 50 INJECTION, SOLUTION INTRAVENOUS at 19:38

## 2024-01-01 RX ADMIN — ONDANSETRON HYDROCHLORIDE 8 MG: 8 TABLET, FILM COATED ORAL at 15:36

## 2024-01-01 RX ADMIN — URSODIOL 300 MG: 300 CAPSULE ORAL at 22:46

## 2024-01-01 RX ADMIN — URSODIOL 300 MG: 300 CAPSULE ORAL at 07:57

## 2024-01-01 RX ADMIN — URSODIOL 300 MG: 300 CAPSULE ORAL at 13:58

## 2024-01-01 RX ADMIN — MICAFUNGIN SODIUM 100 MG: 50 INJECTION, POWDER, LYOPHILIZED, FOR SOLUTION INTRAVENOUS at 07:35

## 2024-01-01 RX ADMIN — AMLODIPINE BESYLATE 5 MG: 5 TABLET ORAL at 22:59

## 2024-01-01 RX ADMIN — GLYCERIN, PETROLATUM, PHENYLEPHRINE HCL, PRAMOXINE HCL: 144; 2.5; 10; 15 CREAM TOPICAL at 13:16

## 2024-01-01 RX ADMIN — Medication 5 ML: at 09:10

## 2024-01-01 RX ADMIN — GLYCERIN, PETROLATUM, PHENYLEPHRINE HCL, PRAMOXINE HCL: 144; 2.5; 10; 15 CREAM TOPICAL at 08:06

## 2024-01-01 RX ADMIN — MYCOPHENOLATE MOFETIL 1000 MG: 500 INJECTION, POWDER, LYOPHILIZED, FOR SOLUTION INTRAVENOUS at 22:25

## 2024-01-01 RX ADMIN — TENOFOVIR DISOPROXIL FUMARATE 300 MG: 300 TABLET, FILM COATED ORAL at 08:16

## 2024-01-01 RX ADMIN — MYCOPHENOLATE MOFETIL 1000 MG: 500 INJECTION, POWDER, LYOPHILIZED, FOR SOLUTION INTRAVENOUS at 21:53

## 2024-01-01 RX ADMIN — PROCHLORPERAZINE MALEATE 5 MG: 5 TABLET ORAL at 07:42

## 2024-01-01 RX ADMIN — LOPERAMIDE HYDROCHLORIDE 2 MG: 2 CAPSULE ORAL at 21:03

## 2024-01-01 RX ADMIN — Medication 2 LOZENGE: at 21:32

## 2024-01-01 RX ADMIN — TRAZODONE HYDROCHLORIDE 100 MG: 50 TABLET ORAL at 21:46

## 2024-01-01 RX ADMIN — URSODIOL 300 MG: 300 CAPSULE ORAL at 20:11

## 2024-01-01 RX ADMIN — DEXTROSE AND SODIUM CHLORIDE 1000 ML: 5; 450 INJECTION, SOLUTION INTRAVENOUS at 21:29

## 2024-01-01 RX ADMIN — Medication 950 MG: at 08:01

## 2024-01-01 RX ADMIN — AMLODIPINE BESYLATE 5 MG: 5 TABLET ORAL at 22:30

## 2024-01-01 RX ADMIN — ACYCLOVIR 800 MG: 800 TABLET ORAL at 11:28

## 2024-01-01 RX ADMIN — PANTOPRAZOLE SODIUM 40 MG: 40 TABLET, DELAYED RELEASE ORAL at 07:56

## 2024-01-01 RX ADMIN — ACYCLOVIR 800 MG: 800 TABLET ORAL at 22:26

## 2024-01-01 RX ADMIN — AMLODIPINE BESYLATE 5 MG: 5 TABLET ORAL at 20:32

## 2024-01-01 RX ADMIN — OLANZAPINE 5 MG: 2.5 TABLET, FILM COATED ORAL at 20:32

## 2024-01-01 RX ADMIN — Medication 5 ML: at 15:12

## 2024-01-01 RX ADMIN — MYCOPHENOLATE MOFETIL 1000 MG: 500 INJECTION, POWDER, LYOPHILIZED, FOR SOLUTION INTRAVENOUS at 14:08

## 2024-01-01 RX ADMIN — TACROLIMUS 82 MCG/HR: 5 INJECTION, SOLUTION INTRAVENOUS at 20:25

## 2024-01-01 RX ADMIN — LEVOFLOXACIN 250 MG: 250 TABLET, FILM COATED ORAL at 14:22

## 2024-01-01 RX ADMIN — LETERMOVIR 480 MG: 480 TABLET, FILM COATED ORAL at 07:55

## 2024-01-01 RX ADMIN — ACYCLOVIR 800 MG: 800 TABLET ORAL at 08:27

## 2024-01-01 RX ADMIN — GLYCERIN, PETROLATUM, PHENYLEPHRINE HCL, PRAMOXINE HCL: 144; 2.5; 10; 15 CREAM TOPICAL at 15:07

## 2024-01-01 RX ADMIN — MICAFUNGIN SODIUM 150 MG: 50 INJECTION, POWDER, LYOPHILIZED, FOR SOLUTION INTRAVENOUS at 10:34

## 2024-01-01 RX ADMIN — HYDROMORPHONE HYDROCHLORIDE 0.5 MG: 0.5 INJECTION, SOLUTION INTRAMUSCULAR; INTRAVENOUS; SUBCUTANEOUS at 14:27

## 2024-01-01 RX ADMIN — MICAFUNGIN SODIUM 50 MG: 50 INJECTION, POWDER, LYOPHILIZED, FOR SOLUTION INTRAVENOUS at 08:21

## 2024-01-01 RX ADMIN — PENTAMIDINE ISETHIONATE 300 MG: 300 INHALANT RESPIRATORY (INHALATION) at 09:32

## 2024-01-01 RX ADMIN — URSODIOL 300 MG: 300 CAPSULE ORAL at 13:01

## 2024-01-01 RX ADMIN — FILGRASTIM-AAFI 300 MCG: 300 INJECTION, SOLUTION INTRAVENOUS; SUBCUTANEOUS at 20:24

## 2024-01-01 RX ADMIN — ACYCLOVIR SODIUM 650 MG: 50 INJECTION, SOLUTION INTRAVENOUS at 03:31

## 2024-01-01 RX ADMIN — AMLODIPINE BESYLATE 5 MG: 5 TABLET ORAL at 19:56

## 2024-01-01 RX ADMIN — ONDANSETRON HYDROCHLORIDE 8 MG: 8 TABLET, FILM COATED ORAL at 23:56

## 2024-01-01 RX ADMIN — GLYCERIN, PETROLATUM, PHENYLEPHRINE HCL, PRAMOXINE HCL: 144; 2.5; 10; 15 CREAM TOPICAL at 20:04

## 2024-01-01 RX ADMIN — URSODIOL 300 MG: 300 CAPSULE ORAL at 14:05

## 2024-01-01 RX ADMIN — URSODIOL 300 MG: 300 CAPSULE ORAL at 10:14

## 2024-01-01 RX ADMIN — ACYCLOVIR 800 MG: 800 TABLET ORAL at 11:01

## 2024-01-01 RX ADMIN — TENOFOVIR DISOPROXIL FUMARATE 300 MG: 300 TABLET, FILM COATED ORAL at 14:03

## 2024-01-01 RX ADMIN — Medication 10 ML: at 04:11

## 2024-01-01 RX ADMIN — LORAZEPAM 0.5 MG: 0.5 TABLET ORAL at 23:44

## 2024-01-01 RX ADMIN — Medication 5 ML: at 10:10

## 2024-01-01 RX ADMIN — URSODIOL 300 MG: 300 CAPSULE ORAL at 20:32

## 2024-01-01 RX ADMIN — FILGRASTIM-AAFI 300 MCG: 300 INJECTION, SOLUTION INTRAVENOUS; SUBCUTANEOUS at 20:29

## 2024-01-01 RX ADMIN — ACYCLOVIR 800 MG: 800 TABLET ORAL at 07:56

## 2024-01-01 RX ADMIN — ACYCLOVIR 800 MG: 800 TABLET ORAL at 18:21

## 2024-01-01 RX ADMIN — SENNOSIDES 2 TABLET: 8.6 TABLET, FILM COATED ORAL at 08:05

## 2024-01-01 RX ADMIN — MIDAZOLAM 1 MG: 1 INJECTION INTRAMUSCULAR; INTRAVENOUS at 09:39

## 2024-01-01 RX ADMIN — PROCHLORPERAZINE EDISYLATE 10 MG: 5 INJECTION INTRAMUSCULAR; INTRAVENOUS at 01:07

## 2024-01-01 RX ADMIN — LEVOFLOXACIN 250 MG: 5 INJECTION, SOLUTION INTRAVENOUS at 12:54

## 2024-01-01 RX ADMIN — LOPERAMIDE HYDROCHLORIDE 2 MG: 2 CAPSULE ORAL at 12:42

## 2024-01-01 RX ADMIN — PANTOPRAZOLE SODIUM 40 MG: 40 INJECTION, POWDER, FOR SOLUTION INTRAVENOUS at 08:01

## 2024-01-01 RX ADMIN — PROCHLORPERAZINE MALEATE 10 MG: 5 TABLET ORAL at 08:33

## 2024-01-01 RX ADMIN — MICAFUNGIN SODIUM 100 MG: 50 INJECTION, POWDER, LYOPHILIZED, FOR SOLUTION INTRAVENOUS at 09:35

## 2024-01-01 RX ADMIN — Medication: at 17:55

## 2024-01-01 RX ADMIN — PROCHLORPERAZINE EDISYLATE 10 MG: 5 INJECTION INTRAMUSCULAR; INTRAVENOUS at 16:56

## 2024-01-01 RX ADMIN — Medication 5 ML: at 04:06

## 2024-01-01 RX ADMIN — Medication 5 ML: at 08:20

## 2024-01-01 RX ADMIN — Medication 2 WAFER: at 08:42

## 2024-01-01 RX ADMIN — LORATADINE 10 MG: 10 TABLET ORAL at 08:43

## 2024-01-01 RX ADMIN — LEVOFLOXACIN 250 MG: 250 TABLET, FILM COATED ORAL at 14:11

## 2024-01-01 RX ADMIN — BUSULFAN 192 MG: 6 INJECTION, SOLUTION INTRAVENOUS at 00:53

## 2024-01-01 RX ADMIN — ACYCLOVIR SODIUM 325 MG: 500 INJECTION, SOLUTION INTRAVENOUS at 08:55

## 2024-01-01 RX ADMIN — SENNOSIDES 2 TABLET: 8.6 TABLET, FILM COATED ORAL at 20:31

## 2024-01-01 RX ADMIN — MYCOPHENOLATE MOFETIL 1000 MG: 500 TABLET, FILM COATED ORAL at 21:35

## 2024-01-01 RX ADMIN — LORATADINE 10 MG: 10 TABLET ORAL at 07:55

## 2024-01-01 RX ADMIN — MICAFUNGIN SODIUM 150 MG: 50 INJECTION, POWDER, LYOPHILIZED, FOR SOLUTION INTRAVENOUS at 08:46

## 2024-01-01 RX ADMIN — ALLOPURINOL 300 MG: 300 TABLET ORAL at 17:55

## 2024-01-01 RX ADMIN — ACYCLOVIR 800 MG: 800 TABLET ORAL at 11:25

## 2024-01-01 RX ADMIN — DIPHENHYDRAMINE HYDROCHLORIDE AND LIDOCAINE HYDROCHLORIDE AND ALUMINUM HYDROXIDE AND MAGNESIUM HYDRO 10 ML: KIT at 14:34

## 2024-01-01 RX ADMIN — URSODIOL 300 MG: 300 CAPSULE ORAL at 20:21

## 2024-01-01 RX ADMIN — ACYCLOVIR 800 MG: 800 TABLET ORAL at 08:19

## 2024-01-01 RX ADMIN — GLYCERIN, PETROLATUM, PHENYLEPHRINE HCL, PRAMOXINE HCL: 144; 2.5; 10; 15 CREAM TOPICAL at 09:36

## 2024-01-01 RX ADMIN — Medication 2.5 ML: at 10:12

## 2024-01-01 RX ADMIN — DIPHENHYDRAMINE HYDROCHLORIDE AND LIDOCAINE HYDROCHLORIDE AND ALUMINUM HYDROXIDE AND MAGNESIUM HYDRO 10 ML: KIT at 14:22

## 2024-01-01 RX ADMIN — TENOFOVIR DISOPROXIL FUMARATE 300 MG: 300 TABLET, FILM COATED ORAL at 08:00

## 2024-01-01 RX ADMIN — URSODIOL 300 MG: 300 CAPSULE ORAL at 22:15

## 2024-01-01 RX ADMIN — Medication 5 ML: at 22:59

## 2024-01-01 RX ADMIN — Medication 5 ML: at 14:06

## 2024-01-01 RX ADMIN — PROCHLORPERAZINE MALEATE 5 MG: 5 TABLET ORAL at 08:22

## 2024-01-01 RX ADMIN — LORATADINE 10 MG: 10 TABLET ORAL at 08:01

## 2024-01-01 RX ADMIN — GLYCERIN, PETROLATUM, PHENYLEPHRINE HCL, PRAMOXINE HCL: 144; 2.5; 10; 15 CREAM TOPICAL at 07:29

## 2024-01-01 RX ADMIN — MYCOPHENOLATE MOFETIL 1000 MG: 500 INJECTION, POWDER, LYOPHILIZED, FOR SOLUTION INTRAVENOUS at 21:10

## 2024-01-01 RX ADMIN — ACYCLOVIR 800 MG: 800 TABLET ORAL at 17:13

## 2024-01-01 RX ADMIN — URSODIOL 300 MG: 300 CAPSULE ORAL at 13:40

## 2024-01-01 RX ADMIN — ACETAMINOPHEN 650 MG: 325 TABLET, FILM COATED ORAL at 08:13

## 2024-01-01 RX ADMIN — URSODIOL 300 MG: 300 CAPSULE ORAL at 13:28

## 2024-01-01 RX ADMIN — ACYCLOVIR SODIUM 650 MG: 50 INJECTION, SOLUTION INTRAVENOUS at 04:00

## 2024-01-01 RX ADMIN — Medication 2 WAFER: at 08:16

## 2024-01-01 RX ADMIN — AMLODIPINE BESYLATE 5 MG: 5 TABLET ORAL at 21:46

## 2024-01-01 RX ADMIN — Medication 5 ML: at 19:48

## 2024-01-01 RX ADMIN — ACYCLOVIR 800 MG: 800 TABLET ORAL at 08:41

## 2024-01-01 RX ADMIN — ACETAMINOPHEN 500 MG: 500 TABLET ORAL at 11:56

## 2024-01-01 RX ADMIN — LEVETIRACETAM 500 MG: 500 TABLET, FILM COATED ORAL at 08:23

## 2024-01-01 RX ADMIN — Medication 266 MG: at 07:55

## 2024-01-01 RX ADMIN — Medication 5 ML: at 08:34

## 2024-01-01 RX ADMIN — ALLOPURINOL 300 MG: 300 TABLET ORAL at 08:23

## 2024-01-01 RX ADMIN — DIPHENHYDRAMINE HYDROCHLORIDE AND LIDOCAINE HYDROCHLORIDE AND ALUMINUM HYDROXIDE AND MAGNESIUM HYDRO 10 ML: KIT at 08:40

## 2024-01-01 RX ADMIN — Medication 950 MG: at 08:44

## 2024-01-01 RX ADMIN — PANTOPRAZOLE SODIUM 40 MG: 40 TABLET, DELAYED RELEASE ORAL at 08:02

## 2024-01-01 RX ADMIN — ACYCLOVIR 800 MG: 800 TABLET ORAL at 13:27

## 2024-01-01 RX ADMIN — MICAFUNGIN SODIUM 150 MG: 50 INJECTION, POWDER, LYOPHILIZED, FOR SOLUTION INTRAVENOUS at 08:38

## 2024-01-01 RX ADMIN — ACYCLOVIR 800 MG: 800 TABLET ORAL at 13:40

## 2024-01-01 RX ADMIN — ONDANSETRON HYDROCHLORIDE 8 MG: 8 TABLET, FILM COATED ORAL at 15:39

## 2024-01-01 RX ADMIN — LEVETIRACETAM 500 MG: 500 TABLET, FILM COATED ORAL at 19:45

## 2024-01-01 RX ADMIN — PROCHLORPERAZINE MALEATE 10 MG: 5 TABLET ORAL at 11:28

## 2024-01-01 RX ADMIN — DEXTROSE MONOHYDRATE 10 ML: 50 INJECTION, SOLUTION INTRAVENOUS at 19:38

## 2024-01-01 RX ADMIN — AZACITIDINE 35 MG: 100 INJECTION, POWDER, LYOPHILIZED, FOR SOLUTION INTRAVENOUS; SUBCUTANEOUS at 10:48

## 2024-01-01 RX ADMIN — MICAFUNGIN SODIUM 50 MG: 50 INJECTION, POWDER, LYOPHILIZED, FOR SOLUTION INTRAVENOUS at 08:23

## 2024-01-01 RX ADMIN — Medication 5 ML: at 09:18

## 2024-01-01 RX ADMIN — ACYCLOVIR 800 MG: 800 TABLET ORAL at 22:32

## 2024-01-01 RX ADMIN — POTASSIUM CHLORIDE 20 MEQ: 29.8 INJECTION, SOLUTION INTRAVENOUS at 08:39

## 2024-01-01 RX ADMIN — ONDANSETRON HYDROCHLORIDE 8 MG: 8 TABLET, FILM COATED ORAL at 07:06

## 2024-01-01 RX ADMIN — LEVOFLOXACIN 250 MG: 250 TABLET, FILM COATED ORAL at 13:40

## 2024-01-01 RX ADMIN — URSODIOL 300 MG: 300 CAPSULE ORAL at 21:47

## 2024-01-01 RX ADMIN — Medication 1 LOZENGE: at 17:05

## 2024-01-01 RX ADMIN — ONDANSETRON HYDROCHLORIDE 8 MG: 8 TABLET, FILM COATED ORAL at 16:38

## 2024-01-01 RX ADMIN — URSODIOL 300 MG: 300 CAPSULE ORAL at 15:03

## 2024-01-01 RX ADMIN — ONDANSETRON HYDROCHLORIDE 8 MG: 8 TABLET, FILM COATED ORAL at 07:28

## 2024-01-01 RX ADMIN — ACYCLOVIR 800 MG: 800 TABLET ORAL at 08:43

## 2024-01-01 RX ADMIN — URSODIOL 300 MG: 300 CAPSULE ORAL at 07:31

## 2024-01-01 RX ADMIN — CYCLOPHOSPHAMIDE 1600 MG: 2 INJECTION, POWDER, FOR SOLUTION INTRAVENOUS; ORAL at 10:06

## 2024-01-01 RX ADMIN — ACYCLOVIR 800 MG: 800 TABLET ORAL at 22:11

## 2024-01-01 RX ADMIN — Medication 5 ML: at 15:10

## 2024-01-01 RX ADMIN — URSODIOL 300 MG: 300 CAPSULE ORAL at 08:27

## 2024-01-01 RX ADMIN — Medication 5 ML: at 13:40

## 2024-01-01 RX ADMIN — TACROLIMUS 2.5 MG: 1 CAPSULE ORAL at 20:01

## 2024-01-01 RX ADMIN — MYCOPHENOLATE MOFETIL 1000 MG: 500 INJECTION, POWDER, LYOPHILIZED, FOR SOLUTION INTRAVENOUS at 22:46

## 2024-01-01 RX ADMIN — GLYCERIN, PETROLATUM, PHENYLEPHRINE HCL, PRAMOXINE HCL: 144; 2.5; 10; 15 CREAM TOPICAL at 08:29

## 2024-01-01 RX ADMIN — HYDROMORPHONE HYDROCHLORIDE 0.5 MG: 0.5 INJECTION, SOLUTION INTRAMUSCULAR; INTRAVENOUS; SUBCUTANEOUS at 09:19

## 2024-01-01 RX ADMIN — URSODIOL 300 MG: 300 CAPSULE ORAL at 08:16

## 2024-01-01 RX ADMIN — TENOFOVIR DISOPROXIL FUMARATE 300 MG: 300 TABLET, FILM COATED ORAL at 08:18

## 2024-01-01 RX ADMIN — ONDANSETRON HYDROCHLORIDE 8 MG: 8 TABLET, FILM COATED ORAL at 08:01

## 2024-01-01 RX ADMIN — Medication 5 ML: at 12:57

## 2024-01-01 RX ADMIN — TRAZODONE HYDROCHLORIDE 100 MG: 50 TABLET ORAL at 22:26

## 2024-01-01 RX ADMIN — GLYCERIN, PETROLATUM, PHENYLEPHRINE HCL, PRAMOXINE HCL: 144; 2.5; 10; 15 CREAM TOPICAL at 07:31

## 2024-01-01 RX ADMIN — AMLODIPINE BESYLATE 5 MG: 5 TABLET ORAL at 23:05

## 2024-01-01 RX ADMIN — ONDANSETRON HYDROCHLORIDE 8 MG: 8 TABLET, FILM COATED ORAL at 08:33

## 2024-01-01 RX ADMIN — ALLOPURINOL 300 MG: 300 TABLET ORAL at 07:40

## 2024-01-01 RX ADMIN — ACYCLOVIR 800 MG: 800 TABLET ORAL at 17:52

## 2024-01-01 RX ADMIN — DEXTROSE AND SODIUM CHLORIDE 1000 ML: 5; 450 INJECTION, SOLUTION INTRAVENOUS at 01:12

## 2024-01-01 RX ADMIN — MYCOPHENOLATE MOFETIL 1000 MG: 500 INJECTION, POWDER, LYOPHILIZED, FOR SOLUTION INTRAVENOUS at 22:32

## 2024-01-01 RX ADMIN — TRAZODONE HYDROCHLORIDE 100 MG: 50 TABLET ORAL at 22:46

## 2024-01-01 RX ADMIN — GLYCERIN, PETROLATUM, PHENYLEPHRINE HCL, PRAMOXINE HCL: 144; 2.5; 10; 15 CREAM TOPICAL at 20:26

## 2024-01-01 RX ADMIN — SENNOSIDES 2 TABLET: 8.6 TABLET, FILM COATED ORAL at 18:33

## 2024-01-01 RX ADMIN — GLYCERIN, PETROLATUM, PHENYLEPHRINE HCL, PRAMOXINE HCL: 144; 2.5; 10; 15 CREAM TOPICAL at 07:37

## 2024-01-01 RX ADMIN — GLYCERIN, PETROLATUM, PHENYLEPHRINE HCL, PRAMOXINE HCL: 144; 2.5; 10; 15 CREAM TOPICAL at 20:34

## 2024-01-01 RX ADMIN — AMLODIPINE BESYLATE 5 MG: 5 TABLET ORAL at 20:11

## 2024-01-01 RX ADMIN — FILGRASTIM-AAFI 300 MCG: 300 INJECTION, SOLUTION INTRAVENOUS; SUBCUTANEOUS at 19:33

## 2024-01-01 RX ADMIN — TRAMADOL HYDROCHLORIDE 50 MG: 50 TABLET, COATED ORAL at 08:08

## 2024-01-01 RX ADMIN — LEVETIRACETAM 500 MG: 500 TABLET, FILM COATED ORAL at 20:08

## 2024-01-01 RX ADMIN — PROCHLORPERAZINE MALEATE 5 MG: 5 TABLET ORAL at 12:31

## 2024-01-01 RX ADMIN — LEVOFLOXACIN 250 MG: 250 TABLET, FILM COATED ORAL at 10:14

## 2024-01-01 RX ADMIN — PANTOPRAZOLE SODIUM 40 MG: 40 TABLET, DELAYED RELEASE ORAL at 07:29

## 2024-01-01 RX ADMIN — URSODIOL 300 MG: 300 CAPSULE ORAL at 22:09

## 2024-01-01 RX ADMIN — DIPHENHYDRAMINE HYDROCHLORIDE AND LIDOCAINE HYDROCHLORIDE AND ALUMINUM HYDROXIDE AND MAGNESIUM HYDRO 10 ML: KIT at 19:48

## 2024-01-01 RX ADMIN — TRAZODONE HYDROCHLORIDE 100 MG: 50 TABLET ORAL at 21:34

## 2024-01-01 RX ADMIN — Medication 5 ML: at 09:31

## 2024-01-01 RX ADMIN — ONDANSETRON HYDROCHLORIDE 8 MG: 8 TABLET, FILM COATED ORAL at 15:21

## 2024-01-01 RX ADMIN — URSODIOL 300 MG: 300 CAPSULE ORAL at 22:29

## 2024-01-01 RX ADMIN — Medication 950 MG: at 08:00

## 2024-01-01 RX ADMIN — LIDOCAINE HYDROCHLORIDE 10 ML: 10 INJECTION, SOLUTION EPIDURAL; INFILTRATION; INTRACAUDAL; PERINEURAL at 10:11

## 2024-01-01 RX ADMIN — FILGRASTIM-AAFI 300 MCG: 300 INJECTION, SOLUTION INTRAVENOUS; SUBCUTANEOUS at 19:44

## 2024-01-01 RX ADMIN — AMLODIPINE BESYLATE 5 MG: 5 TABLET ORAL at 19:45

## 2024-01-01 RX ADMIN — ACYCLOVIR 800 MG: 800 TABLET ORAL at 10:25

## 2024-01-01 RX ADMIN — ACYCLOVIR 800 MG: 800 TABLET ORAL at 19:40

## 2024-01-01 RX ADMIN — Medication 2 WAFER: at 09:28

## 2024-01-01 RX ADMIN — ACYCLOVIR 800 MG: 800 TABLET ORAL at 18:25

## 2024-01-01 RX ADMIN — URSODIOL 300 MG: 300 CAPSULE ORAL at 13:16

## 2024-01-01 RX ADMIN — Medication: at 13:06

## 2024-01-01 RX ADMIN — Medication 5 ML: at 13:01

## 2024-01-01 RX ADMIN — MYCOPHENOLATE MOFETIL 1000 MG: 500 TABLET, FILM COATED ORAL at 05:54

## 2024-01-01 RX ADMIN — SENNOSIDES 2 TABLET: 8.6 TABLET, FILM COATED ORAL at 23:17

## 2024-01-01 RX ADMIN — Medication 5 ML: at 14:05

## 2024-01-01 RX ADMIN — ACYCLOVIR 800 MG: 800 TABLET ORAL at 12:36

## 2024-01-01 RX ADMIN — PHENOL 1 ML: 1.5 LIQUID ORAL at 17:27

## 2024-01-01 RX ADMIN — AMLODIPINE BESYLATE 5 MG: 5 TABLET ORAL at 21:35

## 2024-01-01 RX ADMIN — TRAZODONE HYDROCHLORIDE 100 MG: 50 TABLET ORAL at 21:47

## 2024-01-01 RX ADMIN — GLYCERIN, PETROLATUM, PHENYLEPHRINE HCL, PRAMOXINE HCL: 144; 2.5; 10; 15 CREAM TOPICAL at 20:22

## 2024-01-01 RX ADMIN — ACYCLOVIR 800 MG: 800 TABLET ORAL at 21:41

## 2024-01-01 RX ADMIN — HYDROCORTISONE: 10 OINTMENT TOPICAL at 07:32

## 2024-01-01 RX ADMIN — Medication 3 ML: at 07:54

## 2024-01-01 RX ADMIN — TENOFOVIR DISOPROXIL FUMARATE 300 MG: 300 TABLET, FILM COATED ORAL at 07:30

## 2024-01-01 RX ADMIN — FENTANYL CITRATE 50 MCG: 50 INJECTION, SOLUTION INTRAMUSCULAR; INTRAVENOUS at 09:45

## 2024-01-01 RX ADMIN — TRAZODONE HYDROCHLORIDE 100 MG: 50 TABLET ORAL at 21:22

## 2024-01-01 RX ADMIN — ONDANSETRON HYDROCHLORIDE 8 MG: 8 TABLET, FILM COATED ORAL at 23:03

## 2024-01-01 RX ADMIN — ALLOPURINOL 300 MG: 300 TABLET ORAL at 07:57

## 2024-01-01 RX ADMIN — LEVETIRACETAM 500 MG: 500 TABLET, FILM COATED ORAL at 20:24

## 2024-01-01 RX ADMIN — Medication 5 ML: at 20:01

## 2024-01-01 RX ADMIN — TRAZODONE HYDROCHLORIDE 100 MG: 50 TABLET ORAL at 22:15

## 2024-01-01 RX ADMIN — URSODIOL 300 MG: 300 CAPSULE ORAL at 13:00

## 2024-01-01 RX ADMIN — Medication 266 MG: at 09:38

## 2024-01-01 RX ADMIN — ACYCLOVIR SODIUM 325 MG: 500 INJECTION, SOLUTION INTRAVENOUS at 20:15

## 2024-01-01 RX ADMIN — Medication 5 ML: at 18:43

## 2024-01-01 RX ADMIN — MYCOPHENOLATE MOFETIL 1000 MG: 500 INJECTION, POWDER, LYOPHILIZED, FOR SOLUTION INTRAVENOUS at 14:20

## 2024-01-01 RX ADMIN — ACYCLOVIR 800 MG: 800 TABLET ORAL at 13:01

## 2024-01-01 RX ADMIN — DIPHENHYDRAMINE HYDROCHLORIDE AND LIDOCAINE HYDROCHLORIDE AND ALUMINUM HYDROXIDE AND MAGNESIUM HYDRO 10 ML: KIT at 08:34

## 2024-01-01 RX ADMIN — PROCHLORPERAZINE MALEATE 10 MG: 5 TABLET ORAL at 18:21

## 2024-01-01 RX ADMIN — ACYCLOVIR SODIUM 650 MG: 50 INJECTION, SOLUTION INTRAVENOUS at 11:36

## 2024-01-01 RX ADMIN — MYCOPHENOLATE MOFETIL 1000 MG: 500 INJECTION, POWDER, LYOPHILIZED, FOR SOLUTION INTRAVENOUS at 14:12

## 2024-01-01 RX ADMIN — FILGRASTIM-AAFI 300 MCG: 300 INJECTION, SOLUTION INTRAVENOUS; SUBCUTANEOUS at 19:39

## 2024-01-01 RX ADMIN — OLANZAPINE 5 MG: 2.5 TABLET, FILM COATED ORAL at 20:24

## 2024-01-01 RX ADMIN — Medication 1 LOZENGE: at 14:04

## 2024-01-01 RX ADMIN — ONDANSETRON HYDROCHLORIDE 8 MG: 8 TABLET, FILM COATED ORAL at 14:55

## 2024-01-01 RX ADMIN — ACYCLOVIR 800 MG: 800 TABLET ORAL at 08:34

## 2024-01-01 RX ADMIN — Medication 1 LOZENGE: at 08:13

## 2024-01-01 RX ADMIN — PANTOPRAZOLE SODIUM 40 MG: 40 INJECTION, POWDER, FOR SOLUTION INTRAVENOUS at 08:15

## 2024-01-01 RX ADMIN — Medication 5 ML: at 10:38

## 2024-01-01 RX ADMIN — SODIUM CHLORIDE: 900 INJECTION, SOLUTION INTRAVENOUS at 08:04

## 2024-01-01 RX ADMIN — Medication 5 ML: at 10:09

## 2024-01-01 RX ADMIN — POTASSIUM CHLORIDE 20 MEQ: 29.8 INJECTION, SOLUTION INTRAVENOUS at 04:53

## 2024-01-01 RX ADMIN — Medication 2 WAFER: at 08:27

## 2024-01-01 RX ADMIN — POTASSIUM CHLORIDE 20 MEQ: 29.8 INJECTION, SOLUTION INTRAVENOUS at 06:12

## 2024-01-01 RX ADMIN — Medication 2 WAFER: at 08:03

## 2024-01-01 RX ADMIN — MICAFUNGIN SODIUM 150 MG: 50 INJECTION, POWDER, LYOPHILIZED, FOR SOLUTION INTRAVENOUS at 08:01

## 2024-01-01 RX ADMIN — Medication 5 ML: at 12:37

## 2024-01-01 RX ADMIN — Medication 950 MG: at 08:43

## 2024-01-01 RX ADMIN — LEVETIRACETAM 500 MG: 500 TABLET, FILM COATED ORAL at 07:57

## 2024-01-01 RX ADMIN — ONDANSETRON HYDROCHLORIDE 8 MG: 8 TABLET, FILM COATED ORAL at 08:03

## 2024-01-01 RX ADMIN — DIPHENHYDRAMINE HYDROCHLORIDE, ZINC ACETATE: 2; .1 CREAM TOPICAL at 17:54

## 2024-01-01 RX ADMIN — AMLODIPINE BESYLATE 5 MG: 5 TABLET ORAL at 22:09

## 2024-01-01 RX ADMIN — URSODIOL 300 MG: 300 CAPSULE ORAL at 08:33

## 2024-01-01 RX ADMIN — ACYCLOVIR 800 MG: 800 TABLET ORAL at 10:56

## 2024-01-01 RX ADMIN — GLYCERIN, PETROLATUM, PHENYLEPHRINE HCL, PRAMOXINE HCL: 144; 2.5; 10; 15 CREAM TOPICAL at 14:14

## 2024-01-01 RX ADMIN — DEXAMETHASONE 8 MG: 4 TABLET ORAL at 23:08

## 2024-01-01 RX ADMIN — ACYCLOVIR 800 MG: 800 TABLET ORAL at 22:30

## 2024-01-01 RX ADMIN — Medication 2 WAFER: at 07:28

## 2024-01-01 RX ADMIN — GLYCERIN, PETROLATUM, PHENYLEPHRINE HCL, PRAMOXINE HCL: 144; 2.5; 10; 15 CREAM TOPICAL at 08:03

## 2024-01-01 RX ADMIN — LOPERAMIDE HYDROCHLORIDE 2 MG: 2 CAPSULE ORAL at 14:26

## 2024-01-01 RX ADMIN — ACYCLOVIR 800 MG: 800 TABLET ORAL at 10:30

## 2024-01-01 RX ADMIN — LEVOFLOXACIN 250 MG: 250 TABLET, FILM COATED ORAL at 14:05

## 2024-01-01 RX ADMIN — ACYCLOVIR 800 MG: 800 TABLET ORAL at 20:01

## 2024-01-01 RX ADMIN — MYCOPHENOLATE MOFETIL 1000 MG: 500 INJECTION, POWDER, LYOPHILIZED, FOR SOLUTION INTRAVENOUS at 22:39

## 2024-01-01 RX ADMIN — MAGNESIUM SULFATE IN WATER 4 G: 40 INJECTION, SOLUTION INTRAVENOUS at 04:26

## 2024-01-01 RX ADMIN — URSODIOL 300 MG: 300 CAPSULE ORAL at 07:28

## 2024-01-01 RX ADMIN — LORAZEPAM 0.5 MG: 0.5 TABLET ORAL at 23:39

## 2024-01-01 RX ADMIN — LEVETIRACETAM 500 MG: 500 TABLET, FILM COATED ORAL at 20:21

## 2024-01-01 RX ADMIN — MICAFUNGIN SODIUM 50 MG: 50 INJECTION, POWDER, LYOPHILIZED, FOR SOLUTION INTRAVENOUS at 08:03

## 2024-01-01 RX ADMIN — PROCHLORPERAZINE MALEATE 5 MG: 5 TABLET ORAL at 18:00

## 2024-01-01 RX ADMIN — Medication 2 WAFER: at 09:26

## 2024-01-01 RX ADMIN — ACYCLOVIR SODIUM 650 MG: 50 INJECTION, SOLUTION INTRAVENOUS at 03:59

## 2024-01-01 RX ADMIN — ACYCLOVIR 800 MG: 800 TABLET ORAL at 11:04

## 2024-01-01 RX ADMIN — URSODIOL 300 MG: 300 CAPSULE ORAL at 08:18

## 2024-01-01 RX ADMIN — FLUDARABINE PHOSPHATE 50 MG: 25 INJECTION, SOLUTION INTRAVENOUS at 23:46

## 2024-01-01 RX ADMIN — URSODIOL 300 MG: 300 CAPSULE ORAL at 08:45

## 2024-01-01 RX ADMIN — MAGNESIUM SULFATE IN WATER 4 G: 40 INJECTION, SOLUTION INTRAVENOUS at 05:52

## 2024-01-01 RX ADMIN — GLYCERIN, PETROLATUM, PHENYLEPHRINE HCL, PRAMOXINE HCL: 144; 2.5; 10; 15 CREAM TOPICAL at 14:29

## 2024-01-01 RX ADMIN — ACYCLOVIR 800 MG: 800 TABLET ORAL at 13:58

## 2024-01-01 RX ADMIN — LORAZEPAM 0.5 MG: 0.5 TABLET ORAL at 21:47

## 2024-01-01 RX ADMIN — ACYCLOVIR 800 MG: 800 TABLET ORAL at 22:59

## 2024-01-01 RX ADMIN — GLYCERIN, PETROLATUM, PHENYLEPHRINE HCL, PRAMOXINE HCL: 144; 2.5; 10; 15 CREAM TOPICAL at 11:28

## 2024-01-01 RX ADMIN — Medication 5 ML: at 01:11

## 2024-01-01 RX ADMIN — MIDAZOLAM 1 MG: 1 INJECTION INTRAMUSCULAR; INTRAVENOUS at 09:53

## 2024-01-01 RX ADMIN — Medication 10 ML: at 03:15

## 2024-01-01 RX ADMIN — POTASSIUM CHLORIDE 20 MEQ: 29.8 INJECTION, SOLUTION INTRAVENOUS at 05:04

## 2024-01-01 RX ADMIN — LEVOFLOXACIN 250 MG: 5 INJECTION, SOLUTION INTRAVENOUS at 11:36

## 2024-01-01 RX ADMIN — ACYCLOVIR 800 MG: 800 TABLET ORAL at 07:29

## 2024-01-01 RX ADMIN — Medication 2 WAFER: at 08:24

## 2024-01-01 RX ADMIN — AZACITIDINE 35 MG: 100 INJECTION, POWDER, LYOPHILIZED, FOR SOLUTION INTRAVENOUS; SUBCUTANEOUS at 11:32

## 2024-01-01 RX ADMIN — LORATADINE 10 MG: 10 TABLET ORAL at 08:16

## 2024-01-01 RX ADMIN — TRAMADOL HYDROCHLORIDE 50 MG: 50 TABLET, COATED ORAL at 11:45

## 2024-01-01 RX ADMIN — TENOFOVIR DISOPROXIL FUMARATE 300 MG: 300 TABLET, FILM COATED ORAL at 07:36

## 2024-01-01 RX ADMIN — PANTOPRAZOLE SODIUM 40 MG: 40 TABLET, DELAYED RELEASE ORAL at 09:30

## 2024-01-01 RX ADMIN — ACYCLOVIR SODIUM 650 MG: 50 INJECTION, SOLUTION INTRAVENOUS at 12:59

## 2024-01-01 RX ADMIN — TACROLIMUS 110 MCG/HR: 5 INJECTION, SOLUTION INTRAVENOUS at 19:44

## 2024-01-01 RX ADMIN — PROCHLORPERAZINE EDISYLATE 10 MG: 5 INJECTION INTRAMUSCULAR; INTRAVENOUS at 21:35

## 2024-01-01 RX ADMIN — Medication 5 ML: at 19:00

## 2024-01-01 RX ADMIN — ONDANSETRON HYDROCHLORIDE 8 MG: 8 TABLET, FILM COATED ORAL at 13:26

## 2024-01-01 RX ADMIN — LOPERAMIDE HYDROCHLORIDE 2 MG: 2 CAPSULE ORAL at 11:45

## 2024-01-01 RX ADMIN — TENOFOVIR DISOPROXIL FUMARATE 300 MG: 300 TABLET, FILM COATED ORAL at 08:35

## 2024-01-01 RX ADMIN — DIPHENHYDRAMINE HYDROCHLORIDE AND LIDOCAINE HYDROCHLORIDE AND ALUMINUM HYDROXIDE AND MAGNESIUM HYDRO 10 ML: KIT at 08:01

## 2024-01-01 RX ADMIN — FLUDARABINE PHOSPHATE 50 MG: 25 INJECTION, SOLUTION INTRAVENOUS at 23:56

## 2024-01-01 RX ADMIN — AMLODIPINE BESYLATE 5 MG: 5 TABLET ORAL at 22:25

## 2024-01-01 RX ADMIN — PANTOPRAZOLE SODIUM 40 MG: 40 TABLET, DELAYED RELEASE ORAL at 08:18

## 2024-01-01 RX ADMIN — GLYCERIN, PETROLATUM, PHENYLEPHRINE HCL, PRAMOXINE HCL: 144; 2.5; 10; 15 CREAM TOPICAL at 20:47

## 2024-01-01 RX ADMIN — URSODIOL 300 MG: 300 CAPSULE ORAL at 22:59

## 2024-01-01 RX ADMIN — SENNOSIDES 2 TABLET: 8.6 TABLET, FILM COATED ORAL at 13:02

## 2024-01-01 RX ADMIN — SENNOSIDES 2 TABLET: 8.6 TABLET, FILM COATED ORAL at 09:01

## 2024-01-01 RX ADMIN — MICAFUNGIN SODIUM 50 MG: 50 INJECTION, POWDER, LYOPHILIZED, FOR SOLUTION INTRAVENOUS at 07:29

## 2024-01-01 RX ADMIN — GLYCERIN, PETROLATUM, PHENYLEPHRINE HCL, PRAMOXINE HCL: 144; 2.5; 10; 15 CREAM TOPICAL at 08:02

## 2024-01-01 RX ADMIN — HYDROCORTISONE: 10 OINTMENT TOPICAL at 21:23

## 2024-01-01 RX ADMIN — OXYCODONE HYDROCHLORIDE 5 MG: 5 TABLET ORAL at 16:44

## 2024-01-01 RX ADMIN — ACYCLOVIR 800 MG: 800 TABLET ORAL at 22:15

## 2024-01-01 RX ADMIN — PANTOPRAZOLE SODIUM 40 MG: 40 TABLET, DELAYED RELEASE ORAL at 08:43

## 2024-01-01 RX ADMIN — PROCHLORPERAZINE MALEATE 5 MG: 5 TABLET ORAL at 18:01

## 2024-01-01 RX ADMIN — GLYCERIN, PETROLATUM, PHENYLEPHRINE HCL, PRAMOXINE HCL: 144; 2.5; 10; 15 CREAM TOPICAL at 20:02

## 2024-01-01 RX ADMIN — Medication 950 MG: at 08:27

## 2024-01-01 RX ADMIN — ACYCLOVIR 800 MG: 800 TABLET ORAL at 14:22

## 2024-01-01 RX ADMIN — Medication 5 ML: at 16:12

## 2024-01-01 RX ADMIN — URSODIOL 300 MG: 300 CAPSULE ORAL at 14:20

## 2024-01-01 RX ADMIN — ACYCLOVIR 800 MG: 800 TABLET ORAL at 11:12

## 2024-01-01 RX ADMIN — SENNOSIDES 2 TABLET: 8.6 TABLET, FILM COATED ORAL at 14:23

## 2024-01-01 RX ADMIN — Medication 1 LOZENGE: at 16:17

## 2024-01-01 RX ADMIN — MICAFUNGIN SODIUM 150 MG: 50 INJECTION, POWDER, LYOPHILIZED, FOR SOLUTION INTRAVENOUS at 10:56

## 2024-01-01 RX ADMIN — Medication 2 WAFER: at 11:31

## 2024-01-01 RX ADMIN — URSODIOL 300 MG: 300 CAPSULE ORAL at 19:56

## 2024-01-01 RX ADMIN — TENOFOVIR DISOPROXIL FUMARATE 300 MG: 300 TABLET, FILM COATED ORAL at 07:28

## 2024-01-01 RX ADMIN — URSODIOL 300 MG: 300 CAPSULE ORAL at 20:02

## 2024-01-01 RX ADMIN — TRAZODONE HYDROCHLORIDE 100 MG: 50 TABLET ORAL at 23:05

## 2024-01-01 RX ADMIN — URSODIOL 300 MG: 300 CAPSULE ORAL at 21:46

## 2024-01-01 RX ADMIN — GLYCERIN, PETROLATUM, PHENYLEPHRINE HCL, PRAMOXINE HCL: 144; 2.5; 10; 15 CREAM TOPICAL at 08:39

## 2024-01-01 RX ADMIN — TRAZODONE HYDROCHLORIDE 100 MG: 50 TABLET ORAL at 23:16

## 2024-01-01 RX ADMIN — ONDANSETRON HYDROCHLORIDE 8 MG: 8 TABLET, FILM COATED ORAL at 08:00

## 2024-01-01 RX ADMIN — Medication 266 MG: at 20:01

## 2024-01-01 RX ADMIN — Medication 2 WAFER: at 08:41

## 2024-01-01 RX ADMIN — LORATADINE 10 MG: 10 TABLET ORAL at 08:45

## 2024-01-01 RX ADMIN — URSODIOL 300 MG: 300 CAPSULE ORAL at 08:02

## 2024-01-01 RX ADMIN — TENOFOVIR DISOPROXIL FUMARATE 300 MG: 300 TABLET, FILM COATED ORAL at 08:23

## 2024-01-01 RX ADMIN — POTASSIUM CHLORIDE 20 MEQ: 750 TABLET, EXTENDED RELEASE ORAL at 07:36

## 2024-01-01 RX ADMIN — TRAZODONE HYDROCHLORIDE 100 MG: 50 TABLET ORAL at 23:07

## 2024-01-01 RX ADMIN — FUROSEMIDE 10 MG: 10 INJECTION, SOLUTION INTRAMUSCULAR; INTRAVENOUS at 18:17

## 2024-01-01 RX ADMIN — LOPERAMIDE HYDROCHLORIDE 2 MG: 2 CAPSULE ORAL at 20:29

## 2024-01-01 RX ADMIN — FILGRASTIM-AAFI 300 MCG: 300 INJECTION, SOLUTION INTRAVENOUS; SUBCUTANEOUS at 19:49

## 2024-01-01 RX ADMIN — Medication 950 MG: at 08:16

## 2024-01-01 RX ADMIN — LEVOFLOXACIN 250 MG: 5 INJECTION, SOLUTION INTRAVENOUS at 13:17

## 2024-01-01 RX ADMIN — SENNOSIDES 2 TABLET: 8.6 TABLET, FILM COATED ORAL at 08:23

## 2024-01-01 RX ADMIN — Medication 2 WAFER: at 08:00

## 2024-01-01 RX ADMIN — MICAFUNGIN SODIUM 50 MG: 50 INJECTION, POWDER, LYOPHILIZED, FOR SOLUTION INTRAVENOUS at 07:30

## 2024-01-01 RX ADMIN — TACROLIMUS 110 MCG/HR: 5 INJECTION, SOLUTION INTRAVENOUS at 19:39

## 2024-01-01 ASSESSMENT — ACTIVITIES OF DAILY LIVING (ADL)
ADLS_ACUITY_SCORE: 20
CONCENTRATING,_REMEMBERING_OR_MAKING_DECISIONS_DIFFICULTY: NO
ADLS_ACUITY_SCORE: 20
ADLS_ACUITY_SCORE: 35
ADLS_ACUITY_SCORE: 20
ADLS_ACUITY_SCORE: 21
ADLS_ACUITY_SCORE: 20
CHANGE_IN_FUNCTIONAL_STATUS_SINCE_ONSET_OF_CURRENT_ILLNESS/INJURY: NO
ADLS_ACUITY_SCORE: 20
ADLS_ACUITY_SCORE: 21
ADLS_ACUITY_SCORE: 20
ADLS_ACUITY_SCORE: 21
ADLS_ACUITY_SCORE: 20
ADLS_ACUITY_SCORE: 21
ADLS_ACUITY_SCORE: 20
ADLS_ACUITY_SCORE: 20
ADLS_ACUITY_SCORE: 21
ADLS_ACUITY_SCORE: 20
ADLS_ACUITY_SCORE: 21
ADLS_ACUITY_SCORE: 20
ADLS_ACUITY_SCORE: 21
ADLS_ACUITY_SCORE: 20
ADLS_ACUITY_SCORE: 35
ADLS_ACUITY_SCORE: 20
ADLS_ACUITY_SCORE: 21
ADLS_ACUITY_SCORE: 20
ADLS_ACUITY_SCORE: 21
ADLS_ACUITY_SCORE: 20
ADLS_ACUITY_SCORE: 21
ADLS_ACUITY_SCORE: 20
ADLS_ACUITY_SCORE: 20
TOILETING_ISSUES: NO
ADLS_ACUITY_SCORE: 20
DOING_ERRANDS_INDEPENDENTLY_DIFFICULTY: NO
ADLS_ACUITY_SCORE: 20
ADLS_ACUITY_SCORE: 20
ADLS_ACUITY_SCORE: 21
ADLS_ACUITY_SCORE: 20
ADLS_ACUITY_SCORE: 21
ADLS_ACUITY_SCORE: 20
WEAR_GLASSES_OR_BLIND: YES
ADLS_ACUITY_SCORE: 20
ADLS_ACUITY_SCORE: 21
ADLS_ACUITY_SCORE: 20
ADLS_ACUITY_SCORE: 21
ADLS_ACUITY_SCORE: 20
FALL_HISTORY_WITHIN_LAST_SIX_MONTHS: NO
ADLS_ACUITY_SCORE: 20
ADLS_ACUITY_SCORE: 21
ADLS_ACUITY_SCORE: 20
ADLS_ACUITY_SCORE: 21
ADLS_ACUITY_SCORE: 20
ADLS_ACUITY_SCORE: 20
ADLS_ACUITY_SCORE: 21
ADLS_ACUITY_SCORE: 20
ADLS_ACUITY_SCORE: 35
ADLS_ACUITY_SCORE: 20
ADLS_ACUITY_SCORE: 21
ADLS_ACUITY_SCORE: 20
ADLS_ACUITY_SCORE: 21
ADLS_ACUITY_SCORE: 20
ADLS_ACUITY_SCORE: 20
ADLS_ACUITY_SCORE: 21
ADLS_ACUITY_SCORE: 20
ADLS_ACUITY_SCORE: 35
ADLS_ACUITY_SCORE: 20
ADLS_ACUITY_SCORE: 21
ADLS_ACUITY_SCORE: 20
ADLS_ACUITY_SCORE: 21
ADLS_ACUITY_SCORE: 20
ADLS_ACUITY_SCORE: 21
ADLS_ACUITY_SCORE: 20
ADLS_ACUITY_SCORE: 21
ADLS_ACUITY_SCORE: 20
ADLS_ACUITY_SCORE: 21
ADLS_ACUITY_SCORE: 20
ADLS_ACUITY_SCORE: 21
ADLS_ACUITY_SCORE: 20
ADLS_ACUITY_SCORE: 21
ADLS_ACUITY_SCORE: 20
DIFFICULTY_EATING/SWALLOWING: NO
ADLS_ACUITY_SCORE: 20
ADLS_ACUITY_SCORE: 35
ADLS_ACUITY_SCORE: 20
ADLS_ACUITY_SCORE: 21
ADLS_ACUITY_SCORE: 20
ADLS_ACUITY_SCORE: 21
ADLS_ACUITY_SCORE: 21
ADLS_ACUITY_SCORE: 20
ADLS_ACUITY_SCORE: 20
ADLS_ACUITY_SCORE: 21
ADLS_ACUITY_SCORE: 20
HEARING_DIFFICULTY_OR_DEAF: NO
ADLS_ACUITY_SCORE: 20
ADLS_ACUITY_SCORE: 21
ADLS_ACUITY_SCORE: 20
ADLS_ACUITY_SCORE: 21
ADLS_ACUITY_SCORE: 21
ADLS_ACUITY_SCORE: 20
ADLS_ACUITY_SCORE: 21
ADLS_ACUITY_SCORE: 20
VISION_MANAGEMENT: GLASSES
ADLS_ACUITY_SCORE: 20
ADLS_ACUITY_SCORE: 21
ADLS_ACUITY_SCORE: 20
DRESSING/BATHING_DIFFICULTY: NO
ADLS_ACUITY_SCORE: 20
ADLS_ACUITY_SCORE: 21
ADLS_ACUITY_SCORE: 20
WALKING_OR_CLIMBING_STAIRS_DIFFICULTY: NO
ADLS_ACUITY_SCORE: 20
DIFFICULTY_COMMUNICATING: NO
ADLS_ACUITY_SCORE: 21
ADLS_ACUITY_SCORE: 20

## 2024-01-01 ASSESSMENT — PAIN SCALES - GENERAL
PAINLEVEL: NO PAIN (0)
PAINLEVEL: SEVERE PAIN (6)
PAINLEVEL: NO PAIN (0)
PAINLEVEL: MODERATE PAIN (5)
PAINLEVEL: NO PAIN (0)

## 2024-01-01 ASSESSMENT — ANXIETY QUESTIONNAIRES
GAD7 TOTAL SCORE: 2
6. BECOMING EASILY ANNOYED OR IRRITABLE: SEVERAL DAYS
1. FEELING NERVOUS, ANXIOUS, OR ON EDGE: NOT AT ALL
5. BEING SO RESTLESS THAT IT IS HARD TO SIT STILL: NOT AT ALL
2. NOT BEING ABLE TO STOP OR CONTROL WORRYING: NOT AT ALL
7. FEELING AFRAID AS IF SOMETHING AWFUL MIGHT HAPPEN: NOT AT ALL
3. WORRYING TOO MUCH ABOUT DIFFERENT THINGS: NOT AT ALL
GAD7 TOTAL SCORE: 2
IF YOU CHECKED OFF ANY PROBLEMS ON THIS QUESTIONNAIRE, HOW DIFFICULT HAVE THESE PROBLEMS MADE IT FOR YOU TO DO YOUR WORK, TAKE CARE OF THINGS AT HOME, OR GET ALONG WITH OTHER PEOPLE: NOT DIFFICULT AT ALL

## 2024-01-01 ASSESSMENT — EJECTION FRACTION: LAST EJECTION FRACTION (EF) PRIOR TO CONDITIONING (%): NO

## 2024-01-01 ASSESSMENT — PATIENT HEALTH QUESTIONNAIRE - PHQ9
SUM OF ALL RESPONSES TO PHQ QUESTIONS 1-9: 5
5. POOR APPETITE OR OVEREATING: SEVERAL DAYS

## 2024-01-01 ASSESSMENT — PULMONARY FUNCTION TESTS: FEV1/FVC_PERCENT_PREDICTED: 104

## 2024-04-11 NOTE — PROGRESS NOTES
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  Arrangements are in place for him to be admitted to [facility] (where his wife works as a neurologist) tomorrow under the care of Dr. Kobi Cano to initiate further evaluation and treatment.  I reassured Zaheer that I know Dr. Cano as she did her fellowship training here, and that we would remain in contact and available to assist as needed.      Escobar Peterson MD  Professor of Medicine  Division of Hematology, Oncology, and Transplantation  Director, [facility]

## 2024-05-17 NOTE — PROGRESS NOTES
[facility]  RN Coordinator Pre-Visit Documentation      Zaheer Borges is a 64 year old male who has been referred to the [facility] for hematopoietic cell transplant or immune effector cell therapy.      Referring MD Name: Kobi Cano , telephone [phone]      Reason for referral: AML     Link to BMT & CT Program Algorithms      For allos only:    Previous HLA typing? Yes    Previous formal donor search? N/A    PRA needed? Yes    CMV IGG needed? Yes    and ABO needed? Yes    Potential family donors to type? Unknown    Need URD consents? Yes      All relevant clinical notes, labs, imaging, and pathology may be reviewed in Epic Bookmarks under name: Louise Hurley       Patient Care Team         Relationship Specialty Notifications Start End    Rosalino Rashid MD PCP - General   7/24/20     Phone: [phone] Fax: [phone] [address] [facility] [address]    Escobar Peterson MD MD Hematology Admissions 2/4/19     Phone: [phone] Fax: [phone]         [address] [place] [address]    Noreen Carrillo LPN LPN Hematology Admissions 2/4/19     Escobar Peterson MD Assigned Cancer Care Provider   10/23/20     Phone: [phone] Fax: [phone]         [address] [place] [address]    Noreen Carrillo LPN Specialty Care Coordinator Hematology Admissions 1/1/22               Louise Hurley RN

## 2024-05-19 PROBLEM — C92.00 ACUTE MYELOID LEUKEMIA (H): Status: ACTIVE | Noted: 2024-01-01

## 2024-05-19 PROBLEM — I10 PRIMARY HYPERTENSION: Status: ACTIVE | Noted: 2023-06-27

## 2024-05-19 PROBLEM — D61.810 PANCYTOPENIA DUE TO ANTINEOPLASTIC CHEMOTHERAPY (H): Status: ACTIVE | Noted: 2024-01-01

## 2024-05-19 PROBLEM — T45.1X5A PANCYTOPENIA DUE TO ANTINEOPLASTIC CHEMOTHERAPY (H): Status: RESOLVED | Noted: 2024-01-01 | Resolved: 2024-01-01

## 2024-05-19 PROBLEM — T45.1X5A PANCYTOPENIA DUE TO ANTINEOPLASTIC CHEMOTHERAPY (H): Status: ACTIVE | Noted: 2024-01-01

## 2024-05-19 PROBLEM — D61.810 PANCYTOPENIA DUE TO ANTINEOPLASTIC CHEMOTHERAPY (H): Status: RESOLVED | Noted: 2024-01-01 | Resolved: 2024-01-01

## 2024-05-19 PROBLEM — Z86.03 HISTORY OF POLYCYTHEMIA VERA: Status: ACTIVE | Noted: 2024-01-01

## 2024-05-19 NOTE — PROGRESS NOTES
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      Hematologic history:  Patient has a long history of PV which was managed by Hydrea and phlebotomy. On routine testing, noted to have 27% blasts in April 2024. AML confirmed with BMBx. Started daunorubicin and cytarabine on 4/12. Residual disease noted on repeat bone marrow biopsy so re-induction of chemotherapy started on 5/2 with decitabine and venetoclax.     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  20% myeloid blasts in a hypercellular bone marrow (70% cellular)  Cytogenetic results:  45,XY,psu dic(22;5)(q12;q11.1)[2]/43-44,sl,-16,-18,add(20)(p11.2),+0-1mar[cp13]/44,sdl, +ester(5;22)(p10;p10),-psu dic(22;5)[2]/46,XY[5]  FLT3 mutation results:  Negative  Molecular results:    TP53 c.659A>C, p.Ogn094Rtr ([id number]) VAF: 55.0%  ASXL1 c.2604del, p.Bcx389cd ([id number]) VAF: 31.5%       Peripheral blood, morphology:   Normocytic normochromic anemia  Neutropenia and lymphopenia with 16% circulating blasts  Mild thrombocytopenia with atypical platelet morphology and circulating micromegakaryocytes     Comment:  Given the known history of polycythemia vera, this is classified as blast phase.       Preliminary results are discussed with Dr. Coto on 4/12/24.   Dr. DOWLING reviewed the case and concurs with the diagnosis.      See attached Cytogenetics results from [facility].   Electronically signed by Kourtney Israel MD on 4/25/2024 at  6:29 PM Preliminary result electronically signed by Kourtney Israel MD on 4/16/2024 at  3:23 PM Preliminary result electronically signed by Kourtney Israel MD on 4/15/2024 at  3:39 PM     4/12/24 NGS  TIER 1: Variants of Known Clinical Significance in Hematologic   Malignancies     1. TP53 c.659A>C, p.Saf832Ply ([id number])   VAF: 55.0%   TP53  encodes an important tumor suppressor (p53) that regulates   cell cycle progression, apoptosis, DNA repair, and metabolic   changes (7). Somatic mutations of TP53 are found in 5-10% of de   kathy acute myeloid leukemia (AML), in 24-48% of patients with   secondary AML (15) (19), and in 25-40% of patients with   therapy-related myeloid neoplasms, including AML (11) (17).   Pathogenic germline mutations in TP53 are associated with   Li-Fraumeni syndrome (OMA: 523957), a cancer predisposition   syndrome associated with a high, lifelong risk of a broad spectrum   of cancers (4) (10). This particular mutation occurs in the   DNA-binding domain and is predicted to alter the normal function   of the tumor suppressor p53 (12). In AML, TP53 mutations are   commonly associated with complex karyotype and suboptimal overall   patient outcome, even after hematopoietic stem cell   transplantation (9) (13) (14). In newly-diagnosed SN25-xrnpnud   AML, variant allele frequencies greater than 40% or biallelic TP53   mutations are independently associated with higher rates of   relapse and inferior overall survival regardless of the type of   therapy received (18). In patients with therapy-related myeloid   neoplasms, TP53 mutations are frequently biallelic and associated   with complex karyotype and shorter overall survival (11) (17).   Please note that the variant allele frequency is high, suggesting   that this TP53 mutation may be homozygous or hemizygous in the   neoplastic cells due to copy neutral loss of heterozygosity   (CN-MELIDA) or deletion of 17p (6). Correlation with cytogenetic   findings is recommended.     2. ASXL1 c.2604del, p.Zvf914lv (NM_[id number])   VAF: 31.5%   ASXL1 encodes a protein that interacts with polycomb complex   proteins and chromatin remodelers to control gene expression (5).   Somatic ASXL1 mutations are found in 6.5% of de kathy AML patients   and in 30% of patients with secondary AML (5). ASXL1 mutations  are   also seen in AML, myelodysplasia related (AML-MR) (11) (8) (1). In   myeloid malignancies, acquired ASXL1 mutations are often exon 12   frameshift or nonsense mutations (3) (9) (16). This mutation is   predicted to alter the normal function of ASXL1 (2). ASXL1   mutations are associated with poor prognosis in myeloid   malignancies, including AML (5) (9).     TIER 2: Variants of Unknown Clinical Significance in Hematologic   Malignancies     1. RUNX1 c.26C>T, p.Jry2Enb ([id number])   VAF: 50.1%   This variant has not been reported in hematologic malignancies, to   the best of our knowledge.     4/30/24 BONE MARROW BIOPSY FINAL DIAGNOSIS   Bone marrow aspirate, clot section, and trephine core biopsy:  Residual leukemic blasts, 12% of bone marrow cells  Hypocellular bone marrow (average 10% cellularity) with trilineage hypoplasia      BONE MARROW DIFFERENTIAL: Performed on touch imprint.  Blasts:  12 %   Neutrophils & Precursors: 2 %   Lymphocytes:  69 %   Monocytes:  0 %   Eosinophils & Precursors:   0 %   Basophils & Precursors:  0 %   Plasma Cells:  5 %   Erythrocyte Precursors:  12 %      Date Treatment Response Toxicities/Complications   4/12/24 7+3      5/2/24 Dec/sulema                        HPI:  Please see my entry above for hematologic history.  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  He had complex cytogenetics and also T p53 mutation.  He began induction chemotherapy in April and had a partial improvement in terms of the number of blasts, and then began 10-day decitabine venetoclax in May.  He is tolerated this quite well and is also weathered the pancytopenia well.  He is now improving and follow-up marrow did demonstrate ongoing improvement with 6% blasts noted.  He is planned to begin a second cycle sometime in the next  week.    He is a healthy gentleman who has no significant past medical history.  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  He is healthy and fit, exercises regularly, and he continues to have an excellent performance status.  He is eating well and states that the treatment has not significantly affected his overall sense of wellbeing.  He understands the gravity of his particular situation and is hopeful for options that may lead to long-term remission.      ASSESSMENT AND PLAN:  AML following polycythemia vera-today I reviewed with him the gravity of his diagnosis and the difficulty in controlling and curing the disease.  I explained that there is a small chance of long-term cure provided that the disease response to upfront therapy, and he is able to proceed in a timely fashion to transplantation.  He has donor options that could include an young unrelated donor, or his 22-year-old son.  Either type of donor would be acceptable and provide him a good donor option.  I reviewed with him the potential options that would include a preoperative regimen consisting of fludarabine busulfan, or possibly cyclophosphamide, fludarabine, total body irradiation.  I discussed qgzrc-xtboro-ticn disease prophylaxis based on posttransplant cyclophosphamide.  I also discussed the possibility of maintenance therapy afterwards using a low-dose of a hypomethylating agent potentially with growth factor, or venetoclax.  I also reviewed with him to clinical trials that are ongoing for mismatch donors, the optimize trial that explores the value of a reduced dose of posttransplant cyclophosphamide, or the TRX 1 trial that looks at a modified T-cell product to reduce the risk of acsle-sosogo-jhqp disease.  He is open to both possibilities.  We spent time talking about the various pros and cons of different donors and explained to him that in his situation, he has multiple good options.  We spent time reviewing  risk of transplant including zohbx-vexdot-rylx disease, infection, and toxicity.  I explained that in his situation unfortunately the greatest risk is recurrence.  For now he will continue treatment with Dr. Cano and we will identify donor as quickly as possible to move forward.    ROS:    10 point ROS neg other than the symptoms noted above in the HPI.        No past medical history on file.    Past Surgical History:   Procedure Laterality Date    IR CVC TUNNEL W2 CATH W/O PORT  4/12/2024       No family history on file.    Social History     Tobacco Use    Smoking status: Former     Current packs/day: 0.10     Average packs/day: 0.1 packs/day for 44.4 years (4.4 ttl pk-yrs)     Types: Cigarettes     Start date: 1/1/1980    Smokeless tobacco: Never   Substance Use Topics    Alcohol use: Yes     Alcohol/week: 2.0 - 3.0 standard drinks of alcohol     Types: 2 - 3 Glasses of wine per week    Drug use: No         Allergies   Allergen Reactions    Sulfa Antibiotics Rash        Current Outpatient Medications   Medication Sig Dispense Refill    amLODIPine (NORVASC) 10 MG tablet Take 10 mg by mouth daily      cetirizine HCl 10 MG CAPS Take by mouth as needed      hydroxyurea (HYDREA) 500 MG capsule Take 3 capsules (1,500 mg) by mouth daily      ibuprofen (ADVIL/MOTRIN) 400 MG tablet Take 200 mg by mouth every 6 hours as needed for moderate pain      loratadine (CLARITIN) 10 MG tablet Take 10 mg by mouth as needed      Multiple Vitamins-Minerals (MULTIVITAMIN ADULT PO)       omega 3 1000 MG CAPS          KPS:  90    Physical Exam:   There were no vitals taken for this visit.    Physical Exam       TODAY'S ASSESSMENT BY SYSTEMS     HEMATOLOGY, COAGULATION    He has a history of polycythemia vera    IMMUNOCOMPROMISED HOST  He has no history of immunocompromise    PULMONARY  He has no known lung disease and exercises regularly    LIVER  He has no known liver disease    GASTROINTESTINAL  He is eating well and has no  gastrointestinal disease.  He has excellent dentition    RENAL  He has no kidney disease    CARDIOVASCULAR  He has no known cardiovascular disease    ENDOCRINE  He does not have diabetes    MUSCULOSKELETAL  He has no musculoskeletal limitations and in fact continues to run    NEUROLOGIC  He has no neurologic diseases    PSYCHIATRIC  He has no psychiatric diseases    SOCIAL  He is well supported by his wife and family    Zaheer understood the above assessment and recommendations.  Multiple questions answered.  No barriers to learning identified.       Known issues that I take into account for medical decisions, with salient changes to the plan considering these complexities noted above.    Patient Active Problem List   Diagnosis    Polycythemia vera (H)       Today's summary: We will first identify the best donor, and based on this finding, proceed to either a [place] ablative, or myelo ablative preoperative regimen on protocol.    I spent 90 minutes in the care of this patient today, which included time necessary for preparation for the visit, obtaining history, ordering medications/tests/procedures as medically indicated, review of pertinent medical literature, counseling of the patient, communication of recommendations to the care team, and documentation time.    The longitudinal plan of care for the diagnosis(es)/condition(s) as documented were addressed during this visit. Due to the added complexity in care, I will continue to support Zaheer in the subsequent management and with ongoing continuity of care.    SRINIVAS WILKS MD  May 19, 2024        ------------------------------------------------------------------------------------------------------------------------------------------------    Patient Care Team         Relationship Specialty Notifications Start End    Rosalino Rashid MD PCP - General   7/24/20     Phone: [phone] Fax: [phone] [address] [facility] [address]    Srinivas Peterson  MD LOPE[facility] Hematology Admissions 2/4/19     Phone: [phone] Fax: [phone]         [address] [address]    Noreen Carrillo LPN LPN Hematology Admissions 2/4/19     Escobar Peterson MD Assigned Cancer Care Provider   10/23/20     Phone: [phone] Fax: [phone]         [address] [place] [address]    Noreen Carrillo LPN Specialty Care Coordinator Hematology Admissions 1/1/22

## 2024-05-20 NOTE — LETTER
5/20/2024         RE: Zaheer Borges  [address] [place] [address]        Dear Colleague,    Thank you for referring your patient, Zaheer Borges, to the [facility] [place]. Please see a copy of my visit note below.           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      Hematologic history:  Patient has a long history of PV which was managed by Hydrea and phlebotomy. On routine testing, noted to have 27% blasts in April 2024. AML confirmed with BMBx. Started daunorubicin and cytarabine on 4/12. Residual disease noted on repeat bone marrow biopsy so re-induction of chemotherapy started on 5/2 with decitabine and venetoclax.     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  20% myeloid blasts in a hypercellular bone marrow (70% cellular)  Cytogenetic results:  45,XY,psu dic(22;5)(q12;q11.1)[2]/43-44,sl,-16,-18,add(20)(p11.2),+0-1mar[cp13]/44,sdl, +ester(5;22)(p10;p10),-psu dic(22;5)[2]/46,XY[5]  FLT3 mutation results:  Negative  Molecular results:    TP53 c.659A>C, p.Ima605Yrq ([id number]) VAF: 55.0%  ASXL1 c.2604del, p.Yri679kj ([id number]) VAF: 31.5%       Peripheral blood, morphology:   Normocytic normochromic anemia  Neutropenia and lymphopenia with 16% circulating blasts  Mild thrombocytopenia with atypical platelet morphology and circulating micromegakaryocytes     Comment:  Given the known history of polycythemia vera, this is classified as blast phase.       Preliminary results are discussed with Dr. Coto on 4/12/24.   Dr. DOWLING reviewed the case and concurs with the diagnosis.      See attached Cytogenetics results from [facility].   Electronically signed by Kourtney Israel MD on 4/25/2024 at  6:29 PM Preliminary result electronically signed by Kourtney Israel MD on 4/16/2024  at  3:23 PM Preliminary result electronically signed by Kourtney Israel MD on 4/15/2024 at  3:39 PM     4/12/24 NGS  TIER 1: Variants of Known Clinical Significance in Hematologic   Malignancies     1. TP53 c.659A>C, p.[id number] ([id number])   VAF: 55.0%   TP53 encodes an important tumor suppressor (p53) that regulates   cell cycle progression, apoptosis, DNA repair, and metabolic   changes (7). Somatic mutations of TP53 are found in 5-10% of de   kathy acute myeloid leukemia (AML), in 24-48% of patients with   secondary AML (15) (19), and in 25-40% of patients with   therapy-related myeloid neoplasms, including AML (11) (17).   Pathogenic germline mutations in TP53 are associated with   Li-Fraumeni syndrome (OMA: 344930), a cancer predisposition   syndrome associated with a high, lifelong risk of a broad spectrum   of cancers (4) (10). This particular mutation occurs in the   DNA-binding domain and is predicted to alter the normal function   of the tumor suppressor p53 (12). In AML, TP53 mutations are   commonly associated with complex karyotype and suboptimal overall   patient outcome, even after hematopoietic stem cell   transplantation (9) (13) (14). In newly-diagnosed [id number]   AML, variant allele frequencies greater than 40% or biallelic TP53   mutations are independently associated with higher rates of   relapse and inferior overall survival regardless of the type of   therapy received (18). In patients with therapy-related myeloid   neoplasms, TP53 mutations are frequently biallelic and associated   with complex karyotype and shorter overall survival (11) (17).   Please note that the variant allele frequency is high, suggesting   that this TP53 mutation may be homozygous or hemizygous in the   neoplastic cells due to copy neutral loss of heterozygosity   (CN-MELIDA) or deletion of 17p (6). Correlation with cytogenetic   findings is recommended.     2. ASXL1 c.2604del, p.Var773xo (NM_[id number])   VAF:  31.5%   ASXL1 encodes a protein that interacts with polycomb complex   proteins and chromatin remodelers to control gene expression (5).   Somatic ASXL1 mutations are found in 6.5% of de kathy AML patients   and in 30% of patients with secondary AML (5). ASXL1 mutations are   also seen in AML, myelodysplasia related (AML-MR) (11) (8) (1). In   myeloid malignancies, acquired ASXL1 mutations are often exon 12   frameshift or nonsense mutations (3) (9) (16). This mutation is   predicted to alter the normal function of ASXL1 (2). ASXL1   mutations are associated with poor prognosis in myeloid   malignancies, including AML (5) (9).     TIER 2: Variants of Unknown Clinical Significance in Hematologic   Malignancies     1. RUNX1 c.26C>T, p.Xjd8Cpf ([id number])   VAF: 50.1%   This variant has not been reported in hematologic malignancies, to   the best of our knowledge.     4/30/24 BONE MARROW BIOPSY FINAL DIAGNOSIS   Bone marrow aspirate, clot section, and trephine core biopsy:  Residual leukemic blasts, 12% of bone marrow cells  Hypocellular bone marrow (average 10% cellularity) with trilineage hypoplasia      BONE MARROW DIFFERENTIAL: Performed on touch imprint.  Blasts:  12 %   Neutrophils & Precursors: 2 %   Lymphocytes:  69 %   Monocytes:  0 %   Eosinophils & Precursors:   0 %   Basophils & Precursors:  0 %   Plasma Cells:  5 %   Erythrocyte Precursors:  12 %      Date Treatment Response Toxicities/Complications   4/12/24 7+3      5/2/24 Dec/sulema                        HPI:  Please see my entry above for hematologic history.  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  He had complex cytogenetics and also T p53 mutation.  He began induction chemotherapy in April and had a partial improvement in terms of the number of blasts, and then began 10-day  decitabine venetoclax in May.  He is tolerated this quite well and is also weathered the pancytopenia well.  He is now improving and follow-up marrow did demonstrate ongoing improvement with 6% blasts noted.  He is planned to begin a second cycle sometime in the next week.    He is a healthy gentleman who has no significant past medical history.  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  He is healthy and fit, exercises regularly, and he continues to have an excellent performance status.  He is eating well and states that the treatment has not significantly affected his overall sense of wellbeing.  He understands the gravity of his particular situation and is hopeful for options that may lead to long-term remission.      ASSESSMENT AND PLAN:  AML following polycythemia vera-today I reviewed with him the gravity of his diagnosis and the difficulty in controlling and curing the disease.  I explained that there is a small chance of long-term cure provided that the disease response to upfront therapy, and he is able to proceed in a timely fashion to transplantation.  He has donor options that could include an young unrelated donor, or his 22-year-old son.  Either type of donor would be acceptable and provide him a good donor option.  I reviewed with him the potential options that would include a preoperative regimen consisting of fludarabine busulfan, or possibly cyclophosphamide, fludarabine, total body irradiation.  I discussed hxtic-tljgvr-smkz disease prophylaxis based on posttransplant cyclophosphamide.  I also discussed the possibility of maintenance therapy afterwards using a low-dose of a hypomethylating agent potentially with growth factor, or venetoclax.  I also reviewed with him to clinical trials that are ongoing for mismatch donors, the optimize trial that explores the value of a reduced dose of posttransplant cyclophosphamide, or the TRX 1 trial that looks at a modified  T-cell product to reduce the risk of baeng-ttnloq-ywco disease.  He is open to both possibilities.  We spent time talking about the various pros and cons of different donors and explained to him that in his situation, he has multiple good options.  We spent time reviewing risk of transplant including mituj-evnbcn-lnvm disease, infection, and toxicity.  I explained that in his situation unfortunately the greatest risk is recurrence.  For now he will continue treatment with Dr. Cano and we will identify donor as quickly as possible to move forward.    ROS:    10 point ROS neg other than the symptoms noted above in the HPI.        No past medical history on file.    Past Surgical History:   Procedure Laterality Date    IR CVC TUNNEL W2 CATH W/O PORT  4/12/2024       No family history on file.    Social History     Tobacco Use    Smoking status: Former     Current packs/day: 0.10     Average packs/day: 0.1 packs/day for 44.4 years (4.4 ttl pk-yrs)     Types: Cigarettes     Start date: 1/1/1980    Smokeless tobacco: Never   Substance Use Topics    Alcohol use: Yes     Alcohol/week: 2.0 - 3.0 standard drinks of alcohol     Types: 2 - 3 Glasses of wine per week    Drug use: No         Allergies   Allergen Reactions    Sulfa Antibiotics Rash        Current Outpatient Medications   Medication Sig Dispense Refill    amLODIPine (NORVASC) 10 MG tablet Take 10 mg by mouth daily      cetirizine HCl 10 MG CAPS Take by mouth as needed      hydroxyurea (HYDREA) 500 MG capsule Take 3 capsules (1,500 mg) by mouth daily      ibuprofen (ADVIL/MOTRIN) 400 MG tablet Take 200 mg by mouth every 6 hours as needed for moderate pain      loratadine (CLARITIN) 10 MG tablet Take 10 mg by mouth as needed      Multiple Vitamins-Minerals (MULTIVITAMIN ADULT PO)       omega 3 1000 MG CAPS          KPS:  90    Physical Exam:   There were no vitals taken for this visit.    Physical Exam       TODAY'S ASSESSMENT BY SYSTEMS     HEMATOLOGY, COAGULATION     He has a history of polycythemia vera    IMMUNOCOMPROMISED HOST  He has no history of immunocompromise    PULMONARY  He has no known lung disease and exercises regularly    LIVER  He has no known liver disease    GASTROINTESTINAL  He is eating well and has no gastrointestinal disease.  He has excellent dentition    RENAL  He has no kidney disease    CARDIOVASCULAR  He has no known cardiovascular disease    ENDOCRINE  He does not have diabetes    MUSCULOSKELETAL  He has no musculoskeletal limitations and in fact continues to run    NEUROLOGIC  He has no neurologic diseases    PSYCHIATRIC  He has no psychiatric diseases    SOCIAL  He is well supported by his wife and family    Zaheer understood the above assessment and recommendations.  Multiple questions answered.  No barriers to learning identified.       Known issues that I take into account for medical decisions, with salient changes to the plan considering these complexities noted above.    Patient Active Problem List   Diagnosis    Polycythemia vera (H)       Today's summary: We will first identify the best donor, and based on this finding, proceed to either a [place] ablative, or myelo ablative preoperative regimen on protocol.    I spent 90 minutes in the care of this patient today, which included time necessary for preparation for the visit, obtaining history, ordering medications/tests/procedures as medically indicated, review of pertinent medical literature, counseling of the patient, communication of recommendations to the care team, and documentation time.    The longitudinal plan of care for the diagnosis(es)/condition(s) as documented were addressed during this visit. Due to the added complexity in care, I will continue to support Zaheer in the subsequent management and with ongoing continuity of care.    SRINIVAS WILKS MD  May 19,  2024        ------------------------------------------------------------------------------------------------------------------------------------------------    Patient Care Team         Relationship Specialty Notifications Start Rosalino Vargas MD PCP - General   7/24/20     Phone: [phone] Fax: [phone]         [address] [facility] [address]    Srinivas Peterson MD MD Hematology Admissions 2/4/19     Phone: [phone] Fax: [phone]         [address] [place], [place] [address]    Noreen Carrillo LPN LPN Hematology Admissions 2/4/19     Srinivas Peterson MD Assigned Cancer Care Provider   10/23/20     Phone: [phone] Fax: [phone]         [address] [place], [place] [address]    Noreen Carrillo LPN Specialty Care Coordinator Hematology Admissions 1/1/22               SRINIVAS WILKS MD

## 2024-05-20 NOTE — NURSING NOTE
"Oncology Rooming Note    May 20, 2024 7:27 AM   Zaheer Borges is a 64 year old male who presents for:    Chief Complaint   Patient presents with    Oncology Clinic Visit     New BMT with Acute Myeloid Leukemia     Initial Vitals: /80 (BP Location: Right arm, Patient Position: Sitting, Cuff Size: Adult Regular)   Pulse 95   Temp 99.2  F (37.3  C) (Oral)   Resp 16   Ht 1.695 m (5' 6.73\")   Wt 67.2 kg (148 lb 3.2 oz)   SpO2 97%   BMI 23.40 kg/m   Estimated body mass index is 23.4 kg/m  as calculated from the following:    Height as of this encounter: 1.695 m (5' 6.73\").    Weight as of this encounter: 67.2 kg (148 lb 3.2 oz). Body surface area is 1.78 meters squared.  No Pain (0) Comment: Data Unavailable   No LMP for male patient.  Allergies reviewed: Yes  Medications reviewed: Yes    Medications: Medication refills not needed today.  Pharmacy name entered into [facility]:    [id number] - [place], [place] - [address]  [facility] [address] [place] [address] PHARMACY # [id number] - [place], [place] - [facility] [address]    Frailty Screening:   Is the patient here for a new oncology consult visit in cancer care? 2. No      Clinical concerns:  None      Brett Coates              "

## 2024-05-20 NOTE — NURSING NOTE
BMT BRYANT Frailty assessment completed with patient in clinic.   Patient had no questions and expressed understanding of   what assessment was being done.   Patient was then checked in for lab appt.      Kat Townsend CMA (AAMA)

## 2024-05-20 NOTE — PROGRESS NOTES
Blood and Marrow Transplant - New Evaluation Appointment    Spoke with Zaheer and Sherri, patient's spouse, following visit with Dr. Mejias. I explained the role of the nurse coordinator throughout the process, as well as general time line and expectations for next steps. We discussed the necessity of a caregiver and the program's proximity requirements. All questions were answered.     Plan: Allogeneic Transplant    Timeline Notes: Patient needs to finish 1 more cycle of therapy and bring back for workup in 4-5 weeks.     Contact information provided for :  yes    Allo:  HLA typing drawn: Yes    PRA typing drawn:  Yes    CMV-IgG and ABO-Rh drawn or in record: Yes    Contact information provided for : Yes    Will sibling typing kits need to be sent? Yes    Financial Release for [facility] search obtained:  Yes      Phase Status updated: yes

## 2024-05-20 NOTE — PROGRESS NOTES
BMT Clinical Social Work New Transplant Evaluation    Information for this evaluation on May 20, 2024 was collected via Pt and spouse report, consultation with medical team, and medical chart review.     Present:  Patient: Zaheer Borges  Spouse: Sherri Merrill  Clinical  (CSW): FERMIN Hathaway, [facility]    Medical Team:   Nurse Coordinator: Louise Hurley RN  BMT Physician: Escobar Mejias MD  Referring Physician: Escobar Peterson MD    Diagnosis: Acute Myeloid Leukemia (AML)  Diagnosis Date: 2024      Presenting Information:   Pt is a 64 year old male diagnosed with AML who presents to [facility] for consultation regarding an Allogeneic stem cell transplant. Pt was accompanied by his wife Sherri.      Contact Information:  Pt Phone: [phone]  Pt Email: [email or web address]  Pt's wife Sherri Phone: [phone]    Special Needs:  No needs identified at this time. Zaheer lives in [place] with his wife and son and does not need to relocate.     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 He was looking for work, but stopped after his diagnosis. His wife Sherri is a MD and works in Neurology at [employer].    Finances/Insurance:  No financial or insurance concerns identified at this time.     CSW provided information regarding the insurance authorization process and the role of the BMT financial case-mangers. CSW provided contact info for the BMT financial case-managers and referred pt to them for future insurance questions.     Caregiver:  CSW discussed with Pt the caregiver role and expectation at length. Pt is agreeable to having a full time caregiver for a minimum of 100  days until cleared by the BMT physician. Pt's identified caregivers are his wife and family. Caregiver education and information provided. No caregiver concerns identified.     Healthcare Directive:    Yes. Pt has a health care directive and will bring it when they return to the BMT program.     Resources Provided:  BMT Information Book   BMT Resources Packet:   Healthcare Directive:   Tour of Unit NO   Transplant unit description and information provided.     Identified Concerns:   No concerns identified at this time.     Summary:   Pt presents to [facility] for consultation regarding an allogeneic stem cell transplant. Pt/family asked good questions regarding psychosocial factors related to BMT; all of which were answered. Pt presented as friendly and open. Pt's affect was engaged. Family's affect was engaged. The pt shared that he has been diagnosed with polycythemia vera many years ago and was aware of the possibility of AML. He feels prepared for the treatment ahead.      Plan:   CSW provided contact information and encouraged Pt to contact CSW with questions, concerns, resources and for support. CSW will continue to follow Pt to provide supportive counseling and assistance with resources as needed.      FERMIN Hathaway, [facility]  Phone [phone]  [facility]- BMT SW [address]

## 2024-06-04 NOTE — TELEPHONE ENCOUNTER
Blood and Marrow Transplant Clinic - Care Coordination    RNCC called patient to discuss upcoming workup appointments and calendar in [facility]. Patient reported that he is able to see these appointments and has no questions at this time.

## 2024-06-05 NOTE — NURSING NOTE
BMBX Teaching and Assessment       Teaching concerns addressed: Bone marrow biopsy and infection prevention.     Person(s) involved in teaching: Patient  Motivation Level  Asks Questions: Yes  Eager to Learn: Yes  Cooperative: Yes  Receptive (willing/able to accept information): Yes    Patient demonstrates understanding of the following:     Reason for the appointment, diagnosis and treatment plan: Yes  Knowledge of proper use of medications and conditions for which they are ordered (with special attention to potential side effects or drug interactions): Yes  Which situations necessitate calling provider and whom to contact: Yes    Teaching concerns addressed:   Reviewed activity restrictions if received premeds, potential for bleeding and actions to take if develops any of the issues below    Pain management techniques: Yes  Patient instructed on hand hygiene: Yes  How and/when to access community resources: Yes    Infection Control:  Patient demonstrates understanding of the following:   Bone marrow procedure site care taught: Yes  Signs and symptoms of infection taught: Yes       Instructional Materials Used/Given: Pt instructed to keep bmbx site clean and dry for 24hrs. Pt educated to monitor site for signs of infection such as redness, rash, oozing, puss, bleeding, pain, and elevated temp. Pt instructed to go to call the [facility] triage line or go to the ER if any signs of infection should occur. Pt educated to not operate machinery if receiving Dilaudid. Pt and wife verbalize understanding.     Pre-procedure labs drawn via port. Post procedure: Patient vital signs stable, ambulating, site is clean, dry and intact prior to discharge and line removed. Pt discharged with wife as .     Provider order received to administer Dilaudid 0.5 mg IVP as premed for BMBX. Procedural consent discussed and pt's signature obtained.  Allergies reviewed.  PT currently alert and oriented to plan of care.  Pt lying prone in  stretcher.  Call light w/in reach.  Provider and  at bedside.    Sandra Bolden RN

## 2024-06-05 NOTE — NURSING NOTE
"Chief Complaint   Patient presents with    Port Draw     Labs drawn from port by rn.  VS taken.     Port accessed with 20 gauge 3/4\" gripper needle and labs drawn by rn.  Port flushed with NS and heparin then de-accessed.  Pt tolerated well.  VS taken.  Pt checked in for next appt.    Urine collected and sent as well.    Vandana Raza RN    "

## 2024-06-05 NOTE — TELEPHONE ENCOUNTER
RNCC discussed patient's workup plan with Dr. Mejias in the evening on 6/4. Patient will proceed with workup on 6/5 and not get another cycle of therapy on 6/6. Patient updated and RNCC updated with Dr. Cano's nurse Stephanie.

## 2024-06-05 NOTE — LETTER
6/5/2024    Zaheer Borges  [address] [place] [id number]      Dear Colleague,    Thank you for referring your patient, Zaheer Borges, to the [facility] BLOOD AND MARROW TRANSPLANT PROGRAM [place]. Please see a copy of my visit note below.    BMT ONC Adult Bone Marrow Biopsy Procedure Note  June 5, 2024  /85 (BP Location: Right arm, Patient Position: Sitting, Cuff Size: Adult Regular)   Pulse 81   Temp 98.2  F (36.8  C) (Oral)   Resp 16   Wt 67 kg (147 lb 9.6 oz)   SpO2 97%   BMI 23.30 kg/m       DIAGNOSIS: AML     PROCEDURE: Unilateral Bone Marrow Biopsy and Unilateral Aspirate    [place] [facility]  Patient s identification was positively verified by verbal identification and invasive procedure safety checklist was completed. Informed consent was obtained. Following the administration of  0.5mg dilaudid  as pre-medication, patient was placed in the prone position and prepped and draped in a sterile manner. Approximately 10 cc of 1% Lidocaine was used over the left posterior iliac spine. Following this a 3 mm incision was made. Trephine bone marrow core(s) was (were) obtained from the IC. Bone marrow aspirates were obtained from the [facility]. Aspirates were sent for morphology, immunophenotyping, cytogenetics, and molecular diagnostics gene rearrangement. A total of approximately 20 ml of marrow was aspirated. Following this procedure a sterile dressing was applied to the bone marrow biopsy site(s). The patient was placed in the supine position to maintain pressure on the biopsy site. Post-procedure wound care instructions were given.     Complications: NO  Length of procedure:20 minutes or less      Procedure performed by: Brittany Loo PA-C [phone]

## 2024-06-05 NOTE — PROGRESS NOTES
BMT ONC Adult Bone Marrow Biopsy Procedure Note  June 5, 2024  /85 (BP Location: Right arm, Patient Position: Sitting, Cuff Size: Adult Regular)   Pulse 81   Temp 98.2  F (36.8  C) (Oral)   Resp 16   Wt 67 kg (147 lb 9.6 oz)   SpO2 97%   BMI 23.30 kg/m       DIAGNOSIS: AML     PROCEDURE: Unilateral Bone Marrow Biopsy and Unilateral Aspirate    LOCATION: [facility] [place] 2nd Floor    Patient s identification was positively verified by verbal identification and invasive procedure safety checklist was completed. Informed consent was obtained. Following the administration of  0.5mg dilaudid  as pre-medication, patient was placed in the prone position and prepped and draped in a sterile manner. Approximately 10 cc of 1% Lidocaine was used over the left posterior iliac spine. Following this a 3 mm incision was made. Trephine bone marrow core(s) was (were) obtained from the [facility]. Bone marrow aspirates were obtained from the [facility]. Aspirates were sent for morphology, immunophenotyping, cytogenetics, and molecular diagnostics gene rearrangement. A total of approximately 20 ml of marrow was aspirated. Following this procedure a sterile dressing was applied to the bone marrow biopsy site(s). The patient was placed in the supine position to maintain pressure on the biopsy site. Post-procedure wound care instructions were given.     Complications: NO    Length of procedure:20 minutes or less      Procedure performed by: Brittany Loo PA-C [phone]

## 2024-06-05 NOTE — NURSING NOTE
"Oncology Rooming Note    June 5, 2024 12:05 PM   Zaheer Borges is a 64 year old male who presents for:    Chief Complaint   Patient presents with    Port Draw     Labs drawn from port by rn.  VS taken.    Bone Marrow Biopsy     BMBx, MANNY pt; hx AML     Initial Vitals: /85 (BP Location: Right arm, Patient Position: Sitting, Cuff Size: Adult Regular)   Pulse 81   Temp 98.2  F (36.8  C) (Oral)   Resp 16   Wt 67 kg (147 lb 9.6 oz)   SpO2 97%   BMI 23.30 kg/m   Estimated body mass index is 23.3 kg/m  as calculated from the following:    Height as of 5/20/24: 1.695 m (5' 6.73\").    Weight as of this encounter: 67 kg (147 lb 9.6 oz). Body surface area is 1.78 meters squared.  No Pain (0) Comment: Data Unavailable   No LMP for male patient.  Allergies reviewed: Yes  Medications reviewed: Yes    Medications: MEDICATION REFILLS NEEDED TODAY. Provider was NOT notified.- Pharm Visit Friday to clarify if pt still needs medications that are running low.   Pharmacy name entered into [employer]:    [facility] #[id number] - [place], [place] - [address] [place] [address] DR [facility] [address] [address] [place] [address] PHARMACY # [id number] - [place], [place] - [facility] BEAM DWAYNE Bolden RN              "

## 2024-06-06 NOTE — TELEPHONE ENCOUNTER
For now, I don't suggest we address the platelets, which we see rebound sometimes after the treatment you have received (and tends to be a good sign).  We will continue to watch.  The level should not cause dizziness, so if this is continuing, we should consider other possibilities.

## 2024-06-06 NOTE — TELEPHONE ENCOUNTER
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  He has 7 of 8 HLA matched unrelated donors available, and a haploidentical son.  I recommended that we take the unrelated donor and we have established potential dates for transplant in late June.  With the use of a mismatched unrelated donor, he becomes eligible to participate in a clinical trial, optimize, in which by participating, he would be eligible to receive a myelo ablative preoperative regimen consisting of fludarabine and busulfan, followed by a reduced dose of cyclophosphamide for the prevention of [id number] disease.  I reviewed with him the potential benefits and risk of the treatment.  Most notably increased toxicity from the myeloablative regimen, possibly offset by the decreased toxicity of the reduced dose of cyclophosphamide.  There may be a an increased risk of [id number] disease.  The hope would be reduced relapse risk, reduced infection risk, and improved disease control due to the myelo ablative preoperative regimen which we do not use as standard therapy in a patient over the age of 60.  He and his wife asked many meaningful questions and they appear to have a good understanding of the protocol.  He has expressed his wish to participate.      SRINIVAS WILKS MD  June 6, 2024

## 2024-06-07 NOTE — PROGRESS NOTES
BMT ONC Adult Lumbar Puncture Procedure Note    June 7, 2024    Procedure: Lumbar Puncture to obtain CSF     Diagnosis: PV    Learning needs assessment complete within 12 months: NO    Vitals reviewed:  YES    Informed Consent: Procedure, benefits, risks, and alternatives explained to the patient who verbalized understanding of the information and agreed to proceed with lumbar puncture. Risks include bleeding, headache, and or infection.  Patient safety checklist completed.    Labs: Reviewed:      Lab Results   Component Value Date    INR 1.19 06/05/2024     06/05/2024     06/28/2021            Description:. The patient was seated at the bedside with knees dangling. The L3-4 disc space was prepped and draped in a sterile fashion. Local anesthesia with 3mL 1% preservative-free lidocaine was used. A 22 gauge, 4 inch needle was placed on the first  attempt. 3 mL spinal fluid collected. Specimen appears clear . Specimen sent for cell count and differential, cytology, protein, glucose, flow cytometry, and immunophenotyping.  The needle was removed and a bandage was applied to the site.  Patient tolerated well.     Interventions: No    Complications: No     Disposition: Patient will remain on back for 1 hour post procedure. Avoid soaking in a tub tonight.  Call if develops headaches, fevers and or chills.     Performed by:   BOBO Thorne CNP

## 2024-06-07 NOTE — PROGRESS NOTES
"Pharmacy Assessment - Pre-Stem Cell Transplant    Assessments & Recommendations:  1) Avoid Tylenol (and Tylenol-containing products such as Excedrin) at least 72 hours before and after busulfan administration due to drug interaction. Patient aware.   2) Sulfa allergy - patient reported 30 years ago in [place]. Listed as rash. Pentamidine and atovaquone alternatives to Bactrim.   3) History of migraines. Takes Tylenol and Excedrin as needed. He is aware to avoid Excedrin as aspirin containing. May need to consider alternative therapy (ex. Triptan) if migraines during busulfan with need to avoid Tylenol during this time.   4) Patient wants Team to know cough was major issues for him during previous hospitalization. Per patient, only effective agent was Robutssin-AC, which he wants offered first if cough.   5) Patient to discuss at closing visit when to stop fluconazole.   6) CMV (+), letermovir indicated   7) Per protocol, the target daily busulfan AUC is 19.7-21.75 mg*hr/L. PK monitoring per institutional guidelines (Busulfan SOP).     If this patient is admitted under observation, the patient may bring in their own supply of the following medication for use in the hospital:  1) N/a  -Per \"Medications Not Supplied by Pharmacy\" policy (available on [employer])    History of Present Illness:  Zaheer Borges is a 64 year old male diagnosed with AML. He has been treated with Vyxeos and decitabine plus venetoclax. He is now being worked up for a 7/8 URD MA PBSCT  on protocol 2023-33 Arm A, which utilizes fludarabine and busulfan as a conditioning regimen and reduced-dose ptCy, MMF, and tacrolimus as GVHD prophylaxis.     Pertinent labs/tests:  Viral Testing:  CMV (+) / HSV (+/-) / EBV (+) / VZV (+)  Ejection Fraction: ___% (scheduled for 6/11)  QTc: ___msec (scheduled for 6/11)    Weights:   Wt Readings from Last 3 Encounters:   06/07/24 67.8 kg (149 lb 8 oz)   06/05/24 67 kg (147 lb 9.6 oz)   05/20/24 67.1 kg (148 lb)   Ideal " body weight: 65.5 kg (144 lb 5.7 oz)  Adjusted ideal body weight: 66.4 kg (146 lb 6.6 oz)  % IBW:  103%  There is no height or weight on file to calculate BMI.    Primary BMT Physician: Dr. Mejias  BMT RN Coordinator:  Miguel Sloan / Steffen Butler     Past Medical History:  Past Medical History:   Diagnosis Date    Acute myeloid leukemia (H) 04/12/2024    Pancytopenia due to antineoplastic chemotherapy (H24) 04/27/2024    Polycythemia vera (H) 02/01/2019    Primary hypertension 06/27/2023     Medication Allergies:  Allergies   Allergen Reactions    Sulfa Antibiotics Rash     Current Medications (pre-admit):  Current Outpatient Medications   Medication Sig Dispense Refill    acetaminophen (TYLENOL) 500 MG tablet Take 500-1,000 mg by mouth every 6 hours as needed for mild pain      acyclovir (ZOVIRAX) 200 MG capsule Take 2 capsules (400 mg) by mouth 2 times daily 60 capsule 3    amLODIPine (NORVASC) 5 MG tablet Take 5 mg by mouth daily      cetirizine HCl 10 MG CAPS Take 10 mg by mouth daily as needed      fluconazole (DIFLUCAN) 200 MG tablet Take 2 tablets (400 mg) by mouth daily 30 tablet 3    levofloxacin (LEVAQUIN) 250 MG tablet Take 1 tablet (250 mg) by mouth daily 30 tablet 0    loratadine (CLARITIN) 10 MG tablet Take 10 mg by mouth as needed      LORazepam (ATIVAN) 1 MG tablet Take 1 mg by mouth nightly as needed for sleep      Multiple Vitamins-Minerals (MULTIVITAMIN ADULT PO) Take 1 tablet by mouth daily      ondansetron (ZOFRAN) 8 MG tablet Take 8 mg by mouth every 8 hours as needed for nausea      tenofovir (VIREAD) 300 MG tablet Take 300 mg by mouth daily      traZODone (DESYREL) 100 MG tablet Take 100 mg by mouth at bedtime       Herbal Medication/Nutritional Supplements: Multivitamin. Stopped fish oil.     Smoking/Past Drug Use: N/a    Nausea/Vomiting, Pain, or other issues: Nausea but no vomiting with prior chemotherapy. Found ondansetron effective. Did have mucositis with prior chemotherapy. Tried  Magic Mouthwash and lidocaine. History of migraines. Stopped ibuprofen. Using Tylenol and Excredin. He is aware we would found to avoid Excedrin products containing aspirin. Reported issues with coughing during previous hospitalization. Found only thing effective as Robitussin-AC.     Summary:  I met with Zaheer Borges for approximately 45 minutes.  We updated and reviewed his medication list and allergies. We discussed preparative regimen (fludarabine and busulfan), busulfan level monitoring, GVHD prophylaxis (reduced dose ptCy, tacrolimus, MMF), anti-infective prophylaxis (acyclovir, levofloxacin, micafungin, pentamidine vs atovaquone), supportive medications (allopurinol, ursodiol, antiemetics, filgrastim), and med box/pharmacy expectations.     Tian Crystal, PharmD  Heme/Onc/BMT

## 2024-06-07 NOTE — NURSING NOTE
"Oncology Rooming Note    June 7, 2024 8:27 AM   Zaheer Borges is a 64 year old male who presents for:    Chief Complaint   Patient presents with    Oncology Clinic Visit     Lumbar Puncture for diagnostic testing     Initial Vitals: /85 (BP Location: Right arm, Patient Position: Sitting, Cuff Size: Adult Regular)   Pulse 59   Temp 98.1  F (36.7  C) (Oral)   Resp 16   Wt 67.8 kg (149 lb 8 oz)   SpO2 95%   BMI 23.60 kg/m   Estimated body mass index is 23.6 kg/m  as calculated from the following:    Height as of 5/20/24: 1.695 m (5' 6.73\").    Weight as of this encounter: 67.8 kg (149 lb 8 oz). Body surface area is 1.79 meters squared.  No Pain (0) Comment: Data Unavailable   No LMP for male patient.  Allergies reviewed: Yes  Medications reviewed: Yes    Medications: Medication refills not needed today.  Pharmacy name entered into [employer]:    [facility] #[id number] [place], [place] - [address] [facility]  AT [address] PHARMACY # [id number] [place], [place] - [address]    Lumbar Puncture Teaching and Assessment       Teaching concerns addressed: Lumbar puncture and infection prevention.     Person(s) involved in teaching: Patient  Motivation Level  Asks Questions: Yes  Eager to Learn: Yes  Cooperative: Yes  Receptive (willing/able to accept information): Yes    Patient demonstrates understanding of the following:     Reason for the appointment, diagnosis and treatment plan: Yes  Knowledge of proper use of medications and conditions for which they are ordered (with special attention to potential side effects or drug interactions): Yes  Which situations necessitate calling provider and whom to contact: Yes    Teaching concerns addressed:   Reviewed activity restrictions if received premeds, potential for bleeding and actions to take if develops any of the issues below    Pain management techniques: Yes  Patient instructed on hand hygiene: Yes  How and/when to access community resources: " Yes    Infection Control:  Patient demonstrates understanding of the following:   Bone marrow procedure site care taught: Yes  Signs and symptoms of infection taught: Yes       Instructional Materials Used/Given: Pt instructed to keep LP site clean and dry for 24hrs. Pt educated to monitor site for signs of infection such as redness, rash, oozing, puss, bleeding, pain, and elevated temp. Pt instructed to go to call the [facility] triage line or go to the ER if any signs of infection should occur. Pt educated to not operate machinery if receiving versed. Pt  verbalize understanding.     Pre-procedure labs drawn 6/5. Post procedure: Patient stable, ambulating, site is clean, dry and intact prior to discharge.       Tali Watson RN

## 2024-06-07 NOTE — LETTER
"6/7/2024      Zaheer Borges  [address] [place] [id number]      Dear Colleague,    Thank you for referring your patient, Zaheer Borges, to the [facility] BLOOD AND MARROW TRANSPLANT PROGRAM [place]. Please see a copy of my visit note below.    Pharmacy Assessment - Pre-Stem Cell Transplant    Assessments & Recommendations:  1) Avoid Tylenol (and Tylenol-containing products such as Excedrin) at least 72 hours before and after busulfan administration due to drug interaction. Patient aware.   2) Sulfa allergy - patient reported 30 years ago in [place]. Listed as rash. Pentamidine and atovaquone alternatives to Bactrim.   3) History of migraines. Takes Tylenol and Excedrin as needed. He is aware to avoid Excedrin as aspirin containing. May need to consider alternative therapy (ex. Triptan) if migraines during busulfan with need to avoid Tylenol during this time.   4) Patient wants Team to know cough was major issues for him during previous hospitalization. Per patient, only effective agent was Robutssin-AC, which he wants offered first if cough.   5) Patient to discuss at closing visit when to stop fluconazole.   6) CMV (+), letermovir indicated   7) Per protocol, the target daily busulfan AUC is 19.7-21.75 mg*hr/L. PK monitoring per institutional guidelines (Busulfan SOP).     If this patient is admitted under observation, the patient may bring in their own supply of the following medication for use in the hospital:  1) N/a  -Per \"Medications Not Supplied by Pharmacy\" policy (available on [employer])    History of Present Illness:  Zaheer Borges is a 64 year old male diagnosed with AML. He has been treated with Vyxeos and decitabine plus venetoclax. He is now being worked up for a 7/8 URD MA PBSCT  on protocol 2023-33 Arm A, which utilizes fludarabine and busulfan as a conditioning regimen and reduced-dose ptCy, MMF, and tacrolimus as GVHD prophylaxis.     Pertinent labs/tests:  Viral Testing:  CMV (+) / HSV (+/-) " / EBV (+) / VZV (+)  Ejection Fraction: ___% (scheduled for 6/11)  QTc: ___msec (scheduled for 6/11)    Weights:   Wt Readings from Last 3 Encounters:   06/07/24 67.8 kg (149 lb 8 oz)   06/05/24 67 kg (147 lb 9.6 oz)   05/20/24 67.1 kg (148 lb)   Ideal body weight: 65.5 kg (144 lb 5.7 oz)  Adjusted ideal body weight: 66.4 kg (146 lb 6.6 oz)  % IBW:  103%  There is no height or weight on file to calculate BMI.    Primary BMT Physician: Dr. Mejias  BMT RN Coordinator:  Miguel Sloan / Steffen Butler     Past Medical History:  Past Medical History:   Diagnosis Date    Acute myeloid leukemia (H) 04/12/2024    Pancytopenia due to antineoplastic chemotherapy (H24) 04/27/2024    Polycythemia vera (H) 02/01/2019    Primary hypertension 06/27/2023     Medication Allergies:  Allergies   Allergen Reactions    Sulfa Antibiotics Rash     Current Medications (pre-admit):  Current Outpatient Medications   Medication Sig Dispense Refill    acetaminophen (TYLENOL) 500 MG tablet Take 500-1,000 mg by mouth every 6 hours as needed for mild pain      acyclovir (ZOVIRAX) 200 MG capsule Take 2 capsules (400 mg) by mouth 2 times daily 60 capsule 3    amLODIPine (NORVASC) 5 MG tablet Take 5 mg by mouth daily      cetirizine HCl 10 MG CAPS Take 10 mg by mouth daily as needed      fluconazole (DIFLUCAN) 200 MG tablet Take 2 tablets (400 mg) by mouth daily 30 tablet 3    levofloxacin (LEVAQUIN) 250 MG tablet Take 1 tablet (250 mg) by mouth daily 30 tablet 0    loratadine (CLARITIN) 10 MG tablet Take 10 mg by mouth as needed      LORazepam (ATIVAN) 1 MG tablet Take 1 mg by mouth nightly as needed for sleep      Multiple Vitamins-Minerals (MULTIVITAMIN ADULT PO) Take 1 tablet by mouth daily      ondansetron (ZOFRAN) 8 MG tablet Take 8 mg by mouth every 8 hours as needed for nausea      tenofovir (VIREAD) 300 MG tablet Take 300 mg by mouth daily      traZODone (DESYREL) 100 MG tablet Take 100 mg by mouth at bedtime       Herbal  Medication/Nutritional Supplements: Multivitamin. Stopped fish oil.     Smoking/Past Drug Use: N/a    Nausea/Vomiting, Pain, or other issues: Nausea but no vomiting with prior chemotherapy. Found ondansetron effective. Did have mucositis with prior chemotherapy. Tried Magic Mouthwash and lidocaine. History of migraines. Stopped ibuprofen. Using Tylenol and Excredin. He is aware we would found to avoid Excedrin products containing aspirin. Reported issues with coughing during previous hospitalization. Found only thing effective as Robitussin-AC.     Summary:  I met with Zaheer Borges for approximately 45 minutes.  We updated and reviewed his medication list and allergies. We discussed preparative regimen (fludarabine and busulfan), busulfan level monitoring, GVHD prophylaxis (reduced dose ptCy, tacrolimus, MMF), anti-infective prophylaxis (acyclovir, levofloxacin, micafungin, pentamidine vs atovaquone), supportive medications (allopurinol, ursodiol, antiemetics, filgrastim), and med box/pharmacy expectations.     Tian Crystal, PharmD  Heme/Onc/BMT

## 2024-06-07 NOTE — LETTER
6/7/2024      Zaheer Borges  [address] [place] [id number]      Dear Colleague,    Thank you for referring your patient, Zaheer Borges, to the [facility] BLOOD AND MARROW TRANSPLANT PROGRAM [place]. Please see a copy of my visit note below.    BMT ONC Adult Lumbar Puncture Procedure Note    June 7, 2024    Procedure: Lumbar Puncture to obtain CSF     Diagnosis: PV    Learning needs assessment complete within 12 months: NO    Vitals reviewed:  YES    Informed Consent: Procedure, benefits, risks, and alternatives explained to the patient who verbalized understanding of the information and agreed to proceed with lumbar puncture. Risks include bleeding, headache, and or infection.  Patient safety checklist completed.    Labs: Reviewed:      Lab Results   Component Value Date    INR 1.19 06/05/2024     06/05/2024     06/28/2021            Description:. The patient was seated at the bedside with knees dangling. The L3-4 disc space was prepped and draped in a sterile fashion. Local anesthesia with 3mL 1% preservative-free lidocaine was used. A 22 gauge, 4 inch needle was placed on the first  attempt. 3 mL spinal fluid collected. Specimen appears clear . Specimen sent for cell count and differential, cytology, protein, glucose, flow cytometry, and immunophenotyping.  The needle was removed and a bandage was applied to the site.  Patient tolerated well.     Interventions: No    Complications: No     Disposition: Patient will remain on back for 1 hour post procedure. Avoid soaking in a tub tonight.  Call if develops headaches, fevers and or chills.     Performed by:   BOBO Thorne CNP

## 2024-06-10 NOTE — LETTER
6/10/2024      Zaheer Borges  [address] [facility] [id number]      Dear Colleague,    Thank you for referring your patient, Zaheer Borges, to the [facility]. Please see a copy of my visit note below.    BMT/Cell Therapy Work Up Summary      Zaheer Borges is a 64 year old male referred by Dr. Cano for AML.      Diagnosis and Treatment Summary       HPI:  Please see my entry above for disease and treatment history.  Kusum Katerina has previous MF and has received therapy (see my previous note) with CR by morphologic grounds.  He is otherwise healthy and has recovered uneventfully.  He is here to make final prep for BMT      ROS:    10 point ROS neg other than the symptoms noted above in the HPI.        Past Medical History:   Diagnosis Date    Acute myeloid leukemia (H) 04/12/2024    Pancytopenia due to antineoplastic chemotherapy (H24) 04/27/2024    Polycythemia vera (H) 02/01/2019    Primary hypertension 06/27/2023       Past Surgical History:   Procedure Laterality Date    IR CHEST PORT PLACEMENT > 5 YRS OF AGE  5/21/2024    IR CVC TUNNEL W2 CATH W/O PORT  4/12/2024       No family history on file.    Social History     Tobacco Use    Smoking status: Former     Current packs/day: 0.10     Average packs/day: 0.1 packs/day for 44.5 years (4.4 ttl pk-yrs)     Types: Cigarettes     Start date: 1/1/1980     Passive exposure: Past    Smokeless tobacco: Never   Substance Use Topics    Alcohol use: Not Currently    Drug use: No         Allergies   Allergen Reactions    Sulfa Antibiotics Rash        Current Outpatient Medications   Medication Sig Dispense Refill    acetaminophen (TYLENOL) 500 MG tablet Take 500-1,000 mg by mouth every 6 hours as needed for mild pain      acyclovir (ZOVIRAX) 200 MG capsule Take 2 capsules (400 mg) by mouth 2 times daily 60 capsule 3    amLODIPine (NORVASC) 5 MG tablet Take 5 mg by mouth daily      cetirizine HCl 10 MG CAPS Take 10 mg by mouth daily as  needed      fluconazole (DIFLUCAN) 200 MG tablet Take 2 tablets (400 mg) by mouth daily 30 tablet 3    levofloxacin (LEVAQUIN) 250 MG tablet Take 1 tablet (250 mg) by mouth daily 30 tablet 0    loratadine (CLARITIN) 10 MG tablet Take 10 mg by mouth as needed      LORazepam (ATIVAN) 1 MG tablet Take 1 mg by mouth nightly as needed for sleep      Multiple Vitamins-Minerals (MULTIVITAMIN ADULT PO) Take 1 tablet by mouth daily      ondansetron (ZOFRAN) 8 MG tablet Take 8 mg by mouth every 8 hours as needed for nausea      tenofovir (VIREAD) 300 MG tablet Take 300 mg by mouth daily      traZODone (DESYREL) 100 MG tablet Take 100 mg by mouth at bedtime           Physical Exam:     Vital Signs: BP (!) 141/89 (BP Location: Right arm, Patient Position: Sitting, Cuff Size: Adult Regular)   Pulse 95   Temp 97.8  F (36.6  C) (Oral)   Resp 16   Wt 67.5 kg (148 lb 12.8 oz)   SpO2 97%   BMI 23.49 kg/m      Physical Exam  Constitutional:       General: He is not in acute distress.     Appearance: Normal appearance. He is normal weight.   HENT:      Mouth/Throat:      Mouth: Mucous membranes are moist.      Pharynx: Oropharynx is clear.   Eyes:      Conjunctiva/sclera: Conjunctivae normal.   Cardiovascular:      Rate and Rhythm: Normal rate and regular rhythm.      Pulses: Normal pulses.      Heart sounds: Normal heart sounds.   Pulmonary:      Effort: Pulmonary effort is normal. No respiratory distress.   Abdominal:      General: Abdomen is flat.   Musculoskeletal:         General: No swelling. Normal range of motion.      Cervical back: No rigidity.   Skin:     General: Skin is warm.      Findings: No rash.   Neurological:      General: No focal deficit present.      Mental Status: He is alert. Mental status is at baseline.   Psychiatric:         Mood and Affect: Mood normal.         Behavior: Behavior normal.         Thought Content: Thought content normal.         Judgment: Judgment normal.         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  I discussed with him participation in the Optimize trial.  I reviewed the risks, benefits, options and voluntary nature of the clinical trial.  After reviewing the consent and answering his questions, he wishes to proceed.     Consent summary  In today's visit, we discussed in detail the research for which Zaheer Borges is eligible. We discussed the potential risks and potential benefits of each protocol individually. We explained potential alternatives to the protocols discussed. We explained to the patient that participation is voluntary and that consent may be withdrawn at any time.     We discussed:  The rationale for our approach to the disease treatment  The eligibility requirements for treatment in the context of clinical trials  The need for caregiver support and the caregiver's role in recovery  The importance of adherence to the treatment plan and appropriate follow up  The requirements for contraception while undergoing treatment  The potential risks of morbidity and mortality related to this treatment  The requirements for supportive care to reduce the risk of infection and other complications  The role of the dietician and PT/OT to reduce the risk of muscle loss/sarcopenia  Support that is available through our social workers and care team to mitigate distress  The desired outcomes/goals of treatment, including the possibility of long-term disease control    The patient completed the last round of treatment on May.    The allogeneic donor meets all eligibility criteria for stem cell donation..    HCT-CI score: 1. We counseled the patient about the impact of this on the risk of treatment related and overall mortality. The score fit within treatment protocol eligibility  criteria.    Karnofsky performance score: 80       ECOG: (required for CAR-T): 0    Active infections:  None.  Prior infections that require additional special prophylaxis considerations: none.  I reviewed and discussed infectious disease evaluation with the patient and the management plan during treatment.    Reproductive status: What methods of birth control does the patient plan to use during the treatment period beginning with conditioning and ending with the discontinuation of immune suppression (indicate with an X all that apply):  __ The patient is confirmed to be sterile or post-menopausal  __ Sexual abstinence  __ Condoms  __ Implants  __ Injectables  __ Oral contraceptives  __ Intrauterine devices (IUD)  __ Other (describe)    The patient received appropriate reproductive counseling and agreed with the need for effective contraception during the treatment procedures.    Dental health suitable to proceed: Yes    After our detailed discussion above, the patient signed the following consents for treatment and protocols:  Optimize - myeloablative prep with bu/flu       Known issues that I take into account for medical decisions, with salient changes to the plan considering these complexities noted above.    Patient Active Problem List   Diagnosis    Polycythemia vera (H)    Acute myeloid leukemia (H)    History of polycythemia vera    Primary hypertension         I spent 90 minutes in the care of this patient today, which included time necessary for preparation for the visit, obtaining history, ordering medications/tests/procedures as medically indicated, review of pertinent medical literature, counseling of the patient, communication of recommendations to the care team, and documentation time.    SRINIVAS WILKS MD      ____________________________________________________________________          BMT/Cell Therapy Workup Summary    Workup Nurse Coordinator: Martha  Primary BMT Physician: Randell Saini  BMT Physician (if different): Randell  Date of Summary:  06/17/2024        Patient Demographics       Patient ID:  Zaheer Borges   Age:  64 year old   Sex:  male  Reason for Transplant: AML after MF  Protocol: Optimize - myeloablative bu/flu      Donor Characteristics       Self or Related or Unrelated Donor: URD  Donor Match: 7/8  Donor Age: 27  Donor Sex: M  Donor ABO/Rh: B pos  Donor CMV Serostatus: Neg    Donor-Specific Antibodies:  Recent Labs   Lab Test 06/05/24  1049   [id number] None   [id number] A:26 31 66 69B:51 52 78         Virtual Crossmatch:    Recent Labs   Lab Test 06/07/24  1559   RESVXMT1 DSA Absent   DSAVXMT1 None   RESVXMB1 No Interp         Blood Counts       Recent Labs   Lab Test 06/10/24  1416 06/05/24  1049 05/20/24  0932 04/10/24  1108 03/14/24  1303 03/07/24  1130 02/29/24  1122   HGB 12.1* 10.8* 8.7* 13.2*   < > 11.8* 12.6*   HCT 36.3* 32.3* 23.7* 36.6*   < > 32.5* 34.7*   WBC 4.1 2.0* 0.1* 2.6*   < > 2.2* 1.9*   ANEUTAUTO 2.8  --   --   --   --  1.0* 0.8*   ALYMPAUTO 0.5*  --   --   --   --  0.7* 0.6*   AMONOAUTO 0.6  --   --   --   --  0.4 0.4   AEOSAUTO 0.0  --   --   --   --  0.0 0.0   ABSBASO 0.1  --   --   --   --  0.0 0.0   NRBCMAN 0.0 0.0  --  0.0   < > 0.0 0.0   ABLA  --   --   --  0.3*  --   --   --    * 495* 21* 136*   < > 109* 99*    < > = values in this interval not displayed.         Recent Labs   Lab Test 06/05/24  1049   ABORH B POS         No lab results found.      Chemistries     Basic Panel  Recent Labs   Lab Test 06/05/24  1049 07/24/20  1333 02/07/20  1418    142 141   POTASSIUM 3.9 4.4 3.8   CHLORIDE 104 107 105   CO2 24 26 24   BUN 13.8 11 17   CR 0.80 0.78 0.78   * 100 107        Calcium, Magnesium, Phosphorus  Recent Labs   Lab Test 06/05/24  1049 07/24/20  1333 02/07/20  1418   ARISTIDES 8.9 9.2 9.3        LFTs  Recent Labs   Lab Test 06/05/24  1049 02/07/20  1418   BILITOTAL 0.4 0.5   ALKPHOS 63 59   AST 23 21   ALT 15 19   ALBUMIN 4.3 4.3        LDH  No lab results found.    B2-Microglobulin  No lab results found.    Vitamin D  No lab results found.      Urine Studies       Recent Labs   Lab Test 06/05/24  1049   COLOR Light Yellow   APPEARANCE Clear   URINEGLC Negative   URINEBILI Negative   URINEKETONE Negative   SG 1.013   UBLD Negative   URINEPH 5.5   PROTEIN Negative   UUROI Normal   NITRITE Negative   LEUKEST Negative   MUCUS Present*   RBCU <1   WBCU 1       Creatinine Clearance    No lab results found.      Infectious Disease Markers     [facility] IDM    Recent Labs   Lab Test 06/05/24  1049   TCRUZI Non-reactive       HIV Ab  Recent Labs   Lab Test 06/05/24  1049   HIAGAB Nonreactive       HepB core Ab  Recent Labs   Lab Test 06/05/24  1049   HBCAB Reactive*       HepB surface Ab  Recent Labs   Lab Test 06/05/24  1049   AUSAB 68.70     Recent Labs   Lab Test 06/05/24  1049   AUSABI Reactive       HepB antigen  Recent Labs   Lab Test 06/05/24  1049   HEPBANG Nonreactive       Hep C Ab  Recent Labs   Lab Test 06/05/24  1049   HCVAB Nonreactive       Trypanosoma  Recent Labs   Lab Test 06/05/24  1049   TCRUZI Non-reactive       CMV  Recent Labs   Lab Test 06/05/24  1049   CMVIGG Positive, suggests recent or past exposure.*       EBV  Recent Labs   Lab Test 06/05/24  1049   EBVCAG Positive*       HSV 1/2  Recent Labs   Lab Test 06/05/24  1049   C8BOKKK 54.70*   H1IGG Positive.  IgG antibody to HSV-1 detected.*   R0NPVNP 0.08   H2IGG No HSV-2 IgG antibodies detected.       VZV  Recent Labs   Lab Test 06/05/24  1049   VZVIGG Positive       HTLV  No lab results found.  Recent Labs   Lab Test 06/05/24  1049   HTLVIIIAB Negative       Toxoplasma  Recent Labs   Lab Test 06/05/24  1049   TOXGONGIAB <3.0     Recent Labs   Lab Test 06/05/24  1049   TOXAM <3.0       COVID  No lab results found.      Immunoglobulins     No lab results found.    No lab results found.    No lab results found.      Monocloncal Protein Studies     M spike    No lab  results found.    Kappa FLC    No lab results found.    Lambda FLC    No lab results found.    FLC Ratio    No lab results found.        Bone Marrow Biopsy       Lab Results   Component Value Date    FINALDX  06/07/2024     Specimen A     Interpretation:      No morphologic evidence of malignancy (see comment)      Adequacy:     Satisfactory for evaluation          COMDX  06/07/2024     Final interpretation requires correlation with the results of other ancillary studies and the morphologic and clinical features.        COMDX  06/07/2024     Please correlate with the concurrent flow cytometry report [id number]            Lab Results   Component Value Date    FLINTERP  06/07/2024     A. Cerebrospinal fluid:  - No myeloid blast population identified  - See comment        COMDX  06/07/2024     Final interpretation requires correlation with the results of other ancillary studies and the morphologic and clinical features.        COMDX  06/07/2024     Please correlate with the concurrent flow cytometry report [id number]            Chest X-Ray - 2 view     No results found for this or any previous visit.        Chest CT without Contrast       No results found for this or any previous visit.        PFTs     FVC%  Recent Labs   Lab Test 06/11/24  0904   20003 93       FEV1%  Recent Labs   Lab Test 06/11/24  0904   20016 104       DLCO%  Recent Labs   Lab Test 06/11/24  0904   20143 122         EKG       No results found for this or any previous visit.      ECHOCARDIOGRAM       No results found for this or any previous visit.        PET Scan       No results found for this or any previous visit.         MRI Brain       No results found for this or any previous visit.         CSF Studies       Recent Labs   Lab Test 06/07/24  0858   CCOL Colorless   CAPP Clear   CWBC 3   CRBC 0   CCOM Negative for blasts.  Please correlate with concurrent flow cytometry case [id number].    Keagan Andrade MD on 6/7/2024 at 1:30 PM        Recent Labs   Lab Test 06/07/24 0858   CGLU 61       Recent Labs   Lab Test 06/07/24 0858   CTP 49.7*     SRINIVAS WILKS MD

## 2024-06-10 NOTE — PROGRESS NOTES
BMT/Cell Therapy Work Up Summary      Zaheer Borges is a 64 year old male referred by Dr. Cano for AML.      Diagnosis and Treatment Summary       HPI:  Please see my entry above for disease and treatment history.  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  He is otherwise healthy and has recovered uneventfully.  He is here to make final prep for BMT      ROS:    10 point ROS neg other than the symptoms noted above in the HPI.        Past Medical History:   Diagnosis Date    Acute myeloid leukemia (H) 04/12/2024    Pancytopenia due to antineoplastic chemotherapy (H24) 04/27/2024    Polycythemia vera (H) 02/01/2019    Primary hypertension 06/27/2023       Past Surgical History:   Procedure Laterality Date    IR CHEST PORT PLACEMENT > 5 YRS OF AGE  5/21/2024    IR CVC TUNNEL W2 CATH W/O PORT  4/12/2024       No family history on file.    Social History     Tobacco Use    Smoking status: Former     Current packs/day: 0.10     Average packs/day: 0.1 packs/day for 44.5 years (4.4 ttl pk-yrs)     Types: Cigarettes     Start date: 1/1/1980     Passive exposure: Past    Smokeless tobacco: Never   Substance Use Topics    Alcohol use: Not Currently    Drug use: No         Allergies   Allergen Reactions    Sulfa Antibiotics Rash        Current Outpatient Medications   Medication Sig Dispense Refill    acetaminophen (TYLENOL) 500 MG tablet Take 500-1,000 mg by mouth every 6 hours as needed for mild pain      acyclovir (ZOVIRAX) 200 MG capsule Take 2 capsules (400 mg) by mouth 2 times daily 60 capsule 3    amLODIPine (NORVASC) 5 MG tablet Take 5 mg by mouth daily      cetirizine HCl 10 MG CAPS Take 10 mg by mouth daily as needed      fluconazole (DIFLUCAN) 200 MG tablet Take 2 tablets (400 mg) by mouth daily 30 tablet 3    levofloxacin (LEVAQUIN) 250 MG tablet Take 1 tablet (250 mg) by mouth daily 30 tablet 0    loratadine (CLARITIN) 10 MG tablet Take 10 mg by mouth as  needed      LORazepam (ATIVAN) 1 MG tablet Take 1 mg by mouth nightly as needed for sleep      Multiple Vitamins-Minerals (MULTIVITAMIN ADULT PO) Take 1 tablet by mouth daily      ondansetron (ZOFRAN) 8 MG tablet Take 8 mg by mouth every 8 hours as needed for nausea      tenofovir (VIREAD) 300 MG tablet Take 300 mg by mouth daily      traZODone (DESYREL) 100 MG tablet Take 100 mg by mouth at bedtime           Physical Exam:     Vital Signs: BP (!) 141/89 (BP Location: Right arm, Patient Position: Sitting, Cuff Size: Adult Regular)   Pulse 95   Temp 97.8  F (36.6  C) (Oral)   Resp 16   Wt 67.5 kg (148 lb 12.8 oz)   SpO2 97%   BMI 23.49 kg/m      Physical Exam  Constitutional:       General: He is not in acute distress.     Appearance: Normal appearance. He is normal weight.   HENT:      Mouth/Throat:      Mouth: Mucous membranes are moist.      Pharynx: Oropharynx is clear.   Eyes:      Conjunctiva/sclera: Conjunctivae normal.   Cardiovascular:      Rate and Rhythm: Normal rate and regular rhythm.      Pulses: Normal pulses.      Heart sounds: Normal heart sounds.   Pulmonary:      Effort: Pulmonary effort is normal. No respiratory distress.   Abdominal:      General: Abdomen is flat.   Musculoskeletal:         General: No swelling. Normal range of motion.      Cervical back: No rigidity.   Skin:     General: Skin is warm.      Findings: No rash.   Neurological:      General: No focal deficit present.      Mental Status: He is alert. Mental status is at baseline.   Psychiatric:         Mood and Affect: Mood normal.         Behavior: Behavior normal.         Thought Content: Thought content normal.         Judgment: Judgment normal.         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  I discussed with  him participation in the Optimize trial.  I reviewed the risks, benefits, options and voluntary nature of the clinical trial.  After reviewing the consent and answering his questions, he wishes to proceed.     Consent summary  In today's visit, we discussed in detail the research for which Zaheer Borges is eligible. We discussed the potential risks and potential benefits of each protocol individually. We explained potential alternatives to the protocols discussed. We explained to the patient that participation is voluntary and that consent may be withdrawn at any time.     We discussed:  The rationale for our approach to the disease treatment  The eligibility requirements for treatment in the context of clinical trials  The need for caregiver support and the caregiver's role in recovery  The importance of adherence to the treatment plan and appropriate follow up  The requirements for contraception while undergoing treatment  The potential risks of morbidity and mortality related to this treatment  The requirements for supportive care to reduce the risk of infection and other complications  The role of the dietician and PT/OT to reduce the risk of muscle loss/sarcopenia  Support that is available through our social workers and care team to mitigate distress  The desired outcomes/goals of treatment, including the possibility of long-term disease control    The patient completed the last round of treatment on May.    The allogeneic donor meets all eligibility criteria for stem cell donation..    HCT-CI score: 1. We counseled the patient about the impact of this on the risk of treatment related and overall mortality. The score fit within treatment protocol eligibility criteria.    Karnofsky performance score: 80       ECOG: (required for CAR-T): 0    Active infections:  None.  Prior infections that require additional special prophylaxis considerations: none.  I reviewed and discussed infectious disease evaluation with the  patient and the management plan during treatment.    Reproductive status: What methods of birth control does the patient plan to use during the treatment period beginning with conditioning and ending with the discontinuation of immune suppression (indicate with an X all that apply):  __ The patient is confirmed to be sterile or post-menopausal  __ Sexual abstinence  __ Condoms  __ Implants  __ Injectables  __ Oral contraceptives  __ Intrauterine devices (IUD)  __ Other (describe)    The patient received appropriate reproductive counseling and agreed with the need for effective contraception during the treatment procedures.    Dental health suitable to proceed: Yes    After our detailed discussion above, the patient signed the following consents for treatment and protocols:  Optimize - myeloablative prep with bu/flu       Known issues that I take into account for medical decisions, with salient changes to the plan considering these complexities noted above.    Patient Active Problem List   Diagnosis    Polycythemia vera (H)    Acute myeloid leukemia (H)    History of polycythemia vera    Primary hypertension         I spent 90 minutes in the care of this patient today, which included time necessary for preparation for the visit, obtaining history, ordering medications/tests/procedures as medically indicated, review of pertinent medical literature, counseling of the patient, communication of recommendations to the care team, and documentation time.    SRINIVAS WILKS MD      ____________________________________________________________________          BMT/Cell Therapy Workup Summary    Workup Nurse Coordinator: Martha  Primary BMT Physician: Randell  [facility] BMT Physician (if different): Randell  Date of Summary:  06/17/2024        Patient Demographics       Patient ID:  Zaheer Borges   Age:  64 year old   Sex:  male  Reason for Transplant: AML after MF  Protocol: Optimize - myeloablative bu/flu      Donor  Characteristics       Self or Related or Unrelated Donor: URD  Donor Match: 7/8  Donor Age: 27  Donor Sex: M  Donor ABO/Rh: B pos  Donor CMV Serostatus: Neg    Donor-Specific Antibodies:  Recent Labs   Lab Test 06/05/24  1049   HB0RFETAX None   [id number] A:26 31 66 69B:51 52 78         Virtual Crossmatch:    Recent Labs   Lab Test 06/07/24  1559   RESVXMT1 DSA Absent   DSAVXMT1 None   RESVXMB1 No Interp         Blood Counts       Recent Labs   Lab Test 06/10/24  1416 06/05/24  1049 05/20/24  0932 04/10/24  1108 03/14/24  1303 03/07/24  1130 02/29/24  1122   HGB 12.1* 10.8* 8.7* 13.2*   < > 11.8* 12.6*   HCT 36.3* 32.3* 23.7* 36.6*   < > 32.5* 34.7*   WBC 4.1 2.0* 0.1* 2.6*   < > 2.2* 1.9*   ANEUTAUTO 2.8  --   --   --   --  1.0* 0.8*   ALYMPAUTO 0.5*  --   --   --   --  0.7* 0.6*   AMONOAUTO 0.6  --   --   --   --  0.4 0.4   AEOSAUTO 0.0  --   --   --   --  0.0 0.0   ABSBASO 0.1  --   --   --   --  0.0 0.0   NRBCMAN 0.0 0.0  --  0.0   < > 0.0 0.0   ABLA  --   --   --  0.3*  --   --   --    * 495* 21* 136*   < > 109* 99*    < > = values in this interval not displayed.         Recent Labs   Lab Test 06/05/24  1049   ABORH B POS         No lab results found.      Chemistries     Basic Panel  Recent Labs   Lab Test 06/05/24  1049 07/24/20  1333 02/07/20  1418    142 141   POTASSIUM 3.9 4.4 3.8   CHLORIDE 104 107 105   CO2 24 26 24   BUN 13.8 11 17   CR 0.80 0.78 0.78   * 100 107        Calcium, Magnesium, Phosphorus  Recent Labs   Lab Test 06/05/24  1049 07/24/20  1333 02/07/20  1418   ARISTIDES 8.9 9.2 9.3        LFTs  Recent Labs   Lab Test 06/05/24  1049 02/07/20  1418   BILITOTAL 0.4 0.5   ALKPHOS 63 59   AST 23 21   ALT 15 19   ALBUMIN 4.3 4.3       LDH  No lab results found.    B2-Microglobulin  No lab results found.    Vitamin D  No lab results found.      Urine Studies       Recent Labs   Lab Test 06/05/24  1049   COLOR Light Yellow   APPEARANCE Clear   URINEGLC Negative   URINEBILI Negative    URINEKETONE Negative   SG 1.013   UBLD Negative   URINEPH 5.5   PROTEIN Negative   UUROI Normal   NITRITE Negative   LEUKEST Negative   MUCUS Present*   RBCU <1   WBCU 1       Creatinine Clearance    No lab results found.      Infectious Disease Markers     [facility] IDM    Recent Labs   Lab Test 06/05/24  1049   TCRUZI Non-reactive       HIV Ab  Recent Labs   Lab Test 06/05/24  1049   HIAGAB Nonreactive       HepB core Ab  Recent Labs   Lab Test 06/05/24  1049   HBCAB Reactive*       HepB surface Ab  Recent Labs   Lab Test 06/05/24  1049   AUSAB 68.70     Recent Labs   Lab Test 06/05/24  1049   AUSABI Reactive       HepB antigen  Recent Labs   Lab Test 06/05/24  1049   HEPBANG Nonreactive       Hep C Ab  Recent Labs   Lab Test 06/05/24  1049   HCVAB Nonreactive       Trypanosoma  Recent Labs   Lab Test 06/05/24  1049   TCRUZI Non-reactive       CMV  Recent Labs   Lab Test 06/05/24  1049   CMVIGG Positive, suggests recent or past exposure.*       EBV  Recent Labs   Lab Test 06/05/24  1049   EBVCAG Positive*       HSV 1/2  Recent Labs   Lab Test 06/05/24  1049   J1FHVLZ 54.70*   H1IGG Positive.  IgG antibody to HSV-1 detected.*   S3BBBSB 0.08   H2IGG No HSV-2 IgG antibodies detected.       VZV  Recent Labs   Lab Test 06/05/24  1049   VZVIGG Positive       HTLV  No lab results found.  Recent Labs   Lab Test 06/05/24  1049   HTLVIIIAB Negative       Toxoplasma  Recent Labs   Lab Test 06/05/24  1049   TOXGONGIAB <3.0     Recent Labs   Lab Test 06/05/24  1049   TOXAM <3.0       COVID  No lab results found.      Immunoglobulins     No lab results found.    No lab results found.    No lab results found.      Monocloncal Protein Studies     M spike    No lab results found.    Kappa FLC    No lab results found.    Lambda FLC    No lab results found.    FLC Ratio    No lab results found.        Bone Marrow Biopsy       Lab Results   Component Value Date    FINALDX  06/07/2024     Specimen A     Interpretation:       No morphologic evidence of malignancy (see comment)      Adequacy:     Satisfactory for evaluation          COMDX  06/07/2024     Final interpretation requires correlation with the results of other ancillary studies and the morphologic and clinical features.        COMDX  06/07/2024     Please correlate with the concurrent flow cytometry report [id number]            Lab Results   Component Value Date    FLINTERP  06/07/2024     A. Cerebrospinal fluid:  - No myeloid blast population identified  - See comment        COMDX  06/07/2024     Final interpretation requires correlation with the results of other ancillary studies and the morphologic and clinical features.        COMDX  06/07/2024     Please correlate with the concurrent flow cytometry report [id number]            Chest X-Ray - 2 view     No results found for this or any previous visit.        Chest CT without Contrast       No results found for this or any previous visit.        PFTs     FVC%  Recent Labs   Lab Test 06/11/24 0904 20003 93       FEV1%  Recent Labs   Lab Test 06/11/24 0904 20016 104       DLCO%  Recent Labs   Lab Test 06/11/24 0904 20143 122         EKG       No results found for this or any previous visit.      ECHOCARDIOGRAM       No results found for this or any previous visit.        PET Scan       No results found for this or any previous visit.         MRI Brain       No results found for this or any previous visit.         CSF Studies       Recent Labs   Lab Test 06/07/24  0858   CCOL Colorless   CAPP Clear   CWBC 3   CRBC 0   CCOM Negative for blasts.  Please correlate with concurrent flow cytometry case [id number].    Keagan Andrade MD on 6/7/2024 at 1:30 PM       Recent Labs   Lab Test 06/07/24  0858   CGLU 61       Recent Labs   Lab Test 06/07/24  0858   CTP 49.7*

## 2024-06-10 NOTE — NURSING NOTE
Chief Complaint   Patient presents with    Lab Only     Labs drawn with  by rn.     Labs drawn with  by rn.  Pt tolerated well.    Vandana Raza RN

## 2024-06-10 NOTE — PROGRESS NOTES
[id number]: Informed Consent Note     The consent form, including purpose, risks and benefits, was reviewed with Zaheer Borges, and all questions were answered before he signed the consent form. The patient understands that the study involves an active treatment phase as well as a post-treatment follow up phase.     Present during the discussion was Zaheer Borges. A copy of the signed form was provided to the patient. No procedures specific to this study were performed prior to the patient signing the consent form.    Consent Version Date: [id number]  Consent obtained by: Dr. Escobar Mejias    Date: 10JUN2024  HIPAA authorization signed?: yes  HIPAA authorization version date: [email or web address]    Jenny Gilliam RN    Form 503.03.01 (Version 2)     Effective date: 01AUG2018     Next Review Date: 01AUG2020

## 2024-06-10 NOTE — NURSING NOTE
"Oncology Rooming Note    Eliane 10, 2024 12:31 PM   Zaheer Borges is a 64 year old male who presents for:    Chief Complaint   Patient presents with    Oncology Clinic Visit     Personal history of diseases of blood and blood-forming organs     Initial Vitals: BP (!) 141/89 (BP Location: Right arm, Patient Position: Sitting, Cuff Size: Adult Regular)   Pulse 95   Temp 97.8  F (36.6  C) (Oral)   Resp 16   Wt 67.5 kg (148 lb 12.8 oz)   SpO2 97%   BMI 23.49 kg/m   Estimated body mass index is 23.49 kg/m  as calculated from the following:    Height as of 5/20/24: 1.695 m (5' 6.73\").    Weight as of this encounter: 67.5 kg (148 lb 12.8 oz). Body surface area is 1.78 meters squared.  No Pain (0) Comment: Data Unavailable   No LMP for male patient.  Allergies reviewed: Yes  Medications reviewed: Yes    Medications: Medication refills not needed today.  Pharmacy name entered into [facility]:    [id number] [address] [facility]  AT [facility] OF [address] [id number] - [place], [place] - [address] [place] [address] AVE    Frailty Screening:   Is the patient here for a new oncology consult visit in cancer care? 2. No      Clinical concerns: None       Karol Vee CMA            "

## 2024-06-11 NOTE — NURSING NOTE
BMT BRYANT Frailty assessment completed with patient in clinic. Patient had no questions and showed understanding of what assessment was being done. Frailty form completed in Epic.    Karyn Yu LPN   on 6/11/2024 at 2:22 PM

## 2024-06-11 NOTE — PROGRESS NOTES
[facility]  BMTCT OPEN VISIT    June 11, 2024      Zaheer Borges is a 64 year old male undergoing evaluation prior to hematopoietic cell transplant or immune effector cell therapy.    Reason for BMTCT: AML    Recent chemotherapy: Decitabine (last dose 5/11) + Venetoclax (last dose 5/22)     Recent infections:   5/31: facial swelling, resolved with amoxicillin x7 days   4/30: neutropenic fever, infectious work up negative     Blood thinner use? If yes, why? No    Treatment for diabetes? No    Today, the patient notes the following symptoms:  Review Of Systems  Skin: negative, rash, bruising, lumps or bumps  Eyes: +glasses negative, visual blurring, double vision, photophobia  Ears/Nose/Throat: +bleeding gums 2 weeks ago negative, nasal congestion, hearing loss, tinnitus, vertigo, epistaxis, persistent sore throat  Respiratory: +NGUYEN No shortness of breath, cough, or hemoptysis  Cardiovascular: negative, palpitations, chest pain, orthopnea, lower extremity edema, and syncope or near-syncope  Gastrointestinal: +constipation negative, dysphagia, nausea, vomiting, abdominal pain, hemorrhoids, melena, hematochezia, and diarrhea  Genitourinary: negative, dysuria, frequency, urgency, hesitancy, retention, and incontinence  Musculoskeletal: + low back pain (worse with prolonged standing) & L-heel pain negative, neck pain, joint pain, joint swelling, joint stiffness, and muscular weakness  Neurologic: +migraine HA's negative, stroke, seizures, numbness or tingling of hands, and numbness or tingling of feet  Psychiatric: negative, anxiety, and depression  Hematologic/Lymphatic/Immunologic: +night sweats (more so when taking chemo) 2 weeks ago   Endocrine: negative, cold intolerance, and heat intolerance      Zaheer Borges's History    Past Medical History:   Diagnosis Date    Acute myeloid leukemia (H) 04/12/2024    Pancytopenia due to antineoplastic chemotherapy (H24) 04/27/2024    Polycythemia vera (H) 02/01/2019    Primary  hypertension 2023   - GERD   - Insomnia     Past Surgical History:   Procedure Laterality Date    IR CHEST PORT PLACEMENT > 5 YRS OF AGE  2024    IR CVC TUNNEL W2 CATH W/O PORT  2024   - Appendectomy   - Pyelotomy w/ removal of calculus     No family history on file.  - Father: HTN, HLD, prostate cancer   - Mother: DM-2, glaucoma  - Sister: thyroid tumor   - Brother: high cholesterol,  from MI at age 70   - Brother 2: kidney stone     Social History     Tobacco Use    Smoking status: Former     Current packs/day: 0.10     Average packs/day: Less than 1 pack year     Types: Cigarettes     Start date:  Smoked for 1 year in college      Passive exposure: Past    Smokeless tobacco: Never   Substance Use Topics    Alcohol use: Not Currently, on average 1 beer/day            Zaheer Borges's Medications and Allergies    Current Outpatient Medications   Medication Sig Dispense Refill    acetaminophen (TYLENOL) 500 MG tablet Take 500-1,000 mg by mouth every 6 hours as needed for mild pain      acyclovir (ZOVIRAX) 200 MG capsule Take 2 capsules (400 mg) by mouth 2 times daily 60 capsule 3    amLODIPine (NORVASC) 5 MG tablet Take 5 mg by mouth daily      cetirizine HCl 10 MG CAPS Take 10 mg by mouth daily as needed      fluconazole (DIFLUCAN) 200 MG tablet Take 2 tablets (400 mg) by mouth daily 30 tablet 3    levofloxacin (LEVAQUIN) 250 MG tablet Take 1 tablet (250 mg) by mouth daily 30 tablet 0    loratadine (CLARITIN) 10 MG tablet Take 10 mg by mouth as needed      LORazepam (ATIVAN) 1 MG tablet Take 1 mg by mouth nightly as needed for sleep      Multiple Vitamins-Minerals (MULTIVITAMIN ADULT PO) Take 1 tablet by mouth daily      ondansetron (ZOFRAN) 8 MG tablet Take 8 mg by mouth every 8 hours as needed for nausea      tenofovir (VIREAD) 300 MG tablet Take 300 mg by mouth daily      traZODone (DESYREL) 100 MG tablet Take 100 mg by mouth at bedtime       No current facility-administered medications for  this visit.          Allergies   Allergen Reactions    Sulfa Antibiotics Rash           Physical Examination    There were no vitals taken for this visit.    Exam:  Constitutional: healthy, alert, and no distress  Head: Normocephalic. No masses, lesions, tenderness or abnormalities  ENT: ENT exam normal, no neck nodes   Cardiovascular: RRR. No murmurs, clicks gallops or rub  Respiratory: Lungs clear  Gastrointestinal: Abdomen soft, non-tender. BS normal. No masses, organomegaly  : Deferred  Musculoskeletal: extremities normal- no gross deformities noted, gait normal, and normal muscle tone  Skin: no suspicious lesions or rashes  Neurologic: Gait normal. Sensation grossly WNL.  Psychiatric: mentation appears normal and affect normal/bright  Hematologic/Lymphatic/Immunologic: Normal cervical lymph nodes      Frailty Screening      BMT Fried Frailty          6/11/2024    13:56 5/20/2024    09:01   Fried Frailty   Lost>10 pounds unintenionally last year N N   Exhaustion Score 1 0   Slowness Score 0 1   Weakness/ Strength Score 1 0   Low Activity Level Score 0 0   Final Score Not Frail Not Frail   Final Score Number 2 1   Sit Stand Assessment   Patient able to perform 5 chair stands Y Y   Chair Stands in seconds 11 13   Patient is able to perform stand with Feet Side by Side? Y Y   First attempt (in seconds): 10 10   Patient is able to perform Semi-Tandem Stand? Y Y   First attemp (in seconds): 10 10   Patient is able to perform Tandem Stand? Y Y   First attemp (in seconds): 10 10         Overall Assessment    I have reviewed the diagnostic data, medications, frailty screening, and general processes prior to BMTCT.  I have notified the Primary BMT Physician/and or Attending Physician in the clinic of any issues. We also discussed in detail the database and biorepository research for which Zaheer Borges is eligible. We discussed the potential risks and potential benefits of each of these protocols individually. We  explained potential alternatives to the protocols discussed. We explained to the patient that participation is voluntary and that consent may be withdrawn at any time.       Consents Signed:   Blood transfusion consent form  Ethnicity form  CIBMTR database (declined)   [facility] biorepository   [facility] BMTCT Database    Copies of the signed consent forms will be provided to the patient on admission. No procedures specific to any studies were performed prior to the patient signing the consent form.    Zaheer Borges had the opportunity to ask questions, and I answered all of the questions to the best of my ability.    I spent 60 minutes in the care of this patient today, which included time necessary for preparation for the visit, obtaining history, ordering medications/tests/procedures as medically indicated, review of pertinent medical literature, counseling of the patient, communication of recommendations to the care team, and documentation time.        James Coates PA-C

## 2024-06-11 NOTE — LETTER
6/11/2024      Zaheer Borges  [address] [place] [id number]      Dear Colleague,    Thank you for referring your patient, Zaheer Borges, to the [facility] BLOOD AND MARROW TRANSPLANT PROGRAM [place]. Please see a copy of my visit note below.    [facility]  BMTCT OPEN VISIT    June 11, 2024      Zaheer Borges is a 64 year old male undergoing evaluation prior to hematopoietic cell transplant or immune effector cell therapy.    Reason for BMTCT: AML    Recent chemotherapy: Decitabine (last dose 5/11) + Venetoclax (last dose 5/22)     Recent infections:   5/31: facial swelling, resolved with amoxicillin x7 days   4/30: neutropenic fever, infectious work up negative     Blood thinner use? If yes, why? No    Treatment for diabetes? No    Today, the patient notes the following symptoms:  Review Of Systems  Skin: negative, rash, bruising, lumps or bumps  Eyes: +glasses negative, visual blurring, double vision, photophobia  Ears/Nose/Throat: +bleeding gums 2 weeks ago negative, nasal congestion, hearing loss, tinnitus, vertigo, epistaxis, persistent sore throat  Respiratory: +NGUYEN No shortness of breath, cough, or hemoptysis  Cardiovascular: negative, palpitations, chest pain, orthopnea, lower extremity edema, and syncope or near-syncope  Gastrointestinal: +constipation negative, dysphagia, nausea, vomiting, abdominal pain, hemorrhoids, melena, hematochezia, and diarrhea  Genitourinary: negative, dysuria, frequency, urgency, hesitancy, retention, and incontinence  Musculoskeletal: + low back pain (worse with prolonged standing) & L-heel pain negative, neck pain, joint pain, joint swelling, joint stiffness, and muscular weakness  Neurologic: +migraine HA's negative, stroke, seizures, numbness or tingling of hands, and numbness or tingling of feet  Psychiatric: negative, anxiety, and depression  Hematologic/Lymphatic/Immunologic: +night sweats (more so when taking chemo) 2 weeks ago   Endocrine: negative, cold  intolerance, and heat intolerance      Zaheer Borges's History    Past Medical History:   Diagnosis Date    Acute myeloid leukemia (H) 2024    Pancytopenia due to antineoplastic chemotherapy (H24) 2024    Polycythemia vera (H) 2019    Primary hypertension 2023   - GERD   - Insomnia     Past Surgical History:   Procedure Laterality Date    IR CHEST PORT PLACEMENT > 5 YRS OF AGE  2024    IR CVC TUNNEL W2 CATH W/O PORT  2024   - Appendectomy   - Pyelotomy w/ removal of calculus     No family history on file.  - Father: HTN, HLD, prostate cancer   - Mother: DM-2, glaucoma  - Sister: thyroid tumor   - Brother: high cholesterol,  from MI at age 70   - Brother 2: kidney stone     Social History     Tobacco Use    Smoking status: Former     Current packs/day: 0.10     Average packs/day: Less than 1 pack year     Types: Cigarettes     Start date:  Smoked for 1 year in college      Passive exposure: Past    Smokeless tobacco: Never   Substance Use Topics    Alcohol use: Not Currently, on average 1 beer/day            Zaheer Borges's Medications and Allergies    Current Outpatient Medications   Medication Sig Dispense Refill    acetaminophen (TYLENOL) 500 MG tablet Take 500-1,000 mg by mouth every 6 hours as needed for mild pain      acyclovir (ZOVIRAX) 200 MG capsule Take 2 capsules (400 mg) by mouth 2 times daily 60 capsule 3    amLODIPine (NORVASC) 5 MG tablet Take 5 mg by mouth daily      cetirizine HCl 10 MG CAPS Take 10 mg by mouth daily as needed      fluconazole (DIFLUCAN) 200 MG tablet Take 2 tablets (400 mg) by mouth daily 30 tablet 3    levofloxacin (LEVAQUIN) 250 MG tablet Take 1 tablet (250 mg) by mouth daily 30 tablet 0    loratadine (CLARITIN) 10 MG tablet Take 10 mg by mouth as needed      LORazepam (ATIVAN) 1 MG tablet Take 1 mg by mouth nightly as needed for sleep      Multiple Vitamins-Minerals (MULTIVITAMIN ADULT PO) Take 1 tablet by mouth daily      ondansetron (ZOFRAN) 8  MG tablet Take 8 mg by mouth every 8 hours as needed for nausea      tenofovir (VIREAD) 300 MG tablet Take 300 mg by mouth daily      traZODone (DESYREL) 100 MG tablet Take 100 mg by mouth at bedtime       No current facility-administered medications for this visit.          Allergies   Allergen Reactions    Sulfa Antibiotics Rash           Physical Examination    There were no vitals taken for this visit.    Exam:  Constitutional: healthy, alert, and no distress  Head: Normocephalic. No masses, lesions, tenderness or abnormalities  ENT: ENT exam normal, no neck nodes   Cardiovascular: RRR. No murmurs, clicks gallops or rub  Respiratory: Lungs clear  Gastrointestinal: Abdomen soft, non-tender. BS normal. No masses, organomegaly  : Deferred  Musculoskeletal: extremities normal- no gross deformities noted, gait normal, and normal muscle tone  Skin: no suspicious lesions or rashes  Neurologic: Gait normal. Sensation grossly WNL.  Psychiatric: mentation appears normal and affect normal/bright  Hematologic/Lymphatic/Immunologic: Normal cervical lymph nodes      Frailty Screening      BMT Fried Frailty          6/11/2024    13:56 5/20/2024    09:01   Fried Frailty   Lost>10 pounds unintenionally last year N N   Exhaustion Score 1 0   Slowness Score 0 1   Weakness/ Strength Score 1 0   Low Activity Level Score 0 0   Final Score Not Frail Not Frail   Final Score Number 2 1   Sit Stand Assessment   Patient able to perform 5 chair stands Y Y   Chair Stands in seconds 11 13   Patient is able to perform stand with Feet Side by Side? Y Y   First attempt (in seconds): 10 10   Patient is able to perform Semi-Tandem Stand? Y Y   First attemp (in seconds): 10 10   Patient is able to perform Tandem Stand? Y Y   First attemp (in seconds): 10 10         Overall Assessment    I have reviewed the diagnostic data, medications, frailty screening, and general processes prior to BMTCT.  I have notified the Primary BMT Physician/and or  Attending Physician in the clinic of any issues. We also discussed in detail the database and biorepository research for which Zaheer Borges is eligible. We discussed the potential risks and potential benefits of each of these protocols individually. We explained potential alternatives to the protocols discussed. We explained to the patient that participation is voluntary and that consent may be withdrawn at any time.       Consents Signed:   Blood transfusion consent form  Ethnicity form  [facility] database (declined)   [facility] biorepository   [facility] BMTCT Database    Copies of the signed consent forms will be provided to the patient on admission. No procedures specific to any studies were performed prior to the patient signing the consent form.    Zaheer Borges had the opportunity to ask questions, and I answered all of the questions to the best of my ability.    I spent 60 minutes in the care of this patient today, which included time necessary for preparation for the visit, obtaining history, ordering medications/tests/procedures as medically indicated, review of pertinent medical literature, counseling of the patient, communication of recommendations to the care team, and documentation time.        James Coates PA-C

## 2024-06-11 NOTE — NURSING NOTE
"Oncology Rooming Note    June 11, 2024 2:19 PM   Zaheer Borges is a 64 year old male who presents for:    Chief Complaint   Patient presents with    Oncology Clinic Visit     Acute Myeloid Leukemia     Initial Vitals: /79 (Patient Position: Sitting, Cuff Size: Adult Regular)  Estimated body mass index is 23.49 kg/m  as calculated from the following:    Height as of 5/20/24: 1.695 m (5' 6.73\").    Weight as of 6/10/24: 67.5 kg (148 lb 12.8 oz). There is no height or weight on file to calculate BSA.  Data Unavailable Comment: Data Unavailable   No LMP for male patient.  Allergies reviewed: Yes  Medications reviewed: Yes    Medications: Medication refills not needed today.  Pharmacy name entered into [employer]:    [id number] [place], [place] - [address]  AT [facility] OF [address] [id number] - [place], [place] - [facility] [address]    Frailty Screening:   Is the patient here for a new oncology consult visit in cancer care? 2. No      Clinical concerns: Frailty Assessment       Karyn Yu LPN  6/11/2024              "

## 2024-06-12 NOTE — PROGRESS NOTES
Blood and Marrow Transplant   Psychosocial Assessment with   Clinical     Assessment completed on 6/12/24 of Pt's living situation, support system, financial status, functional status, coping, stressors, need for resources and social work intervention provided as needed.  Information for this assessment was provided by Pt report in addition to medical chart review and consultation with medical team.     Present at Assessment:   Patient: Zaheer Borges  : FERMIN Gustafson, BAR  : FERMIN Hathaway LICSW (observing)    Diagnosis: AML (Acute Myeloid Leukemia)      Date of Diagnosis: 4/2024    Transplant type: allogeneic    Donor: Unrelated allogeneic donor stem cell transplant    Physician: Escobar Mejias MD    Nurse Coordinator: Steffen Butler RN    : FERMIN Hathaway LICSW    Permanent Address:   [address] [place] [address]    Living Situation: Zaheer lives in [place] with his wife and son and does not need to relocate.     Contact Information:  Pt's Home Phone: [phone]  Pt's Cell Phone: [phone]  Pt Email: [email or web address]  Pt's spouse Phone: [phone]    Presenting Information:  Zaheer is a 64 year old male diagnosed with AML who presents for evaluation for allogeneic transplant at the [facility] ([facility]).    Decision Making: Self    Health Care Directive:Yes. Pt has a health care directive and will bring it when they return to the BMT program.     Relationship Status: . Pt and pt's spouse have been  for 24 years. Pt described relationship as stable/supportive.     Special Needs: None identified at this time.     Family/Support System: Pt endorsed a solid support system including family and close friends who will be available to support pt throughout transplant process.     Family Information:  Spouse: Sherri Merrill  Children: Son Marcellus, Khushboo (lives at home); daughter Sarah (college in [place]).  Siblings: 4 siblings, 2   Parents: father is 94, mother is   Friends: Pt endorsed a good friend support system.    Caregiver: SW discussed with pt the caregiver role and expectation at length. Pt is agreeable to having a full time caregiver for a minimum of 100 days until cleared by the BMT physician. Pt confirmed understanding of the caregiver requirement. Pt's primary caregiver will be spouse with family/friends as a secondary or back-up to assist as needed. Pt reviewed and signed the caregiver contract which will be scanned into the EMR. Caregiver education and resources provided. No caregiver concerns identified.     Caregiver Contact Information:  Spouse Sherri Merrill [phone]    Transportation Mode: Personal vehicle. Pt is aware of driving restrictions post-BMT and the need for the caregiver is to drive until cleared to drive by the BMT physician.     Insurance: Pt has [employer] health insurance. Pt denied specific insurance concerns at this time. SW reiterated information about the BMT Financial  should specific insurance questions arise as pt moves through transplant process.     Sources of Income: Pt's source of income is spouse's salary. No financial concerns identified at this time.    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 He was looking for work, but stopped after his diagnosis. His wife Sherri is a MD and works in Neurology at [employer].     Mental Health: Pt denied a history of mental health concerns, specific diagnoses or medications at this time.     PHQ-9:  Pt scored a 5 which indicates mild on the depression severity scale. Pt endorses this is an accurate reflection of his emotional state.    GAD7:  Pt scored a 1 which indicates no sign of anxiety on the Generalized Anxiety Disorder Questionnaire. Pt endorses this is  "an accurate reflection of his emotional state.    Chemical Use:   Tobacco: none  Alcohol: none  Marijuana: none  Other Drugs: none  Based on the information provided, there appear to be no specific risks or concerns identified at this time.     Trauma/Loss/Abuse History: Multiple losses associated with cancer diagnosis and treatment, including health, his employment, changes to physical appearance, etc.     Spirituality: SW explained that there are Chaplains on the unit and pt can request to meet with a  at anytime. Pt declined at this time.    Coping: Pt noted that he is currently feeling \"prepared\". Pt shared that his main coping mechanisms are prayer/spiritual practices, reading books and exercising. Pt noted that he enjoys biking in the summer, ping pong with friends and family, watching D4P games, traveling and reading books. SW and pt discussed additional positive coping mechanisms that pt can utilize while in the hospital. While hospitalized, pt plans to read books, watch TV, watch sports, keep in touch with friends through social media.     Education Provided: Transplant process expectations, Caregiver requirements, Financial issues related to transplant, Financial resources, Common psychosocial stressors pre/post transplant, Support group(s) available, Hospital resources available, Web site information, Social Work role    Interventions Provided: Psychosocial Support and Education     Assessment and Recommendations for Team:  Pt is a 64 year old male diagnosed with AML who is here undergoing preparation for a planned allogeneic transplant.     Pt is pleasant, calm and able to articulate concerns/coping mechanisms in an appropriate manner. During our meeting pt was alert, was interactive, affect was full, displayed appropriate eye contact, memory and thought processes. Pt feels comfortable communicating with the medical team. Pt has a strong supportive network of family and friends who are " involved. Pt has developed strong coping mechanisms.     Pt will benefit from ongoing psychosocial support in regards to coping with the adjustment to the BMT process. CSW has discussed  psychosocial support options in regards to coping with the adjustment to the BMT process and support groups opportunities.      Pt has a solid support system and caregiver plan. Pt verbalizes understanding of the transplant process and wanting to proceed. SW provided contact information and encouraged pt to contact SW with questions, concerns, resources and for support.    Per this assessment, SW did not identify any barriers to this patient moving forward with transplant.    Important Information:   Pt would like bike while IP    Follow up Planned:   Psychosocial support  Support group information    Rena CHRISTENSEN, BAR  [facility]  Specialty [facility]   Phone: [phone]

## 2024-06-12 NOTE — LETTER
6/12/2024      Zaheer Borges  [address] [place] [id number]      Dear Colleague,    Thank you for referring your patient, Zaheer Borges, to the [facility] BLOOD AND MARROW TRANSPLANT PROGRAM [place]. Please see a copy of my visit note below.    BMT Teaching Flowsheet    Zaheer Borges is a 64 year old male    Teaching Topic: 2023-33 Optimize Protocol    Person(s) involved in teaching: Patient    Motivation Level  Asks Questions: Yes  Eager to Learn: Yes  Cooperative: Yes  Receptive (willing/able to accept information): Yes  Any cultural factors/Samaritan beliefs that may influence understanding or compliance? No    Patient demonstrates understanding of the following:   Reason for the appointment, diagnosis and treatment plan: Yes  Knowledge of proper use of medications and conditions for which they are ordered (with special attention to potential side effects or drug interactions): Yes  Which situations necessitate calling provider and whom to contact: Yes  Proper use and care of (medical equipment, care aids, etc.) Yes  Pain management techniques: Yes  How and/when to access community resources: Yes    Teaching/ learning concerns addressed: Patient questions/concerns were addressed    Infection Control:  Patient instructed on hand hygiene: Yes  Signs and symptoms of infection taught: Yes    Instructional Materials Used/Given:   Teaching Binder with protocol calendars, med sheets, and infection prevention handouts were used.      Time spent with patient: 90 minutes.  Specific Concerns: NA      Again, thank you for allowing me to participate in the care of your patient.        Sincerely,        Kimberly Sloan RN

## 2024-06-12 NOTE — PROGRESS NOTES
BMT Teaching Flowsheet    Zaheer Borges is a 64 year old male    Teaching Topic: 2023-33 Optimize Protocol    Person(s) involved in teaching: Patient    Motivation Level  Asks Questions: Yes  Eager to Learn: Yes  Cooperative: Yes  Receptive (willing/able to accept information): Yes  Any cultural factors/Anabaptism beliefs that may influence understanding or compliance? No    Patient demonstrates understanding of the following:   Reason for the appointment, diagnosis and treatment plan: Yes  Knowledge of proper use of medications and conditions for which they are ordered (with special attention to potential side effects or drug interactions): Yes  Which situations necessitate calling provider and whom to contact: Yes  Proper use and care of (medical equipment, care aids, etc.) Yes  Pain management techniques: Yes  How and/when to access community resources: Yes    Teaching/ learning concerns addressed: Patient questions/concerns were addressed    Infection Control:  Patient instructed on hand hygiene: Yes  Signs and symptoms of infection taught: Yes    Instructional Materials Used/Given:   Teaching Binder with protocol calendars, med sheets, and infection prevention handouts were used.      Time spent with patient: 90 minutes.  Specific Concerns: NA

## 2024-06-20 NOTE — H&P
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 Today is day -7.     Transplant Essential Data:   Diagnosis AML    BMTCT Type URD Allo     Prep Regimen Bu/Flu    Donor Match and  Source 7/8 URD     GVHD Prophylaxis Siro/MMF/PtCy    Primary BMT MD Dr. 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 On routine testing, noted to have 27% blasts in April 2024. AML confirmed with BMBx. Started daunorubicin and cytarabine on 4/12. Residual disease noted on repeat bone marrow biopsy so re-induction of chemotherapy started on 5/2 with decitabine and venetoclax. Now presenting for 7/8 URD PBSCT.     No acute medical complaints on admission.  He is feeling well with no sick contacts or symptoms concerning for infection. Calendar, medications, and expectations for upcoming transplant process were reviewed to the patient and his wife, Sherri's stated satisfaction.     Diagnosis and Treatment Summary       Hematologic history:  Patient has a long history of PV which was managed by Hydrea and phlebotomy. On routine testing, noted to have 27% blasts in April 2024. AML confirmed with BMBx. Started daunorubicin and cytarabine on 4/12. Residual disease noted on repeat bone marrow biopsy so re-induction of chemotherapy started on 5/2 with decitabine and venetoclax.      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  20% myeloid blasts in a hypercellular bone marrow (70%  cellular)  Cytogenetic results:  45,XY,psu dic(22;5)(q12;q11.1)[2]/43-44,sl,-16,-18,add(20)(p11.2),+0-1mar[cp13]/44,sdl, +ester(5;22)(p10;p10),-psu dic(22;5)[2]/46,XY[5]  FLT3 mutation results:  Negative  Molecular results:    TP53 c.659A>C, p.Sak334Kys (NM_000546.5) VAF: 55.0%  ASXL1 c.2604del, p.Wzb827vw (NM_015338.5) VAF: 31.5%       Peripheral blood, morphology:   Normocytic normochromic anemia  Neutropenia and lymphopenia with 16% circulating blasts  Mild thrombocytopenia with atypical platelet morphology and circulating micromegakaryocytes     Comment:  Given the known history of polycythemia vera, this is classified as blast phase.       Preliminary results are discussed with Dr. Coto on 4/12/24.   Dr. DOWLING reviewed the case and concurs with the diagnosis.      See attached Cytogenetics results from [facility] [place][note 2].   Electronically signed by Kourtney Israel MD on 4/25/2024 at  6:29 PM Preliminary result electronically signed by Kourtney Israel MD on 4/16/2024 at  3:23 PM Preliminary result electronically signed by Kourtney Israel MD on 4/15/2024 at  3:39 PM      4/12/24 NGS  TIER 1: Variants of Known Clinical Significance in Hematologic   Malignancies     1. TP53 c.659A>C, p.Hek819Ktx (NM_000546.5)   VAF: 55.0%   TP53 encodes an important tumor suppressor (p53) that regulates   cell cycle progression, apoptosis, DNA repair, and metabolic   changes (7). Somatic mutations of TP53 are found in 5-10% of de   kathy acute myeloid leukemia (AML), in 24-48% of patients with   secondary AML (15) (19), and in 25-40% of patients with   therapy-related myeloid neoplasms, including AML (11) (17).   Pathogenic germline mutations in TP53 are associated with   Li-Fraumeni syndrome (OMA: 579645), a cancer predisposition   syndrome associated with a high, lifelong risk of a broad spectrum   of cancers (4) (10). This particular mutation occurs in the   DNA-binding domain and is predicted to alter the normal  function   of the tumor suppressor p53 (12). In AML, TP53 mutations are   commonly associated with complex karyotype and suboptimal overall   patient outcome, even after hematopoietic stem cell   transplantation (9) (13) (14). In newly-diagnosed [id number]   AML, variant allele frequencies greater than 40% or biallelic TP53   mutations are independently associated with higher rates of   relapse and inferior overall survival regardless of the type of   therapy received (18). In patients with therapy-related myeloid   neoplasms, TP53 mutations are frequently biallelic and associated   with complex karyotype and shorter overall survival (11) (17).   Please note that the variant allele frequency is high, suggesting   that this TP53 mutation may be homozygous or hemizygous in the   neoplastic cells due to copy neutral loss of heterozygosity   (CN-MELIDA) or deletion of 17p (6). Correlation with cytogenetic   findings is recommended.     2. ASXL1 c.2604del, p.Vqk115cz (NM_015338.5)   VAF: 31.5%   ASXL1 encodes a protein that interacts with polycomb complex   proteins and chromatin remodelers to control gene expression (5).   Somatic ASXL1 mutations are found in 6.5% of de kathy AML patients   and in 30% of patients with secondary AML (5). ASXL1 mutations are   also seen in AML, myelodysplasia related (AML-MR) (11) (8) (1). In   myeloid malignancies, acquired ASXL1 mutations are often exon 12   frameshift or nonsense mutations (3) (9) (16). This mutation is   predicted to alter the normal function of ASXL1 (2). ASXL1   mutations are associated with poor prognosis in myeloid   malignancies, including AML (5) (9).     TIER 2: Variants of Unknown Clinical Significance in Hematologic   Malignancies     1. RUNX1 c.26C>T, p.Nkv8Xth (NM_001754.4)   VAF: 50.1%   This variant has not been reported in hematologic malignancies, to   the best of our knowledge.      4/30/24 BONE MARROW BIOPSY FINAL DIAGNOSIS   Bone marrow aspirate, clot  section, and trephine core biopsy:  Residual leukemic blasts, 12% of bone marrow cells  Hypocellular bone marrow (average 10% cellularity) with trilineage hypoplasia      BONE MARROW DIFFERENTIAL: Performed on touch imprint.  Blasts:  12 %   Neutrophils & Precursors: 2 %   Lymphocytes:  69 %   Monocytes:  0 %   Eosinophils & Precursors:   0 %   Basophils & Precursors:  0 %   Plasma Cells:  5 %   Erythrocyte Precursors:  12 %      Date Treatment Response Toxicities/Complications   4/12/24 7+3        5/2/24 Dec/sulema x 2 cycles    none                             Donor Characteristics     Self or Related or Unrelated Donor: unrelated  Donor Match: 7/8  Donor Age: 27  Donor Sex: Male  Donor ABO/Rh: B+  Donor CMV Serostatus: neg    Donor-Specific Antibodies:  Recent Labs   Lab Test 06/05/24  1049   QO3KZOHPG None   VQ5NVYNDPV A:26 31 66 69B:51 52 78         Virtual Crossmatch:    Recent Labs   Lab Test 06/07/24  1559   RESVXMT1 DSA Absent   DSAVXMT1 None   RESVXMB1 No Interp         Blood Counts       Recent Labs   Lab Test 06/19/24  1036 06/10/24  1416 06/05/24  1049 05/20/24  0932 04/10/24  1108 03/14/24  1303 03/07/24  1130   HGB 13.5 12.1* 10.8*   < > 13.2*   < > 11.8*   HCT 40.3 36.3* 32.3*   < > 36.6*   < > 32.5*   WBC 5.2 4.1 2.0*   < > 2.6*   < > 2.2*   ANEUTAUTO 3.8 2.8  --   --   --   --  1.0*   ALYMPAUTO 0.5* 0.5*  --   --   --   --  0.7*   AMONOAUTO 0.5 0.6  --   --   --   --  0.4   AEOSAUTO 0.2 0.0  --   --   --   --  0.0   ABSBASO 0.1 0.1  --   --   --   --  0.0   NRBCMAN 0.0 0.0 0.0  --  0.0   < > 0.0   ABLA  --   --   --   --  0.3*  --   --     514* 495*   < > 136*   < > 109*    < > = values in this interval not displayed.         Recent Labs   Lab Test 06/05/24  1049   ABORH B POS       Chemistries     Basic Panel  Recent Labs   Lab Test 06/19/24  1036 06/05/24  1049 07/24/20  1333    138 142   POTASSIUM 4.2 3.9 4.4   CHLORIDE 101 104 107   CO2 22 24 26   BUN 18.6 13.8 11   CR 0.59* 0.80 0.78    * 119* 100        Calcium, Magnesium, Phosphorus  Recent Labs   Lab Test 06/19/24  1036 06/05/24  1049 07/24/20  1333   ARISTIDES 9.5 8.9 9.2        LFTs  Recent Labs   Lab Test 06/19/24  1036 06/05/24  1049 02/07/20  1418   BILITOTAL 0.3 0.4 0.5   ALKPHOS 79 63 59   AST 25 23 21   ALT 12 15 19   ALBUMIN 4.4 4.3 4.3       Urine Studies       Recent Labs   Lab Test 06/05/24  1049   COLOR Light Yellow   APPEARANCE Clear   URINEGLC Negative   URINEBILI Negative   URINEKETONE Negative   SG 1.013   UBLD Negative   URINEPH 5.5   PROTEIN Negative   UUROI Normal   NITRITE Negative   LEUKEST Negative   MUCUS Present*   RBCU <1   WBCU 1       Creatinine Clearance    No lab results found.      Infectious Disease Markers     [facility] IDM    Recent Labs   Lab Test 06/05/24  1049   TCRUZI Non-reactive       CMV  Recent Labs   Lab Test 06/05/24  1049   CMVIGG Positive, suggests recent or past exposure.*         EBV    Recent Labs   Lab Test 06/05/24  1049   EBVCAG Positive*       HSV 1/2    Recent Labs   Lab Test 06/05/24  1049   W4DZPZY 54.70*   H1IGG Positive.  IgG antibody to HSV-1 detected.*   Y9XWZNH 0.08   H2IGG No HSV-2 IgG antibodies detected.         VZV    Recent Labs   Lab Test 06/05/24  1049   VZVIGG Positive       Bone Marrow Biopsy       Morphology  - Slightly hypercellular marrow (50% estimated cellularity) with increased erythropoiesis, decreased granulopoiesis, and markedly increased megakaryocytes, including a subset of small megakaryocytes  - 4% bone marrow blasts, with atypical clusters of CD34+ blasts detected by immunohistochemistry  - Slight increase in marrow reticulin fibrosis (grade MF-0-1 of 3)    Flow Cytometry    Iliac Crest, Bone Marrow Aspirate, Left:  -CD34-positive myeloid blast percentage approaches the upper limit of normal (4.5%)  -See comment       Electronically signed by Tory Mcneil MD on 6/6/2024 at 10:39 AM   Comment    The percentage of CD34-positive myeloid  blasts approaches what is considered to be the upper limit of normal for this assay, however the myeloid blasts have an overall unremarkable immunophenotype. A blast count by flow cytometry may differ from a morphologic blast count for various reasons.  For classification and clinical decision making purposes, the morphologic blast count should be used. Final interpretation requires correlation with the results of other ancillary studies and the morphologic and clinical features.        Molecular Studies    Significant Results    Detected Alterations of Known or Potential Pathogenicity: ASXL1 G869fs  TP53 Y220S     TMB Score: None   Interpretation    The following patient's previously characterized pathogenic mutation(s) were identified in the current bone marrow aspirate.     ASXL1 G869fs currently at 1.8%, previously 32%; TP53 Y220S currently at 2.4%, previously 55%. Of note, the previous percentages are from an outside test report (collection date 04/12/24).     This result would support the morphologic concern for residual myeloid neoplasm (see separate reports from same collection date).     By report, the patient has a history of polycythemia vera; however, a JAK2 mutation is NOT detected.       Clinical management should not be based on this assay and interpretation alone. Correlation with clinical information, histology and other diagnostic tests is indicated.     Chest X-Ray - 2 view     Results for orders placed during the hospital encounter of 06/11/24    XR CHEST 2 VIEWS    Status: Normal 6/11/2024    Narrative  Exam: XR CHEST 2 VIEWS, 6/11/2024 10:09 AM    Comparison:  None    History: Preop examination; Personal history of diseases of blood and  blood-forming organs; Screening for viral disease; Acute myeloid  leukemia in remission (H)    Findings:  2 views of the chest. Right chest port tip projects over the superior  cavoatrial junction. Trachea is midline. Cardiomediastinal silhouette  is within  normal limits. No focal airspace opacity. No pneumothorax or  pleural effusion. The visualized upper abdomen is unremarkable. No  acute osseous abnormalities.    Impression  Impression: Clear chest.    I have personally reviewed the examination and initial interpretation  and I agree with the findings.    BOOGIE MENDIETA MD      SYSTEM ID:  [id number]        Chest CT without Contrast       Results for orders placed during the hospital encounter of 06/11/24    CT CHEST W/O CONTRAST    Status: Normal 6/11/2024    Narrative  CT CHEST W/O CONTRAST 6/11/2024 10:16 AM    History: Preop examination; Personal history of diseases of blood and  blood-forming organs; Screening for viral disease; Acute myeloid  leukemia in remission (H)    Comparison: None.    Technique: CT of the chest was obtained without intravenous contrast.  Axial, coronal, and sagittal reconstructions were obtained and  reviewed.    Contrast: None    Findings:  Findings: Right chest port tip is in the right atrium.  Lungs: No pneumothorax, pleural effusion, focal airspace opacity, or  suspicious pulmonary nodule.  Airways: Central tracheobronchial tree is clear.  Vessels: Main pulmonary artery and aorta are normal in caliber.  Atherosclerosis of the aortic [id number]. Normal three-vessel arch  Heart: Heart size is normal without pericardial effusion  Lymph nodes: No suspicious mediastinal or hilar lymphadenopathy.  Thyroid: Within normal limits.  Esophagus: Within normal limits    Upper abdomen: Within normal limits.    Bones and soft tissues: No suspicious axillary lymphadenopathy or soft  tissue mass. No suspicious osseous lesion.    Impression  Impression: No acute finding in the chest.    I have personally reviewed the examination and initial interpretation  and I agree with the findings.    BOOGIE MENDIETA MD      SYSTEM ID:  [id number]        PFTs     FVC%  Recent Labs   Lab Test 06/11/24  0904   36698 93       FEV1%  Recent Labs   Lab Test  24  0904   77080 104       DLCO%  Recent Labs   Lab Test 24  0904   09108 122       EKG       ECG results from 24   EKG 12-lead complete w/read - Clinics     Value    Systolic Blood Pressure     Diastolic Blood Pressure     Ventricular Rate 77    Atrial Rate 77    MD Interval 162    QRS Duration 100        QTc 443    P Axis 43    R AXIS -30    T Axis 37    Interpretation ECG      Sinus rhythm with sinus arrhythmia  Left axis deviation  Abnormal ECG  No previous ECGs available  Confirmed by MD BALTAZAR, PASCALE () on 2024 1:04:35 PM           ECHOCARDIOGRAM       Results for orders placed during the hospital encounter of 24    ECHOCARDIOGRAM COMPLETE    Status: Normal 2024    Narrative  [id number]  [id number]  [id number]  [id number]^EFE^SRINIVAS^JULI    [facility],[place]  Echocardiography Laboratory  [address] [place] [id number]    Name: IGNACIO BASS  MRN: [id number]  : 1960  Study Date: 2024 10:39 AM  Age: 64 yrs  Gender: Male  Patient Location: [facility]  Reason For Study: Preop examination, Personal history of diseases of blood and  blo  Ordering Physician: SRINIVAS WILKS  Referring Physician: SRINIVAS WILKS  Performed By: Mannie Shen    BSA: 1.8 m2  Height: 66 in  Weight: 148 lb  BP: 109/78 mmHg  ______________________________________________________________________________  Procedure  Echocardiogram with two-dimensional, color and spectral Doppler performed.  ______________________________________________________________________________  Interpretation Summary  Global and regional left ventricular function is normal with an EF of 60-65%.  Global right ventricular function is normal.  No significant valvular abnormalities present.  Pulmonary artery systolic pressure is normal.  Estimated mean right atrial pressure is normal.  No pericardial effusion is  present.  ______________________________________________________________________________  Left Ventricle  Left ventricular size is normal. Left ventricular wall thickness is normal.  Global and regional left ventricular function is normal with an EF of 60-65%.  Left ventricular diastolic function is normal. Global peak LV longitudinal  strain is averaged at -19%. This is within reported normal limits (normal <-  18%). No regional wall motion abnormalities are seen.    Right Ventricle  The right ventricle is normal size. Global right ventricular function is  normal.    Atria  Both atria appear normal.    Mitral Valve  The mitral valve is normal.    Aortic Valve  Aortic valve is normal in structure and function.    Tricuspid Valve  The tricuspid valve is normal. Trace to mild tricuspid insufficiency is  present. The right ventricular systolic pressure is approximated at 24.7 mmHg  plus the right atrial pressure. Pulmonary artery systolic pressure is normal.    Pulmonic Valve  The pulmonic valve is normal.    Vessels  The aorta root is normal. The inferior vena cava is normal. Estimated mean  right atrial pressure is normal.    Pericardium  No pericardial effusion is present.    Miscellaneous  No significant valvular abnormalities present.    Compared to Previous Study  There is no prior study for direct comparison.  ______________________________________________________________________________  MMode/2D Measurements & Calculations  IVSd: 1.0 cm  LVIDd: 4.7 cm  LVIDs: 3.1 cm  LVPWd: 0.95 cm  FS: 34.3 %  LV mass(C)d: 162.3 grams  LV mass(C)dI: 92.2 grams/m2  Ao root diam: 3.7 cm  asc Aorta Diam: 3.5 cm  LVOT diam: 2.1 cm  LVOT area: 3.5 cm2  Ao root diam index Ht(cm/m): 2.2  Ao root diam index BSA (cm/m2): 2.1  Asc Ao diam index BSA (cm/m2): 2.0  Asc Ao diam index Ht(cm/m): 2.1  LA Volume (BP): 50.7 ml    LA Volume Index (BP): 28.8 ml/m2  RWT: 0.41  TAPSE: 1.7 cm    Doppler Measurements & Calculations  MV E max lianne:  55.7 cm/sec  MV A max bharathi: 73.6 cm/sec  MV E/A: 0.76  MV dec slope: 340.5 cm/sec2  MV dec time: 0.17 sec  Ao V2 max: 125.5 cm/sec  Ao max P.3 mmHg  Ao V2 mean: 94.9 cm/sec  Ao mean PG: 3.9 mmHg  Ao V2 VTI: 22.1 cm  ANGELA(I,D): 3.3 cm2  ANGELA(V,D): 3.2 cm2  LV V1 max P.3 mmHg  LV V1 max: 115.3 cm/sec  LV V1 VTI: 21.1 cm  SV(LVOT): 73.1 ml  SI(LVOT): 41.6 ml/m2  TR max bharathi: 248.7 cm/sec  TR max P.7 mmHg    AV Bharathi Ratio (DI): 0.92  ANGELA Index (cm2/m2): 1.9  E/E' av.3  Lateral E/e': 4.7  Medial E/e': 8.0  RV S Bharathi: 11.5 cm/sec    ______________________________________________________________________________  Report approved by: Barbara Hernández MDon 2024 05:02 PM      CSF Studies       Recent Labs   Lab Test 24  0858   CCOL Colorless   CAPP Clear   CWBC 3   CRBC 0   CCOM Negative for blasts.  Please correlate with concurrent flow cytometry case [id number].    Keagan Andrade MD on 2024 at 1:30 PM       Recent Labs   Lab Test 24  0858   CGLU 61       Recent Labs   Lab Test 24  0858   CTP 49.7*         I have assessed all abnormal lab values for their clinical significance and any values considered clinically significant have been addressed in the assessment and plan    Family History:   - Father: HTN, HLD, prostate cancer   - Mother: DM-2, glaucoma  - Sister: thyroid tumor   - Brother: high cholesterol,  from MI at age 70   - Brother 2: kidney stone     Social History:   Social History     Socioeconomic History    Marital status:      Spouse name: Not on file    Number of children: Not on file    Years of education: Not on file    Highest education level: Not on file   Occupational History    Not on file   Tobacco Use    Smoking status: Former     Current packs/day: 0.10     Average packs/day: 0.1 packs/day for 44.5 years (4.4 ttl pk-yrs)     Types: Cigarettes     Start date: 1980     Passive exposure: Past    Smokeless tobacco: Never   Substance and Sexual  Activity    Alcohol use: Not Currently    Drug use: No    Sexual activity: Not on file   Other Topics Concern    Not on file   Social History Narrative    Not on file     Social Determinants of Health     Financial Resource Strain: Not on file   Food Insecurity: No Food Insecurity (5/19/2024)    Received from [facility]    Hunger Vital Sign     Worried About Running Out of Food in the Last Year: Never true     Ran Out of Food in the Last Year: Never true   Transportation Needs: No Transportation Needs (5/19/2024)    Received from [facility]    PRAPARE - Transportation     Lack of Transportation (Medical): No     Lack of Transportation (Non-Medical): No   Physical Activity: Insufficiently Active (5/19/2024)    Received from [facility]    Exercise Vital Sign     Days of Exercise per Week: 2 days     Minutes of Exercise per Session: 30 min   Stress: Not on file   Social Connections: Not on file   Interpersonal Safety: Unknown (4/12/2024)    Received from [facility]    Humiliation, Afraid, Rape, and Kick questionnaire     Fear of Current or Ex-Partner: Not on file     Emotionally Abused: Not on file     Physically Abused: No     Sexually Abused: No   Housing Stability: Low Risk  (5/19/2024)    Received from [facility]    Housing Stability     What is your living situation today?: I have a steady place to live       Past Medical History:   Past Medical History:   Diagnosis Date    Acute myeloid leukemia (H) 04/12/2024    Pancytopenia due to antineoplastic chemotherapy (H24) 04/27/2024    Polycythemia vera (H) 02/01/2019    Primary hypertension 06/27/2023    - GERD   - Insomnia             Past Surgical History:   Past Surgical History:   Procedure Laterality Date    IR CHEST PORT PLACEMENT > 5 YRS OF AGE  5/21/2024    IR CVC TUNNEL W2 CATH W/O PORT  4/12/2024   - Appendectomy   - Pyelotomy w/ removal of calculus 1996    Allergies:   Allergies   Allergen Reactions    Sulfa Antibiotics Rash       Home Medications       Prior to Admission medications    Medication Sig Start Date End Date Taking? Authorizing Provider   acetaminophen (TYLENOL) 500 MG tablet Take 500-1,000 mg by mouth every 6 hours as needed for mild pain    Unknown, Entered By History   acyclovir (ZOVIRAX) 200 MG capsule Take 2 capsules (400 mg) by mouth 2 times daily 6/6/24   Escobar Mejias MD   amLODIPine (NORVASC) 5 MG tablet Take 5 mg by mouth daily 12/15/22   Reported, Patient   cetirizine HCl 10 MG CAPS Take 10 mg by mouth daily as needed    Reported, Patient   fluconazole (DIFLUCAN) 200 MG tablet Take 2 tablets (400 mg) by mouth daily 6/6/24   Escobar Mejias MD   levofloxacin (LEVAQUIN) 250 MG tablet Take 1 tablet (250 mg) by mouth daily 6/6/24   Escobar Mejias MD   loratadine (CLARITIN) 10 MG tablet Take 10 mg by mouth as needed    Reported, Patient   LORazepam (ATIVAN) 1 MG tablet Take 1 mg by mouth nightly as needed for sleep 5/11/24   Reported, Patient   Multiple Vitamins-Minerals (MULTIVITAMIN ADULT PO) Take 1 tablet by mouth daily    Reported, Patient   ondansetron (ZOFRAN) 8 MG tablet Take 8 mg by mouth every 8 hours as needed for nausea 5/11/24   Reported, Patient   tenofovir (VIREAD) 300 MG tablet Take 300 mg by mouth daily    Unknown, Entered By History   traZODone (DESYREL) 100 MG tablet Take 100 mg by mouth at bedtime 5/11/24 5/11/25  Reported, Patient       Review of Systems    Review of Systems:  CONSTITUTIONAL: NEGATIVE for fever, chills, change in weight  INTEGUMENTARY/SKIN: NEGATIVE for worrisome rashes, moles or lesions  EYES: NEGATIVE for vision changes or irritation  ENT/MOUTH: NEGATIVE for ear, mouth and throat problems  RESP: NEGATIVE for significant cough or SOB  CV: NEGATIVE for chest pain, palpitations or peripheral edema  GI: NEGATIVE for nausea, abdominal pain, heartburn, or change in bowel habits  : NEGATIVE for frequency, dysuria, or hematuria  MUSCULOSKELETAL: NEGATIVE for significant arthralgias or myalgia  NEURO:  "NEGATIVE for weakness, dizziness or paresthesias  ENDOCRINE: NEGATIVE for temperature intolerance, skin/hair changes  HEME/ALLERGY: NEGATIVE for bleeding problems  PSYCHIATRIC: NEGATIVE for changes in mood or affect    PHYSICAL EXAM      Weight     Wt Readings from Last 3 Encounters:   06/21/24 65.7 kg (144 lb 12.8 oz)   06/10/24 67.5 kg (148 lb 12.8 oz)   06/07/24 67.8 kg (149 lb 8 oz)        KPS: 90    /71 (BP Location: Left arm)   Pulse 63   Temp 97.2  F (36.2  C) (Oral)   Resp 16   Ht 1.68 m (5' 6.14\")   Wt 65.7 kg (144 lb 12.8 oz)   SpO2 94%   BMI 23.27 kg/m       General: NAD   Eyes: ROCAEL, sclera anicteric   Nose/Mouth/Throat: OP clear, buccal mucosa moist, no ulcerations   Lungs: CTA bilaterally  Cardiovascular: RRR, no M/R/G   Abdominal/Rectal: +BS, soft, NT, ND, No HSM   Lymphatics: No edema  Skin: No rashes or petechaie  Neuro: A&O   Musculoskeletal: Muscle mass adequate  Additional Findings: Mckinney site NT, no drainage.    Current aGVHD staging:  Start with engraftment    LABS AND IMAGING: I have assessed all abnormal lab values for their clinical significance and any values considered clinically significant have been addressed in the assessment and plan.        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 Today is day -7     BMT/IEC PROTOCOL for AML  - Chemo protocol: OPTIMIZE trial   - Day -7: Allopurinol- uric acid 5.3.   - Day -6 to day -3: Busulfan/fludarabine   - Day -2: Fludarabine   - Day -1: Rest " day   - Day 0: Transplant  - Donor and Recip B+   - Restaging per protocol   - GCSF starts day +5, continue until ANC >2.5 x 2 days.     HEME/COAG  - Risk of cytopenias due to chemotherapy and radiation  - Transfusion parameters: hemoglobin <7, platelets <10  - Relevant thrombosis or bleeding history: none    IMMUNOCOMPROMISED  - Relevant infectious history: tenofovir for hepatitis B core positivity.   - Prophylaxis plan: ACV, letermovir day +14, fabian through day +45. CT Chest clear.  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 Siro/MMF to start day +5    CARDIOVASCULAR  - Hypertension: Continue PTA amlodipine  - Risk of cardiomyopathy:  Baseline EF 60-65%  - Risk of arrhythmia: Baseline EKG showed Sinus rhythm with sinus arrhythmia    GI/NUTRITION  - Ulcer prophylaxis: protonix  - Risk of nausea/vomiting due to chemo/radiation: dex+zofran through conditioning. Has PRN anti-emetics available as well  - Risk of malnutrition: RD to follow.   - Constipation: senna PRN    RENAL/ELECTROLYTES/  - Electrolyte management: replace per sliding scale    DIABETES/ENDOCRINE  - Risk of steroid-induced hyperglyemia: Monitor BG, sliding scale if needed    MUSCULOSKELETAL/FRAILTY  - Baseline Frailty Score: 2  - Patient with substantial risk of sarcopenia  - Daily PT/OT as needed while inpatient  - Cancer Rehab as needed outpatient  - Trazodone for sleep    SOCIAL DETERMINANTS  - Caregiver: Pt's primary caregiver will be spouse with family/friends as a secondary or back-up to assist as needed.   - Financial/insurance concerns: no      Known issues that I take into account for medical decisions, with salient changes to the plan considering these complexities noted above.    Patient Active Problem List   Diagnosis    Polycythemia vera (H)    Acute myeloid leukemia (H)    History of polycythemia vera    Primary hypertension      Clinically Significant Risk Factors Present on Admission                  # Hypertension: Noted on problem list                                 Medically Ready for Discharge: Anticipated in 5+ Days      Disposition: Remain admitted through engraftment       I spent 70 minutes in the care of this patient today, which included time necessary for preparation for the visit, obtaining history, ordering medications/tests/procedures as medically indicated, review of pertinent medical literature, counseling of the patient, communication of recommendations to the care team, and documentation time.      Stefanie Lopez, CNP  Vocera or Pager [phone]    Physician Attestation     I, Sebas Hernandez MD, saw and evaluated Zaheer Borges as part of a shared APRN/PA visit. I personally performed the substantive portion of the medical decision making for this visit - please see the ELIA s documentation for full details.    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 He was stable and had weekly to every other week labs through much of 2023-early 2024. A CBC on 3/21 was relatively stable. The next CBC 4/4/24 showed 9% blasts. On flow 4/10, there was 27% myeloid blast population. BMBx 4/12 confirmed blast phase PVera with 20% blasts, complex karyotype, TP53 55%, ASXL1 31.5%. He was treated with 7+3 starting 4/12/24, and had 12% residual blasts on BM 4/30/24, started Gus/decitabine 5/2/24 with 6% on BM 5/16/24, and 4% on BM in 5/23/24, and 4% on pre BMT BM Bx here 6/5/24. Given high risk disease, residual blasts but in morphologic remission, and good performance status, he is getting a myeloablative (BuFlu) 7/8 URD (DP match, 27yM, B+/B+, CMV R+D-) PBSCT with PTCy(25mg/kgx2)/tac/MMF for GVHD ppx per the Optimize study. Today is D-7. He will get bu  and flu on D-6 to D-3 and Flu on D-2. Transplant on 6/28.    On the date of service, 06/21/2024, I spent 80 minutes on the patient unit personally reviewing medical records and medications, reviewing vital signs, labs, and imaging results as summarized above, discussing the patient's case on rounds with the ELIA, obtaining a history from the patient, performing a physical exam, counseling and educating the patient on the diagnosis and treatment, evaluating a potentially life or organ threatening problem, intensively monitoring treatments with high risk of toxicity, coordinating care, and documenting in the electronic medical record.    Thank you for allowing me to participate in the care of this patient. Please do not hesitate to contact me if there are any concerns or questions.     Sebas Hernandez MD   of Medicine  Classical Hematology and Blood and Marrow Transplantation  Division of Hematology, Oncology, and Transplantation  [facility]

## 2024-06-20 NOTE — TELEPHONE ENCOUNTER
Inpatient Admission Information:      Admit Date:  6/21, line placement at 730am, ad miles at 1000   Diagnosis:  AML   Transplant Type:  Allogeneic   Protocol:  MT 2023-33   Sedated bmbx needed?  No   NMDP lab (lab 7033) needed?  Yes  **If YES, ELIA to please order with admission labs.  **If YES, this lab MUST BE DRAWN PRIOR TO ANY BMT PREP (chemo/TBI).   Notes:  Line placement at 730am, patient was instructed to be NPO after midnight and shower w/CHG/Hibiclens x2       New Eval Work-Up   MD Randell Butler         Consult Type Date   1 Pharmacy 6/7/24   2 Nutrition 6/6   3           Long Term Follow-Up   MD Randell Hurley      EOC updated? No  Care team updated? Yes      Pre-admission Nursing Assessment     Patient is contacted via telephone to review health status in preparation for planned admission on: 6/21.    Does patient endorse any of the following?:    New or worsening pain? No  Recent fever or other infectious symptoms, including but not limited to: runny nose, cough, localized swelling, redness, abnormal discharge, swollen or tender lymph nodes, increased fatigue. No  New rash? No  New onset N/V/D? No  New bleeding from any site? No  New injury? No  Has the patient had any known contact in the past 3 days with a sick contact? No    Has your health changed in any ways since you were last seen in our facility by BMT provider/ELIA? No    Patient was reminded to contact the BMT office with ANY changes in their health, prior to their planned admission.    Patient has had or will have CBC/CMP draw within 7 days of admission on date: 6/19.     Steffen Butler RN, June 20, 2024

## 2024-06-21 PROBLEM — C92.01 ACUTE MYELOID LEUKEMIA IN REMISSION (H): Status: ACTIVE | Noted: 2024-01-01

## 2024-06-21 NOTE — PHARMACY-CONSULT NOTE
"Busulfan - Initial Dose Note      Busulfan is a chemotherapeutic agent used for conditioning regimens in HSCT patients.  Therapeutic drug monitoring (TDM) using area under the plasma concentration curve (AUC) analysis is recommended due to high inter-individual variability in plasma levels.       A high busulfan AUC is associated with an increased risk for sinusoidal obstruction syndrome, and a suboptimal AUC is associated with an increased risk for graft rejection or disease relapse. Levels are analyzed to optimize the targeted drug exposure and minimize drug-related toxicity.      The Goal Cumulative AUC (cAUC) for all 4 doses for this protocol is 82.9 mghr/L (range 78.8 - 87 mghr/L ).  Predicted cAUC outside of this range require a dose adjustment.       Per protocol 2023-33 Arm A, AUC calculations will be performed on at least Days 1 following Dose 1.    (Note if protocol states specifics on the number of AUCs required versus if the AUC is \"in range/ in goal\" on any particular day/dose, then there is a need or no need for additional levels. Until the protocol specifically states, \"per busulfan standard of care guidelines\" we must follow the protocol to avoid protocol deviations.)     Initial Dose = 192 mg IV q24h according to protocol is based on Model based Dosing.  (USE ACTUAL BODY WEIGHT - DO NOT ADJUST FOR OBESITY.  Weight will be adjusted for in the software as appropriate for model.)        PLAN: Initial dose: 192 mg IV Q24H.       This recommendation is based on an ideal AUC cumulative goal of 82.9 mghr/L.     Thank you,   Tian Crystal, PharmD   "

## 2024-06-21 NOTE — PROCEDURES
[facility]    Procedure: IR Procedure Note    Date/Time: 6/21/2024 10:23 AM    Performed by: Daniel Duval PA-C  Authorized by: Daniel Duval PA-C      UNIVERSAL PROTOCOL   Site Marked: NA  Prior Images Obtained and Reviewed:  Yes  Required items: Required blood products, implants, devices and special equipment available    Patient identity confirmed:  Verbally with patient, arm band, provided demographic data and hospital-assigned identification number  Patient was reevaluated immediately before administering moderate or deep sedation or anesthesia  Confirmation Checklist:  Patient's identity using two indicators, relevant allergies, procedure was appropriate and matched the consent or emergent situation and correct equipment/implants were available  Time out: Immediately prior to the procedure a time out was called    Universal Protocol: the Joint Commission Universal Protocol was followed    Preparation: Patient was prepped and draped in usual sterile fashion       ANESTHESIA    Anesthesia:  Local infiltration  Local Anesthetic:  Lidocaine 1% without epinephrine      SEDATION  Patient Sedated: Yes    Vital signs: Vital signs monitored during sedation    See dictated procedure note for full details.  Findings: Sedation medications administered: versed  2 mg., fentanyl 50  mcg.   Sedation time: 20 minutes    Specimens: none    Complications: None    Condition: Stable      PROCEDURE  Describe Procedure: Zaheer Borges  [id number]    Completed placement of 9.5 Beninese 33 cm dual lumen, Power Mckinney brand, tunneled central venous access catheter via LIJV (RIGHT chest port in place). Tip lying in the right atrium. Okay to use immediately. Dx: MARY. Simin. <1    Sedation medications administered: versed  2 mg., fentanyl 50  mcg.   Sedation time: 20 minutes    Patient Tolerance:  Patient tolerated the procedure well with no immediate  complications  Length of time physician/provider present for 1:1 monitoring during sedation: 20

## 2024-06-21 NOTE — IR NOTE
Patient Name: Zaheer Borges  Medical Record Number: [id number]  Today's Date: 6/21/2024    Procedure: tunneled CVC placement  Proceduralist: BRANDI Duval PA-C    Procedure Start: 0958  Procedure end: 1015  Sedation medications administered: versed  2 mg., fentanyl 50  mcg.     Report given to: 5C RN    Other Notes: Pt arrived to IR room 2 from 2A. Consent reviewed. Pt denies any questions or concerns regarding procedure. Pt positioned supine and monitored per protocol. Pt tolerated procedure without any noted complications. Pt transferred back to . CVC heparin fushed , ready to use.

## 2024-06-21 NOTE — PLAN OF CARE
"Afebrile, vital signs stable. Denies nausea and diarrhea. Reports chronic lower back pain, declines pain medications. Mckinney catheter placed in IR this morning, dressing intact. Senna x2 for constipation.  Still needs stool sample collected for Cdiff and VRE.  Plan for fludarabine and busulfan overnight.        Problem: Adult Inpatient Plan of Care  Goal: Plan of Care Review  Description: The Plan of Care Review/Shift note should be completed every shift.  The Outcome Evaluation is a brief statement about your assessment that the patient is improving, declining, or no change.  This information will be displayed automatically on your shift  note.  6/21/2024 1745 by Alnodra Leavitt RN  Outcome: Progressing  6/21/2024 1434 by Alondra Leavitt RN  Outcome: Progressing  Goal: Patient-Specific Goal (Individualized)  Description: You can add care plan individualizations to a care plan. Examples of Individualization might be:  \"Parent requests to be called daily at 9am for status\", \"I have a hard time hearing out of my right ear\", or \"Do not touch me to wake me up as it startles  me\".  6/21/2024 1745 by Alondra Leavitt RN  Outcome: Progressing  6/21/2024 1434 by Alondra Leavitt RN  Outcome: Progressing  Goal: Absence of Hospital-Acquired Illness or Injury  6/21/2024 1745 by Alondra Leavitt RN  Outcome: Progressing  6/21/2024 1434 by Alondra Leavitt RN  Outcome: Progressing  Intervention: Prevent Infection  Recent Flowsheet Documentation  Taken 6/21/2024 1100 by Alondra Leavitt RN  Infection Prevention:   single patient room provided   visitors restricted/screened   rest/sleep promoted  Goal: Optimal Comfort and Wellbeing  6/21/2024 1745 by Alondra Leavitt RN  Outcome: Progressing  6/21/2024 1434 by Alondra Leavitt RN  Outcome: Progressing  Goal: Readiness for Transition of Care  6/21/2024 1745 by Alondra Leavitt RN  Outcome: Progressing  6/21/2024 1434 by Alondra Leavitt RN  Outcome: Progressing  Intervention: Mutually Develop " Transition Plan  Recent Flowsheet Documentation  Taken 6/21/2024 1249 by Alondra Leavitt, MALINI  Equipment Currently Used at Home: none

## 2024-06-21 NOTE — PHARMACY-ADMISSION MEDICATION HISTORY
Pharmacist Admission Medication History    Admission medication history is complete. The information provided in this note is only as accurate as the sources available at the time of the update.    Information Source(s): Spoke to patient in clinic; Reviewed medication dispense history (Sure Scripts); Nurse documented last times of administration     Changes made to PTA medication list:  Added: None  Deleted: None  Changed: None    Prior to Admission medications    Medication Sig Last Dose Taking? Auth Provider Long Term End Date   acetaminophen (TYLENOL) 500 MG tablet Take 500-1,000 mg by mouth every 6 hours as needed for mild pain Past Week Yes Unknown, Entered By History     acyclovir (ZOVIRAX) 200 MG capsule Take 2 capsules (400 mg) by mouth 2 times daily 6/20/2024 at 2000 Yes Escobar Mejias MD Yes    amLODIPine (NORVASC) 5 MG tablet Take 5 mg by mouth daily 6/20/2024 at 2000 Yes Reported, Patient Yes    cetirizine HCl 10 MG CAPS Take 10 mg by mouth daily as needed More than a month at 0800 Yes Reported, Patient     fluconazole (DIFLUCAN) 200 MG tablet Take 2 tablets (400 mg) by mouth daily 6/20/2024 at 2000 Yes Escobar Mejias MD     levofloxacin (LEVAQUIN) 250 MG tablet Take 1 tablet (250 mg) by mouth daily 6/20/2024 at 0800 Yes Escobar Mejias MD     loratadine (CLARITIN) 10 MG tablet Take 10 mg by mouth as needed Past Week at 0800 Yes Reported, Patient     LORazepam (ATIVAN) 1 MG tablet Take 1 mg by mouth nightly as needed for sleep Past Week Yes Reported, Patient     Multiple Vitamins-Minerals (MULTIVITAMIN ADULT PO) Take 1 tablet by mouth daily 6/20/2024 at 0800 Yes Reported, Patient     ondansetron (ZOFRAN) 8 MG tablet Take 8 mg by mouth every 8 hours as needed for nausea Past Week Yes Reported, Patient     tenofovir (VIREAD) 300 MG tablet Take 300 mg by mouth daily 6/20/2024 at 0800 Yes Unknown, Entered By History     traZODone (DESYREL) 100 MG tablet Take 100 mg by mouth at bedtime 6/20/2024 at 2100  Yes Reported, Patient  5/11/25     Medication History Completed By: Tian Crystal [facility] 6/21/2024 5:12 PM

## 2024-06-21 NOTE — CONSULTS
Below is the pt's psychosocial assessment for the staff to review as needed.    Blood and Marrow Transplant   Psychosocial Assessment with   Clinical      Assessment completed on 6/12/24 of Pt's living situation, support system, financial status, functional status, coping, stressors, need for resources and social work intervention provided as needed.  Information for this assessment was provided by Pt report in addition to medical chart review and consultation with medical team.      Present at Assessment:   Patient: Zaheer Borges  : FERMIN Gustafson, BAR  : FERMIN Hathaway LICSW (observing)     Diagnosis: AML (Acute Myeloid Leukemia)       Date of Diagnosis: 4/2024     Transplant type: allogeneic     Donor: Unrelated allogeneic donor stem cell transplant     Physician: Escobar Mejias MD     Nurse Coordinator: Steffen Butler RN     : FERMIN Hathaway LICSW     Permanent Address:   [address] [place] [address]     Living Situation: Zaheer lives in [place] with his wife and son and does not need to relocate.      Contact Information:  Pt's Home Phone: [phone]  Pt's Cell Phone: [phone]  Pt Email: [email or web address]  Pt's spouse Phone: [phone]     Presenting Information:  Zaheer is a 64 year old male diagnosed with AML who presents for evaluation for allogeneic transplant at the [facility] ([facility]).     Decision Making: Self     Health Care Directive:Yes. Pt has a health care directive and will bring it when they return to the BMT program.      Relationship Status: . Pt and pt's spouse have been  for 24 years. Pt described relationship as stable/supportive.      Special Needs: None identified at this time.      Family/Support System: Pt endorsed a solid support system including family and close friends who will be available to support pt throughout transplant process.      Family  Information:  Spouse: Sherri Merrill  Children: Son Marcellus, 24 (lives at home); daughter Sarah (college in [place]).  Siblings: 4 siblings, 2   Parents: father is 94, mother is   Friends: Pt endorsed a good friend support system.     Caregiver: SW discussed with pt the caregiver role and expectation at length. Pt is agreeable to having a full time caregiver for a minimum of 100 days until cleared by the BMT physician. Pt confirmed understanding of the caregiver requirement. Pt's primary caregiver will be spouse with family/friends as a secondary or back-up to assist as needed. Pt reviewed and signed the caregiver contract which will be scanned into the EMR. Caregiver education and resources provided. No caregiver concerns identified.      Caregiver Contact Information:  Spouse Sherri Merrill [phone]     Transportation Mode: Personal vehicle. Pt is aware of driving restrictions post-BMT and the need for the caregiver is to drive until cleared to drive by the BMT physician.      Insurance: Pt has [employer] health insurance. Pt denied specific insurance concerns at this time. SW reiterated information about the BMT Financial  should specific insurance questions arise as pt moves through transplant process.      Sources of Income: Pt's source of income is spouse's salary. No financial concerns identified at this time.     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 He was looking for work, but stopped after his diagnosis. His wife Sherri is a MD and works in Neurology at [employer].      Mental Health: Pt denied a history of mental health concerns, specific diagnoses or medications at this time.      PHQ-9:  Pt scored a 5 which indicates mild on the depression severity scale. Pt endorses this is an accurate reflection of his emotional state.    "  GAD7:  Pt scored a 1 which indicates no sign of anxiety on the Generalized Anxiety Disorder Questionnaire. Pt endorses this is an accurate reflection of his emotional state.     Chemical Use:   Tobacco: none  Alcohol: none  Marijuana: none  Other Drugs: none  Based on the information provided, there appear to be no specific risks or concerns identified at this time.      Trauma/Loss/Abuse History: Multiple losses associated with cancer diagnosis and treatment, including health, his employment, changes to physical appearance, etc.      Spirituality: SW explained that there are Chaplains on the unit and pt can request to meet with a  at anytime. Pt declined at this time.     Coping: Pt noted that he is currently feeling \"prepared\". Pt shared that his main coping mechanisms are prayer/spiritual practices, reading books and exercising. Pt noted that he enjoys biking in the summer, ping pong with friends and family, watching [employer] games, traveling and reading books. SW and pt discussed additional positive coping mechanisms that pt can utilize while in the hospital. While hospitalized, pt plans to read books, watch TV, watch sports, keep in touch with friends through social media.      Education Provided: Transplant process expectations, Caregiver requirements, Financial issues related to transplant, Financial resources, Common psychosocial stressors pre/post transplant, Support group(s) available, Hospital resources available, Web site information, Social Work role     Interventions Provided: Psychosocial Support and Education      Assessment and Recommendations for Team:  Pt is a 64 year old male diagnosed with AML who is here undergoing preparation for a planned allogeneic transplant.      Pt is pleasant, calm and able to articulate concerns/coping mechanisms in an appropriate manner. During our meeting pt was alert, was interactive, affect was full, displayed appropriate eye contact, memory and thought " processes. Pt feels comfortable communicating with the medical team. Pt has a strong supportive network of family and friends who are involved. Pt has developed strong coping mechanisms.      Pt will benefit from ongoing psychosocial support in regards to coping with the adjustment to the BMT process. CSW has discussed  psychosocial support options in regards to coping with the adjustment to the BMT process and support groups opportunities.       Pt has a solid support system and caregiver plan. Pt verbalizes understanding of the transplant process and wanting to proceed. SW provided contact information and encouraged pt to contact SW with questions, concerns, resources and for support.     Per this assessment, SW did not identify any barriers to this patient moving forward with transplant.     Important Information:   Pt would like bike while IP     Follow up Planned:   Psychosocial support  Support group information     Rean CHRISTENSEN, BAR  [facility]  [facility]   Phone: [phone]

## 2024-06-21 NOTE — PLAN OF CARE
Patient admitted to: 5429  Admitted from: IR  Arrived by: Liter  Reason for admission: Scheduled Allogenic Transplant  Patient accompanied by: Wife, Sherri  Belongings: Cell phone, Clothing kept with patient  Teaching: Unit routine, tour, treatment plan  Skin double check completed by: Ara Mcnally RN   No skin issues found

## 2024-06-21 NOTE — PROGRESS NOTES
Prep complete for Tunneled Line Placement. Patient will transfer to INPATIENT floor 5C room 29 post procedure.   .

## 2024-06-21 NOTE — PRE-PROCEDURE
GENERAL PRE-PROCEDURE:   Procedure:  TCVC  Date/Time:  6/21/2024 8:09 AM    Written consent obtained?: Yes    Risks and benefits: Risks, benefits and alternatives were discussed    Consent given by:  Patient  Patient states understanding of procedure being performed: Yes    Patient's understanding of procedure matches consent: Yes    Procedure consent matches procedure scheduled: Yes    Expected level of sedation:  Moderate  Appropriately NPO:  Yes  ASA Class:  3  Mallampati  :  Grade 1- soft palate, uvula, tonsillar pillars, and posterior pharyngeal wall visible  Lungs:  Lungs clear with good breath sounds bilaterally  Heart:  Normal heart sounds and rate  History & Physical reviewed:  History and physical reviewed and no updates needed  Statement of review:  I have reviewed the lab findings, diagnostic data, medications, and the plan for sedation

## 2024-06-22 NOTE — PROGRESS NOTES
"BMT/Cell Therapy Daily Progress Note   06/22/2024    Patient ID:  Zaheer Borges is a 64 year old male, currently day -6 of his therapy.    Admission date: 6/21/2024    INTERVAL  HISTORY     Zaheer feels well today. He had a headache overnight- his wife gave him a neck massage which helped. We discussed plans for management should this recur. He tolerated his chemo without difficulty.     Review of Systems: 10 point ROS negative except as noted above.      PHYSICAL EXAM     Weight In/Out     Wt Readings from Last 3 Encounters:   06/22/24 67.4 kg (148 lb 8 oz)   06/10/24 67.5 kg (148 lb 12.8 oz)   06/07/24 67.8 kg (149 lb 8 oz)      I/O last 3 completed shifts:  In: 1724 [P.O.:1000; I.V.:100; IV Piggyback:624]  Out: 750 [Urine:750]       KPS:  90    /73 (BP Location: Left arm)   Pulse 72   Temp 96.9  F (36.1  C) (Oral)   Resp 18   Ht 1.68 m (5' 6.14\")   Wt 67.4 kg (148 lb 8 oz)   SpO2 91%   BMI 23.87 kg/m       General: NAD   Eyes: : ROCAEL, sclera anicteric   Nose/Mouth/Throat: OP clear, buccal mucosa moist, no ulcerations   Lungs: CTA bilaterally  Cardiovascular: RRR, no M/R/G   Abdominal/Rectal: +BS, soft, NT, ND, No HSM   Lymphatics: no edema  Skin: no rashes or petechiae  Neuro: A&O   Musculoskeletal: muscle mass adequate  Additional Findings: Mckinney site NT, no drainage.    Current aGVHD staging:  Skin 0, UGI 0, LGI 0, Liver 0 (keep in note through day +180 for allos)      LABS AND IMAGING: I have assessed all abnormal lab values for their clinical significance and any values considered clinically significant have been addressed in the assessment and plan.        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 Today is day -6.      BMT/IEC PROTOCOL for AML  - Chemo protocol: OPTIMIZE trial              - Day -7: Allopurinol              - Day -6 to day -3: Busulfan/fludarabine              - Day -2: Fludarabine              - Day -1: Rest day              - Day 0: Transplant  - Donor and Recip B+   - Restaging per protocol   - GCSF starts day +5, continue until ANC >2.5 x 2 days.      HEME/COAG  - Risk of cytopenias due to chemotherapy and radiation  - Transfusion parameters: hemoglobin <7, platelets <10  - Relevant thrombosis or bleeding history: none     IMMUNOCOMPROMISED  - Relevant infectious history: tenofovir for hepatitis B core positivity.   - Prophylaxis plan: ACV, letermovir day +14, fabian through day +45. CT Chest clear.  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 Siro/MMF to start day +5     CARDIOVASCULAR  - Hypertension: Continue PTA amlodipine  - Risk of cardiomyopathy:  Baseline EF 60-65%  - Risk of arrhythmia: Baseline EKG showed Sinus rhythm with sinus arrhythmia     GI/NUTRITION  - Ulcer prophylaxis: protonix  - Risk of nausea/vomiting due to chemo/radiation: dex+zofran through conditioning. Has PRN anti-emetics available as well  - Risk of malnutrition: RD to follow.   - Constipation: senna PRN     RENAL/ELECTROLYTES/  - Electrolyte management: replace per sliding scale     DIABETES/ENDOCRINE  - Risk of steroid-induced hyperglyemia: Monitor BG, sliding scale if needed     MUSCULOSKELETAL/FRAILTY  - Baseline Frailty Score: 2  - Patient with substantial risk of sarcopenia  - Daily PT/OT as needed while inpatient  - Cancer Rehab as needed outpatient    SUPPORTIVE CARES  - Headache- tramadol, caffeine, imitrex  available PRN   - Trazodone for sleep     SOCIAL DETERMINANTS  - Caregiver: Pt's primary caregiver will be spouse with family/friends as a secondary or back-up to assist as needed.   - Financial/insurance concerns: no      Known issues that I take into account for medical decisions, with salient changes to the plan considering these complexities noted above.    Patient Active Problem List   Diagnosis    Polycythemia vera (H)    Acute myeloid leukemia (H)    History of polycythemia vera    Primary hypertension    Acute myeloid leukemia in remission (H)       I spent 30 minutes in the care of this patient today, which included time necessary for preparation for the visit, obtaining history, ordering medications/tests/procedures as medically indicated, review of pertinent medical literature, counseling of the patient, communication of recommendations to the care team, and documentation time.    Clinically Significant Risk Factors                  # Hypertension: Noted on problem list                       Medically Ready for Discharge: Anticipated in 5+ Days      Stefanie Lopez NP    Physician Attestation     I, Sebas Hernandez MD, saw and evaluated Zaheer Borges as part of a shared APRN/PA visit. I personally performed the substantive portion of the medical decision making for this visit - please see the ELIA s documentation for full details.    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 He was stable and had weekly to every other week labs through much of 2023-early 2024. A CBC on 3/21 was relatively stable. The next CBC 4/4/24 showed 9% blasts. On flow 4/10, there was 27% myeloid blast population. BMBx 4/12 confirmed blast phase PVera with 20% blasts, complex karyotype, TP53 55%, ASXL1 31.5%. He was treated with 7+3 starting  4/12/24, and had 12% residual blasts on BM 4/30/24, started Gus/decitabine 5/2/24 with 6% on BM 5/16/24, and 4% on BM in 5/23/24, and 4% on pre BMT BM Bx here 6/5/24. Given high risk disease, residual blasts but in morphologic remission, and good performance status, he is getting a myeloablative (BuFlu) 7/8 URD (DP match, 27yM, B+/B+, CMV R+D-) PBSCT with PTCy(25mg/kgx2)/tac/MMF for GVHD ppx per the Optimize study.     Today is D--6. He tolerated his first dose of Bu and Flu early this morning and based on PK levels, we will increase his busulfan dose to 234 mg for his 2nd dose early tomorrow morning. He will get levels with that. He gets a total of 5 doses of fludarabine and 4 doses of busulfan with this protocol. He is eating well, walking well, just a little tired with the very early morning chemo. Transplant on 6/28.     On the date of service, 06/22/2024, I spent 50 minutes on the patient unit personally reviewing medical records and medications, reviewing vital signs, labs, and imaging results as summarized above, discussing the patient's case on rounds with the ELIA, obtaining a history from the patient, performing a physical exam, counseling and educating the patient on the diagnosis and treatment, evaluating a potentially life or organ threatening problem, intensively monitoring treatments with high risk of toxicity, coordinating care, and documenting in the electronic medical record.    Thank you for allowing me to participate in the care of this patient. Please do not hesitate to contact me if there are any concerns or questions.     Sebas Hernandez MD   of Medicine  Classical Hematology and Blood and Marrow Transplantation  Division of Hematology, Oncology, and Transplantation  [facility]

## 2024-06-22 NOTE — PLAN OF CARE
"/66 (BP Location: Left arm)   Pulse 73   Temp 97.6  F (36.4  C) (Oral)   Resp 16   Ht 1.68 m (5' 6.14\")   Wt 65.7 kg (144 lb 12.8 oz)   SpO2 92%   BMI 23.27 kg/m    Pt's AVSS, with no c/o pain, nausea or diarrhea all shift. Pt is up independently in room and voiding good amount via recording it on the white broad. Pt tolerated Busulfan and Fludarabine. Last Busulfan level was done at 0606 and next level to be done at 0808. C-diff simple still needed and pt is aware. CVC intact and infusing at tko. Keep monitoring pt as ordered and notify MD with any new changes.    Problem: Adult Inpatient Plan of Care  Goal: Plan of Care Review  Description: The Plan of Care Review/Shift note should be completed every shift.  The Outcome Evaluation is a brief statement about your assessment that the patient is improving, declining, or no change.  This information will be displayed automatically on your shift  note.  Outcome: Progressing  Goal: Patient-Specific Goal (Individualized)  Description: You can add care plan individualizations to a care plan. Examples of Individualization might be:  \"Parent requests to be called daily at 9am for status\", \"I have a hard time hearing out of my right ear\", or \"Do not touch me to wake me up as it startles  me\".  Outcome: Progressing  Goal: Absence of Hospital-Acquired Illness or Injury  Outcome: Progressing  Intervention: Identify and Manage Fall Risk  Recent Flowsheet Documentation  Taken 6/22/2024 0000 by Renetta Forbes RN  Safety Promotion/Fall Prevention:   clutter free environment maintained   nonskid shoes/slippers when out of bed  Taken 6/21/2024 2004 by Renetta Forbes RN  Safety Promotion/Fall Prevention:   clutter free environment maintained   nonskid shoes/slippers when out of bed  Intervention: Prevent Skin Injury  Recent Flowsheet Documentation  Taken 6/22/2024 0000 by Renetta Forbes RN  Body Position: position changed independently  Taken 6/21/2024 2004 " by Renetta Forbes RN  Body Position: position changed independently  Intervention: Prevent Infection  Recent Flowsheet Documentation  Taken 6/22/2024 0000 by Renetta Forbes RN  Infection Prevention:   hand hygiene promoted   rest/sleep promoted  Taken 6/21/2024 2004 by Renetta Forbes RN  Infection Prevention:   hand hygiene promoted   rest/sleep promoted  Goal: Optimal Comfort and Wellbeing  Outcome: Progressing  Goal: Readiness for Transition of Care  Outcome: Progressing     Problem: Stem Cell/Bone Marrow Transplant  Goal: Optimal Coping with Transplant  Outcome: Progressing  Goal: Symptom-Free Urinary Elimination  Outcome: Progressing  Goal: Diarrhea Symptom Control  Outcome: Progressing  Goal: Improved Activity Tolerance  Outcome: Progressing  Intervention: Promote Improved Energy  Recent Flowsheet Documentation  Taken 6/22/2024 0000 by Renetta Forbes RN  Activity Management: activity adjusted per tolerance  Taken 6/21/2024 2004 by Renetta Forbes RN  Activity Management: activity adjusted per tolerance  Goal: Blood Counts Within Acceptable Range  Outcome: Progressing  Goal: Absence of Hypersensitivity Reaction  Outcome: Progressing  Goal: Absence of Infection  Outcome: Progressing  Intervention: Prevent and Manage Infection  Recent Flowsheet Documentation  Taken 6/22/2024 0000 by Renetta Forbes RN  Infection Prevention:   hand hygiene promoted   rest/sleep promoted  Isolation Precautions: contact precautions maintained  Taken 6/21/2024 2004 by Renetta Forbes RN  Infection Prevention:   hand hygiene promoted   rest/sleep promoted  Isolation Precautions: contact precautions maintained  Goal: Improved Oral Mucous Membrane Health and Integrity  Outcome: Progressing  Goal: Nausea and Vomiting Symptom Relief  Outcome: Progressing  Goal: Optimal Nutrition Intake  Outcome: Progressing   Goal Outcome Evaluation:

## 2024-06-22 NOTE — PHARMACY-ADMISSION MEDICATION HISTORY
Busulfan - Area Under the Curve  Therapeutic Drug Monitoring Pharmacokinetic Note        Busulfan is a chemotherapeutic agent used for conditioning regimens in HSCT patients.    Therapeutic drug monitoring (TDM) using area under the plasma concentration curve (AUC) analysis is recommended due to high inter-individual variability in plasma levels.        A high busulfan AUC is associated with an increased risk for sinusoidal obstruction syndrome, and a suboptimal AUC is associated with an increased risk for graft rejection or disease relapse. TDM uses busulfan levels to optimize the targeted drug exposure and minimize drug-related toxicity.       The Goal Cumulative AUC (cAUC) for all busulfan doses for this protocol is 82.9 mghr/L (range 78.8 - 87 mghr/L ).  Predicted cAUC outside of this range require a dose adjustment.    Per protocol 2023-33, AUC calculations will be performed on Days 1/2 following Dose(s)1/2.       Initial Dose = 192 mg IV q24h according to protocol is based on   Model based Dosing.  (USE ACTUAL BODY WEIGHT - DO NOT ADJUST FOR OBESITY.  Weight will be adjusted for in the software as appropriate for model.)     Date levels were drawn: 6/22/24              Dose number: 1   Model used for TDM: MAP [place]  Based on busulfan drug levels and current dose, predicted cAUC = 70.5 mghr/L  T1/2 = 2.38 hr   Clearance = 0.163 L/hr/kg      ASSESSMENT: The predicted busulfan cAUC is outside of the goal range.     A new dose of 234 mg IV q24h which will result in a predicted cAUC of 82.4 mghr/L.     PLAN: Discussed result with BMT attending physician Dr. Hernandez.     Recommend to increase busulfan dose to 234 mg IV Q24H. This recommendation is based on an ideal AUC cumulative goal of 82.9 mghr/L.     Repeat levels will be performed after Dose #2.     SUMMARY:  NEW Regimen: 234 mg IV every 24 hours.  Start time: 01:00 on 06/23/2024  Exposure target: AUC mg.hr/L cumulative 82.9 mg.hr/L cumulative   AUCcum: 82.4  mg.hr/L cumulative  Cav: 859.2 ng/mL    Thank you,   Tian Crystal, PharmD  Heme/Onc/BMT

## 2024-06-22 NOTE — PLAN OF CARE
Stop time on MAR & chart I & O  Chemo drug: Fludarabine/Busulfan  Tolerated: Yes  Intervention: None needed. Positive blood return before and after infusing.   Response: None needed.   Plan: Keep monitoring pt as ordered and notify MD with any new changes.

## 2024-06-22 NOTE — PLAN OF CARE
"30-Second Sit to Stand Test:  The test is designed to be conducted with a straight back chair, without armrests, with a 17-inch seat height.  (Chair heights: High back & Folding = 18\", ICU/5C recliner & window seat = 18 1/2\"  Actual height of chair used: 18 1/2 \"    Patient Score (score =0 if must use arms) 12 reps    The 30 Second Sit to Stand Test is considered a test of fall risk.  Data from [place] MATT, cosponsored by [place] [facility]:  If must use arms = High Fall Risk regardless of reps  8 or less times = High Fall Risk   9 to 12 times = Moderate Risk  13 or more times = Low Risk    The 30 Second Sit to Stand Test is also considered a test of leg strength and endurance.   Normative Data from Sammy et al,. 2001  Age                 Reps: Men        Reps: Women  60-64                14-19                       12-17                               65-69                12-18                       11-16                    70-74                12-17                       10-15              75-79                11-17                       10-15                    80-84                10-15                         9-14  85-89                 8-14                          8-13  90-94                 7-12                          4-11    Assessment (rationale for performing, application to patient s function & care plan): Pt completed 12 STS during 30 second STS, indicating a moderate risk for falls.     "

## 2024-06-22 NOTE — PLAN OF CARE
"Afebrile, vital signs stable. Alert and oriented.    Denies nausea, pain, diarrhea. No bowel movement this shift. Senna x1. Mild headache, declines PRN medications    Plan for busulfan and fludarabine tonight. Post busulfan labs.             Problem: Adult Inpatient Plan of Care  Goal: Plan of Care Review  Description: The Plan of Care Review/Shift note should be completed every shift.  The Outcome Evaluation is a brief statement about your assessment that the patient is improving, declining, or no change.  This information will be displayed automatically on your shift  note.  Outcome: Progressing  Goal: Patient-Specific Goal (Individualized)  Description: You can add care plan individualizations to a care plan. Examples of Individualization might be:  \"Parent requests to be called daily at 9am for status\", \"I have a hard time hearing out of my right ear\", or \"Do not touch me to wake me up as it startles  me\".  Outcome: Progressing  Goal: Absence of Hospital-Acquired Illness or Injury  Outcome: Progressing  Intervention: Identify and Manage Fall Risk  Recent Flowsheet Documentation  Taken 6/22/2024 1600 by Alondra Leavitt, RN  Safety Promotion/Fall Prevention:   clutter free environment maintained   chemotherapeutic precautions   increase visualization of patient   patient and family education   nonskid shoes/slippers when out of bed   safety round/check completed  Taken 6/22/2024 1220 by Alondra Leavitt RN  Safety Promotion/Fall Prevention:   clutter free environment maintained   chemotherapeutic precautions   increase visualization of patient   patient and family education   nonskid shoes/slippers when out of bed   safety round/check completed  Taken 6/22/2024 1000 by Alondra Leavitt RN  Safety Promotion/Fall Prevention: safety round/check completed  Taken 6/22/2024 0810 by Alondra Leavitt, RN  Safety Promotion/Fall Prevention:   clutter free environment maintained   chemotherapeutic precautions   increase visualization " of patient   patient and family education   nonskid shoes/slippers when out of bed   safety round/check completed  Intervention: Prevent Skin Injury  Recent Flowsheet Documentation  Taken 6/22/2024 0810 by Alondra Leavitt RN  Body Position: position changed independently  Goal: Optimal Comfort and Wellbeing  Outcome: Progressing  Goal: Readiness for Transition of Care  Outcome: Progressing

## 2024-06-22 NOTE — PROGRESS NOTES
"   06/22/24 6866   Appointment Info   Signing Clinician's Name / Credentials (PT) Stacy Lozano PT, DPT   Living Environment   People in Home significant other   Current Living Arrangements house   Home Accessibility stairs to enter home;stairs within home   Number of Stairs, Main Entrance 2   Stair Railings, Main Entrance none   Number of Stairs, Within Home, Primary greater than 10 stairs  (10-12)   Stair Railings, Within Home, Primary railing on right side (ascending)   Transportation Anticipated family or friend will provide   Self-Care   Usual Activity Tolerance good   Regular Exercise No   Equipment Currently Used at Home none   Fall history within last six months no   General Information   Onset of Illness/Injury or Date of Surgery 06/21/24   Patient/Family Therapy Goals Statement (PT) wants to return home   Pertinent History of Current Problem (include personal factors and/or comorbidities that impact the POC) Per pt's chart, \"   Existing Precautions/Restrictions immunosuppressed   General Observations UAL   Cognition   Affect/Mental Status (Cognition) WFL   Pain Assessment   Patient Currently in Pain No   Integumentary/Edema   Integumentary/Edema no deficits were identifed   Posture    Posture Forward head position   Range of Motion (ROM)   Range of Motion ROM is WFL   Strength (Manual Muscle Testing)   Strength (Manual Muscle Testing) strength is WFL   Strength Comments did not formally assess but pt demo antigravity strength by completing STS IND   Bed Mobility   Comment, (Bed Mobility) IND   Transfers   Comment, (Transfers) IND   Gait/Stairs (Locomotion)   Comment, (Gait/Stairs) IND   Balance   Balance Comments good static standing balance, good dynamic balance with horizontal head turns   Sensory Examination   Sensory Perception patient reports no sensory changes   Clinical Impression   Criteria for Skilled Therapeutic Intervention Yes, treatment indicated   PT Diagnosis (PT) at risk for " deconditioning   Influenced by the following impairments at risk for deconditioning 2/2 BMT process and prolonged admission   Functional limitations due to impairments community ambulation, higher level balance   Clinical Presentation (PT Evaluation Complexity) evolving   Clinical Presentation Rationale clinical reasoning   Clinical Decision Making (Complexity) low complexity   Planned Therapy Interventions (PT) balance training;bed mobility training;gait training;home exercise program;motor coordination training;neuromuscular re-education;patient/family education;postural re-education;ROM (range of motion);stair training;strengthening;stretching;lumbar stabilization;manual therapy techniques;transfer training;progressive activity/exercise;risk factor education;home program guidelines   Risk & Benefits of therapy have been explained evaluation/treatment results reviewed;care plan/treatment goals reviewed;risks/benefits reviewed;current/potential barriers reviewed;participants voiced agreement with care plan;participants included;patient   Clinical Impression Comments Pt is mobilizing at IND mobility baseline but is at risk for deconditioning due to BMT process and prolonged admission. Will benefit skilled PT during LOS to mitigate deconditioning and maximize IND.   PT Total Evaluation Time   PT Eval, Low Complexity Minutes (05411) 9   Physical Therapy Goals   PT Frequency 1x/week   PT Predicted Duration/Target Date for Goal Attainment 08/16/24   PT Goals Stairs;Aerobic Activity;PT Goal 1;PT Goal 2   PT: Stairs Independent;Greater than 10 stairs;Rail on right   PT: Perform aerobic activity with stable cardiovascular response continuous activity;10 minutes;15 minutes;ambulation;NuStep   PT: Goal 1 Pt will verbalize IND understanding of lab values and impacts on mobility.   PT: Goal 2 Pt will demo IND with BLE strengthening HEP.   PT Discharge Planning   PT Plan low back exercises, walking program and HEP check in, see  if stationary bike is available   PT Discharge Recommendation (DC Rec) home   PT Rationale for DC Rec Pt is mobilizing at IND mobility baseline, anticipate safe d/c home at this time.   PT Brief overview of current status IND

## 2024-06-23 NOTE — PROGRESS NOTES
"BMT/Cell Therapy Daily Progress Note   06/23/2024    Patient ID:  Zaheer Borges is a 64 year old male, currently day -5 of his therapy.    Admission date: 6/21/2024    INTERVAL  HISTORY     Zaheer continue to feel well today. He had some itching after CHG wipes, but that has resolved with washing and lotion. No nausea or vomiting. Eating well. He had bowel movement overnight. Walking well. He tolerated his 2nd doses of chemo without difficulty.     Review of Systems: 10 point ROS negative except as noted above.      PHYSICAL EXAM     Weight In/Out     Wt Readings from Last 3 Encounters:   06/23/24 67.2 kg (148 lb 3.2 oz)   06/10/24 67.5 kg (148 lb 12.8 oz)   06/07/24 67.8 kg (149 lb 8 oz)      I/O last 3 completed shifts:  In: 2946 [P.O.:2100; I.V.:110; IV Piggyback:736]  Out: 3075 [Urine:3075]       KPS:  90    /79 (BP Location: Left arm)   Pulse 66   Temp 97.7  F (36.5  C) (Oral)   Resp 16   Ht 1.68 m (5' 6.14\")   Wt 67.2 kg (148 lb 3.2 oz)   SpO2 94%   BMI 23.82 kg/m       General: NAD   Eyes: : ROCAEL, sclera anicteric   Nose/Mouth/Throat: OP clear, buccal mucosa moist, no ulcerations   Lungs: CTA bilaterally  Cardiovascular: RRR, no M/R/G   Abdominal/Rectal: +BS, soft, NT, ND, No HSM   Lymphatics: no edema  Skin: no rashes or petechiae  Neuro: A&O   Musculoskeletal: muscle mass adequate  Additional Findings: Mckinney site NT, no drainage.    Current aGVHD staging:  Skin 0, UGI 0, LGI 0, Liver 0 (keep in note through day +180 for allos)      LABS AND IMAGING: I have assessed all abnormal lab values for their clinical significance and any values considered clinically significant have been addressed in the assessment and plan.        Lab Results   Component Value Date    WBC 8.3 06/23/2024    ANEU 1.1 (L) 06/05/2024    HGB 11.6 (L) 06/23/2024    HCT 35.0 (L) 06/23/2024     06/23/2024     06/23/2024    POTASSIUM 4.3 06/23/2024    CHLORIDE 103 06/23/2024    CO2 26 06/23/2024     (H) " 06/23/2024    BUN 13.9 06/23/2024    CR 0.62 (L) 06/23/2024    MAG 2.2 06/23/2024    INR 1.15 06/21/2024           SYSTEMS-BASED ASSESSMENT AND PLAN     Zaheer Borges is a 64 year old male with long hx of PV and now AML, undergoing URD 7/8 PBSCT with the Optimize trial. Today is day -5.      BMT/IEC PROTOCOL for AML  - Chemo protocol: OPTIMIZE trial              - Day -7: Allopurinol              - Day -6 to day -3: Busulfan/fludarabine              - Day -2: Fludarabine              - Day -1: Rest day              - Day 0: Transplant  - Donor and Recip B+   - Restaging per protocol   - GCSF starts day +5, continue until ANC >2.5 x 2 days.      HEME/COAG  - Risk of cytopenias due to chemotherapy and radiation  - Transfusion parameters: hemoglobin <7, platelets <10  - Relevant thrombosis or bleeding history: none     IMMUNOCOMPROMISED  - Relevant infectious history: tenofovir for hepatitis B core positivity.   - Prophylaxis plan: ACV, letermovir day +14, fabian through day +45. CT Chest clear.  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 Siro/MMF to start day +5     CARDIOVASCULAR  - Hypertension: Continue PTA amlodipine  - Risk of cardiomyopathy:  Baseline EF 60-65%  - Risk of arrhythmia: Baseline EKG showed Sinus rhythm with sinus arrhythmia     GI/NUTRITION  - Ulcer prophylaxis: protonix  - Risk of nausea/vomiting due to chemo/radiation: dex+zofran through conditioning. Has PRN anti-emetics available as well  - Risk of malnutrition: RD to follow.   - Constipation: senna PRN     RENAL/ELECTROLYTES/  - Electrolyte management: replace per sliding scale     DIABETES/ENDOCRINE  - Risk of steroid-induced hyperglyemia: Monitor BG, sliding scale if needed     MUSCULOSKELETAL/FRAILTY  - Baseline Frailty Score: 2  - Patient with substantial risk of sarcopenia  - Daily PT/OT as needed while inpatient  - Cancer Rehab as  needed outpatient    SUPPORTIVE CARES  - Headache- tramadol, caffeine, imitrex available PRN   - Trazodone for sleep     SOCIAL DETERMINANTS  - Caregiver: Pt's primary caregiver will be spouse with family/friends as a secondary or back-up to assist as needed.   - Financial/insurance concerns: no    Known issues that I take into account for medical decisions, with salient changes to the plan considering these complexities noted above.    Patient Active Problem List   Diagnosis    Polycythemia vera (H)    Acute myeloid leukemia (H)    History of polycythemia vera    Primary hypertension    Acute myeloid leukemia in remission (H)     Clinically Significant Risk Factors                  # Hypertension: Noted on problem list                       Medically Ready for Discharge: Anticipated in 5+ Days      Physician Attestation     I, Sebas Hernandez MD, saw and evaluated Zaheer Borges as part of a shared APRN/PA visit. I personally performed the substantive portion of the medical decision making for this visit - please see the ELIA s documentation for full details.    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 He was stable and had weekly to every other week labs through much of 2023-early 2024. A CBC on 3/21 was relatively stable. The next CBC 4/4/24 showed 9% blasts. On flow 4/10, there was 27% myeloid blast population. BMBx 4/12 confirmed blast phase PVera with 20% blasts, complex karyotype, TP53 55%, ASXL1 31.5%. He was treated with 7+3 starting 4/12/24, and had 12% residual blasts on BM 4/30/24, started Gus/decitabine 5/2/24 with 6% on BM 5/16/24, and 4% on BM in 5/23/24, and 4% on pre BMT BM Bx here 6/5/24. Given high risk disease, residual blasts but in morphologic remission, and good performance status, he is getting a  myeloablative (BuFlu) 7/8 URD (DP match, 27yM, B+/B+, CMV R+D-) PBSCT with PTCy(25mg/kgx2)/tac/MMF for GVHD ppx per the Optimize study.     Today is D--5. He tolerated his 2nd dose of Bu and Flu early this morning and based on PK levels, we will continue his busulfan dose at 234 mg for his 3rd and 4th doses and does not need further levels. He gets a total of 5 doses of fludarabine and 4 doses of busulfan with this protocol. He is eating well, walking well, just a little tired with the very early morning chemo. Transplant on 6/28.     On the date of service, 06/23/2024, I spent 50 minutes on the patient unit personally reviewing medical records and medications, reviewing vital signs, labs, and imaging results as summarized above, discussing the patient's case on rounds with the ELIA, obtaining a history from the patient, performing a physical exam, counseling and educating the patient on the diagnosis and treatment, evaluating a potentially life or organ threatening problem, intensively monitoring treatments with high risk of toxicity, coordinating care, and documenting in the electronic medical record.    Thank you for allowing me to participate in the care of this patient. Please do not hesitate to contact me if there are any concerns or questions.     Sebas Hernandez MD   of Medicine  Classical Hematology and Blood and Marrow Transplantation  Division of Hematology, Oncology, and Transplantation  [facility]

## 2024-06-23 NOTE — PLAN OF CARE
"/67 (BP Location: Left arm)   Pulse 62   Temp 98.1  F (36.7  C) (Oral)   Resp 16   Ht 1.68 m (5' 6.14\")   Wt 67.4 kg (148 lb 8 oz)   SpO2 94%   BMI 23.87 kg/m    Pt's AVSS, denied pain, up independently in room and voiding good amount. Pt received fludarabine and Busulfan over night. Pt did c/o itching after CHG wipes. Itching stopped after applying lotion. MD was notified about itching and benadryl cream was ordered. No changes in skin integrity. Next Busulfan level will be done at 0810. Pt did have x1 BM and C-diff was sent. Am lab stable with Hgb 11.6, Pl t 198, WBC 8.3, , K+ 4.3. Pt had uneventful night. Keep monitoring pt as ordered and notify MD with any new changes.   Problem: Adult Inpatient Plan of Care  Goal: Plan of Care Review  Description: The Plan of Care Review/Shift note should be completed every shift.  The Outcome Evaluation is a brief statement about your assessment that the patient is improving, declining, or no change.  This information will be displayed automatically on your shift  note.  Outcome: Progressing  Goal: Patient-Specific Goal (Individualized)  Description: You can add care plan individualizations to a care plan. Examples of Individualization might be:  \"Parent requests to be called daily at 9am for status\", \"I have a hard time hearing out of my right ear\", or \"Do not touch me to wake me up as it startles  me\".  Outcome: Progressing  Goal: Absence of Hospital-Acquired Illness or Injury  Outcome: Progressing  Intervention: Identify and Manage Fall Risk  Recent Flowsheet Documentation  Taken 6/23/2024 0100 by Renetta Forbes RN  Safety Promotion/Fall Prevention:   clutter free environment maintained   nonskid shoes/slippers when out of bed  Taken 6/22/2024 2000 by Renetta Forbes RN  Safety Promotion/Fall Prevention:   clutter free environment maintained   nonskid shoes/slippers when out of bed  Intervention: Prevent Skin Injury  Recent Flowsheet " Documentation  Taken 6/23/2024 0100 by Renetta Forbes RN  Body Position: position changed independently  Taken 6/22/2024 2000 by Renetta Forbes RN  Body Position: position changed independently  Intervention: Prevent Infection  Recent Flowsheet Documentation  Taken 6/23/2024 0100 by Renetta Forbes RN  Infection Prevention:   hand hygiene promoted   rest/sleep promoted  Taken 6/22/2024 2000 by Renetta Forbes RN  Infection Prevention:   hand hygiene promoted   rest/sleep promoted  Goal: Optimal Comfort and Wellbeing  Outcome: Progressing  Goal: Readiness for Transition of Care  Outcome: Progressing     Problem: Stem Cell/Bone Marrow Transplant  Goal: Optimal Coping with Transplant  Outcome: Progressing  Goal: Symptom-Free Urinary Elimination  Outcome: Progressing  Goal: Diarrhea Symptom Control  Outcome: Progressing  Goal: Improved Activity Tolerance  Outcome: Progressing  Intervention: Promote Improved Energy  Recent Flowsheet Documentation  Taken 6/23/2024 0100 by Renetta Forbes RN  Activity Management: activity adjusted per tolerance  Taken 6/22/2024 2000 by Renetta Forbes RN  Activity Management: activity adjusted per tolerance  Goal: Blood Counts Within Acceptable Range  Outcome: Progressing  Goal: Absence of Hypersensitivity Reaction  Outcome: Progressing  Goal: Absence of Infection  Outcome: Progressing  Intervention: Prevent and Manage Infection  Recent Flowsheet Documentation  Taken 6/23/2024 0100 by Renetta Forbes RN  Infection Prevention:   hand hygiene promoted   rest/sleep promoted  Isolation Precautions: contact precautions maintained  Taken 6/22/2024 2000 by Renetta Forbes RN  Infection Prevention:   hand hygiene promoted   rest/sleep promoted  Isolation Precautions: contact precautions maintained  Goal: Improved Oral Mucous Membrane Health and Integrity  Outcome: Progressing  Goal: Nausea and Vomiting Symptom Relief  Outcome: Progressing  Goal: Optimal Nutrition  Intake  Outcome: Progressing   Goal Outcome Evaluation:

## 2024-06-23 NOTE — PHARMACY-CONSULT NOTE
Busulfan - Area Under the Curve  Therapeutic Drug Monitoring Pharmacokinetic Note        Busulfan is a chemotherapeutic agent used for conditioning regimens in HSCT patients.    Therapeutic drug monitoring (TDM) using area under the plasma concentration curve (AUC) analysis is recommended due to high inter-individual variability in plasma levels.        A high busulfan AUC is associated with an increased risk for sinusoidal obstruction syndrome, and a suboptimal AUC is associated with an increased risk for graft rejection or disease relapse. TDM uses busulfan levels to optimize the targeted drug exposure and minimize drug-related toxicity.       The Goal Cumulative AUC (cAUC) for all busulfan doses for this protocol is 82.9 mghr/L (range 78.8 - 87 mghr/L).  Predicted cAUC outside of this range require a dose adjustment.    Per protocol 2023-33, AUC calculations will be performed on Days 1/2 following Dose(s)1/2.       Initial Dose = 192 mg IV q24h according to protocol is based on   Model based Dosing.  (USE ACTUAL BODY WEIGHT - DO NOT ADJUST FOR OBESITY.  Weight will be adjusted for in the software as appropriate for model.)     Date levels were drawn: 6/22/24              Dose number: 1   Model used for TDM: MAP [place]  Based on busulfan drug levels and current dose, predicted cAUC = 70.5 mghr/L  T1/2 = 2.38 hr   Clearance = 0.163 L/hr/kg     CURRENT DOSE: 234 mg IV q24h    Date levels were drawn: 6/23/24              Dose number: 2   Model used for TDM: MAP [place]  Based on busulfan drug levels and current dose, predicted cAUC = 80 mghr/L  T1/2 = 2.39 hr   Clearance = 0.167 L/hr/kg      ASSESSMENT: The predicted busulfan cAUC of 80 mghr/L is within the goal range of 78.8 - 87 mghr/L.     PLAN: Discussed result with BMT attending physician Dr. Hernandez. Recommend to continue current busulfan dose to 234 mg IV Q24H. This recommendation is based on an ideal AUC cumulative goal of 82.9 mghr/L (range 78.8 - 87 mghr/L).  No further levels will be performed.      SUMMARY:  Regimen: 234 mg IV every 24 hours.  Start time: 01:00 on 06/24/2024  Exposure target: AUC mg.hr/L cumulative 82.9 mg.hr/L cumulative   AUCcum: 80 mg.hr/L cumulative  Cav: 834.7 ng/mL    Thank you,   Tain Crystal, PharmD  Heme/Onc/BMT

## 2024-06-23 NOTE — PLAN OF CARE
Stop time on MAR & chart I & O  Chemo drug: Fludarabine/Busulfan  Tolerated: Yes  Intervention: None needed. Positive blood return before and after infusing.   Response: None needed.   Plan: Keep monitoring as ordered and notify MD with kvng changes

## 2024-06-23 NOTE — PLAN OF CARE
"Afebrile, vital signs stable.    Senna x1, had 2 Bowel movements.    Denies headache, nausea, pain.    Plan for fludarabine and busulfan tonight. No longer requires busulfan levels.        Problem: Adult Inpatient Plan of Care  Goal: Plan of Care Review  Description: The Plan of Care Review/Shift note should be completed every shift.  The Outcome Evaluation is a brief statement about your assessment that the patient is improving, declining, or no change.  This information will be displayed automatically on your shift  note.  Outcome: Progressing  Goal: Patient-Specific Goal (Individualized)  Description: You can add care plan individualizations to a care plan. Examples of Individualization might be:  \"Parent requests to be called daily at 9am for status\", \"I have a hard time hearing out of my right ear\", or \"Do not touch me to wake me up as it startles  me\".  Outcome: Progressing  Goal: Absence of Hospital-Acquired Illness or Injury  Outcome: Progressing  Intervention: Identify and Manage Fall Risk  Recent Flowsheet Documentation  Taken 6/23/2024 0800 by Alondra Leavitt, RN  Safety Promotion/Fall Prevention:   assistive device/personal items within reach   clutter free environment maintained   chemotherapeutic precautions   nonskid shoes/slippers when out of bed   patient and family education   safety round/check completed  Intervention: Prevent Skin Injury  Recent Flowsheet Documentation  Taken 6/23/2024 0800 by Alondra Leavitt, RN  Body Position: position changed independently  Goal: Optimal Comfort and Wellbeing  Outcome: Progressing  Goal: Readiness for Transition of Care  Outcome: Progressing     "

## 2024-06-24 NOTE — PROGRESS NOTES
Stop time on MAR & chart I & O  Chemo drug: Fludarabine/Busulfan  Tolerated: YES  Intervention: None needed. Positive blood return before and after infusing.   Response: None needed.   Plan: Keep monitoring pt and notify MD with any new changes.

## 2024-06-24 NOTE — PROGRESS NOTES
"CLINICAL NUTRITION SERVICES - ASSESSMENT NOTE     Nutrition Prescription    RECOMMENDATIONS FOR MDs/PROVIDERS TO ORDER:  None at this time     Malnutrition Status:    Patient does not meet two of the established criteria necessary for diagnosing malnutrition    Recommendations already ordered by Registered Dietitian (RD):  PRN snacks/supplements   Ensure Clear at 2pm  Ensure Enlive at 10am    Future/Additional Recommendations:  -Monitor PO intake  -Monitor wt trends  -Monitor labs      REASON FOR ASSESSMENT  Zaheer Borges is a/an 64 year old male assessed by the dietitian for Nutrition Risk Monitoring      CLINICAL HISTORY  Per H&P, \"64 year old male with long hx of PV and now AML, undergoing URD 7/8 PBSCT with the Optimize trial. Today is day -7.\" PMH also significant for GERD.     NUTRITION HISTORY  Met with pt at bedside. Note pt is familiar to this writer- please see OP BMT nutrition note from 6/6 for additional history and information.     Pt reports he has been continuing to eat 3 meals daily (a smaller breakfast and lunch/dinner) + boost high protein daily. He notes he felt nauseous yesterday and today, denies any taste changes.     CURRENT NUTRITION ORDERS  Diet: High Kcal/High Protein + Ensure Enlive BID  Intake/Tolerance: 100% per RN flowsheets.     LABS  Labs reviewed. Notable for:  Ca 8.4  Glucose 112    MEDICATIONS  Medications reviewed. Notable for:  Chemo  Cytoxan  Zofran  Protonix  D5% and 0.45% NaCl @ 225 mL/hr  PRN Lasix, Compazine, Senokot    ANTHROPOMETRICS  Height: 168 cm (5' 6.142\")  Most Recent Weight: 67 kg (147 lb 11.2 oz) - currently 103.8% of IBW  IBW: 64.5 kg  BMI: Normal BMI  Weight History: Per OP BMT nutrition appointment on 6/6 with this writer, \"Pt reports a UBW of 148#. He reports he lost 5-6# following chemo from April-May of this year. Current weight is 142-143# by report.\"   Dosing Weight: 67 kg (actual wt)    ASSESSED NUTRITION NEEDS  Estimated Energy Needs: 2774-2922 kcals/day (30 " - 35 kcals/kg )  Justification: Increased needs  Estimated Protein Needs:  grams protein/day (1.2 - 1.5 grams of pro/kg)  Justification: Increased needs  Estimated Fluid Needs: 1 mL/kcal or per MD  Justification: Maintenance    PHYSICAL FINDINGS  See malnutrition section below.       MALNUTRITION  % Intake: No decreased intake noted  % Weight Loss: None noted  Subcutaneous Fat Loss: None observed  Muscle Loss: None observed  Fluid Accumulation/Edema: None noted  Malnutrition Diagnosis: Patient does not meet two of the established criteria necessary for diagnosing malnutrition    NUTRITION DIAGNOSIS  Predicted inadequate nutrient intake (kcal/protein) related to upcoming BMT with potential for side effects to affect ability to take adequate PO.     INTERVENTIONS  Implementation  -Nutrition Education: Provided education on BMT nutrition, menu hacks, food safety, ONS options at [facility]. Answered questions about food safety. Recommended pt continue to have 2 ONS daily to maximize nutrition prior to and during upcoming BMT.   -Medical food supplement therapy- Ensure Enlive at 10am and Ensure Clear at 2pm per pt request.      Goals  Patient to consume % of nutritionally adequate meal trays TID, or the equivalent with supplements/snacks.     Monitoring/Evaluation  Progress toward goals will be monitored and evaluated per protocol.  Diane Shell (Maggie), JEREMIAH, LD- 5C Clinical Dietitian   Available on [place]  No longer available by paging

## 2024-06-24 NOTE — PLAN OF CARE
"/70 (BP Location: Left arm)   Pulse 57   Temp 98.4  F (36.9  C) (Oral)   Resp 16   Ht 1.68 m (5' 6.14\")   Wt 67 kg (147 lb 11.2 oz)   SpO2 97%   BMI 23.74 kg/m      Vitals stable on room air. Denies pain, SOB, dizziness. Reported nausea this morning with breakfast that went away once done eating. Compazine now scheduled to be given with meals. Denied nausea the rest of the day. Potassium replaced, no rechecks needed. Up ad marjan. Wants to shower this evening. Senna given per pt request for mild constipation. One formed BM this afternoon. Using incentive spirometer.    Problem: Adult Inpatient Plan of Care  Goal: Plan of Care Review  Description: The Plan of Care Review/Shift note should be completed every shift.  The Outcome Evaluation is a brief statement about your assessment that the patient is improving, declining, or no change.  This information will be displayed automatically on your shift  note.  Outcome: Progressing  Goal: Patient-Specific Goal (Individualized)  Description: You can add care plan individualizations to a care plan. Examples of Individualization might be:  \"Parent requests to be called daily at 9am for status\", \"I have a hard time hearing out of my right ear\", or \"Do not touch me to wake me up as it startles  me\".  Outcome: Progressing  Goal: Absence of Hospital-Acquired Illness or Injury  Outcome: Progressing  Intervention: Identify and Manage Fall Risk  Recent Flowsheet Documentation  Taken 6/24/2024 1650 by Jana Campbell RN  Safety Promotion/Fall Prevention: safety round/check completed  Taken 6/24/2024 1600 by Jana Campbell RN  Safety Promotion/Fall Prevention:   assistive device/personal items within reach   clutter free environment maintained   room organization consistent   safety round/check completed   patient and family education  Taken 6/24/2024 1458 by Jana Campbell, RN  Safety Promotion/Fall Prevention: safety round/check completed  Taken 6/24/2024 1354 by " Campbell, Jana, RN  Safety Promotion/Fall Prevention: safety round/check completed  Taken 6/24/2024 1200 by Jana Campbell RN  Safety Promotion/Fall Prevention:   assistive device/personal items within reach   clutter free environment maintained   room organization consistent   safety round/check completed   patient and family education  Taken 6/24/2024 1100 by Jana Campbell RN  Safety Promotion/Fall Prevention: safety round/check completed  Taken 6/24/2024 1022 by Jana Campbell RN  Safety Promotion/Fall Prevention: safety round/check completed  Taken 6/24/2024 0800 by Jana Campbell RN  Safety Promotion/Fall Prevention:   assistive device/personal items within reach   clutter free environment maintained   room organization consistent   safety round/check completed   patient and family education  Intervention: Prevent Skin Injury  Recent Flowsheet Documentation  Taken 6/24/2024 1600 by Jana Campbell RN  Body Position: position changed independently  Taken 6/24/2024 1200 by Jana Campbell RN  Body Position: position changed independently  Taken 6/24/2024 0800 by Jana Campbell RN  Body Position: position changed independently  Intervention: Prevent Infection  Recent Flowsheet Documentation  Taken 6/24/2024 1600 by Jana Campbell RN  Infection Prevention:   hand hygiene promoted   rest/sleep promoted  Taken 6/24/2024 1200 by Jana Campbell RN  Infection Prevention:   hand hygiene promoted   rest/sleep promoted  Taken 6/24/2024 0800 by Jana Campbell RN  Infection Prevention:   hand hygiene promoted   rest/sleep promoted  Goal: Optimal Comfort and Wellbeing  Outcome: Progressing  Goal: Readiness for Transition of Care  Outcome: Progressing

## 2024-06-24 NOTE — PROGRESS NOTES
"BMT/Cell Therapy Daily Progress Note   06/24/2024    Patient ID:  Zaheer Borges is a 64 year old male, currently day -4 of his therapy.    Admission date: 6/21/2024    INTERVAL  HISTORY     Experiencing nausea today. Will schedule compazine with meals. Able to eat regular meals yesterday. No belly pain. No infectious symptoms.     Review of Systems: 10 point ROS negative except as noted above.      PHYSICAL EXAM     Weight In/Out     Wt Readings from Last 3 Encounters:   06/24/24 67 kg (147 lb 11.2 oz)   06/10/24 67.5 kg (148 lb 12.8 oz)   06/07/24 67.8 kg (149 lb 8 oz)      I/O last 3 completed shifts:  In: 1302 [P.O.:1080; I.V.:110; IV Piggyback:112]  Out: 1750 [Urine:1750]       KPS:  90    /76 (BP Location: Left arm)   Pulse 60   Temp 98.3  F (36.8  C) (Oral)   Resp 16   Ht 1.68 m (5' 6.14\")   Wt 67 kg (147 lb 11.2 oz)   SpO2 95%   BMI 23.74 kg/m       General: NAD   Eyes: sclera anicteric   Lungs: breathing well on RA  Cardiovascular: appears well perfused    Skin: no rashes or petechiae  Neuro: A&O   Musculoskeletal: muscle mass adequate  Additional Findings: Mckinney site NT, no drainage.    Current aGVHD staging:  Skin 0, UGI 0, LGI 0, Liver 0 (keep in note through day +180 for allos)      LABS AND IMAGING: I have assessed all abnormal lab values for their clinical significance and any values considered clinically significant have been addressed in the assessment and plan.        Lab Results   Component Value Date    WBC 6.2 06/24/2024    ANEU 1.1 (L) 06/05/2024    HGB 10.7 (L) 06/24/2024    HCT 33.1 (L) 06/24/2024     06/24/2024     06/24/2024    POTASSIUM 3.5 06/24/2024    CHLORIDE 104 06/24/2024    CO2 26 06/24/2024     (H) 06/24/2024    BUN 19.7 06/24/2024    CR 0.69 06/24/2024    MAG 2.1 06/24/2024    INR 1.10 06/24/2024           SYSTEMS-BASED ASSESSMENT AND PLAN     Zaheer Borges is a 64 year old male with long hx of PV and now AML, undergoing URD 7/8 PBSCT with the Optimize " trial. Today is day -4.      BMT/IEC PROTOCOL for AML  - Chemo protocol: OPTIMIZE trial              - Day -7: Allopurinol              - Day -6 to day -3: Busulfan/fludarabine              - Day -2: Fludarabine              - Day -1: Rest day              - Day 0: Transplant  - Donor and Recip B+   - Restaging per protocol   - GCSF starts day +5, continue until ANC >2.5 x 2 days.      HEME/COAG  - Risk of cytopenias due to chemotherapy and radiation  - Transfusion parameters: hemoglobin <7, platelets <10  - Relevant thrombosis or bleeding history: none     IMMUNOCOMPROMISED  - Relevant infectious history: tenofovir for hepatitis B core positivity.   - Prophylaxis plan: ACV, letermovir day +14, fabian through day +45. CT Chest clear.  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 Siro/MMF to start day +5     CARDIOVASCULAR  - Hypertension: Continue PTA amlodipine  - Risk of cardiomyopathy:  Baseline EF 60-65%  - Risk of arrhythmia: Baseline EKG showed Sinus rhythm with sinus arrhythmia     GI/NUTRITION  - Ulcer prophylaxis: protonix  -nausea/vomiting due to chemo: dex+zofran through conditioning. Zofran [id number], compazine TID with meals. Prn ativan.  - Risk of malnutrition: RD to follow.   - Constipation: senna PRN     RENAL/ELECTROLYTES/  - Electrolyte management: replace per sliding scale     DIABETES/ENDOCRINE  - Risk of steroid-induced hyperglyemia: Monitor BG, sliding scale if needed     MUSCULOSKELETAL/FRAILTY  - Baseline Frailty Score: 2  - Patient with substantial risk of sarcopenia  - Daily PT/OT as needed while inpatient  - Cancer Rehab as needed outpatient    SUPPORTIVE CARES  - Headache- tramadol, caffeine, imitrex available PRN   - Trazodone for sleep     SOCIAL DETERMINANTS  - Caregiver: Pt's primary caregiver will be spouse with family/friends as a secondary or back-up to assist as needed.   -  Financial/insurance concerns: no    Known issues that I take into account for medical decisions, with salient changes to the plan considering these complexities noted above.    Patient Active Problem List   Diagnosis    Polycythemia vera (H)    Acute myeloid leukemia (H)    History of polycythemia vera    Primary hypertension    Acute myeloid leukemia in remission (H)     Clinically Significant Risk Factors          # Hypocalcemia: Lowest Ca = 8.4 mg/dL in last 2 days, will monitor and replace as appropriate         # Hypertension: Noted on problem list                       Medically Ready for Discharge: Anticipated in 5+ Days    Kari Cabral PA-C  , [facility]     Physician Attestation     I, Sebas Hernandez MD, saw and evaluated Zaheer Borges as part of a shared APRN/PA visit. I personally performed the substantive portion of the medical decision making for this visit - please see the ELIA s documentation for full details.    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 He was stable and had weekly to every other week labs through much of 2023-early 2024. A CBC on 3/21 was relatively stable. The next CBC 4/4/24 showed 9% blasts. On flow 4/10, there was 27% myeloid blast population. BMBx 4/12 confirmed blast phase PVera with 20% blasts, complex karyotype, TP53 55%, ASXL1 31.5%. He was treated with 7+3 starting 4/12/24, and had 12% residual blasts on BM 4/30/24, started Gus/decitabine 5/2/24 with 6% on BM 5/16/24, and 4% on BM in 5/23/24, and 4% on pre BMT BM Bx here 6/5/24. Given high risk disease, residual blasts but in morphologic remission, and good performance status, he is getting a myeloablative (BuFlu) 7/8 URD (DP match, 26yM, B+/B+, CMV R+D-) PBSCT with PTCy(25mg/kgx2)/tac/MMF for GVHD ppx per the Optimize study.      Today is D--4. He tolerated his 3rd dose of Bu and Flu early this morning and based on PK levels, we will continue his busulfan dose at 234 mg for his 4th doses and does not need further levels. He gets a total of 5 doses of fludarabine and 4 doses of busulfan with this protocol. He is eating well, walking well, having good BMs, just a little tired with the very early morning chemo. Transplant on 6/28.     On the date of service, 06/24/2024, I spent 40 minutes on the patient unit personally reviewing medical records and medications, reviewing vital signs, labs, and imaging results as summarized above, discussing the patient's case on rounds with the ELIA, obtaining a history from the patient, performing a physical exam, counseling and educating the patient on the diagnosis and treatment, evaluating a potentially life or organ threatening problem, intensively monitoring treatments with high risk of toxicity, coordinating care, and documenting in the electronic medical record.    Thank you for allowing me to participate in the care of this patient. Please do not hesitate to contact me if there are any concerns or questions.     Sebas Hernandez MD   of Medicine  Classical Hematology and Blood and Marrow Transplantation  Division of Hematology, Oncology, and Transplantation  [facility]

## 2024-06-24 NOTE — PLAN OF CARE
"/66 (BP Location: Left arm)   Pulse 57   Temp 98.2  F (36.8  C) (Oral)   Resp 16   Ht 1.68 m (5' 6.14\")   Wt 67.2 kg (148 lb 3.2 oz)   SpO2 96%   BMI 23.82 kg/m    Pt's AVSS, no c/o pain, nausea or diarrhea all shift. Pt tolerated fludarabine and Busulfan. Pt will get chemo again today. Pt is up independently in room and CVC intact and heparin locked. Am lab stable with Hgb 10.7, WBC 6.2, Plt 170, , Mag 2.1, Phos 4.0 and K+ 3.5. Oral replacement ordered and will be given with Am meds. Pt had uneventful night. Keep monitoring pt as ordered and notify MD with any new changes.   Problem: Adult Inpatient Plan of Care  Goal: Plan of Care Review  Description: The Plan of Care Review/Shift note should be completed every shift.  The Outcome Evaluation is a brief statement about your assessment that the patient is improving, declining, or no change.  This information will be displayed automatically on your shift  note.  Outcome: Progressing  Goal: Patient-Specific Goal (Individualized)  Description: You can add care plan individualizations to a care plan. Examples of Individualization might be:  \"Parent requests to be called daily at 9am for status\", \"I have a hard time hearing out of my right ear\", or \"Do not touch me to wake me up as it startles  me\".  Outcome: Progressing  Goal: Absence of Hospital-Acquired Illness or Injury  Outcome: Progressing  Intervention: Identify and Manage Fall Risk  Recent Flowsheet Documentation  Taken 6/24/2024 0000 by Renetta Forbes RN  Safety Promotion/Fall Prevention:   clutter free environment maintained   nonskid shoes/slippers when out of bed  Taken 6/23/2024 2000 by Renetta Forbes RN  Safety Promotion/Fall Prevention:   clutter free environment maintained   nonskid shoes/slippers when out of bed  Intervention: Prevent Skin Injury  Recent Flowsheet Documentation  Taken 6/24/2024 0000 by Renetta Forbes RN  Body Position: position changed " independently  Taken 6/23/2024 2000 by Renetta Forbes RN  Body Position: position changed independently  Intervention: Prevent Infection  Recent Flowsheet Documentation  Taken 6/24/2024 0000 by Renetta Forbes RN  Infection Prevention:   hand hygiene promoted   rest/sleep promoted  Taken 6/23/2024 2000 by Renetta Forbes RN  Infection Prevention:   hand hygiene promoted   rest/sleep promoted  Goal: Optimal Comfort and Wellbeing  Outcome: Progressing  Goal: Readiness for Transition of Care  Outcome: Progressing     Problem: Stem Cell/Bone Marrow Transplant  Goal: Optimal Coping with Transplant  Outcome: Progressing  Goal: Symptom-Free Urinary Elimination  Outcome: Progressing  Goal: Diarrhea Symptom Control  Outcome: Progressing  Goal: Improved Activity Tolerance  Outcome: Progressing  Intervention: Promote Improved Energy  Recent Flowsheet Documentation  Taken 6/24/2024 0000 by Renetta Forbes RN  Activity Management: activity adjusted per tolerance  Taken 6/23/2024 2000 by Renetta Forbes RN  Activity Management: activity adjusted per tolerance  Goal: Blood Counts Within Acceptable Range  Outcome: Progressing  Goal: Absence of Hypersensitivity Reaction  Outcome: Progressing  Goal: Absence of Infection  Outcome: Progressing  Intervention: Prevent and Manage Infection  Recent Flowsheet Documentation  Taken 6/24/2024 0000 by Renetta Forbes RN  Infection Prevention:   hand hygiene promoted   rest/sleep promoted  Isolation Precautions: contact precautions maintained  Taken 6/23/2024 2000 by Renetta Forbes RN  Infection Prevention:   hand hygiene promoted   rest/sleep promoted  Isolation Precautions: contact precautions maintained  Goal: Improved Oral Mucous Membrane Health and Integrity  Outcome: Progressing  Goal: Nausea and Vomiting Symptom Relief  Outcome: Progressing  Goal: Optimal Nutrition Intake  Outcome: Progressing     Problem: Stem Cell/Bone Marrow Transplant  Goal: Improved Oral  Mucous Membrane Health and Integrity  Outcome: Progressing     Problem: Stem Cell/Bone Marrow Transplant  Goal: Nausea and Vomiting Symptom Relief  Outcome: Progressing     Problem: Stem Cell/Bone Marrow Transplant  Goal: Optimal Nutrition Intake  Outcome: Progressing   Goal Outcome Evaluation:

## 2024-06-25 NOTE — PLAN OF CARE
"Afebrile, vital signs stable. Alert and oriented.    Mild nausea without emesis this morning. Resolved as day went on and with scheduled compazine and zofran. Able to tolerate food and drink without issue.    Senna x1 to prevent constipation.    Up ambulating in the hallway.    Plan for fludarabine tonight.        Problem: Adult Inpatient Plan of Care  Goal: Plan of Care Review  Description: The Plan of Care Review/Shift note should be completed every shift.  The Outcome Evaluation is a brief statement about your assessment that the patient is improving, declining, or no change.  This information will be displayed automatically on your shift  note.  Outcome: Progressing  Goal: Patient-Specific Goal (Individualized)  Description: You can add care plan individualizations to a care plan. Examples of Individualization might be:  \"Parent requests to be called daily at 9am for status\", \"I have a hard time hearing out of my right ear\", or \"Do not touch me to wake me up as it startles  me\".  Outcome: Progressing  Goal: Absence of Hospital-Acquired Illness or Injury  Outcome: Progressing  Intervention: Identify and Manage Fall Risk  Recent Flowsheet Documentation  Taken 6/25/2024 1200 by Alondra Leavitt, RN  Safety Promotion/Fall Prevention:   assistive device/personal items within reach   clutter free environment maintained   chemotherapeutic precautions   nonskid shoes/slippers when out of bed   patient and family education   safety round/check completed   room organization consistent  Taken 6/25/2024 0800 by Alondra Leavitt RN  Safety Promotion/Fall Prevention:   assistive device/personal items within reach   clutter free environment maintained   chemotherapeutic precautions   nonskid shoes/slippers when out of bed   patient and family education   safety round/check completed   room organization consistent  Intervention: Prevent Skin Injury  Recent Flowsheet Documentation  Taken 6/25/2024 0800 by Alondra Leavitt RN  Body " Position: position changed independently  Goal: Optimal Comfort and Wellbeing  Outcome: Progressing  Goal: Readiness for Transition of Care  Outcome: Progressing

## 2024-06-25 NOTE — PROGRESS NOTES
"BMT/Cell Therapy Daily Progress Note   06/25/2024    Patient ID:  Zaheer Borges is a 64 year old male, currently day -3 of his therapy.    Admission date: 6/21/2024    INTERVAL  HISTORY     Nausea is tolerable.   Appetite decreased, no mouth pain.   No diarrhea.   Intermittent msk back pain that he has had prior to transplant with prolonged activity (standing and walking). No radicular symptoms or weakness.    Review of Systems: 10 point ROS negative except as noted above.      PHYSICAL EXAM     Weight In/Out     Wt Readings from Last 3 Encounters:   06/25/24 66.8 kg (147 lb 4.8 oz)   06/10/24 67.5 kg (148 lb 12.8 oz)   06/07/24 67.8 kg (149 lb 8 oz)      I/O last 3 completed shifts:  In: 2410 [P.O.:1720; I.V.:110; IV Piggyback:580]  Out: 2050 [Urine:2050]       KPS:  90    /76 (BP Location: Left arm)   Pulse 63   Temp 97.7  F (36.5  C) (Oral)   Resp 16   Ht 1.68 m (5' 6.14\")   Wt 66.8 kg (147 lb 4.8 oz)   SpO2 98%   BMI 23.67 kg/m       General: NAD   Eyes: sclera anicteric   Lungs: breathing well on RA  Cardiovascular: appears well perfused    Skin: no rashes or petechiae  Neuro: A&O   Musculoskeletal: muscle mass adequate  Additional Findings: Mckinney site NT, no drainage.    Current aGVHD staging:  Skin 0, UGI 0, LGI 0, Liver 0 (keep in note through day +180 for allos)      LABS AND IMAGING: I have assessed all abnormal lab values for their clinical significance and any values considered clinically significant have been addressed in the assessment and plan.        Lab Results   Component Value Date    WBC 6.6 06/25/2024    ANEU 1.1 (L) 06/05/2024    HGB 11.3 (L) 06/25/2024    HCT 33.6 (L) 06/25/2024     06/25/2024     06/25/2024    POTASSIUM 3.9 06/25/2024    CHLORIDE 102 06/25/2024    CO2 25 06/25/2024     (H) 06/25/2024    BUN 18.7 06/25/2024    CR 0.66 (L) 06/25/2024    MAG 2.0 06/25/2024    INR 1.10 06/24/2024           SYSTEMS-BASED ASSESSMENT AND PLAN     Zaheer Borges is a 64 year " old male with long hx of PV and now AML, undergoing URD 7/8 PBSCT with the Optimize trial. Today is day -3.      BMT/IEC PROTOCOL for AML  - Chemo protocol: OPTIMIZE trial              - Day -7: Allopurinol              - Day -6 to day -3: Busulfan/fludarabine              - Day -2: Fludarabine              - Day -1: Rest day              - Day 0: Transplant  - Donor and Recip B+   - Restaging per protocol   - GCSF starts day +5, continue until ANC >2.5 x 2 days.      HEME/COAG  - Risk of cytopenias due to chemotherapy and radiation  - Transfusion parameters: hemoglobin <7, platelets <10  - Relevant thrombosis or bleeding history: none     IMMUNOCOMPROMISED  - Relevant infectious history: tenofovir for hepatitis B core positivity.   - Prophylaxis plan: ACV, letermovir day +14, fabian through day +45. CT Chest clear.  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 Siro/MMF to start day +5     CARDIOVASCULAR  - Hypertension: Continue PTA amlodipine  - Risk of cardiomyopathy:  Baseline EF 60-65%  - Risk of arrhythmia: Baseline EKG showed Sinus rhythm with sinus arrhythmia     GI/NUTRITION  - Ulcer prophylaxis: protonix  -nausea/vomiting due to chemo: dex+zofran through conditioning. Zofran [id number], compazine TID with meals. Prn ativan.  - Risk of malnutrition: RD to follow.   - Constipation: senna PRN     RENAL/ELECTROLYTES/  - Electrolyte management: replace per sliding scale     DIABETES/ENDOCRINE  - Risk of steroid-induced hyperglyemia: Monitor BG, sliding scale if needed     MUSCULOSKELETAL/FRAILTY  - Baseline Frailty Score: 2  - Patient with substantial risk of sarcopenia  - Daily PT/OT as needed while inpatient  - Cancer Rehab as needed outpatient    SUPPORTIVE CARES  -MSK back pain intermittent: voltaren gel, bengay PRN  - Headache- tramadol, caffeine, imitrex available PRN   - Trazodone for sleep     SOCIAL DETERMINANTS  -  Caregiver: Pt's primary caregiver will be spouse with family/friends as a secondary or back-up to assist as needed.   - Financial/insurance concerns: no    Known issues that I take into account for medical decisions, with salient changes to the plan considering these complexities noted above.    Patient Active Problem List   Diagnosis    Polycythemia vera (H)    Acute myeloid leukemia (H)    History of polycythemia vera    Primary hypertension    Acute myeloid leukemia in remission (H)     Clinically Significant Risk Factors          # Hypocalcemia: Lowest Ca = 8.4 mg/dL in last 2 days, will monitor and replace as appropriate         # Hypertension: Noted on problem list                       Medically Ready for Discharge: Anticipated in 5+ Days    Kari Cabral PA-C  , [facility]     Physician Attestation     I, Sebas Hernandez MD, saw and evaluated Zaheer Borges as part of a shared APRN/PA visit. I personally performed the substantive portion of the medical decision making for this visit - please see the ELIA s documentation for full details.    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 He was stable and had weekly to every other week labs through much of 2023-early 2024. A CBC on 3/21 was relatively stable. The next CBC 4/4/24 showed 9% blasts. On flow 4/10, there was 27% myeloid blast population. BMBx 4/12 confirmed blast phase PVera with 20% blasts, complex karyotype, TP53 55%, ASXL1 31.5%. He was treated with 7+3 starting 4/12/24, and had 12% residual blasts on BM 4/30/24, started Gus/decitabine 5/2/24 with 6% on BM 5/16/24, and 4% on BM in 5/23/24, and 4% on pre BMT BM Bx here 6/5/24. Given high risk disease, residual blasts but in morphologic remission, and good performance status, he is getting a myeloablative (BuFlu)  7/8 URD (DP match, 27yM, B+/B+, CMV R+D-) PBSCT with PTCy(25mg/kgx2)/tac/MMF for GVHD ppx per the Optimize study.     Today is D--3. He tolerated his 4rd dose of Bu and Flu early this morning and based on PK levels. He gets one more fludarabine with this protocol. He is eating well, walking well, having good BMs, just a little tired with the very early morning chemo. Transplant on 6/28.     On the date of service, 06/25/2024, I spent 40 minutes on the patient unit personally reviewing medical records and medications, reviewing vital signs, labs, and imaging results as summarized above, discussing the patient's case on rounds with the ELIA, obtaining a history from the patient, performing a physical exam, counseling and educating the patient on the diagnosis and treatment, evaluating a potentially life or organ threatening problem, intensively monitoring treatments with high risk of toxicity, coordinating care, and documenting in the electronic medical record.    Thank you for allowing me to participate in the care of this patient. Please do not hesitate to contact me if there are any concerns or questions.     Sebas Hernandez MD   of Medicine  Classical Hematology and Blood and Marrow Transplantation  Division of Hematology, Oncology, and Transplantation  [facility]

## 2024-06-25 NOTE — PROGRESS NOTES
Prior Authorization Approval    Medication: PREVYMIS 480 MG PO TABS  Authorization Effective Date:  05/26/2024  Authorization Expiration Date:  06262025  Approved Dose/Quantity: 480 mg   /   28 tabs  Reference #: [id number]   Insurance Company: [employer] - Phone [phone] Fax [phone]  Expected CoPay: $ 0            eDena Doran  [facility] Liaison  Phone [phone]  Fax [phone]  Available on Teams & Vocera

## 2024-06-25 NOTE — PROGRESS NOTES
Stop time on MAR & chart I & O  Chemo drug: busulfan  Tolerated: yes  Intervention: n/a  Response: tolerated well. Blood return noted pre/post infusion  Plan: continue to monitor. Continue with chem plan of care  Lab: last dose, no levels needed.

## 2024-06-25 NOTE — PROGRESS NOTES
Therapy: Micafungin and Line care  Insurance: [employer]     This patient has coverage for Micafungin and Line care through their [employer] plan, patient has a deductible of $3200.00 met $3200.00, once pt meets the deductible they are covered at 80%. Patient has an out of pocket of $4000.00 met $4000.00. Once the out of pocket was met pt is covered at 100%.     [facility] S[facility]KS    Please contact Intake with any questions, [phone] or In Basket pool,  Home Infusion ([phone]).

## 2024-06-25 NOTE — PROGRESS NOTES
Stop time on MAR & chart I & O  Chemo drug: fludarabine  Tolerated: yes  Intervention: n/a  Response: tolerated well. Blood return noted pre/post infusion  Plan: continue to monitor. Continue with chemo plan of care.

## 2024-06-25 NOTE — PLAN OF CARE
"/72 (BP Location: Left arm)   Pulse 66   Temp 97.6  F (36.4  C) (Oral)   Resp 18   Ht 1.68 m (5' 6.14\")   Wt 67 kg (147 lb 11.2 oz)   SpO2 97%   BMI 23.74 kg/m      Neuro: A&Ox4.   Cardiac: VSS.    Respiratory: Sating 97 on RA.  GI/: Adequate urine output. BM X0  Diet/appetite: Tolerating regular diet.   Activity:  Pt independent  Pain: Pt did not report any pain. Pt reported intermittent nausea. Managed with scheduled zofran  Skin: No new deficits noted.  LDA's: CVC Hep Locked    Plan: Continue with POC. Notify primary team with changes.  "

## 2024-06-26 NOTE — PROGRESS NOTES
Stop time on MAR & chart I & O  Chemo drug: Fludarabine  Tolerated: Well  Intervention: Pre-medications given, blood return noted pre/post infusion  Response: N/A  Plan: Continue with plan of care.

## 2024-06-26 NOTE — PROGRESS NOTES
"BMT/Cell Therapy Daily Progress Note   06/26/2024    Patient ID:  Zaheer Borges is a 64 year old male, currently day -2 of his therapy.    Admission date: 6/21/2024    INTERVAL  HISTORY     Loose stools overnight. No belly pain. Known hemorrhoids painful with stools, scant blood with wiping. Will try prep H, topical lidocaine.  Nausea controlled. Eating ok.      Review of Systems: 10 point ROS negative except as noted above.      PHYSICAL EXAM     Weight In/Out     Wt Readings from Last 3 Encounters:   06/26/24 66.8 kg (147 lb 4.8 oz)   06/10/24 67.5 kg (148 lb 12.8 oz)   06/07/24 67.8 kg (149 lb 8 oz)      I/O last 3 completed shifts:  In: 2382 [P.O.:2160; I.V.:222]  Out: 2525 [Urine:2525]       KPS:  90    /72 (BP Location: Left arm)   Pulse 62   Temp 97.9  F (36.6  C) (Oral)   Resp 16   Ht 1.68 m (5' 6.14\")   Wt 66.8 kg (147 lb 4.8 oz)   SpO2 96%   BMI 23.67 kg/m       General: NAD   Eyes: sclera anicteric   Lungs: breathing well on RA  Cardiovascular: RRR no MRG  Abdomen: soft, nontender, BS+  Skin: no rashes or petechiae  Neuro: A&O   Musculoskeletal: muscle mass adequate  Additional Findings: Mckinney site NT, no drainage.    Current aGVHD staging:  Skin 0, UGI 0, LGI 0, Liver 0 (keep in note through day +180 for allos)      LABS AND IMAGING: I have assessed all abnormal lab values for their clinical significance and any values considered clinically significant have been addressed in the assessment and plan.        Lab Results   Component Value Date    WBC 6.7 06/26/2024    ANEU 1.1 (L) 06/05/2024    HGB 11.5 (L) 06/26/2024    HCT 34.3 (L) 06/26/2024     06/26/2024     06/26/2024    POTASSIUM 4.0 06/26/2024    CHLORIDE 101 06/26/2024    CO2 26 06/26/2024     (H) 06/26/2024    BUN 20.0 06/26/2024    CR 0.67 06/26/2024    MAG 2.2 06/26/2024    INR 1.10 06/24/2024           SYSTEMS-BASED ASSESSMENT AND PLAN     Zaheer Borges is a 64 year old male with long hx of PV and now AML, undergoing " URD 7/8 PBSCT with the Optimize trial. Today is day -2.      BMT/IEC PROTOCOL for AML  - Chemo protocol: OPTIMIZE trial              - Day -7: Allopurinol              - Day -6 to day -3: Busulfan/fludarabine              - Day -2: Fludarabine              - Day -1: Rest day              - Day 0: Transplant  - Donor and Recip B+   - Restaging per protocol   - GCSF starts day +5, continue until ANC >2.5 x 2 days.      HEME/COAG  - Risk of cytopenias due to chemotherapy and radiation  - Transfusion parameters: hemoglobin <7, platelets <10     IMMUNOCOMPROMISED  - Relevant infectious history: tenofovir for hepatitis B core positivity.   - Prophylaxis plan: ACV, letermovir day +14, fabian through day +45. CT Chest clear.  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 Siro/MMF to start day +5     CARDIOVASCULAR  - Hypertension: Continue PTA amlodipine  - Risk of cardiomyopathy:  Baseline EF 60-65%  - Risk of arrhythmia: Baseline EKG showed Sinus rhythm with sinus arrhythmia     GI/NUTRITION  - Ulcer prophylaxis: protonix  -nausea/vomiting due to chemo: dex+zofran through conditioning. Zofran f2ptqet, compazine TID with meals. Prn ativan.  - Risk of malnutrition: RD to follow.   - Constipation: senna PRN     RENAL/ELECTROLYTES/  - Electrolyte management: replace per sliding scale     DIABETES/ENDOCRINE  - Risk of steroid-induced hyperglyemia: Monitor BG, sliding scale if needed     MUSCULOSKELETAL/FRAILTY  - Baseline Frailty Score: 2  - Patient with substantial risk of sarcopenia  - Daily PT/OT as needed while inpatient  - Cancer Rehab as needed outpatient    SUPPORTIVE CARES  -MSK back pain intermittent: voltaren gel, bengay PRN  - Headache- tramadol, caffeine, imitrex available PRN   - Trazodone for sleep     SOCIAL DETERMINANTS  - Caregiver: Pt's primary caregiver will be spouse with family/friends as a secondary or back-up to assist as  needed.   - Financial/insurance concerns: no    Known issues that I take into account for medical decisions, with salient changes to the plan considering these complexities noted above.    Patient Active Problem List   Diagnosis    Polycythemia vera (H)    Acute myeloid leukemia (H)    History of polycythemia vera    Primary hypertension    Acute myeloid leukemia in remission (H)     Clinically Significant Risk Factors                  # Hypertension: Noted on problem list                       Medically Ready for Discharge: Anticipated in 5+ Days    Kari Cabral PA-C  , anitra

## 2024-06-26 NOTE — PLAN OF CARE
"/70 (BP Location: Left arm)   Pulse 69   Temp 98.1  F (36.7  C) (Oral)   Resp 16   Ht 1.68 m (5' 6.14\")   Wt 66.8 kg (147 lb 4.8 oz)   SpO2 97%   BMI 23.67 kg/m       Patient afebrile, vital signs stable, no reports of pain or nausea. Patient received fludarabine overnight and tolerated it well. No replacements this morning. Patient assist level independent. Continue with plan of care.    Goal Outcome Evaluation:  Problem: Adult Inpatient Plan of Care  Goal: Optimal Comfort and Wellbeing  Outcome: Progressing     Problem: Stem Cell/Bone Marrow Transplant  Goal: Optimal Coping with Transplant  Outcome: Progressing  Goal: Symptom-Free Urinary Elimination  Outcome: Progressing  Goal: Diarrhea Symptom Control  Outcome: Progressing  Goal: Improved Activity Tolerance  Outcome: Progressing  Intervention: Promote Improved Energy  Recent Flowsheet Documentation  Taken 6/26/2024 0400 by Javi Romero RN  Activity Management: activity encouraged  Taken 6/26/2024 0000 by Javi Romero RN  Activity Management: activity encouraged  Taken 6/25/2024 2000 by Javi Romero RN  Activity Management: activity encouraged  Goal: Blood Counts Within Acceptable Range  Outcome: Progressing  Goal: Absence of Hypersensitivity Reaction  Outcome: Progressing  Goal: Absence of Infection  Outcome: Progressing  Goal: Improved Oral Mucous Membrane Health and Integrity  Outcome: Progressing  Goal: Nausea and Vomiting Symptom Relief  Outcome: Progressing  Intervention: Prevent and Manage Nausea and Vomiting  Recent Flowsheet Documentation  Taken 6/25/2024 2000 by Javi Romero RN  Nausea/Vomiting Interventions: other (see comments)  Goal: Optimal Nutrition Intake  Outcome: Progressing                         "

## 2024-06-26 NOTE — PLAN OF CARE
"Afebrile, vital signs stable.  Mild nausea early this afternoon. Resolved with 10 mg of compazine rather than 5 mg which he had taken previously.  No further diarrhea after two episode last night. Started on fiber supplement.  Dressing changed, tolerating CHG embedded biopatch disk without issue.   Plan rest day tomorrow.        Problem: Adult Inpatient Plan of Care  Goal: Plan of Care Review  Description: The Plan of Care Review/Shift note should be completed every shift.  The Outcome Evaluation is a brief statement about your assessment that the patient is improving, declining, or no change.  This information will be displayed automatically on your shift  note.  Outcome: Progressing  Goal: Patient-Specific Goal (Individualized)  Description: You can add care plan individualizations to a care plan. Examples of Individualization might be:  \"Parent requests to be called daily at 9am for status\", \"I have a hard time hearing out of my right ear\", or \"Do not touch me to wake me up as it startles  me\".  Outcome: Progressing  Goal: Absence of Hospital-Acquired Illness or Injury  Outcome: Progressing  Intervention: Identify and Manage Fall Risk  Recent Flowsheet Documentation  Taken 6/26/2024 1600 by Alondra Leavitt, RN  Safety Promotion/Fall Prevention:   assistive device/personal items within reach   clutter free environment maintained   safety round/check completed   nonskid shoes/slippers when out of bed   patient and family education  Taken 6/26/2024 1200 by Alondra Leavitt, RN  Safety Promotion/Fall Prevention:   assistive device/personal items within reach   clutter free environment maintained   safety round/check completed   nonskid shoes/slippers when out of bed   patient and family education  Taken 6/26/2024 0800 by Alondra Leavitt, RN  Safety Promotion/Fall Prevention:   assistive device/personal items within reach   clutter free environment maintained   safety round/check completed   nonskid shoes/slippers when out " of bed   patient and family education  Intervention: Prevent Skin Injury  Recent Flowsheet Documentation  Taken 6/26/2024 0800 by Alondra Leavitt RN  Body Position: position changed independently  Goal: Optimal Comfort and Wellbeing  Outcome: Progressing  Goal: Readiness for Transition of Care  Outcome: Progressing

## 2024-06-27 NOTE — PLAN OF CARE
"/75 (BP Location: Left arm)   Pulse 65   Temp 98.2  F (36.8  C) (Oral)   Resp 16   Ht 1.68 m (5' 6.14\")   Wt 66.8 kg (147 lb 4.8 oz)   SpO2 96%   BMI 23.67 kg/m       Patient afebrile, vital signs stable, no reports of pain or nausea. Patient received senna x1 per request due to no BM yesterday. Patient will need potassium this morning. Patient assist level independent. Continue with plan of care.      Goal Outcome Evaluation:  Problem: Adult Inpatient Plan of Care  Goal: Optimal Comfort and Wellbeing  Outcome: Progressing     Problem: Stem Cell/Bone Marrow Transplant  Goal: Optimal Coping with Transplant  Outcome: Progressing  Goal: Symptom-Free Urinary Elimination  Outcome: Progressing  Goal: Diarrhea Symptom Control  Outcome: Progressing  Goal: Improved Activity Tolerance  Outcome: Progressing  Intervention: Promote Improved Energy  Recent Flowsheet Documentation  Taken 6/27/2024 0400 by Javi Romero RN  Activity Management: activity encouraged  Taken 6/27/2024 0000 by Javi Romero RN  Activity Management: activity encouraged  Taken 6/26/2024 2000 by Javi Romero RN  Activity Management: activity encouraged  Goal: Blood Counts Within Acceptable Range  Outcome: Progressing  Intervention: Monitor and Manage Hematologic Symptoms  Recent Flowsheet Documentation  Taken 6/27/2024 0400 by Javi Romero RN  Medication Review/Management: medications reviewed  Taken 6/27/2024 0000 by Javi Romero RN  Medication Review/Management: medications reviewed  Taken 6/26/2024 2000 by Javi Romero RN  Medication Review/Management: medications reviewed  Goal: Absence of Hypersensitivity Reaction  Outcome: Progressing  Goal: Absence of Infection  Outcome: Progressing  Goal: Improved Oral Mucous Membrane Health and Integrity  Outcome: Progressing  Goal: Nausea and Vomiting Symptom Relief  Outcome: Progressing  Goal: Optimal Nutrition Intake  Outcome: Progressing                           "

## 2024-06-27 NOTE — PROGRESS NOTES
"BMT CLINICAL SOCIAL WORK NOTE:    Focus: Supportive Counseling/Resources/Discharge Planning    Data: Zaheer Borges is a 64 year old male, currently day -1 of his therapy.     Interventions: Clinical  (CSW) spoke with Pt to assess coping, provide supportive counseling and assist with resources as needed. Pt shared that \"he is doing all right but feels very sleepy all of a sudden\".  Pt shared that he has been sleeping relatively well.  He confirmed transplant for tomorrow but did not know the time of transplant. He shared that his wife will be present tomorrow to provide support during the transplant process. No other questions/concerns indicated at this time.  CSW provided empathic listening, validation of concerns, and encouragement. CSW encouraged Pt to contact CSW for support, questions and/or resources.     Assessment: Pt presented as pleasant.  Pt appears to be coping appropriately at this time. Pt continues to be supported by Spouse Sherri.     Plan: CSW will continue to provide supportive counseling and assistance with resources as needed. CSW will continue to collaborate with multidisciplinary team regarding Pt's plan of care.     FEMRIN Sofia, LGSW  [facility]  Adult Blood & Marrow Transplant   Phone: [phone]  Pager: [phone]  [employer] SEARCHABLE: BMT SW #4  Securely message with [employer]   Support Groups at [facility]: Social Work Services for Cancer Patients ([email or web address])      "

## 2024-06-27 NOTE — PROGRESS NOTES
"BMT/Cell Therapy Daily Progress Note   06/27/2024    Patient ID:  Zaheer Borges is a 64 year old male, currently day -1 of his therapy.    Admission date: 6/21/2024    INTERVAL  HISTORY     N/V well controlled with scheduled antiemetics.  No stool yesterday, so elected to take a senna yesterday.  Eating well.       Review of Systems: 10 point ROS negative except as noted above.      PHYSICAL EXAM     Weight In/Out     Wt Readings from Last 3 Encounters:   06/27/24 66.3 kg (146 lb 1.6 oz)   06/10/24 67.5 kg (148 lb 12.8 oz)   06/07/24 67.8 kg (149 lb 8 oz)      I/O last 3 completed shifts:  In: 2187 [P.O.:2077; I.V.:110]  Out: 2200 [Urine:2200]       KPS:  90    /72 (BP Location: Left arm)   Pulse 57   Temp 98.2  F (36.8  C) (Oral)   Resp 16   Ht 1.68 m (5' 6.14\")   Wt 66.3 kg (146 lb 1.6 oz)   SpO2 98%   BMI 23.48 kg/m       General: NAD   Eyes: sclera anicteric   Lungs: breathing well on RA  Cardiovascular: well perfused  Skin: no rashes or petechiae  Neuro: A&O   Musculoskeletal: muscle mass adequate  Additional Findings: Mckinney site NT, no drainage.    Current aGVHD staging:  Skin 0, UGI 0, LGI 0, Liver 0 (keep in note through day +180 for allos)      LABS AND IMAGING: I have assessed all abnormal lab values for their clinical significance and any values considered clinically significant have been addressed in the assessment and plan.        Lab Results   Component Value Date    WBC 4.5 06/27/2024    ANEU 1.1 (L) 06/05/2024    HGB 11.0 (L) 06/27/2024    HCT 31.9 (L) 06/27/2024     06/27/2024     06/27/2024    POTASSIUM 3.5 06/27/2024    CHLORIDE 100 06/27/2024    CO2 27 06/27/2024     (H) 06/27/2024    BUN 14.9 06/27/2024    CR 0.64 (L) 06/27/2024    MAG 2.2 06/26/2024    INR 1.10 06/24/2024           SYSTEMS-BASED ASSESSMENT AND PLAN     Zaheer Borges is a 64 year old male with long hx of PV and now AML, undergoing URD 7/8 PBSCT with the Optimize trial. Today is day -1.      BMT/IEC " PROTOCOL for AML  - Chemo protocol: OPTIMIZE trial              - Day -7: Allopurinol              - Day -6 to day -3: Busulfan/fludarabine              - Day -2: Fludarabine              - Day -1: Rest day              - Day 0: Transplant  - Donor and Recip B+   - Restaging per protocol   - GCSF starts day +5, continue until ANC >2.5 x 2 days.      HEME/COAG  - Risk of cytopenias due to chemotherapy and radiation  - Transfusion parameters: hemoglobin <7, platelets <10     IMMUNOCOMPROMISED  - Relevant infectious history: tenofovir for hepatitis B core positivity.   - Prophylaxis plan: ACV, letermovir day +14, fabian through day +45. CT Chest clear.  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 Siro/MMF to start day +5     CARDIOVASCULAR  - Hypertension: Continue PTA amlodipine  - Risk of cardiomyopathy:  Baseline EF 60-65%  - Risk of arrhythmia: Baseline EKG showed Sinus rhythm with sinus arrhythmia     GI/NUTRITION  - Ulcer prophylaxis: protonix  -nausea/vomiting due to chemo: dex+zofran through conditioning. Zofran [id number], compazine TID with meals. Prn ativan.  - Risk of malnutrition: RD to follow.   - Constipation: senna, miralax PRN     RENAL/ELECTROLYTES/  - Electrolyte management: replace per sliding scale     DIABETES/ENDOCRINE  - Risk of steroid-induced hyperglyemia: Monitor BG, sliding scale if needed     MUSCULOSKELETAL/FRAILTY  - Baseline Frailty Score: 2  - Patient with substantial risk of sarcopenia  - Daily PT/OT as needed while inpatient  - Cancer Rehab as needed outpatient    SUPPORTIVE CARES  -MSK back pain intermittent: voltaren gel, bengay PRN  - Headache- tramadol, caffeine, imitrex available PRN   - Trazodone for sleep     SOCIAL DETERMINANTS  - Caregiver: Pt's primary caregiver will be spouse with family/friends as a secondary or back-up to assist as needed.   - Financial/insurance concerns: no    Known issues  that I take into account for medical decisions, with salient changes to the plan considering these complexities noted above.    Patient Active Problem List   Diagnosis    Polycythemia vera (H)    Acute myeloid leukemia (H)    History of polycythemia vera    Primary hypertension    Acute myeloid leukemia in remission (H)     Clinically Significant Risk Factors                  # Hypertension: Noted on problem list                       Medically Ready for Discharge: Anticipated in 5+ Days    Kari Cabral PA-C  , anitra

## 2024-06-27 NOTE — PLAN OF CARE
Problem: Adult Inpatient Plan of Care  Goal: Plan of Care Review  Description: The Plan of Care Review/Shift note should be completed every shift.  The Outcome Evaluation is a brief statement about your assessment that the patient is improving, declining, or no change.  This information will be displayed automatically on your shift  note.  Outcome: Progressing  Flowsheets (Taken 6/27/2024 3746)  Plan of Care Reviewed With: patient   Goal Outcome Evaluation:      Plan of Care Reviewed With: patient  C/o fatigue and has been resting quietly all day, sleeping in between cares. Asymptomatic bradycardia ovss. K replaced with recheck tomorrow. Mild nausea controlled with scheduled antiemetics. Poor appetite, eating small amounts. No stool x2d, senna 2tabs given. Left RAC hepar locked. Transplant tomorrow.

## 2024-06-28 NOTE — PROGRESS NOTES
Type of transplant: Donor: Allogeneic - Unrelated  Product:   BMT INFUSION DOCUMENTATION (Last 48 Hours)       BMT/Cellular Product Infusion       Row Name 06/28/24 1100                [REMOVED] Product 06/28/24 1208 HPC, Apheresis    Product Details Product Release Date: 06/28/24  -NB Product Release Time: 1208  -JH Product Type: HPC, Apheresis  -NB DIN: [id number]  -NB Product Description Code: [id number]  -NB Volume Dispensed (mL): 147 mL  -NB Completion Date (RN to complete): 06/28/24  -BH Completion Time (RN to complete): 1306  -BH    Checked by (Patient RN) Ayala Clifton RN  -KG       Checked by (Witness) Alondra Suero RN  -AZUL       Product Volume Infused (mL) 147 mL  -BH       Flush Volume (mL) 30 mL  -BH       Volume Dispensed (mL) --                 User Key  (r) = Recorded By, (t) = Taken By, (c) = Cosigned By      Initials Name Effective Dates     Sarah Walker 01/08/24 -     Michele Darling 04/02/24 -     Ayala Manjarrez RN 01/08/24 -     Karyn Wiggins 01/08/24 -     Alondra Le RN 01/08/24 -                   Preparation: RN Documentation  Patient was premedicated as ordered: yes  Line Type: central line, left  Patient Stable Prior to Infusion: yes  Time Infusion Started: 1257  Teaching: side effects/monitoring  Tolerated/Reaction: Patient tolerance of product infusion  Immediate suspected transfusion reaction to the product: none  Did patient have prior history of similar signs/symptoms during this hospitalization?: NA  Did the patient tolerate the infusion well: yes  Flush until:no flush needed  Plan:watch for signs of reaction

## 2024-06-28 NOTE — PROGRESS NOTES
"BMT/Cell Therapy Daily Progress Note   06/28/2024    Patient ID:  Zaheer Borges is a 64 year old male, currently day 0 of his therapy.    Admission date: 6/21/2024    INTERVAL  HISTORY     No N/V/D.  Slept ok. Doing well. Transplant today.    Review of Systems: 10 point ROS negative except as noted above.      PHYSICAL EXAM     Weight In/Out     Wt Readings from Last 3 Encounters:   06/28/24 65.8 kg (145 lb 1.6 oz)   06/10/24 67.5 kg (148 lb 12.8 oz)   06/07/24 67.8 kg (149 lb 8 oz)      I/O last 3 completed shifts:  In: 620 [P.O.:450; I.V.:170]  Out: 1375 [Urine:1375]       KPS:  90    /70 (BP Location: Left arm)   Pulse 72   Temp 98  F (36.7  C) (Oral)   Resp 16   Ht 1.68 m (5' 6.14\")   Wt 65.8 kg (145 lb 1.6 oz)   SpO2 95%   BMI 23.32 kg/m       General: NAD   Eyes: sclera anicteric   Lungs: breathing well on RA  Cardiovascular: well perfused  Skin: no rashes or petechiae  Neuro: A&O   Musculoskeletal: muscle mass adequate  Additional Findings: Mckinney site NT, no drainage.    Current aGVHD staging:  Skin 0, UGI 0, LGI 0, Liver 0 (keep in note through day +180 for allos)      LABS AND IMAGING: I have assessed all abnormal lab values for their clinical significance and any values considered clinically significant have been addressed in the assessment and plan.        Lab Results   Component Value Date    WBC 2.6 (L) 06/28/2024    ANEU 1.1 (L) 06/05/2024    HGB 11.0 (L) 06/28/2024    HCT 33.0 (L) 06/28/2024     06/28/2024     06/28/2024    POTASSIUM 4.2 06/28/2024    CHLORIDE 104 06/28/2024    CO2 27 06/28/2024     (H) 06/28/2024    BUN 19.5 06/28/2024    CR 0.60 (L) 06/28/2024    MAG 2.3 06/28/2024    INR 1.10 06/24/2024           SYSTEMS-BASED ASSESSMENT AND PLAN     Zaheer Borges is a 64 year old male with long hx of PV and now AML, undergoing URD 7/8 PBSCT with the Optimize trial. Today is day 0.      BMT/IEC PROTOCOL for AML  - Chemo protocol: OPTIMIZE trial              - Day -7: " Allopurinol              - Day -6 to day -3: Busulfan/fludarabine              - Day -2: Fludarabine              - Day -1: Rest day              - Day 0: Transplant   - Donor and Recip B+   - Restaging per protocol   - GCSF starts day +5, continue until ANC >2.5 x 2 days.      HEME/COAG  - Risk of cytopenias due to chemotherapy and radiation  - Transfusion parameters: hemoglobin <7, platelets <10     IMMUNOCOMPROMISED  - Relevant infectious history: tenofovir for hepatitis B core positivity.   - Prophylaxis plan: ACV, letermovir day +14, fabian through day +45. CT Chest clear.  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 Siro/MMF to start day +5     CARDIOVASCULAR  - Hypertension: Continue PTA amlodipine  - Risk of cardiomyopathy:  Baseline EF 60-65%  - Risk of arrhythmia: Baseline EKG showed Sinus rhythm with sinus arrhythmia     GI/NUTRITION  - Ulcer prophylaxis: protonix  -nausea/vomiting due to chemo: dex+zofran through conditioning. Zofran q4erwej, compazine TID with meals. Prn ativan.  - Risk of malnutrition: RD to follow.   - Constipation: senna, miralax PRN     RENAL/ELECTROLYTES/  - Electrolyte management: replace per sliding scale     DIABETES/ENDOCRINE  - Risk of steroid-induced hyperglyemia: Monitor BG, sliding scale if needed     MUSCULOSKELETAL/FRAILTY  - Baseline Frailty Score: 2  - Patient with substantial risk of sarcopenia  - Daily PT/OT as needed while inpatient  - Cancer Rehab as needed outpatient    SUPPORTIVE CARES  -MSK back pain intermittent: voltaren gel, bengay PRN  - Headache- tramadol, caffeine, imitrex available PRN   - Trazodone for sleep     SOCIAL DETERMINANTS  - Caregiver: Pt's primary caregiver will be spouse with family/friends as a secondary or back-up to assist as needed.   - Financial/insurance concerns: no    Known issues that I take into account for medical decisions, with salient changes to  the plan considering these complexities noted above.    Patient Active Problem List   Diagnosis    Polycythemia vera (H)    Acute myeloid leukemia (H)    History of polycythemia vera    Primary hypertension    Acute myeloid leukemia in remission (H)     Clinically Significant Risk Factors                  # Hypertension: Noted on problem list                       Medically Ready for Discharge: Anticipated in 5+ Days    Kari Cabral PA-C  , anitra

## 2024-06-28 NOTE — PLAN OF CARE
"Goal Outcome Evaluation:         Assumed cares from 23:00 to 07:30 AM    Blood pressure 102/71, pulse 77, temperature 97.7  F (36.5  C), temperature source Oral, resp. rate 16, height 1.68 m (5' 6.14\"), weight 66.3 kg (146 lb 1.6 oz), SpO2 94%.    AVSS, A/O x 4. Pt is on room air with good saturation. He is voiding freely into the toilet. No stool reported during the night His lab results are WNL and no replacements needed. CVC dressing is C/D/I and he is heparin locked. He denies any Pain/nausea/vomiting. Day 0 today and he will be getting URD PBSCT early this evening. Continue to monitor pt and follow plan of care.      Problem: Adult Inpatient Plan of Care  Goal: Plan of Care Review  Description: The Plan of Care Review/Shift note should be completed every shift.  The Outcome Evaluation is a brief statement about your assessment that the patient is improving, declining, or no change.  This information will be displayed automatically on your shift  note.  Outcome: Progressing  Goal: Patient-Specific Goal (Individualized)  Description: You can add care plan individualizations to a care plan. Examples of Individualization might be:  \"Parent requests to be called daily at 9am for status\", \"I have a hard time hearing out of my right ear\", or \"Do not touch me to wake me up as it startles  me\".  Outcome: Progressing  Goal: Absence of Hospital-Acquired Illness or Injury  Outcome: Progressing  Intervention: Identify and Manage Fall Risk  Recent Flowsheet Documentation  Taken 6/28/2024 0000 by Adalgisa Emanuel RN  Safety Promotion/Fall Prevention:   clutter free environment maintained   lighting adjusted   room organization consistent   nonskid shoes/slippers when out of bed  Intervention: Prevent Skin Injury  Recent Flowsheet Documentation  Taken 6/28/2024 0000 by Adaglisa Emanuel RN  Body Position: position changed independently  Intervention: Prevent Infection  Recent Flowsheet Documentation  Taken 6/28/2024 0000 by " Adalgisa Emanuel RN  Infection Prevention:   hand hygiene promoted   single patient room provided   visitors restricted/screened   rest/sleep promoted   personal protective equipment utilized   environmental surveillance performed   equipment surfaces disinfected  Goal: Optimal Comfort and Wellbeing  Outcome: Progressing     Problem: Stem Cell/Bone Marrow Transplant  Goal: Optimal Coping with Transplant  Outcome: Progressing  Goal: Symptom-Free Urinary Elimination  Outcome: Progressing  Goal: Diarrhea Symptom Control  Outcome: Progressing  Intervention: Manage Diarrhea  Recent Flowsheet Documentation  Taken 6/28/2024 0000 by Adalgisa Emanuel RN  Perineal Care: perineal hygiene encouraged  Goal: Improved Activity Tolerance  Outcome: Progressing  Intervention: Promote Improved Energy  Recent Flowsheet Documentation  Taken 6/28/2024 0000 by Adalgisa Emanuel RN  Activity Management:   up ad marjan   activity adjusted per tolerance  Goal: Blood Counts Within Acceptable Range  Outcome: Progressing  Intervention: Monitor and Manage Hematologic Symptoms  Recent Flowsheet Documentation  Taken 6/28/2024 0000 by Adalgisa Emanuel RN  Bleeding Precautions: gentle oral care promoted  Medication Review/Management: medications reviewed  Goal: Absence of Hypersensitivity Reaction  Outcome: Progressing  Goal: Absence of Infection  Outcome: Progressing  Intervention: Prevent and Manage Infection  Recent Flowsheet Documentation  Taken 6/28/2024 0000 by Adalgisa Emanuel RN  Infection Prevention:   hand hygiene promoted   single patient room provided   visitors restricted/screened   rest/sleep promoted   personal protective equipment utilized   environmental surveillance performed   equipment surfaces disinfected  Infection Management: aseptic technique maintained  Isolation Precautions: protective environment maintained  Goal: Improved Oral Mucous Membrane Health and Integrity  Outcome: Progressing  Intervention: Promote Oral  Comfort and Health  Recent Flowsheet Documentation  Taken 6/28/2024 0000 by Adalgisa Emanuel RN  Oral Mucous Membrane Protection: nonirritating oral fluids promoted  Oral Care:   lip/mouth moisturizer applied   oral rinse provided  Goal: Nausea and Vomiting Symptom Relief  Outcome: Progressing  Goal: Optimal Nutrition Intake  Outcome: Progressing

## 2024-06-28 NOTE — PLAN OF CARE
"Goal Outcome Evaluation: /71 (BP Location: Left arm)   Pulse 73   Temp 98.4  F (36.9  C) (Oral)   Resp 16   Ht 1.68 m (5' 6.14\")   Wt 66.3 kg (146 lb 1.6 oz)   SpO2 96%   BMI 23.48 kg/m            AVSS. No new symptoms. Continue with plan of care.     Problem: Adult Inpatient Plan of Care  Goal: Plan of Care Review  Description: The Plan of Care Review/Shift note should be completed every shift.  The Outcome Evaluation is a brief statement about your assessment that the patient is improving, declining, or no change.  This information will be displayed automatically on your shift  note.  Outcome: Progressing  Goal: Patient-Specific Goal (Individualized)  Description: You can add care plan individualizations to a care plan. Examples of Individualization might be:  \"Parent requests to be called daily at 9am for status\", \"I have a hard time hearing out of my right ear\", or \"Do not touch me to wake me up as it startles  me\".  Outcome: Progressing  Goal: Absence of Hospital-Acquired Illness or Injury  Outcome: Progressing  Intervention: Identify and Manage Fall Risk  Recent Flowsheet Documentation  Taken 6/27/2024 1953 by Radha Moore RN  Safety Promotion/Fall Prevention: safety round/check completed  Intervention: Prevent Skin Injury  Recent Flowsheet Documentation  Taken 6/27/2024 1953 by Radha Moore RN  Body Position: position changed independently  Skin Protection: adhesive use limited  Device Skin Pressure Protection: tubing/devices free from skin contact  Taken 6/27/2024 1949 by Radha Moore RN  Body Position: position changed independently  Intervention: Prevent and Manage VTE (Venous Thromboembolism) Risk  Recent Flowsheet Documentation  Taken 6/27/2024 1953 by Radha Moore RN  VTE Prevention/Management: SCDs off (sequential compression devices)  Intervention: Prevent Infection  Recent Flowsheet Documentation  Taken 6/27/2024 1953 by Radha Moore RN  Infection " Prevention: hand hygiene promoted  Goal: Optimal Comfort and Wellbeing  Outcome: Progressing  Goal: Readiness for Transition of Care  Outcome: Progressing     Problem: Stem Cell/Bone Marrow Transplant  Goal: Optimal Coping with Transplant  Outcome: Progressing  Intervention: Optimize Patient/Family Adjustment to Transplant  Recent Flowsheet Documentation  Taken 6/27/2024 1953 by Radha Moore RN  Supportive Measures: positive reinforcement provided  Goal: Symptom-Free Urinary Elimination  Outcome: Progressing  Intervention: Prevent or Manage Bladder Irritation  Recent Flowsheet Documentation  Taken 6/27/2024 1953 by Radha Moore RN  Urinary Elimination Promotion: frequent voiding encouraged  Hyperhydration Management:   encouraged to void   fluids provided  Goal: Diarrhea Symptom Control  Outcome: Progressing  Intervention: Manage Diarrhea  Recent Flowsheet Documentation  Taken 6/27/2024 1953 by Radha Moore RN  Skin Protection: adhesive use limited  Fluid/Electrolyte Management: fluids provided  Perineal Care: perineal hygiene encouraged  Taken 6/27/2024 1949 by Radha Moore RN  Perineal Care: perineal hygiene encouraged  Goal: Improved Activity Tolerance  Outcome: Progressing  Intervention: Promote Improved Energy  Recent Flowsheet Documentation  Taken 6/27/2024 1953 by Radha Moore RN  Fatigue Management: paced activity encouraged  Sleep/Rest Enhancement:   awakenings minimized   comfort measures  Activity Management:   activity encouraged   up ad marjan  Environmental Support: calm environment promoted  Taken 6/27/2024 1949 by Radha Moore RN  Activity Management:   activity encouraged   up ad marjan  Goal: Blood Counts Within Acceptable Range  Outcome: Progressing  Intervention: Monitor and Manage Hematologic Symptoms  Recent Flowsheet Documentation  Taken 6/27/2024 1953 by Radha Moore RN  Sleep/Rest Enhancement:   awakenings minimized   comfort measures  Bleeding  Precautions: gentle oral care promoted  Medication Review/Management: medications reviewed  Goal: Absence of Hypersensitivity Reaction  Outcome: Progressing  Goal: Absence of Infection  Outcome: Progressing  Intervention: Prevent and Manage Infection  Recent Flowsheet Documentation  Taken 6/27/2024 1953 by Radha Moore RN  Infection Prevention: hand hygiene promoted  Infection Management: aseptic technique maintained  Isolation Precautions: protective environment maintained  Goal: Improved Oral Mucous Membrane Health and Integrity  Outcome: Progressing  Intervention: Promote Oral Comfort and Health  Recent Flowsheet Documentation  Taken 6/27/2024 1953 by Radha Moore RN  Oral Mucous Membrane Protection: nonirritating oral fluids promoted  Oral Care: oral rinse provided  Taken 6/27/2024 1949 by Radha Moore RN  Oral Care: oral rinse provided  Goal: Nausea and Vomiting Symptom Relief  Outcome: Progressing  Intervention: Prevent and Manage Nausea and Vomiting  Recent Flowsheet Documentation  Taken 6/27/2024 1953 by Radha Moore RN  Nausea/Vomiting Interventions: (denies) --  Goal: Optimal Nutrition Intake  Outcome: Progressing  Intervention: Minimize and Manage Barriers to Oral Intake  Recent Flowsheet Documentation  Taken 6/27/2024 1953 by Radha Moore RN  Oral Nutrition Promotion: physical activity promoted

## 2024-06-28 NOTE — PROGRESS NOTES
BMT Post Infusion Documentation    Data   Patient Vitals for the past 72 hrs:   Temp Temp src Pulse Resp BP   06/25/24 1627 97.9  F (36.6  C) Oral 66 16 132/73   06/25/24 1943 98.1  F (36.7  C) Oral 67 16 127/85   06/25/24 2353 98.1  F (36.7  C) Oral 77 16 127/74   06/26/24 0408 98.1  F (36.7  C) Oral 69 16 108/70   06/26/24 0807 97.9  F (36.6  C) Oral 62 16 111/72   06/26/24 1218 97.9  F (36.6  C) Oral 66 16 123/77   06/26/24 1620 98.7  F (37.1  C) Oral 65 16 131/72   06/26/24 2017 98.2  F (36.8  C) Oral 67 16 (!) 147/84   06/26/24 2357 98  F (36.7  C) Oral 70 16 133/83   06/27/24 0408 98.2  F (36.8  C) Oral 65 16 108/75   06/27/24 0812 98.2  F (36.8  C) Oral 57 16 114/72   06/27/24 1200 98.4  F (36.9  C) Oral 57 16 107/68   06/27/24 1610 98.7  F (37.1  C) Oral 65 16 110/69   06/27/24 1949 98.4  F (36.9  C) Oral 73 16 110/71   06/27/24 2333 98.3  F (36.8  C) Oral 77 16 120/73   06/28/24 0320 97.7  F (36.5  C) Oral -- 16 102/71   06/28/24 0814 98.4  F (36.9  C) Oral 77 16 107/72   06/28/24 1141 98  F (36.7  C) Oral 72 16 115/70     BMT INFUSION DOCUMENTATION (Last 24 Hours)       BMT/Cellular Product Infusion       Row Name 06/28/24 1100                Cell Therapy Documentation    Product Release Date 06/28/24  -NB       Recipient Study ID N/A  -NB       Donor Allogeneic - Unrelated  -NB       Donor MRN/ID [id number]  -NB       Donor ABO/Rh B pos  -NB       Allogeneic Donor Eligibility Determination and Summary of Records Eligible  -NB       Type of Infusion Allogeneic  -NB       Total Volume Dispensed (mL) 147  -NB       Total NC Dose 4.84E+08  -NB       Total CD34 Dose 8.01E+06  -NB       Total CD3 dose 1.21E+08  -NB       Total NC Dose Left in Storage 3.98E+08  -NB       Comments for Product Issues Tisha Coyle RN verified times for product arrival  -NB       Donor ABO/Rh --       Product Types --       Product Numbers --       Product Types and Numbers --       Volume --       ABO Mismatch --        ZZTotal NC Dose --       ZZTotal CD34 Dose --       ZZTotal NC Dose Left in Storage --          Product 06/28/24 HPC, Apheresis    Product Details Product Release Date: 06/28/24  -NB Product Type: HPC, Apheresis  -NB DIN: [id number]  -NB Product Description Code: [id number]  -NB Volume Dispensed (mL): 147 mL  -NB    Checked by (Patient RN) --       Checked by (Witness) --       Product Volume Infused (mL) --       Flush Volume (mL) --       Volume Dispensed (mL) --          RN Documentation    Patient was premedicated as ordered --       Line Type --       Patient Stable Prior to Infusion --       Time Infusion Started --       Checked by (Patient RN) --       Checked by (RN 2) --       Broken Bag? --       Immediate suspected transfusion reaction to the product --       Time Infusion Stopped --       Total Flush Volume (mL) --       Checked by (Witness) --       Date Infusion Started --       Date Infusion Stopped --       Volume Infused (mL) --       Total Volume Infused (cc) --          Patient tolerance of product infusion    Immediate suspected transfusion reaction to the product --       Did patient have prior history of similar signs/symptoms during this hospitalization? --       Symptoms during/after infusion --       Did the patient tolerate the infusion well --       Medications and treatment for symptoms --       Did the symptoms resolve? --       Enter comments if clots, leaks, broken bag, infusion delays, other issues with bag/infusion --       Describe symptoms --                 User Key  (r) = Recorded By, (t) = Taken By, (c) = Cosigned By      Initials Name Effective Dates    Karyn Wiggins 01/08/24 -                       Post-Infusion Evaluation:   Infusion Related Reaction: Grade 0 - none  Dyspnea: Grade 0 - none  Hypoxia: Grade 0 - not present  Fever: Grade 0 - afebrile  Chills: Grade 0 - none  Febrile Neutropenia: Grade 0 - not present  Sinus Bradycardia: Grade 0 - none  Hypertension:  Grade 2 - stage 1 hypertension (systolic -159 mm Hg or diastolic BP 90-99 mm Hg); medical intervention indicated; recurrent or persistent (>/ 24 hours); symptomatic increase by >/ 20 mm Hg (diastolic) or to > 140/90 mm Hg if previously WNL; monotherapy indicated  Hypotension: Grade 0 - none  Chest Pain: Grade 0 - none  Bronchospasm: Grade 0 - none  Pain: Grade 0 - none  Rash: Grade 0 - None  Neurologic Specify: none      Kari Cabral PA-C

## 2024-06-28 NOTE — PLAN OF CARE
"Pt is currently day 0 and had a transplant today around 1pm. Pt tolerated the transplant well, and is vitally stable. Compazine was switched from scheduled to PRN. Pt states he has no pain, no replacements needed. Pt showered, no CHG wipes done due to pt stating he had an allergic reaction to the wipes we use. Pt has his own wipes.     Problem: Adult Inpatient Plan of Care  Goal: Plan of Care Review  Description: The Plan of Care Review/Shift note should be completed every shift.  The Outcome Evaluation is a brief statement about your assessment that the patient is improving, declining, or no change.  This information will be displayed automatically on your shift  note.  Outcome: Progressing  Goal: Patient-Specific Goal (Individualized)  Description: You can add care plan individualizations to a care plan. Examples of Individualization might be:  \"Parent requests to be called daily at 9am for status\", \"I have a hard time hearing out of my right ear\", or \"Do not touch me to wake me up as it startles  me\".  Outcome: Progressing  Goal: Absence of Hospital-Acquired Illness or Injury  Outcome: Progressing  Intervention: Identify and Manage Fall Risk  Recent Flowsheet Documentation  Taken 6/28/2024 1600 by Michele Zhang  Safety Promotion/Fall Prevention: safety round/check completed  Taken 6/28/2024 1200 by Michele Zhang  Safety Promotion/Fall Prevention: safety round/check completed  Taken 6/28/2024 0800 by Michele Zhang  Safety Promotion/Fall Prevention: safety round/check completed  Intervention: Prevent Skin Injury  Recent Flowsheet Documentation  Taken 6/28/2024 0800 by Michele Zhang  Body Position: position changed independently  Goal: Optimal Comfort and Wellbeing  Outcome: Progressing  Goal: Readiness for Transition of Care  Outcome: Progressing     Problem: Stem Cell/Bone Marrow Transplant  Goal: Optimal Coping with Transplant  Outcome: Progressing  Intervention: Optimize Patient/Family Adjustment to " Transplant  Recent Flowsheet Documentation  Taken 6/28/2024 0800 by Michele Zhang  Supportive Measures:   active listening utilized   relaxation techniques promoted   self-care encouraged  Goal: Symptom-Free Urinary Elimination  Outcome: Progressing  Goal: Diarrhea Symptom Control  Outcome: Progressing  Goal: Improved Activity Tolerance  Outcome: Progressing  Intervention: Promote Improved Energy  Recent Flowsheet Documentation  Taken 6/28/2024 0800 by Michele Zhang  Sleep/Rest Enhancement: awakenings minimized  Environmental Support: calm environment promoted  Goal: Blood Counts Within Acceptable Range  Outcome: Progressing  Intervention: Monitor and Manage Hematologic Symptoms  Recent Flowsheet Documentation  Taken 6/28/2024 0800 by Michele Zhang  Sleep/Rest Enhancement: awakenings minimized  Goal: Absence of Hypersensitivity Reaction  Outcome: Progressing  Goal: Absence of Infection  Outcome: Progressing  Goal: Improved Oral Mucous Membrane Health and Integrity  Outcome: Progressing  Goal: Nausea and Vomiting Symptom Relief  Outcome: Progressing  Goal: Optimal Nutrition Intake  Outcome: Progressing   Goal Outcome Evaluation:

## 2024-06-28 NOTE — PROGRESS NOTES
BMT/Cellular Allogeneic Product Infusion       Patient Vitals for the past 24 hrs:   Temp Temp src Pulse Resp BP   06/27/24 1200 98.4  F (36.9  C) Oral 57 16 107/68   06/27/24 1610 98.7  F (37.1  C) Oral 65 16 110/69   06/27/24 1949 98.4  F (36.9  C) Oral 73 16 110/71   06/27/24 2333 98.3  F (36.8  C) Oral 77 16 120/73   06/28/24 0320 97.7  F (36.5  C) Oral -- 16 102/71   06/28/24 0814 98.4  F (36.9  C) Oral 77 16 107/72   06/28/24 1141 98  F (36.7  C) Oral 72 16 115/70      BMT INFUSION DOCUMENTATION (Last 48 Hours)       BMT/Cellular Product Infusion       Row Name                  Product 06/28/24 HPC, Apheresis    Product Details Product Release Date: 06/28/24  -NB Product Type: HPC, Apheresis  -NB DIN: [id number]  -NB Product Description Code: [id number]  -NB Volume Dispensed (mL): 147 mL  -NB    Checked by (Patient RN) --       Checked by (Witness) --       Product Volume Infused (mL) --       Flush Volume (mL) --       Volume Dispensed (mL) --                 User Key  (r) = Recorded By, (t) = Taken By, (c) = Cosigned By      Initials Name Effective Dates    NB Karyn Beltrán 01/08/24 -                   Allogeneic Donor Eligibility Determination and Summary of Records: Eligible        Type of Infusion: Allogeneic      Baseline Pre-Infusion Evaluation (to be completed by Provider):   Dyspnea: Grade 0 - none  Hypoxia: Grade 0 - not present  Fever: Grade 0 - afebrile  Chills: Grade 0 - none  Febrile Neutropenia: Grade 0 - not present  Sinus Bradycardia: Grade 0 - none  Hypertension: Grade 0 - none  Hypotension: Grade 0 - none  Chest Pain: Grade 0 - none  Bronchospasm: Grade 0 - none  Pain: Grade 0 - none  Rash: Grade 0 - None  Neurologic Specify: none    If adverse reactions, events or complications occur (fever greater than 2 degrees fahrenheit increase, and severe reactions of the following types: chills, dyspnea, bronchospasm, hyper/hypotension, hypoxia, bradycardia, chest pain, back/flank pain,  hypoxia, and any other reaction deemed severe or life threatening; any instance of product bag breakage or unusual product appearance)    Any other events that are >= grade 3, then immediately contact the BMT Attending physician, the Cell Therapy Laboratory Medical Director (pager [phone]) and the Cell Therapy Laboratory ([phone]).  After midnight, holidays & weekends contact the [facility] on the appropriate campus ([facility] [place]: [phone]; [facility] [place]: [phone]).    Kari Cabral PA-C

## 2024-06-29 NOTE — PROCEDURES
Today, after confirming the identity of the allogeneic peripheral stem cell product, Zaheer Borges received the infusion without difficulty, and there were no complications. I remained immediately available to manage infusion toxicities or complications.       SRINIVAS WILKS MD  June 29, 2024

## 2024-06-29 NOTE — PLAN OF CARE
"/62 (BP Location: Left arm)   Pulse 66   Temp 98.6  F (37  C) (Oral)   Resp 16   Ht 1.68 m (5' 6.14\")   Wt 65.8 kg (145 lb 1.6 oz)   SpO2 93%   BMI 23.32 kg/m       Neuro: A&Ox4.   Cardiac: Afebrile. OVSS.   Respiratory: Sating at 93% on RA. Denies SOB.   GI/: denies n/v.d. Adequate urine output. BM X 3 (per pt report, all formed BM).   Diet/appetite: high kcal/high protein diet.  Activity:  independent/up at marjan.  Pain: At acceptable level on current regimen. Denied pain.   Skin: No new deficits noted.  LDA's: left CVC, HL.    Plan: no replacement needed this morning. Continue with POC. Notify primary team with changes.   Problem: Stem Cell/Bone Marrow Transplant  Goal: Diarrhea Symptom Control  Intervention: Manage Diarrhea  Recent Flowsheet Documentation  Taken 6/28/2024 2100 by Kassandra Dickerson, RN  Skin Protection: adhesive use limited  Fluid/Electrolyte Management: fluids provided   Goal Outcome Evaluation:                        "

## 2024-06-29 NOTE — PLAN OF CARE
"Pt is day +1 from his transplant. Pt had 2 loose/watery stools today, so immodium was given and pt responded well to this with no new loose/watery stools. Shower and CHG wipes done. Pt is feeling fatigued today. After pt showered, the dressing was loose/wet so it was changed. Pt noticed a new rash on the ankle/foot area (pictures in chart) that is itchy, MD notified, steroid cream will be applied when it is sent up from pharmacy.     Problem: Adult Inpatient Plan of Care  Goal: Plan of Care Review  Description: The Plan of Care Review/Shift note should be completed every shift.  The Outcome Evaluation is a brief statement about your assessment that the patient is improving, declining, or no change.  This information will be displayed automatically on your shift  note.  Outcome: Progressing  Goal: Patient-Specific Goal (Individualized)  Description: You can add care plan individualizations to a care plan. Examples of Individualization might be:  \"Parent requests to be called daily at 9am for status\", \"I have a hard time hearing out of my right ear\", or \"Do not touch me to wake me up as it startles  me\".  Outcome: Progressing  Goal: Absence of Hospital-Acquired Illness or Injury  Outcome: Progressing  Intervention: Identify and Manage Fall Risk  Recent Flowsheet Documentation  Taken 6/29/2024 1600 by Michele Zhang  Safety Promotion/Fall Prevention: safety round/check completed  Taken 6/29/2024 1500 by Michele Zhang  Safety Promotion/Fall Prevention: safety round/check completed  Taken 6/29/2024 0800 by Michele Zhang  Safety Promotion/Fall Prevention: safety round/check completed  Intervention: Prevent Skin Injury  Recent Flowsheet Documentation  Taken 6/29/2024 0800 by Michele Zhang  Body Position: position changed independently  Goal: Optimal Comfort and Wellbeing  Outcome: Progressing  Goal: Readiness for Transition of Care  Outcome: Progressing     Problem: Stem Cell/Bone Marrow Transplant  Goal: Optimal Coping " with Transplant  Outcome: Progressing  Intervention: Optimize Patient/Family Adjustment to Transplant  Recent Flowsheet Documentation  Taken 6/29/2024 0800 by Michele Zhang  Supportive Measures:   active listening utilized   decision-making supported   relaxation techniques promoted   self-care encouraged  Goal: Symptom-Free Urinary Elimination  Outcome: Progressing  Goal: Diarrhea Symptom Control  Outcome: Progressing  Goal: Improved Activity Tolerance  Outcome: Progressing  Intervention: Promote Improved Energy  Recent Flowsheet Documentation  Taken 6/29/2024 0800 by Michele Zhang  Environmental Support:   calm environment promoted   distractions minimized   environmental consistency promoted   personal routine supported   rest periods encouraged  Goal: Blood Counts Within Acceptable Range  Outcome: Progressing  Goal: Absence of Hypersensitivity Reaction  Outcome: Progressing  Goal: Absence of Infection  Outcome: Progressing  Goal: Improved Oral Mucous Membrane Health and Integrity  Outcome: Progressing  Intervention: Promote Oral Comfort and Health  Recent Flowsheet Documentation  Taken 6/29/2024 0800 by Michele Zhang  Oral Mucous Membrane Protection: nonirritating oral fluids promoted  Goal: Nausea and Vomiting Symptom Relief  Outcome: Progressing  Goal: Optimal Nutrition Intake  Outcome: Progressing   Goal Outcome Evaluation:

## 2024-06-29 NOTE — PROGRESS NOTES
"BMT/Cell Therapy Daily Progress Note   06/29/2024    Patient ID:  Zaheer Borges is a 64 year old male, currently day 1 of his therapy.    Admission date: 6/21/2024    INTERVAL  HISTORY     No N/V/D  Eating well  Active     Review of Systems: 10 point ROS negative except as noted above.      PHYSICAL EXAM     Weight In/Out     Wt Readings from Last 3 Encounters:   06/29/24 66.2 kg (145 lb 14.4 oz)   06/10/24 67.5 kg (148 lb 12.8 oz)   06/07/24 67.8 kg (149 lb 8 oz)      I/O last 3 completed shifts:  In: 2037 [P.O.:1760; I.V.:130; Blood:147]  Out: 1550 [Urine:1550]       KPS:  90    /76 (BP Location: Left arm)   Pulse 69   Temp 97.7  F (36.5  C) (Oral)   Resp 16   Ht 1.68 m (5' 6.14\")   Wt 66.2 kg (145 lb 14.4 oz)   SpO2 97%   BMI 23.45 kg/m       General: NAD   Eyes: sclera anicteric   Lungs: breathing well on RA  Cardiovascular: well perfused  Skin: no rashes or petechiae  Neuro: A&O   Musculoskeletal: muscle mass adequate  Additional Findings: Mckinney site NT, no drainage.    Current aGVHD staging:  Skin 0, UGI 0, LGI 0, Liver 0 (keep in note through day +180 for allos)      LABS AND IMAGING: I have assessed all abnormal lab values for their clinical significance and any values considered clinically significant have been addressed in the assessment and plan.        Lab Results   Component Value Date    WBC 2.5 (L) 06/29/2024    ANEU 1.1 (L) 06/05/2024    HGB 10.8 (L) 06/29/2024    HCT 32.4 (L) 06/29/2024     06/29/2024     06/29/2024    POTASSIUM 3.9 06/29/2024    CHLORIDE 102 06/29/2024    CO2 26 06/29/2024     (H) 06/29/2024    BUN 18.7 06/29/2024    CR 0.61 (L) 06/29/2024    MAG 2.3 06/29/2024    INR 1.10 06/24/2024           SYSTEMS-BASED ASSESSMENT AND PLAN     Zaheer Borges is a 64 year old male with long hx of PV and now AML, undergoing URD 7/8 PBSCT with the Optimize trial. Today is day 1.      BMT/IEC PROTOCOL for AML  - Chemo protocol: OPTIMIZE trial              - Day -7: " Allopurinol              - Day -6 to day -3: Busulfan/fludarabine              - Day -2: Fludarabine              - Day -1: Rest day              - Day 0: Transplant   - Donor and Recip B+   - Restaging per protocol   - GCSF starts day +5, continue until ANC >2.5 x 2 days.      HEME/COAG  - Risk of cytopenias due to chemotherapy and radiation  - Transfusion parameters: hemoglobin <7, platelets <10     IMMUNOCOMPROMISED  - Relevant infectious history: tenofovir for hepatitis B core positivity.   - Prophylaxis plan: ACV, letermovir day +14, fabian through day +45. CT Chest clear.  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 Siro/MMF to start day +5     CARDIOVASCULAR  - Hypertension: Continue PTA amlodipine  - Risk of cardiomyopathy:  Baseline EF 60-65%  - Risk of arrhythmia: Baseline EKG showed Sinus rhythm with sinus arrhythmia     GI/NUTRITION  - Ulcer prophylaxis: protonix  -nausea/vomiting due to chemo: dex+zofran through conditioning. Zofran [id number], zyrepxa HS, compazine PRN Prn ativan.  - Risk of malnutrition: RD to follow.   - Constipation: senna, miralax PRN     RENAL/ELECTROLYTES/  - Electrolyte management: replace per sliding scale     DIABETES/ENDOCRINE  - Risk of steroid-induced hyperglyemia: Monitor BG, sliding scale if needed     MUSCULOSKELETAL/FRAILTY  - Baseline Frailty Score: 2  - Patient with substantial risk of sarcopenia  - Daily PT/OT as needed while inpatient  - Cancer Rehab as needed outpatient    SUPPORTIVE CARES  -MSK back pain intermittent: voltaren gel, bengay PRN  - Headache- tramadol, caffeine, imitrex available PRN   - Trazodone for sleep     SOCIAL DETERMINANTS  - Caregiver: Pt's primary caregiver will be spouse with family/friends as a secondary or back-up to assist as needed.   - Financial/insurance concerns: no    Known issues that I take into account for medical decisions, with salient changes to  the plan considering these complexities noted above.    Patient Active Problem List   Diagnosis    Polycythemia vera (H)    Acute myeloid leukemia (H)    History of polycythemia vera    Primary hypertension    Acute myeloid leukemia in remission (H)     Clinically Significant Risk Factors                  # Hypertension: Noted on problem list                       Medically Ready for Discharge: Anticipated in 5+ Days    Kari Cbaral PA-C  , anitra

## 2024-06-30 NOTE — PROGRESS NOTES
Assumed care at 1900 until 2300. Pt had 1 loose stool, no PRNs given. No concerns or complaints. Denies pain. Intermittent assessment ongoing.

## 2024-06-30 NOTE — PLAN OF CARE
Provided care [id number]    AV. A&O. RA. Independent. No BM this shift. Adequate urine output. Reg diet. Denies pain or nausea. CVC: Heparin Locked. No replacements needed. Continue with plan of care and update team as needed.       Problem: Stem Cell/Bone Marrow Transplant  Goal: Diarrhea Symptom Control  Outcome: Progressing  Intervention: Manage Diarrhea  Recent Flowsheet Documentation  Taken 6/29/2024 2345 by Zaheer Hicks RN  Fluid/Electrolyte Management: fluids provided  Perineal Care: perineal hygiene encouraged  Goal: Improved Activity Tolerance  Outcome: Progressing  Intervention: Promote Improved Energy  Recent Flowsheet Documentation  Taken 6/30/2024 0335 by Zaheer Hicks RN  Activity Management: activity adjusted per tolerance  Taken 6/29/2024 2345 by Zaheer Hicks RN  Activity Management: activity adjusted per tolerance  Goal: Absence of Infection  Outcome: Progressing  Intervention: Prevent and Manage Infection  Recent Flowsheet Documentation  Taken 6/30/2024 0400 by Zaheer Hicks RN  Infection Prevention:   cohorting utilized   environmental surveillance performed   equipment surfaces disinfected   personal protective equipment utilized   rest/sleep promoted   hand hygiene promoted   single patient room provided   visitors restricted/screened  Isolation Precautions: protective environment maintained  Taken 6/29/2024 2345 by Zaheer Hicks RN  Infection Prevention:   cohorting utilized   environmental surveillance performed   equipment surfaces disinfected   personal protective equipment utilized   rest/sleep promoted   hand hygiene promoted   single patient room provided   visitors restricted/screened  Isolation Precautions: protective environment maintained  Goal: Nausea and Vomiting Symptom Relief  Outcome: Progressing

## 2024-06-30 NOTE — PLAN OF CARE
"Pt is day +2 from allo transplant. Shower and CHG wipes done, Imodium given x1 for one loose stool this afternoon. Rash on ankle/feet looks about the same, a bit better, still localized and not itchy.     Problem: Adult Inpatient Plan of Care  Goal: Plan of Care Review  Description: The Plan of Care Review/Shift note should be completed every shift.  The Outcome Evaluation is a brief statement about your assessment that the patient is improving, declining, or no change.  This information will be displayed automatically on your shift  note.  Outcome: Progressing  Goal: Patient-Specific Goal (Individualized)  Description: You can add care plan individualizations to a care plan. Examples of Individualization might be:  \"Parent requests to be called daily at 9am for status\", \"I have a hard time hearing out of my right ear\", or \"Do not touch me to wake me up as it startles  me\".  Outcome: Progressing  Goal: Absence of Hospital-Acquired Illness or Injury  Outcome: Progressing  Intervention: Identify and Manage Fall Risk  Recent Flowsheet Documentation  Taken 6/30/2024 1600 by Michele Zhang  Safety Promotion/Fall Prevention: safety round/check completed  Taken 6/30/2024 1200 by Michele Zhang  Safety Promotion/Fall Prevention: safety round/check completed  Taken 6/30/2024 0800 by Michele Zhang  Safety Promotion/Fall Prevention: safety round/check completed  Goal: Optimal Comfort and Wellbeing  Outcome: Progressing  Goal: Readiness for Transition of Care  Outcome: Progressing     Problem: Stem Cell/Bone Marrow Transplant  Goal: Optimal Coping with Transplant  Outcome: Progressing  Intervention: Optimize Patient/Family Adjustment to Transplant  Recent Flowsheet Documentation  Taken 6/30/2024 0800 by Michele Zhang  Supportive Measures:   active listening utilized   relaxation techniques promoted   self-care encouraged  Goal: Symptom-Free Urinary Elimination  Outcome: Progressing  Goal: Diarrhea Symptom Control  Outcome: " Progressing  Goal: Improved Activity Tolerance  Outcome: Progressing  Intervention: Promote Improved Energy  Recent Flowsheet Documentation  Taken 6/30/2024 0800 by Michele Zhang  Environmental Support:   calm environment promoted   caregiver consistency promoted   environmental consistency promoted   distractions minimized   personal routine supported   rest periods encouraged  Goal: Blood Counts Within Acceptable Range  Outcome: Progressing  Goal: Absence of Hypersensitivity Reaction  Outcome: Progressing  Goal: Absence of Infection  Outcome: Progressing  Goal: Improved Oral Mucous Membrane Health and Integrity  Outcome: Progressing  Goal: Nausea and Vomiting Symptom Relief  Outcome: Progressing  Goal: Optimal Nutrition Intake  Outcome: Progressing   Goal Outcome Evaluation:

## 2024-06-30 NOTE — PROGRESS NOTES
"BMT/Cell Therapy Daily Progress Note   06/30/2024    Patient ID:  Zaheer Borges is a 64 year old male, currently day 2 of his therapy.    Admission date: 6/21/2024    INTERVAL  HISTORY     N/V/D well controlled. Eating ok although appetite decreased overall. Tells me he is staying active during day. He did develop a pruritic rash on bilateral ankles that is equal in distrubution on both sides of external malleolus, small raised papules and some excoriation marks present. Hydrocortisone cream is helping. He hasn't been using any new topicals, he is sensitive to CHG so he has not been using these either.     Review of Systems: 10 point ROS negative except as noted above.      PHYSICAL EXAM     Weight In/Out     Wt Readings from Last 3 Encounters:   06/30/24 65.9 kg (145 lb 3.2 oz)   06/10/24 67.5 kg (148 lb 12.8 oz)   06/07/24 67.8 kg (149 lb 8 oz)      I/O last 3 completed shifts:  In: 1670 [P.O.:1570; I.V.:100]  Out: 2300 [Urine:2300]       KPS:  90    /83 (BP Location: Left arm)   Pulse 77   Temp 98.1  F (36.7  C) (Oral)   Resp 16   Ht 1.68 m (5' 6.14\")   Wt 65.9 kg (145 lb 3.2 oz)   SpO2 95%   BMI 23.34 kg/m       General: NAD   Eyes: sclera anicteric   Lungs: breathing well on RA  Cardiovascular: well perfused  Skin: bilateral ankles with similar distribution bilaterally small raised papular rash, no erythema, exoriations present, not consistent with petechiae   Neuro: A&O   Musculoskeletal: muscle mass adequate  Additional Findings: Mckinney site NT, no drainage.    Current aGVHD staging:  Skin 0, UGI 0, LGI 0, Liver 0 (keep in note through day +180 for allos)      LABS AND IMAGING: I have assessed all abnormal lab values for their clinical significance and any values considered clinically significant have been addressed in the assessment and plan.        Lab Results   Component Value Date    WBC 1.8 (L) 06/30/2024    ANEU 1.1 (L) 06/05/2024    HGB 11.1 (L) 06/30/2024    HCT 33.4 (L) 06/30/2024    PLT " 138 (L) 06/30/2024     06/30/2024    POTASSIUM 4.0 06/30/2024    CHLORIDE 103 06/30/2024    CO2 28 06/30/2024     (H) 06/30/2024    BUN 18.4 06/30/2024    CR 0.70 06/30/2024    MAG 2.4 (H) 06/30/2024    INR 1.10 06/24/2024           SYSTEMS-BASED ASSESSMENT AND PLAN     Zaheer Borges is a 64 year old male with long hx of PV and now AML, undergoing URD 7/8 PBSCT with the Optimize trial. Today is day 2.      BMT/IEC PROTOCOL for AML  - Chemo protocol: OPTIMIZE trial              - Day -7: Allopurinol              - Day -6 to day -3: Busulfan/fludarabine              - Day -2: Fludarabine              - Day -1: Rest day              - Day 0: Transplant   - Donor and Recip B+   - Restaging per protocol   - GCSF starts day +5, continue until ANC >2.5 x 2 days.      HEME/COAG  - Risk of cytopenias due to chemotherapy and radiation  - Transfusion parameters: hemoglobin <7, platelets <10     IMMUNOCOMPROMISED  - Relevant infectious history: tenofovir for hepatitis B core positivity.   - Prophylaxis plan: ACV, letermovir day +14, fabian through day +45. CT Chest clear.  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 Siro/MMF to start day +5     CARDIOVASCULAR  - Hypertension: Continue PTA amlodipine  - Risk of cardiomyopathy:  Baseline EF 60-65%  - Risk of arrhythmia: Baseline EKG showed Sinus rhythm with sinus arrhythmia     GI/NUTRITION  - Ulcer prophylaxis: protonix  -nausea/vomiting due to chemo: dex+zofran through conditioning. Zofran [id number], zyrepxa HS PRN, compazine PRN Prn ativan.  - Risk of malnutrition: RD to follow.   - Constipation: senna, miralax PRN, metamucil   #Hemorrhoids: prep H, lidocaine      RENAL/ELECTROLYTES/  - Electrolyte management: replace per sliding scale    CV  #HTN- pta norvasc 5mg     DIABETES/ENDOCRINE  - Risk of steroid-induced hyperglyemia: Monitor BG, sliding scale if needed      MUSCULOSKELETAL/FRAILTY  - Baseline Frailty Score: 2  - Patient with substantial risk of sarcopenia  - Daily PT/OT as needed while inpatient  - Cancer Rehab as needed outpatient    SUPPORTIVE CARES  -MSK back pain intermittent: voltaren gel, bengay PRN  - Headache- tramadol, caffeine, imitrex available PRN   - Trazodone for sleep     SOCIAL DETERMINANTS  - Caregiver: Pt's primary caregiver will be spouse with family/friends as a secondary or back-up to assist as needed.   - Financial/insurance concerns: no    Known issues that I take into account for medical decisions, with salient changes to the plan considering these complexities noted above.    Patient Active Problem List   Diagnosis    Polycythemia vera (H)    Acute myeloid leukemia (H)    History of polycythemia vera    Primary hypertension    Acute myeloid leukemia in remission (H)     Clinically Significant Risk Factors                  # Hypertension: Noted on problem list                       Medically Ready for Discharge: Anticipated in 5+ Days    Kari Cabral PA-C  , anitra

## 2024-07-01 NOTE — PLAN OF CARE
"/77   Pulse 77   Temp 97.5  F (36.4  C) (Oral)   Resp 16   Ht 1.68 m (5' 6.14\")   Wt 65.9 kg (145 lb 3.2 oz)   SpO2 96%   BMI 23.34 kg/m     Neuro: A&Ox4. No new neuro deficit   Cardiac: Afebrile VSS.   Respiratory: Sating >95% on RA.  GI/: Adequate urine output. BM X1 this shift   Diet/appetite: Tolerating regular diet. Eating well.  Activity:  Independent up in room   Pain: At acceptable level on current regimen. Denies pain  Skin: No new deficits noted.  LDA's: CVC infusing cytoxan pre flush at ordered rate.   Labs: Potassium replaced, recheck tomorrow morning.   Plan: Continue with POC. Notify primary team with changes.   Goal Outcome Evaluation:      Plan of Care Reviewed With: patient    Overall Patient Progress: no changeOverall Patient Progress: no change           "

## 2024-07-01 NOTE — PROGRESS NOTES
CLINICAL NUTRITION SERVICES - REASSESSMENT NOTE     Nutrition Prescription    RECOMMENDATIONS FOR MDs/PROVIDERS TO ORDER:  None at this time     Malnutrition Status:    Patient does not meet two of the established criteria necessary for diagnosing malnutrition    Recommendations already ordered by Registered Dietitian (RD):  PRN snacks/supplements   Ensure Enlive at 10am    Future/Additional Recommendations:  -Monitor PO intake  -Monitor wt trends  -Monitor labs      EVALUATION OF THE PROGRESS TOWARD GOALS   Diet: High Kcal/High Protein + Ensure Enlive BID + PRN snacks/supplements    Intake: Pt reports he has been eating at least 2 meals + at least 1 Ensure Enlive or a Boost High Protein shake from home. % documented on RN flowsheets (100% for the majority of meals).      NEW FINDINGS   Pt is currently day 3 s/p Allo BMT. Met with pt at bedside. He reports appetite remains stable. He denies any nausea or taste changes at this time. Pt with no nutrition questions or concerns.     GI:  LBM 6/30. Stooling 2-5x per I's/O's. Pt reports stools have been very loose lately.     SKIN:  Rash noted on B/L ankles per PA note    LABS:  Reviewed. Notable for:  Cr 0.65  Ca 8.1  Glucose 122    MEDICATIONS:  Reviewed. Notable for:  Chemo  Cytoxan  Zofran  Protonix  K Cl  Metamucil  D5% and 0.45% NaCl @ 225 mL/hr  PRN Lasix, Imodium, Zyprexa, Miralax, Compazine, Senokot    WEIGHTS:  Wts have been trending up during this admission, likely due to IVF's  Vitals:    06/27/24 0812 06/28/24 0814 06/29/24 0750 06/30/24 0744   Weight: 66.3 kg (146 lb 1.6 oz) 65.8 kg (145 lb 1.6 oz) 66.2 kg (145 lb 14.4 oz) 65.9 kg (145 lb 3.2 oz)    07/01/24 0747   Weight: 67.5 kg (148 lb 12.8 oz)        MALNUTRITION  % Intake: No decreased intake noted  % Weight Loss: None noted  Subcutaneous Fat Loss: None observed  Muscle Loss: None observed  Fluid Accumulation/Edema: None noted  Malnutrition Diagnosis: Patient does not meet two of the established  criteria necessary for diagnosing malnutrition    Previous Goals   Patient to consume % of nutritionally adequate meal trays TID, or the equivalent with supplements/snacks.   Evaluation: Met    Previous Nutrition Diagnosis  Predicted inadequate nutrient intake (kcal/protein) related to upcoming BMT with potential for side effects to affect ability to take adequate PO.   Evaluation: ongoing, details updated below    CURRENT NUTRITION DIAGNOSIS  Predicted inadequate nutrient intake (kcal/protein) related to recent BMT with potential for side effects to affect ability to take adequate PO.     INTERVENTIONS  Implementation  Medical food supplement therapy- continue current orders. Recommended pt consume at least 1 ONS daily if he is unable to eat a 3rd meal to maximize nutrition.     Goals  Patient to consume % of nutritionally adequate meal trays TID, or the equivalent with supplements/snacks.    Monitoring/Evaluation  Progress toward goals will be monitored and evaluated per protocol.    Diane Shell (Maggie), JEREMIAH, LD- 5C Clinical Dietitian   Available on [employer]  No longer available by paging

## 2024-07-01 NOTE — PROGRESS NOTES
"BMT/Cell Therapy Daily Progress Note   07/01/2024    Patient ID:  Zaheer Borges is a 64 year old male, currently day +3 s/p MA MUD PBSCT for AML.    Admission date: 6/21/2024     Diagnosis AML     BMTCT Type URD Allo     Prep Regimen Bu/Flu     Donor Match and  Source 7/8 URD     GVHD Prophylaxis Siro/MMF/PtCy     Primary BMT MD Dr. Mejias    Clinical Trials MT 2023-33            INTERVAL  HISTORY     Eating ok. Nausea managed. Soft/loose stools, taking metamucil daily and Imodium prn. Occasional am headaches otherwise denies pain. No fevers. Rash on ankles is better, not really itchy.      Review of Systems: 10 point ROS negative except as noted above.      PHYSICAL EXAM     KPS:  90    /78   Pulse 77   Temp 97.9  F (36.6  C) (Oral)   Resp 16   Ht 1.68 m (5' 6.14\")   Wt 67.5 kg (148 lb 12.8 oz)   SpO2 95%   BMI 23.91 kg/m       General: NAD   Eyes: sclera anicteric ; OP moist without lesions  Lungs: CTAB  Cardiovascular: RRR  Skin: bilateral ankles with scattered papular rash, improving 7/1 per pt   Neuro: A&O   Musculoskeletal: muscle mass adequate  Access: L Mckinney site NT, dressing cdi. R PAC not accessed    Current aGVHD staging:  start after engraftment      LABS AND IMAGING: I have assessed all abnormal lab values for their clinical significance and any values considered clinically significant have been addressed in the assessment and plan.      Lab Results   Component Value Date    WBC 1.6 (L) 07/01/2024    ANEU 1.1 (L) 06/05/2024    HGB 9.9 (L) 07/01/2024    HCT 30.1 (L) 07/01/2024     (L) 07/01/2024     07/01/2024    POTASSIUM 3.8 07/01/2024    CHLORIDE 105 07/01/2024    CO2 26 07/01/2024     (H) 07/01/2024    BUN 16.4 07/01/2024    CR 0.65 (L) 07/01/2024    MAG 2.2 07/01/2024    INR 1.02 07/01/2024    BILITOTAL 0.4 07/01/2024    AST 25 07/01/2024    ALT 53 07/01/2024    ALKPHOS 47 07/01/2024    PROTTOTAL 5.6 (L) 07/01/2024    ALBUMIN 3.7 07/01/2024         SYSTEMS-BASED " ASSESSMENT AND PLAN     Zaheer Borges is a 64 year old male with long hx of PV and now AML, undergoing URD 7/8 PBSCT with the Optimize trial. Today is day +3.      BMT/IEC PROTOCOL for AML  - Chemo protocol: OPTIMIZE trial              - Day -7: Allopurinol              - Day -6 to day -3: Busulfan/fludarabine              - Day -2: Fludarabine              - Day -1: Rest day              - Day 0: Transplant   - Donor and Recip B+   - Restaging per protocol   - GCSF starts day +5, continue until ANC >2.5 x 2 days.      HEME/COAG  #pancytopenia 2/2 chemo/BMT  - Transfusion parameters: hemoglobin <7, platelets <10     IMMUNOCOMPROMISED  - Relevant infectious history: tenofovir for hepatitis B core positivity.   - Prophylaxis plan: ACV, letermovir day +14, fabian through day +45. CT Chest clear.  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 Siro/MMF to start day +5     CARDIOVASCULAR  #Hypertension: Continue PTA amlodipine 5mg/d  - Risk of cardiomyopathy:  Baseline EF 60-65%  - Risk of arrhythmia: Baseline EKG showed Sinus rhythm with sinus arrhythmia     GI/NUTRITION  - Ulcer prophylaxis: protonix  - CINV: Zofran [id number]. Prn Zyrepxa at hs, prn Compazine& Ativan.  - Risk of malnutrition: RD to follow.   - Constipation: metamucil daily with prn senna & miralax  - Hemorrhoids: prep H, lidocaine      RENAL/ELECTROLYTES/  - Electrolyte management: replace per sliding scale    MUSCULOSKELETAL/FRAILTY  - Baseline Frailty Score: 2  - Patient with substantial risk of sarcopenia  - Daily PT/OT as needed while inpatient  - Cancer Rehab as needed outpatient    SUPPORTIVE CARES  - MSK back pain intermittent: voltaren gel, bengay PRN  - Headache- tramadol, caffeine, imitrex available PRN   - Trazodone for sleep     SOCIAL DETERMINANTS  - Caregiver: Pt's primary caregiver will be spouse with family/friends as a secondary or back-up to assist as needed.    - Financial/insurance concerns: no    Known issues that I take into account for medical decisions, with salient changes to the plan considering these complexities noted above.    Patient Active Problem List   Diagnosis    Polycythemia vera (H)    Acute myeloid leukemia (H)    History of polycythemia vera    Primary hypertension    Acute myeloid leukemia in remission (H)     Clinically Significant Risk Factors          # Hypocalcemia: Lowest Ca = 8.1 mg/dL in last 2 days, will monitor and replace as appropriate       # Thrombocytopenia: Lowest platelets = 106 in last 2 days, will monitor for bleeding   # Hypertension: Noted on problem list                       Medically Ready for Discharge: Anticipated in 5+ Days    Daina Booth PA-C

## 2024-07-01 NOTE — PROGRESS NOTES
Stop time on MAR & chart I & O  Chemo drug Cytoxan  Tolerated OK some sinus pressure and headache  Intervention Tramadol, decreased iv rate to 200 ml/hr  Response Headache and sinus pressure improved  Plan Monitor pain and sinus pressure, gave lasix for decreased urine output  Lab: Na, K  Wt/intervention this evening

## 2024-07-01 NOTE — PROGRESS NOTES
VSS. A&O x4. Denies pain, N/V/D. Maintenance fluids and mesna currently infusing. BM x2 this shift. Weight is unchanged from morning, up 370 mL fluid on this shift. PRN Lasix 10 mg given at 1817 and 2249 due to urine output not meeting 300 mL parameters Q2. No other PRNs given.

## 2024-07-02 NOTE — PROGRESS NOTES
"BMT/Cell Therapy Daily Progress Note   07/02/2024    Patient ID:  Zaheer Borges is a 64 year old male, currently day +4 s/p MA MUD PBSCT for AML.    Admission date: 6/21/2024     Diagnosis AML     BMTCT Type URD Allo     Prep Regimen Bu/Flu     Donor Match and  Source 7/8 URD     GVHD Prophylaxis Siro/MMF/PtCy     Primary BMT MD Dr. Mejias    Clinical Trials MT 2023-33            INTERVAL  HISTORY     Doing well. Intermittent HA, using tramadol and working well. No N/V with scheduled antiemetics. No diarrhea. No mucositis. Required lasix intermittently with Cytoxan flush.     Review of Systems: 10 point ROS negative except as noted above.      PHYSICAL EXAM     KPS:  90    /70 (BP Location: Left arm)   Pulse 73   Temp 97.9  F (36.6  C) (Oral)   Resp 16   Ht 1.68 m (5' 6.14\")   Wt 67.2 kg (148 lb 1.6 oz)   SpO2 96%   BMI 23.80 kg/m       General: NAD   Eyes: sclera anicteric ; OP moist without lesions  Lungs: CTAB  Cardiovascular: RRR  Skin: bilateral ankles with scattered papular rash, improving 7/2  Neuro: A&O   Musculoskeletal: muscle mass adequate  Access: L Mckinney site NT, dressing cdi. R PAC not accessed    Current aGVHD staging:  start after engraftment      LABS AND IMAGING: I have assessed all abnormal lab values for their clinical significance and any values considered clinically significant have been addressed in the assessment and plan.      Lab Results   Component Value Date    WBC 1.6 (L) 07/02/2024    ANEU 1.1 (L) 06/05/2024    HGB 9.8 (L) 07/02/2024    HCT 28.7 (L) 07/02/2024    PLT 81 (L) 07/02/2024     07/02/2024     07/02/2024    POTASSIUM 3.5 07/02/2024    POTASSIUM 3.5 07/02/2024    CHLORIDE 102 07/02/2024    CO2 27 07/02/2024     (H) 07/02/2024    BUN 14.2 07/02/2024    CR 0.62 (L) 07/02/2024    MAG 2.0 07/02/2024    INR 1.02 07/01/2024    BILITOTAL 0.4 07/01/2024    AST 25 07/01/2024    ALT 53 07/01/2024    ALKPHOS 47 07/01/2024    PROTTOTAL 5.6 (L) 07/01/2024    " ALBUMIN 3.7 07/01/2024         SYSTEMS-BASED ASSESSMENT AND PLAN     Zaheer Borges is a 64 year old male with long hx of PV and now AML, undergoing URD 7/8 PBSCT with the Optimize trial. Today is day +4.      BMT/IEC PROTOCOL for AML  - Chemo protocol: OPTIMIZE trial              - Day -7: Allopurinol              - Day -6 to day -3: Busulfan/fludarabine              - Day -2: Fludarabine              - Day -1: Rest day              - Day 0: Transplant   - Donor and Recip B+   - Restaging per protocol   - GCSF starts day +5, continue until ANC >2.5 x 2 days.      HEME/COAG  #Pancytopenia 2/2 chemo/BMT  - Transfusion parameters: hemoglobin <7, platelets <10     IMMUNOCOMPROMISED  - Relevant infectious history: tenofovir for hepatitis B core positivity.   - Prophylaxis plan: ACV, letermovir day +14, fabian through day +45. CT Chest clear.  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 Siro/MMF to start day +5     CARDIOVASCULAR  #Hypertension: Continue PTA amlodipine 5mg/d  - Risk of cardiomyopathy:  Baseline EF 60-65%  - Risk of arrhythmia: Baseline EKG showed Sinus rhythm with sinus arrhythmia     GI/NUTRITION  - Ulcer prophylaxis: protonix  - CINV: Zofran [id number]. Prn Zyrepxa at hs, prn Compazine& Ativan.  - Risk of malnutrition: RD to follow.   - Constipation: metamucil daily with prn senna & miralax  - Hemorrhoids: prep H, lidocaine      RENAL/ELECTROLYTES/  - Electrolyte management: replace per sliding scale  #Hypocalcemia in setting of normal albumin- added PO supplement daily     MUSCULOSKELETAL/FRAILTY  - Baseline Frailty Score: 2  - Patient with substantial risk of sarcopenia  - Daily PT/OT as needed while inpatient  - Cancer Rehab as needed outpatient    SUPPORTIVE CARES  - MSK back pain intermittent: voltaren gel, bengay PRN  - Headache- tramadol, caffeine, imitrex available PRN   - Trazodone for sleep     SOCIAL DETERMINANTS  -  Caregiver: Pt's primary caregiver will be spouse with family/friends as a secondary or back-up to assist as needed.   - Financial/insurance concerns: no    Known issues that I take into account for medical decisions, with salient changes to the plan considering these complexities noted above.    Patient Active Problem List   Diagnosis    Polycythemia vera (H)    Acute myeloid leukemia (H)    History of polycythemia vera    Primary hypertension    Acute myeloid leukemia in remission (H)     Clinically Significant Risk Factors          # Hypocalcemia: Lowest Ca = 8.1 mg/dL in last 2 days, will monitor and replace as appropriate       # Thrombocytopenia: Lowest platelets = 81 in last 2 days, will monitor for bleeding   # Hypertension: Noted on problem list           #Precipitous drop in Hgb/Hct: Lowest Hgb this hospitalization: 9.8 g/dL. Will continue to monitor and treat/transfuse as appropriate.              Medically Ready for Discharge: Anticipated in 5+ Days    Kari Cabral PA-C  [phone]

## 2024-07-02 NOTE — PLAN OF CARE
"/75 (BP Location: Left arm)   Pulse 74   Temp 97.9  F (36.6  C) (Oral)   Resp 16   Ht 1.68 m (5' 6.14\")   Wt 67.8 kg (149 lb 8 oz)   SpO2 97%   BMI 24.03 kg/m      Pt slept comfortably throughout the night between cares. VSS on room air, afebrile. Pt denies nausea, endorsed headache. PRN tramadol given with adequate relief per pt. Cytoxan flush running, pt required PRN furosemide x1 to meet voiding requirements. Potassium level below goal this morning, PO replacement ordered. All other heme and electrolyte levels above goal, no further replacements necessary. Pt is A&Ox4, up independently with steady gait. Continue with current POC.    Goal Outcome Evaluation:    Problem: Stem Cell/Bone Marrow Transplant  Goal: Symptom-Free Urinary Elimination  Outcome: Progressing     Problem: Stem Cell/Bone Marrow Transplant  Goal: Blood Counts Within Acceptable Range  Outcome: Progressing  Intervention: Monitor and Manage Hematologic Symptoms  Recent Flowsheet Documentation  Taken 7/2/2024 0000 by Ara Hill, RN  Bleeding Precautions: blood pressure closely monitored  Medication Review/Management: medications reviewed     Problem: Stem Cell/Bone Marrow Transplant  Goal: Absence of Infection  Outcome: Progressing  Intervention: Prevent and Manage Infection  Recent Flowsheet Documentation  Taken 7/2/2024 0000 by Ara Hill, RN  Infection Prevention:   hand hygiene promoted   rest/sleep promoted   single patient room provided  Infection Management: aseptic technique maintained  Isolation Precautions: protective environment maintained       "

## 2024-07-02 NOTE — PROGRESS NOTES
Stop time on MAR & chart I & O  Chemo drug- cytoxan  Tolerated-well  Intervention-NA  Response-NA  plan- fluid flush til 1145 tomorrow  Lab: Na, K, - due at 1600  Wt/intervention- stable will check at 1600

## 2024-07-02 NOTE — PLAN OF CARE
"Goal Outcome Evaluation:    Pt is day +4 from an allo transplant. Pt given lasix x1 for not meeting voiding parameters. Calcium citrate started at 0900 daily, levaquin moved to 1400. K+ and Na+ drawn at 1600, K+ came back at 3.9 and Na+ came back at 135, no replacements needed. Shower & CHG done. Orthostatic +, lying /85 dropped to 115/82 when standing. Denies any lightheadedness or dizziness.     Problem: Adult Inpatient Plan of Care  Goal: Plan of Care Review  Description: The Plan of Care Review/Shift note should be completed every shift.  The Outcome Evaluation is a brief statement about your assessment that the patient is improving, declining, or no change.  This information will be displayed automatically on your shift  note.  Outcome: Progressing  Goal: Patient-Specific Goal (Individualized)  Description: You can add care plan individualizations to a care plan. Examples of Individualization might be:  \"Parent requests to be called daily at 9am for status\", \"I have a hard time hearing out of my right ear\", or \"Do not touch me to wake me up as it startles  me\".  Outcome: Progressing  Goal: Absence of Hospital-Acquired Illness or Injury  Outcome: Progressing  Intervention: Identify and Manage Fall Risk  Recent Flowsheet Documentation  Taken 7/2/2024 1600 by Michele Zhang  Safety Promotion/Fall Prevention: safety round/check completed  Taken 7/2/2024 1500 by Michele Zhang  Safety Promotion/Fall Prevention: safety round/check completed  Taken 7/2/2024 1200 by Michele Zhang  Safety Promotion/Fall Prevention: safety round/check completed  Taken 7/2/2024 0900 by Michele Zhang  Safety Promotion/Fall Prevention: safety round/check completed  Taken 7/2/2024 0800 by Michele Zhang  Safety Promotion/Fall Prevention: safety round/check completed  Intervention: Prevent Skin Injury  Recent Flowsheet Documentation  Taken 7/2/2024 0800 by Michele Zhang  Body Position: position changed independently  Goal: Optimal Comfort " and Wellbeing  Outcome: Progressing  Goal: Readiness for Transition of Care  Outcome: Progressing     Problem: Stem Cell/Bone Marrow Transplant  Goal: Optimal Coping with Transplant  Outcome: Progressing  Goal: Symptom-Free Urinary Elimination  Outcome: Progressing  Intervention: Prevent or Manage Bladder Irritation  Recent Flowsheet Documentation  Taken 7/2/2024 0800 by Michele Zhang  Urinary Elimination Promotion: frequent voiding encouraged  Hyperhydration Management: fluids provided  Goal: Diarrhea Symptom Control  Outcome: Progressing  Goal: Improved Activity Tolerance  Outcome: Progressing  Goal: Blood Counts Within Acceptable Range  Outcome: Progressing  Goal: Absence of Hypersensitivity Reaction  Outcome: Progressing  Goal: Absence of Infection  Outcome: Progressing  Goal: Improved Oral Mucous Membrane Health and Integrity  Outcome: Progressing  Goal: Nausea and Vomiting Symptom Relief  Outcome: Progressing  Goal: Optimal Nutrition Intake  Outcome: Progressing

## 2024-07-02 NOTE — PROGRESS NOTES
BMT CLINICAL SOCIAL WORK NOTE:    Focus: Supportive Counseling/Resources/Discharge Planning    Data: Zaheer Borges is a 64 year old male, currently day +4 of his therapy.     Interventions: Clinical  (CSW) met with Pt to assess coping, provide supportive counseling and assist with resources as needed. Pt shared that he is currently doing well. Pt also shared that transplant went well last week.  No concerns/questions indicated at this time.  Pt shared that his wife is his primary caregiver.  CSW provided empathic listening, validation of concerns, and encouragement. CSW encouraged Pt to contact CSW for support, questions and/or resources.     Assessment: Pt presented as pleasant.  Pt appears to be coping appropriately at this time. Pt continues to be supported by spouse.     Plan: CSW will continue to provide supportive counseling and assistance with resources as needed. CSW will continue to collaborate with multidisciplinary team regarding Pt's plan of care.     FERMIN Sofia, [facility]  Adult Blood & Marrow Transplant   Phone: [phone]  Pager: [phone]  Tappr SEARCHABLE: BMT SW #4  Securely message with [employer]   Support Groups at [facility]: Social Work Services for Cancer Patients ([email or web address])

## 2024-07-03 NOTE — PLAN OF CARE
"BP (!) 140/80 (BP Location: Right arm)   Pulse 70   Temp 98.1  F (36.7  C) (Oral)   Resp 16   Ht 1.68 m (5' 6.14\")   Wt 66.6 kg (146 lb 14.4 oz)   SpO2 98%   BMI 23.61 kg/m        Pt is AOx4 and independent. He complained of a headache and took tramadol x1 with partial relief. No complaints of N/V/D. He is eating and drinking adequately. He is voiding spontaneously, 1 BM reported. Tac drip infusing per orders. Will continue to follow the plan of care.      Goal Outcome Evaluation:           Overall Patient Progress: no change      Problem: Adult Inpatient Plan of Care  Goal: Plan of Care Review  Description: The Plan of Care Review/Shift note should be completed every shift.  The Outcome Evaluation is a brief statement about your assessment that the patient is improving, declining, or no change.  This information will be displayed automatically on your shift  note.  Outcome: Progressing  Flowsheets (Taken 7/3/2024 1514)  Overall Patient Progress: no change  Goal: Patient-Specific Goal (Individualized)  Description: You can add care plan individualizations to a care plan. Examples of Individualization might be:  \"Parent requests to be called daily at 9am for status\", \"I have a hard time hearing out of my right ear\", or \"Do not touch me to wake me up as it startles  me\".  Outcome: Progressing  Goal: Absence of Hospital-Acquired Illness or Injury  Outcome: Progressing  Intervention: Identify and Manage Fall Risk  Recent Flowsheet Documentation  Taken 7/3/2024 0806 by Alee Sher RN  Safety Promotion/Fall Prevention:   safety round/check completed   assistive device/personal items within reach   clutter free environment maintained   lighting adjusted   nonskid shoes/slippers when out of bed   patient and family education   room near nurse's station   room organization consistent  Intervention: Prevent Skin Injury  Recent Flowsheet Documentation  Taken 7/3/2024 0806 by Alee Sher, RN  Body Position:   " position changed independently   supine  Skin Protection: protective footwear used  Device Skin Pressure Protection: adhesive use limited  Intervention: Prevent and Manage VTE (Venous Thromboembolism) Risk  Recent Flowsheet Documentation  Taken 7/3/2024 0806 by Alee Sher RN  VTE Prevention/Management: SCDs off (sequential compression devices)  Intervention: Prevent Infection  Recent Flowsheet Documentation  Taken 7/3/2024 1200 by Alee Sher RN  Infection Prevention:   environmental surveillance performed   equipment surfaces disinfected   hand hygiene promoted   rest/sleep promoted   personal protective equipment utilized   single patient room provided   visitors restricted/screened  Taken 7/3/2024 0806 by Alee Sher RN  Infection Prevention:   environmental surveillance performed   equipment surfaces disinfected   hand hygiene promoted   rest/sleep promoted   personal protective equipment utilized   single patient room provided   visitors restricted/screened  Goal: Optimal Comfort and Wellbeing  Outcome: Progressing  Intervention: Monitor Pain and Promote Comfort  Recent Flowsheet Documentation  Taken 7/3/2024 0808 by Alee Sher RN  Pain Management Interventions: medication (see MAR)  Goal: Readiness for Transition of Care  Outcome: Progressing     Problem: Stem Cell/Bone Marrow Transplant  Goal: Optimal Coping with Transplant  Outcome: Progressing  Intervention: Optimize Patient/Family Adjustment to Transplant  Recent Flowsheet Documentation  Taken 7/3/2024 0806 by Alee Sher RN  Supportive Measures:   active listening utilized   decision-making supported   self-care encouraged   self-responsibility promoted  Goal: Symptom-Free Urinary Elimination  Outcome: Progressing  Intervention: Prevent or Manage Bladder Irritation  Recent Flowsheet Documentation  Taken 7/3/2024 0808 by Alee Sher RN  Pain Management Interventions: medication (see MAR)  Taken 7/3/2024 0806 by Christos  Alee, RN  Urinary Elimination Promotion: frequent voiding encouraged  Hyperhydration Management: fluids provided  Goal: Diarrhea Symptom Control  Outcome: Progressing  Intervention: Manage Diarrhea  Recent Flowsheet Documentation  Taken 7/3/2024 0806 by Alee Sher RN  Skin Protection: protective footwear used  Fluid/Electrolyte Management: fluids provided  Goal: Improved Activity Tolerance  Outcome: Progressing  Intervention: Promote Improved Energy  Recent Flowsheet Documentation  Taken 7/3/2024 0806 by Alee Sher RN  Activity Management: up ad marjan  Environmental Support:   calm environment promoted   environmental consistency promoted   personal routine supported   rest periods encouraged  Goal: Blood Counts Within Acceptable Range  Outcome: Progressing  Intervention: Monitor and Manage Hematologic Symptoms  Recent Flowsheet Documentation  Taken 7/3/2024 0806 by Alee Sher RN  Bleeding Precautions:   coagulation study results reviewed   foot protection facilitated   gentle oral care promoted   monitored for signs of bleeding  Medication Review/Management: medications reviewed  Goal: Absence of Hypersensitivity Reaction  Outcome: Progressing  Goal: Absence of Infection  Outcome: Progressing  Intervention: Prevent and Manage Infection  Recent Flowsheet Documentation  Taken 7/3/2024 1200 by Alee Sher RN  Infection Prevention:   environmental surveillance performed   equipment surfaces disinfected   hand hygiene promoted   rest/sleep promoted   personal protective equipment utilized   single patient room provided   visitors restricted/screened  Isolation Precautions: protective environment maintained  Taken 7/3/2024 0806 by Alee Sher RN  Infection Prevention:   environmental surveillance performed   equipment surfaces disinfected   hand hygiene promoted   rest/sleep promoted   personal protective equipment utilized   single patient room provided   visitors  restricted/screened  Infection Management: aseptic technique maintained  Isolation Precautions: protective environment maintained  Goal: Improved Oral Mucous Membrane Health and Integrity  Outcome: Progressing  Goal: Nausea and Vomiting Symptom Relief  Outcome: Progressing  Goal: Optimal Nutrition Intake  Outcome: Progressing  Intervention: Minimize and Manage Barriers to Oral Intake  Recent Flowsheet Documentation  Taken 7/3/2024 0806 by Alee Sher, RN  Oral Nutrition Promotion:   physical activity promoted   rest periods promoted

## 2024-07-03 NOTE — PLAN OF CARE
"Goal Outcome Evaluation:       /76   Pulse 74   Temp 98  F (36.7  C) (Oral)   Resp 16   Ht 1.68 m (5' 6.14\")   Wt 67.9 kg (149 lb 11.2 oz)   SpO2 96%   BMI 24.06 kg/m       Avss. A&Ox4. IND. Tramadol given 1x for headache. Ativan given for sleep. No nausea. Lasix given 1x for not meeting DTV parameters. K replacement needed. Continue with plan of care.       Problem: Adult Inpatient Plan of Care  Goal: Plan of Care Review  Description: The Plan of Care Review/Shift note should be completed every shift.  The Outcome Evaluation is a brief statement about your assessment that the patient is improving, declining, or no change.  This information will be displayed automatically on your shift  note.  Outcome: Progressing  Goal: Patient-Specific Goal (Individualized)  Description: You can add care plan individualizations to a care plan. Examples of Individualization might be:  \"Parent requests to be called daily at 9am for status\", \"I have a hard time hearing out of my right ear\", or \"Do not touch me to wake me up as it startles  me\".  Outcome: Progressing  Goal: Absence of Hospital-Acquired Illness or Injury  Outcome: Progressing  Intervention: Identify and Manage Fall Risk  Recent Flowsheet Documentation  Taken 7/3/2024 0000 by Rahda Moore RN  Safety Promotion/Fall Prevention: safety round/check completed  Taken 7/2/2024 2200 by Radha Moore RN  Safety Promotion/Fall Prevention: safety round/check completed  Intervention: Prevent Skin Injury  Recent Flowsheet Documentation  Taken 7/2/2024 2148 by Radha Moore RN  Skin Protection: adhesive use limited  Device Skin Pressure Protection: adhesive use limited  Taken 7/2/2024 2000 by Radha Moore, RN  Body Position: position changed independently  Intervention: Prevent and Manage VTE (Venous Thromboembolism) Risk  Recent Flowsheet Documentation  Taken 7/2/2024 2148 by Radha Moore RN  VTE Prevention/Management: SCDs off (sequential " compression devices)  Intervention: Prevent Infection  Recent Flowsheet Documentation  Taken 7/2/2024 2148 by Radha Moore RN  Infection Prevention:   hand hygiene promoted   rest/sleep promoted   single patient room provided  Goal: Optimal Comfort and Wellbeing  Outcome: Progressing  Intervention: Monitor Pain and Promote Comfort  Recent Flowsheet Documentation  Taken 7/2/2024 2228 by Radha Moore RN  Pain Management Interventions: medication (see MAR)  Goal: Readiness for Transition of Care  Outcome: Progressing     Problem: Stem Cell/Bone Marrow Transplant  Goal: Optimal Coping with Transplant  Outcome: Progressing  Intervention: Optimize Patient/Family Adjustment to Transplant  Recent Flowsheet Documentation  Taken 7/2/2024 2148 by Radha Moore RN  Supportive Measures:   active listening utilized   positive reinforcement provided   self-care encouraged  Goal: Symptom-Free Urinary Elimination  Outcome: Progressing  Intervention: Prevent or Manage Bladder Irritation  Recent Flowsheet Documentation  Taken 7/2/2024 2228 by Radha Moore RN  Pain Management Interventions: medication (see MAR)  Taken 7/2/2024 2148 by Radha Moore RN  Urinary Elimination Promotion: frequent voiding encouraged  Hyperhydration Management: fluids provided  Goal: Diarrhea Symptom Control  Outcome: Progressing  Intervention: Manage Diarrhea  Recent Flowsheet Documentation  Taken 7/2/2024 2148 by Radha Moore RN  Skin Protection: adhesive use limited  Fluid/Electrolyte Management: fluids provided  Taken 7/2/2024 2000 by Radha Moore RN  Perineal Care: perineal hygiene encouraged  Goal: Improved Activity Tolerance  Outcome: Progressing  Intervention: Promote Improved Energy  Recent Flowsheet Documentation  Taken 7/2/2024 2148 by Radha Moore RN  Fatigue Management: paced activity encouraged  Sleep/Rest Enhancement: awakenings minimized  Environmental Support:   calm environment promoted    distractions minimized   environmental consistency promoted   personal routine supported   rest periods encouraged  Taken 7/2/2024 2000 by Radha Moore RN  Activity Management: activity adjusted per tolerance  Goal: Blood Counts Within Acceptable Range  Outcome: Progressing  Intervention: Monitor and Manage Hematologic Symptoms  Recent Flowsheet Documentation  Taken 7/2/2024 2148 by Radha Moore RN  Sleep/Rest Enhancement: awakenings minimized  Bleeding Precautions: blood pressure closely monitored  Medication Review/Management: medications reviewed  Goal: Absence of Hypersensitivity Reaction  Outcome: Progressing  Goal: Absence of Infection  Outcome: Progressing  Intervention: Prevent and Manage Infection  Recent Flowsheet Documentation  Taken 7/2/2024 2148 by Radha Moore RN  Infection Prevention:   hand hygiene promoted   rest/sleep promoted   single patient room provided  Infection Management: aseptic technique maintained  Isolation Precautions: protective environment maintained  Goal: Improved Oral Mucous Membrane Health and Integrity  Outcome: Progressing  Intervention: Promote Oral Comfort and Health  Recent Flowsheet Documentation  Taken 7/2/2024 2148 by Radha Moore RN  Oral Mucous Membrane Protection: nonirritating oral fluids promoted  Taken 7/2/2024 2000 by Radha Moore RN  Oral Care: oral rinse provided  Goal: Nausea and Vomiting Symptom Relief  Outcome: Progressing  Intervention: Prevent and Manage Nausea and Vomiting  Recent Flowsheet Documentation  Taken 7/2/2024 2148 by Radha Moore RN  Nausea/Vomiting Interventions:   aromatherapy utilized   acupressure applied  Goal: Optimal Nutrition Intake  Outcome: Progressing  Intervention: Minimize and Manage Barriers to Oral Intake  Recent Flowsheet Documentation  Taken 7/2/2024 2148 by Radha Moore RN  Oral Nutrition Promotion: physical activity promoted

## 2024-07-03 NOTE — PROGRESS NOTES
"BMT/Cell Therapy Daily Progress Note   07/03/2024    Patient ID:  Zaheer Borges is a 64 year old male, currently day +5 s/p MA MUD PBSCT for AML.    Admission date: 6/21/2024     Diagnosis AML     BMTCT Type URD Allo     Prep Regimen Bu/Flu     Donor Match and  Source 7/8 URD     GVHD Prophylaxis Siro/MMF/PtCy     Primary BMT MD Dr. Mejias    Clinical Trials MT [id number]            INTERVAL  HISTORY     HA persistent and not helped with tramadol. Will try fioricet.  No N/V/D.  Happy to be done with IVF flushes.    Review of Systems: 10 point ROS negative except as noted above.      PHYSICAL EXAM     KPS:  90    /75 (BP Location: Left arm)   Pulse 74   Temp 98.1  F (36.7  C) (Oral)   Resp 16   Ht 1.68 m (5' 6.14\")   Wt 66.6 kg (146 lb 14.4 oz)   SpO2 96%   BMI 23.61 kg/m       General: NAD   Eyes: sclera anicteric ; OP moist without lesions  Lungs: CTAB  Cardiovascular: RRR  Skin: bilateral ankles with scattered papular rash- RESOLVED 7/3  Neuro: A&O   Musculoskeletal: muscle mass adequate  Access: L Mckinney site NT, dressing cdi. R PAC not accessed    Current aGVHD staging:  start after engraftment      LABS AND IMAGING: I have assessed all abnormal lab values for their clinical significance and any values considered clinically significant have been addressed in the assessment and plan.      Lab Results   Component Value Date    WBC 1.3 (L) 07/03/2024    ANEU 1.1 (L) 06/05/2024    HGB 9.4 (L) 07/03/2024    HCT 26.5 (L) 07/03/2024    PLT 66 (L) 07/03/2024     07/03/2024     07/03/2024    POTASSIUM 4.1 07/03/2024    CHLORIDE 101 07/03/2024    CO2 27 07/03/2024     (H) 07/03/2024    BUN 11.7 07/03/2024    CR 0.60 (L) 07/03/2024    MAG 2.0 07/03/2024    INR 1.02 07/01/2024    BILITOTAL 0.4 07/01/2024    AST 25 07/01/2024    ALT 53 07/01/2024    ALKPHOS 47 07/01/2024    PROTTOTAL 5.6 (L) 07/01/2024    ALBUMIN 3.7 07/01/2024         SYSTEMS-BASED ASSESSMENT AND PLAN     Zaheer Borges is a 64 year " old male with long hx of PV and now AML, undergoing URD 7/8 PBSCT with the Optimize trial. Today is day +5.      BMT/IEC PROTOCOL for AML  - Chemo protocol: OPTIMIZE trial              - Day -7: Allopurinol              - Day -6 to day -3: Busulfan/fludarabine              - Day -2: Fludarabine              - Day -1: Rest day              - Day 0: Transplant   - Donor and Recip B+   - Restaging per protocol   - GCSF started day +5, continue until ANC >2.5 x 2 days.      HEME/COAG  #Pancytopenia 2/2 chemo/BMT  - Transfusion parameters: hemoglobin <7, platelets <10     IMMUNOCOMPROMISED  - Relevant infectious history: tenofovir for hepatitis B core positivity.   - Prophylaxis plan: ACV, letermovir day +14, fabian through day +45. CT Chest clear.  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 Siro/MMF start day +5     CARDIOVASCULAR  #Hypertension: Continue PTA amlodipine 5mg/d  - Risk of cardiomyopathy:  Baseline EF 60-65%  - Risk of arrhythmia: Baseline EKG showed Sinus rhythm with sinus arrhythmia     GI/NUTRITION  - Ulcer prophylaxis: protonix  - CINV: Zofran [id number]. Prn Zyrepxa at hs, prn Compazine& Ativan.  - Risk of malnutrition: RD to follow.   - Constipation: metamucil daily with prn senna & miralax  - Hemorrhoids: prep H, lidocaine      RENAL/ELECTROLYTES/  - Electrolyte management: replace per sliding scale  #Hypocalcemia in setting of normal albumin- added PO supplement daily     MUSCULOSKELETAL/FRAILTY  - Baseline Frailty Score: 2  - Patient with substantial risk of sarcopenia  - Daily PT/OT as needed while inpatient  - Cancer Rehab as needed outpatient    SUPPORTIVE CARES  - MSK back pain intermittent: voltaren gel, bengay PRN  - Headache-fioricet, imitrex available PRN   - Trazodone for sleep     SOCIAL DETERMINANTS  - Caregiver: Pt's primary caregiver will be spouse with family/friends as a secondary or back-up to assist  as needed.   - Financial/insurance concerns: no    Known issues that I take into account for medical decisions, with salient changes to the plan considering these complexities noted above.    Patient Active Problem List   Diagnosis    Polycythemia vera (H)    Acute myeloid leukemia (H)    History of polycythemia vera    Primary hypertension    Acute myeloid leukemia in remission (H)     Clinically Significant Risk Factors                # Thrombocytopenia: Lowest platelets = 66 in last 2 days, will monitor for bleeding   # Hypertension: Noted on problem list           #Precipitous drop in Hgb/Hct: Lowest Hgb this hospitalization: 9.4 g/dL. Will continue to monitor and treat/transfuse as appropriate.              Medically Ready for Discharge: Anticipated in 5+ Days    Kari Cabral PA-C  [phone]

## 2024-07-04 NOTE — PLAN OF CARE
"AVSS.  Denies nausea.  Tylenol x1 for HA pain.  Up independently, voiding adequately + writing output on board.  Good appetite.  Walked on treadmill.  Showered with CHG soap.  TAC drip running at 4.1 mL/hr.  Continue POC.     Problem: Adult Inpatient Plan of Care  Goal: Plan of Care Review  Description: The Plan of Care Review/Shift note should be completed every shift.  The Outcome Evaluation is a brief statement about your assessment that the patient is improving, declining, or no change.  This information will be displayed automatically on your shift  note.  7/4/2024 1750 by Harika Hahn, RN  Outcome: Progressing     Problem: Adult Inpatient Plan of Care  Goal: Patient-Specific Goal (Individualized)  Description: You can add care plan individualizations to a care plan. Examples of Individualization might be:  \"Parent requests to be called daily at 9am for status\", \"I have a hard time hearing out of my right ear\", or \"Do not touch me to wake me up as it startles  me\".  7/4/2024 1750 by Harika Hahn, RN  Outcome: Progressing     Problem: Adult Inpatient Plan of Care  Goal: Absence of Hospital-Acquired Illness or Injury  7/4/2024 1750 by Harika Hahn, RN  Outcome: Progressing     Problem: Adult Inpatient Plan of Care  Goal: Absence of Hospital-Acquired Illness or Injury  Intervention: Identify and Manage Fall Risk  Recent Flowsheet Documentation  Taken 7/4/2024 1600 by Harika Hahn, RN  Safety Promotion/Fall Prevention:   nonskid shoes/slippers when out of bed   assistive device/personal items within reach   clutter free environment maintained  Taken 7/4/2024 1200 by Harika Hahn, RN  Safety Promotion/Fall Prevention:   nonskid shoes/slippers when out of bed   assistive device/personal items within reach   clutter free environment maintained  Taken 7/4/2024 0800 by Harika Hahn, RN  Safety Promotion/Fall Prevention:   nonskid shoes/slippers when out of bed   assistive device/personal items within reach   " clutter free environment maintained     Problem: Adult Inpatient Plan of Care  Goal: Absence of Hospital-Acquired Illness or Injury  Intervention: Prevent Skin Injury  Recent Flowsheet Documentation  Taken 7/4/2024 0800 by Harika Hahn RN  Body Position: position changed independently  Skin Protection: protective footwear used  Device Skin Pressure Protection: adhesive use limited     Problem: Adult Inpatient Plan of Care  Goal: Absence of Hospital-Acquired Illness or Injury  Intervention: Prevent and Manage VTE (Venous Thromboembolism) Risk  Recent Flowsheet Documentation  Taken 7/4/2024 0800 by Harika Hahn RN  VTE Prevention/Management: SCDs off (sequential compression devices)     Problem: Adult Inpatient Plan of Care  Goal: Absence of Hospital-Acquired Illness or Injury  Intervention: Prevent Infection  Recent Flowsheet Documentation  Taken 7/4/2024 1200 by Harika Hahn RN  Infection Prevention: single patient room provided  Taken 7/4/2024 0800 by Harika Hahn RN  Infection Prevention: single patient room provided     Problem: Adult Inpatient Plan of Care  Goal: Optimal Comfort and Wellbeing  7/4/2024 1750 by Harika Hahn RN  Outcome: Progressing     Problem: Adult Inpatient Plan of Care  Goal: Readiness for Transition of Care  7/4/2024 1750 by Harika Hahn RN  Outcome: Progressing     Problem: Stem Cell/Bone Marrow Transplant  Goal: Optimal Coping with Transplant  7/4/2024 1750 by Harika Hahn RN  Outcome: Progressing     Problem: Stem Cell/Bone Marrow Transplant  Goal: Optimal Coping with Transplant  Intervention: Optimize Patient/Family Adjustment to Transplant  Recent Flowsheet Documentation  Taken 7/4/2024 0800 by Harika Hahn RN  Supportive Measures: active listening utilized     Problem: Stem Cell/Bone Marrow Transplant  Goal: Symptom-Free Urinary Elimination  7/4/2024 1750 by Harika Hahn RN  Outcome: Progressing     Problem: Stem Cell/Bone Marrow Transplant  Goal: Symptom-Free Urinary  Elimination  Intervention: Prevent or Manage Bladder Irritation  Recent Flowsheet Documentation  Taken 7/4/2024 0800 by Harika Hahn RN  Urinary Elimination Promotion: frequent voiding encouraged  Hyperhydration Management: fluids provided  Taken 7/4/2024 0751 by Harika Hahn RN  Pain Management Interventions: medication (see MAR)     Problem: Stem Cell/Bone Marrow Transplant  Goal: Diarrhea Symptom Control  7/4/2024 1751 by Harika Hahn RN  Outcome: Progressing     Problem: Stem Cell/Bone Marrow Transplant  Goal: Diarrhea Symptom Control  Intervention: Manage Diarrhea  Recent Flowsheet Documentation  Taken 7/4/2024 0800 by Harika Hahn RN  Skin Protection: protective footwear used  Fluid/Electrolyte Management: fluids provided  Perineal Care: perineal hygiene encouraged     Problem: Stem Cell/Bone Marrow Transplant  Goal: Improved Activity Tolerance  7/4/2024 1751 by Harika Hahn RN  Outcome: Progressing     Problem: Stem Cell/Bone Marrow Transplant  Goal: Improved Activity Tolerance  Intervention: Promote Improved Energy  Recent Flowsheet Documentation  Taken 7/4/2024 0800 by Harika Hahn RN  Fatigue Management: paced activity encouraged  Sleep/Rest Enhancement: awakenings minimized  Activity Management: activity adjusted per tolerance  Environmental Support: calm environment promoted     Problem: Stem Cell/Bone Marrow Transplant  Goal: Blood Counts Within Acceptable Range  7/4/2024 1751 by Harika Hahn RN  Outcome: Progressing     Problem: Stem Cell/Bone Marrow Transplant  Goal: Blood Counts Within Acceptable Range  Intervention: Monitor and Manage Hematologic Symptoms  Recent Flowsheet Documentation  Taken 7/4/2024 1600 by Harika Hahn RN  Bleeding Precautions: monitored for signs of bleeding  Medication Review/Management: medications reviewed  Taken 7/4/2024 1200 by Harika Hahn RN  Bleeding Precautions: monitored for signs of bleeding  Medication Review/Management: medications reviewed  Taken  7/4/2024 0800 by Harika Hahn RN  Sleep/Rest Enhancement: awakenings minimized  Bleeding Precautions: monitored for signs of bleeding  Medication Review/Management: medications reviewed     Problem: Stem Cell/Bone Marrow Transplant  Goal: Absence of Hypersensitivity Reaction  7/4/2024 1751 by Harika Hahn RN  Outcome: Progressing     Problem: Stem Cell/Bone Marrow Transplant  Goal: Absence of Infection  7/4/2024 1751 by Harika Hahn RN  Outcome: Progressing     Problem: Stem Cell/Bone Marrow Transplant  Goal: Absence of Infection  Intervention: Prevent and Manage Infection  Recent Flowsheet Documentation  Taken 7/4/2024 1600 by Harika Hahn RN  Infection Management: aseptic technique maintained  Taken 7/4/2024 1200 by Harika Hahn RN  Infection Prevention: single patient room provided  Infection Management: aseptic technique maintained  Isolation Precautions: protective environment maintained  Taken 7/4/2024 0800 by Harika Hahn RN  Infection Prevention: single patient room provided  Infection Management: aseptic technique maintained  Isolation Precautions: protective environment maintained     Problem: Stem Cell/Bone Marrow Transplant  Goal: Improved Oral Mucous Membrane Health and Integrity  7/4/2024 1751 by Harika Hahn RN  Outcome: Progressing     Problem: Stem Cell/Bone Marrow Transplant  Goal: Improved Oral Mucous Membrane Health and Integrity  Intervention: Promote Oral Comfort and Health  Recent Flowsheet Documentation  Taken 7/4/2024 0800 by Harika Hahn RN  Oral Mucous Membrane Protection: nonirritating oral fluids promoted  Oral Care: oral rinse provided     Problem: Stem Cell/Bone Marrow Transplant  Goal: Nausea and Vomiting Symptom Relief  7/4/2024 1751 by Harika Hahn RN  Outcome: Progressing     Problem: Stem Cell/Bone Marrow Transplant  Goal: Optimal Nutrition Intake  7/4/2024 1751 by Harika Hahn RN  Outcome: Progressing     Problem: Stem Cell/Bone Marrow Transplant  Goal:  Optimal Nutrition Intake  Intervention: Minimize and Manage Barriers to Oral Intake  Recent Flowsheet Documentation  Taken 7/4/2024 0800 by Harika Hahn, RN  Oral Nutrition Promotion: physical activity promoted   Goal Outcome Evaluation:

## 2024-07-04 NOTE — PLAN OF CARE
"Goal Outcome Evaluation: /69 (BP Location: Left arm)   Pulse 82   Temp 98.5  F (36.9  C) (Oral)   Resp 16   Ht 1.68 m (5' 6.14\")   Wt 66.6 kg (146 lb 14.4 oz)   SpO2 94%   BMI 23.61 kg/m         AVSS. IND. A&Ox4. No pain or nausea. Ativan given for sleep and immodium given 1x prn. No replacements needed. Continue with plan of care.       Problem: Adult Inpatient Plan of Care  Goal: Plan of Care Review  Description: The Plan of Care Review/Shift note should be completed every shift.  The Outcome Evaluation is a brief statement about your assessment that the patient is improving, declining, or no change.  This information will be displayed automatically on your shift  note.  Outcome: Progressing  Goal: Patient-Specific Goal (Individualized)  Description: You can add care plan individualizations to a care plan. Examples of Individualization might be:  \"Parent requests to be called daily at 9am for status\", \"I have a hard time hearing out of my right ear\", or \"Do not touch me to wake me up as it startles  me\".  Outcome: Progressing  Goal: Absence of Hospital-Acquired Illness or Injury  Outcome: Progressing  Intervention: Identify and Manage Fall Risk  Recent Flowsheet Documentation  Taken 7/3/2024 1904 by Radha Moore RN  Safety Promotion/Fall Prevention: safety round/check completed  Intervention: Prevent Skin Injury  Recent Flowsheet Documentation  Taken 7/3/2024 1904 by Radha Moore RN  Body Position:   position changed independently   supine, head elevated  Skin Protection: protective footwear used  Device Skin Pressure Protection: adhesive use limited  Intervention: Prevent and Manage VTE (Venous Thromboembolism) Risk  Recent Flowsheet Documentation  Taken 7/3/2024 1904 by Radha Moore RN  VTE Prevention/Management: SCDs off (sequential compression devices)  Intervention: Prevent Infection  Recent Flowsheet Documentation  Taken 7/3/2024 1904 by Radha Moore RN  Infection " Prevention:   environmental surveillance performed   equipment surfaces disinfected   hand hygiene promoted   rest/sleep promoted   personal protective equipment utilized   single patient room provided   visitors restricted/screened  Goal: Optimal Comfort and Wellbeing  Outcome: Progressing  Goal: Readiness for Transition of Care  Outcome: Progressing     Problem: Stem Cell/Bone Marrow Transplant  Goal: Optimal Coping with Transplant  Outcome: Progressing  Intervention: Optimize Patient/Family Adjustment to Transplant  Recent Flowsheet Documentation  Taken 7/3/2024 1904 by Radha Moore RN  Supportive Measures:   active listening utilized   decision-making supported   self-care encouraged   self-responsibility promoted  Goal: Symptom-Free Urinary Elimination  Outcome: Progressing  Intervention: Prevent or Manage Bladder Irritation  Recent Flowsheet Documentation  Taken 7/3/2024 1904 by Radha Moore RN  Urinary Elimination Promotion: frequent voiding encouraged  Hyperhydration Management: fluids provided  Goal: Diarrhea Symptom Control  Outcome: Progressing  Intervention: Manage Diarrhea  Recent Flowsheet Documentation  Taken 7/3/2024 1904 by Radha Moore RN  Skin Protection: protective footwear used  Fluid/Electrolyte Management: fluids provided  Perineal Care: perineal hygiene encouraged  Goal: Improved Activity Tolerance  Outcome: Progressing  Intervention: Promote Improved Energy  Recent Flowsheet Documentation  Taken 7/3/2024 1904 by Radha Moore RN  Fatigue Management: paced activity encouraged  Sleep/Rest Enhancement: awakenings minimized  Activity Management:   activity adjusted per tolerance   ambulated in room   ambulated to bathroom   back to bed  Environmental Support:   calm environment promoted   environmental consistency promoted   personal routine supported   rest periods encouraged  Goal: Blood Counts Within Acceptable Range  Outcome: Progressing  Intervention: Monitor and  Manage Hematologic Symptoms  Recent Flowsheet Documentation  Taken 7/3/2024 1904 by Radha Moore RN  Sleep/Rest Enhancement: awakenings minimized  Bleeding Precautions:   coagulation study results reviewed   foot protection facilitated   gentle oral care promoted   monitored for signs of bleeding  Medication Review/Management: medications reviewed  Goal: Absence of Hypersensitivity Reaction  Outcome: Progressing  Goal: Absence of Infection  Outcome: Progressing  Intervention: Prevent and Manage Infection  Recent Flowsheet Documentation  Taken 7/3/2024 1904 by Radha Moore RN  Infection Prevention:   environmental surveillance performed   equipment surfaces disinfected   hand hygiene promoted   rest/sleep promoted   personal protective equipment utilized   single patient room provided   visitors restricted/screened  Infection Management: aseptic technique maintained  Isolation Precautions: protective environment maintained  Goal: Improved Oral Mucous Membrane Health and Integrity  Outcome: Progressing  Intervention: Promote Oral Comfort and Health  Recent Flowsheet Documentation  Taken 7/3/2024 1904 by Radha Moore RN  Oral Mucous Membrane Protection: nonirritating oral fluids promoted  Oral Care: oral rinse provided  Goal: Nausea and Vomiting Symptom Relief  Outcome: Progressing  Intervention: Prevent and Manage Nausea and Vomiting  Recent Flowsheet Documentation  Taken 7/3/2024 1904 by Radha Moore RN  Nausea/Vomiting Interventions: (Denies) --  Goal: Optimal Nutrition Intake  Outcome: Progressing  Intervention: Minimize and Manage Barriers to Oral Intake  Recent Flowsheet Documentation  Taken 7/3/2024 1904 by Radha Moore RN  Oral Nutrition Promotion:   physical activity promoted   rest periods promoted

## 2024-07-04 NOTE — PROGRESS NOTES
"BMT/Cell Therapy Daily Progress Note   07/04/2024    Patient ID:  Zaheer Borges is a 64 year old male, currently day +6 s/p MA MUD PBSCT for AML.    Admission date: 6/21/2024     Diagnosis AML     BMTCT Type URD Allo     Prep Regimen Bu/Flu     Donor Match and  Source 7/8 URD     GVHD Prophylaxis Siro/MMF/PtCy     Primary BMT MD Dr. Mejias    Clinical Trials MT 2023-33            INTERVAL  HISTORY   Intermittent HA. No other pain. Nausea managed. Eating/drinking ok. Some loose stools, took Imodium. No fevers or bleeding.     Review of Systems: 10 point ROS negative except as noted above.      PHYSICAL EXAM     KPS:  80    /72 (BP Location: Left arm)   Pulse 75   Temp 98  F (36.7  C) (Oral)   Resp 16   Ht 1.68 m (5' 6.14\")   Wt 66 kg (145 lb 8 oz)   SpO2 95%   BMI 23.38 kg/m       General: NAD   Eyes: sclera anicteric ; OP moist without lesions  Lungs: CTAB  Cardiovascular: RRR  Skin: no rash  Neuro: A&O   Musculoskeletal: muscle mass adequate  Lymph: no LE edema  Access: L Mckinney site NT, dressing cdi. R PAC not accessed    Current aGVHD staging:  start after engraftment      LABS AND IMAGING: I have assessed all abnormal lab values for their clinical significance and any values considered clinically significant have been addressed in the assessment and plan.      Lab Results   Component Value Date    WBC 2.6 (L) 07/04/2024    ANEU 1.1 (L) 06/05/2024    HGB 9.3 (L) 07/04/2024    HCT 26.7 (L) 07/04/2024    PLT 50 (L) 07/04/2024     07/04/2024     07/04/2024    POTASSIUM 3.8 07/04/2024    POTASSIUM 3.8 07/04/2024    CHLORIDE 101 07/04/2024    CO2 27 07/04/2024     (H) 07/04/2024    BUN 13.0 07/04/2024    CR 0.56 (L) 07/04/2024    MAG 2.0 07/04/2024    INR 1.02 07/01/2024    BILITOTAL 0.6 07/04/2024    AST 23 07/04/2024    ALT 43 07/04/2024    ALKPHOS 55 07/04/2024    PROTTOTAL 5.9 (L) 07/04/2024    ALBUMIN 3.9 07/04/2024         SYSTEMS-BASED ASSESSMENT AND PLAN     Zaheer Borges is a 64 year " old male with long hx of PV and now AML, undergoing URD 7/8 PBSCT with the Optimize trial. Today is day +6.      BMT/IEC PROTOCOL for AML  - Chemo protocol: OPTIMIZE trial              - Day -7: Allopurinol              - Day -6 to day -3: Busulfan/fludarabine              - Day -2: Fludarabine              - Day -1: Rest day              - Day 0: Transplant   - Donor and Recip B+   - Restaging per protocol   - GCSF started day +5, continue until ANC >2.5 x 2 days (changed to subcutaneous 7/4). Added Claritin 7/4 to mitigate bone pain, ? help w/HA     HEME/COAG  #Pancytopenia 2/2 chemo/BMT. 7/4 WBC increased from 1.3 to 2.6, suspect GCSF, not yet at lesly. Trend.  - Transfusion parameters: hemoglobin <7, platelets <10     IMMUNOCOMPROMISED  - Relevant infectious history: tenofovir for hepatitis B core positivity.   - Prophylaxis plan: ACV, letermovir day +14, fabian through day +45. CT Chest clear.  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 Tacro/MMF started day +5. Check tacro level D+7.     CARDIOVASCULAR  #Hypertension: Continue PTA amlodipine 5mg/d  - Risk of cardiomyopathy:  Baseline EF 60-65%  - Risk of arrhythmia: Baseline EKG showed Sinus rhythm with sinus arrhythmia     GI/NUTRITION  - Ulcer prophylaxis: protonix  - CINV: Zofran [id number]. Prn Zyrepxa at hs, prn Compazine & Ativan.  - Risk of malnutrition: RD to follow.  - loose stools: metamucil daily with prn Imodium (admission C.dif neg 6/23)  - hx Constipation: metamucil daily with prn senna & miralax  - Hemorrhoids: prep H, lidocaine      RENAL/ELECTROLYTES/  - Electrolyte management: replace per sliding scale  #Hypocalcemia in setting of normal albumin- added PO supplement daily     MUSCULOSKELETAL/FRAILTY  - Baseline Frailty Score: 2  - Patient with substantial risk of sarcopenia  - Daily PT/OT as needed while inpatient  - Cancer Rehab as needed  outpatient    SUPPORTIVE CARES  - MSK back pain intermittent: voltaren gel, bengay prn  - Headaches: fioricet, imitrex, oxycodone available prn  - Trazodone for sleep     SOCIAL DETERMINANTS  - Caregiver: Pt's primary caregiver will be spouse with family/friends as a secondary or back-up to assist as needed.   - Financial/insurance concerns: no    Known issues that I take into account for medical decisions, with salient changes to the plan considering these complexities noted above.    Patient Active Problem List   Diagnosis    Polycythemia vera (H)    Acute myeloid leukemia (H)    History of polycythemia vera    Primary hypertension    Acute myeloid leukemia in remission (H)     Clinically Significant Risk Factors                # Thrombocytopenia: Lowest platelets = 50 in last 2 days, will monitor for bleeding   # Hypertension: Noted on problem list           #Precipitous drop in Hgb/Hct: Lowest Hgb this hospitalization: 9.3 g/dL. Will continue to monitor and treat/transfuse as appropriate.              Medically Ready for Discharge: Anticipated in 5+ Days    I spent 30 minutes in the care of this patient today, which included time necessary for preparation for the visit, obtaining history, ordering medications/tests/procedures as medically indicated, review of pertinent medical literature, counseling of the patient, communication of recommendations to the care team, and documentation time.    Daina Booth PA-C

## 2024-07-05 NOTE — TELEPHONE ENCOUNTER
----- Message from Claudine SCHULTZ sent at 6/28/2024 11:13 AM CDT -----  Regarding: Day 0 BAN Notification  Hey,    This patient is ready to schedule for BAN visits.    Admitting ELIA: Stefanie Lopez LT RNCC: Louise MARROQUIN: Randell Griderx: In clinic  Weekly lab day preference: unknown at this time    Restage per protocol. Orders are scanned into the media tab.  For allos only: please schedule with primary MD at D28, D60, D100, and D180.    Thanks!    Claudine

## 2024-07-05 NOTE — PLAN OF CARE
Goal Outcome Evaluation:    Patient complained of sore throat this afternoon, obtained order for benzocaine throat lozenge and magic mouthwash. Patient to try benzocaine lozenge first. Patient is still eating OK and swallowing his pills fine.

## 2024-07-05 NOTE — PLAN OF CARE
[id number]    Neuro: A&Ox4.   Cardiac: Not on tele. VSS, afebrile.   Respiratory: Sating >94% on RA.  GI/: Adequate urine output. BM X3 - PRN immodium given x1  for loose stool  Diet/appetite: Tolerating regular diet. Eating well.  Activity:  independent  Pain: At acceptable level on current regimen.   Skin: No new deficits noted.  LDA's: DL CVC - tacro gtt @ 4.1mL/hr via purple, TKO with intermittent MMF via red.    No transfusions or electrolyte replacements needed.    Plan: Continue with POC. Notify primary team with changes.

## 2024-07-05 NOTE — PLAN OF CARE
OT 5C BMT: DEFER    Occupational Therapy: Orders received. Chart reviewed and discussed with care team.? Occupational Therapy not indicated due to no acute IP OT needs at this time. Pt is up INDly completing ADL/IADLs at baseline. PT to follow while IP to address ax tolerance, strength, endurance, and balance as needs arise.? Defer discharge recommendations to physical therapy (PT) and medical team.? Will complete orders.

## 2024-07-05 NOTE — PROGRESS NOTES
"BMT/Cell Therapy Daily Progress Note   07/05/2024    Patient ID:  Zaheer Borges is a 64 year old male, currently day +7 s/p MA MUD PBSCT for AML.    Admission date: 6/21/2024     Diagnosis AML     BMTCT Type URD Allo     Prep Regimen Bu/Flu     Donor Match and  Source 7/8 URD     GVHD Prophylaxis Siro/MMF/PtCy     Primary BMT MD Dr. Mejias    Clinical Trials MT 2023-33            INTERVAL  HISTORY   He started to have a few episodes of loose stool last night. Denies abdominal pain or fevers. Also noticed a dark, patchy non pruritic rash on hips. Mucositis started to develop in mouth and throat which is mildly painful. Denies difficulty eating or taking pills at this time.     Review of Systems: 10 point ROS negative except as noted above.      PHYSICAL EXAM     KPS:  80    /78 (BP Location: Left arm)   Pulse 88   Temp 98  F (36.7  C) (Oral)   Resp 16   Ht 1.68 m (5' 6.14\")   Wt 66.1 kg (145 lb 12.8 oz)   SpO2 100%   BMI 23.43 kg/m       General: NAD   Eyes: sclera anicteric ; OP moist without lesions  Lungs: CTAB  Cardiovascular: RRR  Skin:  dark and patchy, non pruritic rash on upper thigh bilaterally   Neuro: A&O   Musculoskeletal: muscle mass adequate  Lymph: no LE edema  Access: L Mckinney site NT, dressing cdi. R PAC not accessed    Current aGVHD staging:  start after engraftment      LABS AND IMAGING: I have assessed all abnormal lab values for their clinical significance and any values considered clinically significant have been addressed in the assessment and plan.      Lab Results   Component Value Date    WBC 1.7 (L) 07/05/2024    ANEU 1.6 07/05/2024    HGB 8.7 (L) 07/05/2024    HCT 25.8 (L) 07/05/2024    PLT 25 (LL) 07/05/2024     (L) 07/05/2024    POTASSIUM 3.9 07/05/2024    CHLORIDE 100 07/05/2024    CO2 28 07/05/2024     (H) 07/05/2024    BUN 14.8 07/05/2024    CR 0.59 (L) 07/05/2024    MAG 1.8 07/05/2024    INR 1.02 07/01/2024    BILITOTAL 0.6 07/04/2024    AST 23 07/04/2024    " ALT 43 07/04/2024    ALKPHOS 55 07/04/2024    PROTTOTAL 5.9 (L) 07/04/2024    ALBUMIN 3.9 07/04/2024         SYSTEMS-BASED ASSESSMENT AND PLAN     Zaheer Borges is a 64 year old male with long hx of PV and now AML, undergoing URD 7/8 PBSCT with the Optimize trial. Today is day +7.     Changes Made 7/6/24:   - pancytopenia: continue GCSF and await engraftment  - rash: likely bisulfan rash, continue to monitor  - GVHD ppx: continue IV MMF and IV tacrolimus, D7 tacro level in process   - Mucositis: start prn lozenges and magic mouthwash   - diarrhea: likely 2/2 cytoxan, continue prn imodium and metamucil, if persistent will trial loperamide     _______________________________________________________     BMT/IEC PROTOCOL for AML  - Chemo protocol: OPTIMIZE trial              - Day -7: Allopurinol              - Day -6 to day -3: Busulfan/fludarabine              - Day -2: Fludarabine              - Day -1: Rest day              - Day 0: Transplant   - Donor and Recip B+   - Restaging per protocol   - GCSF started day +5, continue until ANC >2.5 x 2 days (changed to subcutaneous 7/4). Added Claritin 7/4 to mitigate bone pain,      HEME/COAG  #Pancytopenia 2/2 chemo/BMT.   - Transfusion parameters: hemoglobin <7, platelets <10     IMMUNOCOMPROMISED  - Relevant infectious history: tenofovir for hepatitis B core positivity.   - Prophylaxis plan: ACV, letermovir day +14, fabian through day +45. CT Chest clear.  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 Tacro/MMF started day +5. Check tacro level D+7.     CARDIOVASCULAR  #Hypertension: Continue PTA amlodipine 5mg/d  - Risk of cardiomyopathy:  Baseline EF 60-65%  - Risk of arrhythmia: Baseline EKG showed Sinus rhythm with sinus arrhythmia     GI/NUTRITION  - Ulcer prophylaxis: protonix  - CINV: Zofran [id number]. Prn Zyrepxa at hs, prn Compazine & Ativan.  - Risk of malnutrition: RD to follow.  -  Diarrhea: likely 2/2 cytoxan, continue metamucil daily with prn Imodium (admission C.dif neg 6/23), if persists can trail prn loperamide   - hx Constipation: metamucil daily with prn senna & miralax  - Hemorrhoids: prep H, lidocaine   - Mucositis: start prn lozenges and magic mouthwash (7/5)      RENAL/ELECTROLYTES/  - Electrolyte management: replace per sliding scale  # Hypocalcemia in setting of normal albumin- added PO supplement daily    SKIN:   -Rash on hips bilaterally: appears ~7/5. likely bisulfan rash, continue to monitor    MUSCULOSKELETAL/FRAILTY  - Baseline Frailty Score: 2  - Patient with substantial risk of sarcopenia  - Daily PT/OT as needed while inpatient  - Cancer Rehab as needed outpatient    SUPPORTIVE CARES  - MSK back pain intermittent: voltaren gel, bengay prn  - Headaches: fioricet, imitrex, oxycodone available prn  - Trazodone for sleep     SOCIAL DETERMINANTS  - Caregiver: Pt's primary caregiver will be spouse with family/friends as a secondary or back-up to assist as needed.   - Financial/insurance concerns: no    Known issues that I take into account for medical decisions, with salient changes to the plan considering these complexities noted above.    Patient Active Problem List   Diagnosis    Polycythemia vera (H)    Acute myeloid leukemia (H)    History of polycythemia vera    Primary hypertension    Acute myeloid leukemia in remission (H)     Clinically Significant Risk Factors                # Thrombocytopenia: Lowest platelets = 25 in last 2 days, will monitor for bleeding   # Hypertension: Noted on problem list           #Precipitous drop in Hgb/Hct: Lowest Hgb this hospitalization: 8.7 g/dL. Will continue to monitor and treat/transfuse as appropriate.              Medically Ready for Discharge: Anticipated in 5+ Days    Patient discussed with Dr. Randell Melton MD  Heme/Onc Fellow

## 2024-07-06 NOTE — PLAN OF CARE
"BP 98/69 (BP Location: Left arm)   Pulse 82   Temp 98.1  F (36.7  C) (Oral)   Resp 16   Ht 1.68 m (5' 6.14\")   Wt 66.1 kg (145 lb 12.8 oz)   SpO2 98%   BMI 23.43 kg/m    Pt's AVSS, denied nausea and pain all shift but did have x1 loose stool per pt. Pt is up independently in room. Pt was using the treadmill start of the shift. Wife and daughter did visit pt. CVC intact and tac gtt at 4.1ml/h with tko line. Pt is voiding good amount amount. Pt encourage to save stool so nurse can observer it. Am lab stable with K+ 4.1, Mag 1.7, Phos 4.4, , WBC 0.4, Hgb 8.3, and Plt 15. Pt had uneventful evening and night. Keep monitoring pt as ordered and notify MD with any new changes.    Problem: Adult Inpatient Plan of Care  Goal: Plan of Care Review  Description: The Plan of Care Review/Shift note should be completed every shift.  The Outcome Evaluation is a brief statement about your assessment that the patient is improving, declining, or no change.  This information will be displayed automatically on your shift  note.  Outcome: Progressing  Goal: Patient-Specific Goal (Individualized)  Description: You can add care plan individualizations to a care plan. Examples of Individualization might be:  \"Parent requests to be called daily at 9am for status\", \"I have a hard time hearing out of my right ear\", or \"Do not touch me to wake me up as it startles  me\".  Outcome: Progressing  Goal: Absence of Hospital-Acquired Illness or Injury  Outcome: Progressing  Intervention: Identify and Manage Fall Risk  Recent Flowsheet Documentation  Taken 7/6/2024 0000 by Renetta Forbes RN  Safety Promotion/Fall Prevention: nonskid shoes/slippers when out of bed  Intervention: Prevent Skin Injury  Recent Flowsheet Documentation  Taken 7/6/2024 0000 by Renetta Forbes RN  Body Position: position changed independently  Goal: Optimal Comfort and Wellbeing  Outcome: Progressing  Goal: Readiness for Transition of Care  Outcome: " Progressing     Problem: Stem Cell/Bone Marrow Transplant  Goal: Optimal Coping with Transplant  Outcome: Progressing  Goal: Symptom-Free Urinary Elimination  Outcome: Progressing  Goal: Diarrhea Symptom Control  Outcome: Progressing  Goal: Improved Activity Tolerance  Outcome: Progressing  Intervention: Promote Improved Energy  Recent Flowsheet Documentation  Taken 7/6/2024 0000 by Renetta Forbes RN  Activity Management: activity adjusted per tolerance  Goal: Blood Counts Within Acceptable Range  Outcome: Progressing  Goal: Absence of Hypersensitivity Reaction  Outcome: Progressing  Goal: Absence of Infection  Outcome: Progressing  Goal: Improved Oral Mucous Membrane Health and Integrity  Outcome: Progressing  Goal: Nausea and Vomiting Symptom Relief  Outcome: Progressing  Goal: Optimal Nutrition Intake  Outcome: Progressing   Goal Outcome Evaluation:

## 2024-07-06 NOTE — PROGRESS NOTES
"BMT/Cell Therapy Daily Progress Note   07/06/2024    Patient ID:  Zaheer Borges is a 64 year old male, currently day +8 s/p MA MUD PBSCT for AML.    Admission date: 6/21/2024     Diagnosis AML     BMTCT Type URD Allo     Prep Regimen Bu/Flu     Donor Match and  Source 7/8 URD     GVHD Prophylaxis Siro/MMF/PtCy     Primary BMT MD Dr. Mejias    Clinical Trials MT 2023-33            INTERVAL  HISTORY   Endorses mucositis pain and sore throat that is mild to moderate. Still able to swallow, eat/drink. Having loose stools, increased with ~4-5 episodes yesterday. Couple episodes nausea without vomiting yesterday, not severe enough to impact PO intake and gets relief with PRN zofran. Denies fever, chills, SOB, swelling.     Review of Systems: 10 point ROS negative except as noted above.    PHYSICAL EXAM     KPS:  80    /71 (BP Location: Left arm)   Pulse 77   Temp 98.3  F (36.8  C) (Oral)   Resp 16   Ht 1.68 m (5' 6.14\")   Wt 65.7 kg (144 lb 14.4 oz)   SpO2 97%   BMI 23.29 kg/m       General: NAD   Eyes: sclera anicteric, OP moist without lesions  Lungs: CTAB  Cardiovascular: RRR  Neuro: A&O, speech clear   Musculoskeletal: grossly normal  Lymph: no LE edema  Access: L Mckinney site NT, dressing cdi. R PAC not accessed    Current aGVHD staging:  start after engraftment      LABS AND IMAGING: I have assessed all abnormal lab values for their clinical significance and any values considered clinically significant have been addressed in the assessment and plan.      Lab Results   Component Value Date    WBC 0.4 (LL) 07/06/2024    ANEU 1.6 07/05/2024    HGB 8.3 (L) 07/06/2024    HCT 23.7 (L) 07/06/2024    PLT 15 (LL) 07/06/2024     07/06/2024    POTASSIUM 4.1 07/06/2024    CHLORIDE 102 07/06/2024    CO2 27 07/06/2024     (H) 07/06/2024    BUN 16.4 07/06/2024    CR 0.69 07/06/2024    MAG 1.7 07/06/2024    INR 1.02 07/01/2024    BILITOTAL 0.6 07/04/2024    AST 23 07/04/2024    ALT 43 07/04/2024    ALKPHOS 55 " 07/04/2024    PROTTOTAL 5.9 (L) 07/04/2024    ALBUMIN 3.9 07/04/2024       SYSTEMS-BASED ASSESSMENT AND PLAN     Zaheer Borges is a 64 year old male with long hx of PV and now AML, undergoing URD 7/8 PBSCT with the Optimize trial. Today is day +8.     Plan today:  - pancytopenia: continue GCSF and await engraftment  - rash: likely bisulfan rash, continue to monitor  - GVHD ppx: continue IV MMF and IV tacrolimus, D7 tacro level WDL  - Mucositis: continue prn lozenges and magic mouthwash   - diarrhea: likely 2/2 cytoxan, continue metamucil, increased prn imodium dose. If persists can trial lomotil.     _______________________________________________________     BMT/IEC PROTOCOL for AML  - Chemo protocol: OPTIMIZE trial              - Day -7: Allopurinol              - Day -6 to day -3: Busulfan/fludarabine              - Day -2: Fludarabine              - Day -1: Rest day              - Day 0: Transplant   - Donor and Recip B+   - Restaging per protocol   - GCSF started day +5, continue until ANC >2.5 x 2 days (changed to subcutaneous 7/4). Added Claritin 7/4 to mitigate bone pain.     HEME/COAG  #Pancytopenia 2/2 chemo/BMT.   - Transfusion parameters: hemoglobin <7, platelets <10     IMMUNOCOMPROMISED  - Relevant infectious history: tenofovir for hepatitis B core positivity.   - Prophylaxis plan: ACV, letermovir day +14, fabian through day +45. CT Chest clear. Levofloxacin while neutropenic. 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 Tacro/MMF started day +5. Day +7 tacro level checked 7/5= 8 (goal 5-12).     CARDIOVASCULAR  #Hypertension: Continue PTA amlodipine 5mg/d  - Risk of cardiomyopathy:  Baseline EF 60-65%  - Risk of arrhythmia: Baseline EKG showed Sinus rhythm with sinus arrhythmia     GI/NUTRITION  - Ulcer prophylaxis: protonix  - CINV: Zofran [id number]. Prn Zyrepxa at hs, prn Compazine & Ativan.  - Risk of malnutrition: RD to follow.  - Diarrhea: likely 2/2  cytoxan, continue metamucil daily with prn Imodium (admission C.dif neg 6/23), if persists can trial prn lomotil  - hx Constipation: metamucil daily with prn senna & miralax  - Hemorrhoids: prep H, lidocaine   - Mucositis: start prn lozenges and magic mouthwash (7/5)      RENAL/ELECTROLYTES/  - Electrolyte management: replace per sliding scale  # Hypocalcemia in setting of normal albumin- added PO supplement daily    SKIN:   -Rash on hips bilaterally: appeared ~7/5. likely bisulfan rash, continue to monitor    MUSCULOSKELETAL/FRAILTY  - Baseline Frailty Score: 2  - Patient with substantial risk of sarcopenia  - Daily PT/OT as needed while inpatient  - Cancer Rehab as needed outpatient    SUPPORTIVE CARES  - MSK back pain intermittent: voltaren gel, bengay prn  - Headaches: fioricet, imitrex, oxycodone available prn  - Trazodone for sleep     SOCIAL DETERMINANTS  - Caregiver: Pt's primary caregiver will be spouse with family/friends as a secondary or back-up to assist as needed.   - Financial/insurance concerns: no    Known issues that I take into account for medical decisions, with salient changes to the plan considering these complexities noted above.    Patient Active Problem List   Diagnosis    Polycythemia vera (H)    Acute myeloid leukemia (H)    History of polycythemia vera    Primary hypertension    Acute myeloid leukemia in remission (H)     Clinically Significant Risk Factors                # Thrombocytopenia: Lowest platelets = 15 in last 2 days, will monitor for bleeding   # Hypertension: Noted on problem list           #Precipitous drop in Hgb/Hct: Lowest Hgb this hospitalization: 8.3 g/dL. Will continue to monitor and treat/transfuse as appropriate.              Medically Ready for Discharge: Anticipated in 5+ Days    I saw and evaluated this patient as part of a shared visit with the attending physician. We reviewed and discussed this patient's history, physical exam, and treatment plan. I spent  approximately 30 minutes on this patient's care today. Over 50% of our time was spent reviewing notes and tests, counseling the patient, and coordinating and documenting care.      Marya Jonas DNP, APRN, NP-C  Blood & Marrow Transplant

## 2024-07-07 NOTE — PLAN OF CARE
VSS  Afebrile  Up ad marjan, steady gait and balance, alert and oriented x4.  Pt will need Platelets this AM.  Plts have already been prepared, please release when ready to transfuse.  MMF currently infusing, should be done around 0745  No electrolytes needed this AM.    Tac Drip infusing via Purple Lumen    Red area noted back of Right side of mouth.  Red area noted on Frenulum.

## 2024-07-07 NOTE — PROGRESS NOTES
"BMT/Cell Therapy Daily Progress Note   07/07/2024    Patient ID:  Zaheer Borges is a 64 year old male, currently day +9 s/p MA MUD PBSCT for AML.    Admission date: 6/21/2024     Diagnosis AML     BMTCT Type URD Allo     Prep Regimen Bu/Flu     Donor Match and  Source 7/8 URD     GVHD Prophylaxis Siro/MMF/PtCy     Primary BMT MD Dr. Mejias    Clinical Trials MT 2023-33            INTERVAL  HISTORY   Mucositis in mouth/throat. Oxycodone working ok. Wants to try viscous lidocaine.  Eating ok despite the pain.  No N/V. Diarrhea resolved with imodium.  No infectious symptoms.    Review of Systems: 10 point ROS negative except as noted above.    PHYSICAL EXAM     KPS:  80    /80   Pulse 79   Temp 98.2  F (36.8  C) (Oral)   Resp 16   Ht 1.68 m (5' 6.14\")   Wt 65.8 kg (145 lb 1.6 oz)   SpO2 97%   BMI 23.32 kg/m       General: NAD   Mouth: generalized sloughing, erythema   Lungs: CTAB  Cardiovascular: RRR  Neuro: A&O, speech clear   Musculoskeletal: grossly normal  Lymph: no LE edema  Access: L Mckinney site NT, dressing cdi. R PAC not accessed    Current aGVHD staging:  start after engraftment      LABS AND IMAGING: I have assessed all abnormal lab values for their clinical significance and any values considered clinically significant have been addressed in the assessment and plan.      Lab Results   Component Value Date    WBC 0.1 (LL) 07/07/2024    ANEU 1.6 07/05/2024    HGB 8.1 (L) 07/07/2024    HCT 23.1 (L) 07/07/2024    PLT 7 (LL) 07/07/2024     07/07/2024    POTASSIUM 4.2 07/07/2024    CHLORIDE 101 07/07/2024    CO2 27 07/07/2024     (H) 07/07/2024    BUN 14.2 07/07/2024    CR 0.68 07/07/2024    MAG 1.6 (L) 07/07/2024    INR 1.02 07/01/2024    BILITOTAL 0.6 07/04/2024    AST 23 07/04/2024    ALT 43 07/04/2024    ALKPHOS 55 07/04/2024    PROTTOTAL 5.9 (L) 07/04/2024    ALBUMIN 3.9 07/04/2024       SYSTEMS-BASED ASSESSMENT AND PLAN     Zaheer Borges is a 64 year old male with long hx of PV and now " AML, undergoing URD 7/8 PBSCT with the Optimize trial. Today is day +9.        BMT/IEC PROTOCOL for AML  - Chemo protocol: OPTIMIZE trial              - Day -7: Allopurinol              - Day -6 to day -3: Busulfan/fludarabine              - Day -2: Fludarabine              - Day -1: Rest day              - Day 0: Transplant   - Donor and Recip B+   - Restaging per protocol   - GCSF started day +5, continue until ANC >2.5 x 2 days (changed to subcutaneous 7/4). Added Claritin 7/4 to mitigate bone pain.     HEME/COAG  #Pancytopenia 2/2 chemo/BMT.   - Transfusion parameters: hemoglobin <7, platelets <10     IMMUNOCOMPROMISED  - Relevant infectious history: tenofovir for hepatitis B core positivity.   - Prophylaxis plan: ACV, letermovir day +14, fabian through day +45. CT Chest clear. Levofloxacin while neutropenic. 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 Tacro/MMF started day +5. Tac 7/7 = 6.1, changed to 100mcg/hr     CARDIOVASCULAR  #Hypertension: Continue PTA amlodipine 5mg/d  - Risk of cardiomyopathy:  Baseline EF 60-65%  - Risk of arrhythmia: Baseline EKG showed Sinus rhythm with sinus arrhythmia     GI/NUTRITION  - Ulcer prophylaxis: protonix  - CINV: Zofran [id number]. Prn Zyrepxa at hs, prn Compazine & Ativan.  - Risk of malnutrition: RD to follow.  - Diarrhea: likely 2/2 cytoxan, continue metamucil daily with prn Imodium (admission C.dif neg 6/23), if persists can trial prn lomotil  - hx Constipation: metamucil daily with prn senna & miralax  - Hemorrhoids: prep H, lidocaine   - Mucositis: start prn lozenges and magic mouthwash (7/5) . Prn oxycodone 5-10mg, viscous lidocaine.      RENAL/ELECTROLYTES/  - Electrolyte management: replace per sliding scale  # Hypocalcemia in setting of normal albumin- added PO supplement daily    SKIN:   -Rash on hips bilaterally: appeared ~7/5. likely bisulfan rash, continue to monitor    MUSCULOSKELETAL/FRAILTY  - Baseline  Frailty Score: 2  - Patient with substantial risk of sarcopenia  - Daily PT/OT as needed while inpatient  - Cancer Rehab as needed outpatient    SUPPORTIVE CARES  - MSK back pain intermittent: voltaren gel, bengay prn  - Headaches: fioricet, imitrex, oxycodone available prn  - Trazodone for sleep     SOCIAL DETERMINANTS  - Caregiver: Pt's primary caregiver will be spouse with family/friends as a secondary or back-up to assist as needed.   - Financial/insurance concerns: no    Known issues that I take into account for medical decisions, with salient changes to the plan considering these complexities noted above.    Patient Active Problem List   Diagnosis    Polycythemia vera (H)    Acute myeloid leukemia (H)    History of polycythemia vera    Primary hypertension    Acute myeloid leukemia in remission (H)     Clinically Significant Risk Factors            # Hypomagnesemia: Lowest Mg = 1.6 mg/dL in last 2 days, will replace as needed     # Thrombocytopenia: Lowest platelets = 7 in last 2 days, will monitor for bleeding   # Hypertension: Noted on problem list           #Precipitous drop in Hgb/Hct: Lowest Hgb this hospitalization: 8.1 g/dL. Will continue to monitor and treat/transfuse as appropriate.              Medically Ready for Discharge: Anticipated in 5+ Days    I saw and evaluated this patient as part of a shared visit with the attending physician. We reviewed and discussed this patient's history, physical exam, and treatment plan. I spent approximately 30 minutes on this patient's care today. Over 50% of our time was spent reviewing notes and tests, counseling the patient, and coordinating and documenting care.      Kari Cabral PA-C  [phone], anitra

## 2024-07-07 NOTE — PLAN OF CARE
Goal Outcome Evaluation:      Plan of Care Reviewed With: patient    Overall Patient Progress: no changeOverall Patient Progress: no change     Day +8 allo transplant for AML. Reports throat pain. Somewhat relieved with magic mouthwash and chloraseptic lozenges. Throat is reddened, but no white patches. Reports intermittent nausea, Zofran given x1 with relief. Took imodium x1 with meal to prevent diarrhea. No diarrhea stool since yesterday. Up ad marjan-steady on feet. Showered this evening. Continue Plan of Care.

## 2024-07-07 NOTE — PLAN OF CARE
Goal Outcome Evaluation:    Patient states he is having increased discomfort with his mouth and throat, given magic mouthwash to use this morning. Stated it is not as effective as in the past. Viscous lidocaine ordered now for patient to use, came up from pharmacy and is at bedside. Once patient uses it, will need to assess its effectiveness. Patient reluctant to use oral pain medication, but would probably benefit from it for his pain relief. Plts given over about one hour without incident.

## 2024-07-08 NOTE — PLAN OF CARE
Goal Outcome Evaluation:      Plan of Care Reviewed With: patient    Overall Patient Progress: no change     Day +9 allo. Continues to report throat, mouth, and tongue pain. Ulceration noted in back of throat bilaterally today.  Pain temporarily relieved with topicals. Some relief with oxycodone 10mg. Up ad marjan in room. Showered/CHG wipes completed this evening. Port re-accessed for 28 day flush. Good blood return. Diarrhea noted 2 days ago, but no stool since then. Senna given per patient request. Fair appetite-eating food brought from home. Continue to offer topical and oral pain meds for throat pain. Continue Plan of Care.

## 2024-07-08 NOTE — PLAN OF CARE
VSS   Afebrile  Up ad marjan, steady gait and balance.  Alert and Oriented x4.  Last BM on 07/05/2024, pt took senna yesterday (07/07/2024).  BM today (07/08/2024).    Tacrolimus (PROGRAF) infusing at 5mL/hr or 100mcg/hr.  Infusing via purple lumen along with n/s carrier.  Pt will Need Tacrolimus Level this AM.    NO blood products this AM.  Pt will need IV Magnesium this AM, please re-check value 2-4 hours post completion.    No PRN for oral discomfort.  PRN were offered throughout the shift.

## 2024-07-08 NOTE — PROGRESS NOTES
"BMT/Cell Therapy Daily Progress Note   07/08/2024    Patient ID:  Zaheer Borges is a 64 year old male, currently day +10 s/p MA MUD PBSCT for AML.    Admission date: 6/21/2024     Diagnosis AML     BMTCT Type URD Allo     Prep Regimen Bu/Flu     Donor Match and  Source 7/8 URD     GVHD Prophylaxis Siro/MMF/PtCy     Primary BMT MD Dr. Mejias    Clinical Trials MT 2023-33            INTERVAL  HISTORY   Mucositis worse, would like PCA.   Appetite decreased.   No stools x2 days, no pain, distention.   No rashes.    Review of Systems: 10 point ROS negative except as noted above.    PHYSICAL EXAM     KPS:  80    /75 (BP Location: Left arm)   Pulse 75   Temp 96.8  F (36  C) (Axillary)   Resp 16   Ht 1.68 m (5' 6.14\")   Wt 67.2 kg (148 lb 3.2 oz)   SpO2 98%   BMI 23.82 kg/m       General: NAD   Mouth: generalized sloughing, erythema, ulcers forming bilateral buccal mucosa   Lungs: CTAB  Cardiovascular: RRR  Neuro: A&O, speech clear   Abdomen: NT, soft, BS+  Musculoskeletal: grossly normal  Lymph: no LE edema  Access: L Mckinney site NT, dressing cdi. R PAC not accessed    Current aGVHD staging:  start after engraftment      LABS AND IMAGING: I have assessed all abnormal lab values for their clinical significance and any values considered clinically significant have been addressed in the assessment and plan.      Lab Results   Component Value Date    WBC 0.1 (LL) 07/08/2024    ANEU 1.6 07/05/2024    HGB 7.5 (L) 07/08/2024    HCT 21.4 (L) 07/08/2024    PLT 22 (LL) 07/08/2024     07/08/2024    POTASSIUM 4.0 07/08/2024    CHLORIDE 102 07/08/2024    CO2 27 07/08/2024    GLC 97 07/08/2024    BUN 13.0 07/08/2024    CR 0.59 (L) 07/08/2024    MAG 2.5 (H) 07/08/2024    INR 1.07 07/08/2024    BILITOTAL 0.4 07/08/2024    AST 14 07/08/2024    ALT 26 07/08/2024    ALKPHOS 58 07/08/2024    PROTTOTAL 5.7 (L) 07/08/2024    ALBUMIN 3.8 07/08/2024       SYSTEMS-BASED ASSESSMENT AND PLAN     Zaheer Borges is a 64 year old male with " long hx of PV and now AML, undergoing URD 7/8 PBSCT with the Optimize trial. Today is day +10.        BMT/IEC PROTOCOL for AML  - Chemo protocol: OPTIMIZE trial              - Day -7: Allopurinol              - Day -6 to day -3: Busulfan/fludarabine              - Day -2: Fludarabine              - Day -1: Rest day              - Day 0: Transplant   - Donor and Recip B+   - Restaging per protocol   - GCSF started day +5, continue until ANC >2.5 x 2 days (changed to subcutaneous 7/4). Added Claritin 7/4 to mitigate bone pain.     HEME/COAG  #Pancytopenia 2/2 chemo/BMT.   - Transfusion parameters: hemoglobin <7, platelets <10     IMMUNOCOMPROMISED  - Relevant infectious history: tenofovir for hepatitis B core positivity.   - Prophylaxis plan: ACV, letermovir day +14, fabian through day +45. CT Chest clear. Levofloxacin while neutropenic. 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 Tacro/MMF started day +5. Tac 7/7 = 6.1, changed to 100mcg/hr     CARDIOVASCULAR  #Hypertension: Continue PTA amlodipine 5mg/d  - Risk of cardiomyopathy:  Baseline EF 60-65%  - Risk of arrhythmia: Baseline EKG showed Sinus rhythm with sinus arrhythmia     GI/NUTRITION  - Ulcer prophylaxis: protonix  - CINV: Zofran [id number]. Prn Zyrepxa at hs, prn Compazine & Ativan.  - Risk of malnutrition: RD to follow.  - Diarrhea: likely 2/2 cytoxan, continue metamucil daily with prn Imodium (admission C.dif neg 6/23), if persists can trial prn lomotil  - hx Constipation: metamucil daily with prn senna & miralax  - Hemorrhoids: prep H, lidocaine   - Mucositis: start prn lozenges and magic mouthwash (7/5) . Viscous lidocaine. PCA 0.2 q15 (7/8-x)     RENAL/ELECTROLYTES/  - Electrolyte management: replace per sliding scale  # Hypocalcemia in setting of normal albumin- added PO supplement daily    SKIN:   -Rash on hips bilaterally: appeared ~7/5. likely bisulfan rash, continue to  monitor    MUSCULOSKELETAL/FRAILTY  - Baseline Frailty Score: 2  - Patient with substantial risk of sarcopenia  - Daily PT/OT as needed while inpatient  - Cancer Rehab as needed outpatient    SUPPORTIVE CARES  - MSK back pain intermittent: voltaren gel, bengay prn  - Headaches: fioricet, imitrex, oxycodone available prn  - Trazodone for sleep     SOCIAL DETERMINANTS  - Caregiver: Pt's primary caregiver will be spouse with family/friends as a secondary or back-up to assist as needed.   - Financial/insurance concerns: no    Known issues that I take into account for medical decisions, with salient changes to the plan considering these complexities noted above.    Patient Active Problem List   Diagnosis    Polycythemia vera (H)    Acute myeloid leukemia (H)    History of polycythemia vera    Primary hypertension    Acute myeloid leukemia in remission (H)     Clinically Significant Risk Factors            # Hypomagnesemia: Lowest Mg = 1.5 mg/dL in last 2 days, will replace as needed     # Thrombocytopenia: Lowest platelets = 7 in last 2 days, will monitor for bleeding   # Hypertension: Noted on problem list           #Precipitous drop in Hgb/Hct: Lowest Hgb this hospitalization: 7.5 g/dL. Will continue to monitor and treat/transfuse as appropriate.              Medically Ready for Discharge: Anticipated in 5+ Days    I saw and evaluated this patient as part of a shared visit with the attending physician. We reviewed and discussed this patient's history, physical exam, and treatment plan. I spent approximately 30 minutes on this patient's care today. Over 50% of our time was spent reviewing notes and tests, counseling the patient, and coordinating and documenting care.      Kari Cabral PA-C  [phone], anitra

## 2024-07-08 NOTE — PLAN OF CARE
"Tac level drawn this AM, came back at 5.5. Mg replaced this AM, recheck came back at 2.5. Acyclovir, levaquin, and protonix switched to IV from PO. Senna given this afternoon per pt request for constipation. PCA dilaudid started this afternoon for pain, pt on continuous O2 monitoring. Appetite is poor. Shower and CHG done.     Problem: Adult Inpatient Plan of Care  Goal: Plan of Care Review  Description: The Plan of Care Review/Shift note should be completed every shift.  The Outcome Evaluation is a brief statement about your assessment that the patient is improving, declining, or no change.  This information will be displayed automatically on your shift  note.  Outcome: Not Progressing  Flowsheets (Taken 7/8/2024 1457)  Overall Patient Progress: no change  Goal: Patient-Specific Goal (Individualized)  Description: You can add care plan individualizations to a care plan. Examples of Individualization might be:  \"Parent requests to be called daily at 9am for status\", \"I have a hard time hearing out of my right ear\", or \"Do not touch me to wake me up as it startles  me\".  Outcome: Not Progressing  Goal: Absence of Hospital-Acquired Illness or Injury  Outcome: Not Progressing  Intervention: Identify and Manage Fall Risk  Recent Flowsheet Documentation  Taken 7/8/2024 1200 by Michele Zhang  Safety Promotion/Fall Prevention: safety round/check completed  Taken 7/8/2024 0800 by Michele Zhang  Safety Promotion/Fall Prevention: safety round/check completed  Intervention: Prevent Skin Injury  Recent Flowsheet Documentation  Taken 7/8/2024 0800 by Michele Zhang  Body Position: position changed independently  Intervention: Prevent Infection  Recent Flowsheet Documentation  Taken 7/8/2024 0800 by Michele Zhang  Infection Prevention:   cohorting utilized   hand hygiene promoted   visitors restricted/screened   single patient room provided   rest/sleep promoted   personal protective equipment utilized   equipment surfaces " disinfected   environmental surveillance performed  Goal: Optimal Comfort and Wellbeing  Outcome: Not Progressing  Goal: Readiness for Transition of Care  Outcome: Not Progressing     Problem: Stem Cell/Bone Marrow Transplant  Goal: Optimal Coping with Transplant  Outcome: Not Progressing  Intervention: Optimize Patient/Family Adjustment to Transplant  Recent Flowsheet Documentation  Taken 7/8/2024 0800 by Michele Zhang  Supportive Measures:   active listening utilized   positive reinforcement provided   self-care encouraged   verbalization of feelings encouraged  Goal: Symptom-Free Urinary Elimination  Outcome: Not Progressing  Goal: Diarrhea Symptom Control  Outcome: Not Progressing  Intervention: Manage Diarrhea  Recent Flowsheet Documentation  Taken 7/8/2024 0800 by Michele Zhang  Fluid/Electrolyte Management: fluids provided  Goal: Improved Activity Tolerance  Outcome: Not Progressing  Intervention: Promote Improved Energy  Recent Flowsheet Documentation  Taken 7/8/2024 0800 by Michele Zhang  Environmental Support:   calm environment promoted   distractions minimized   caregiver consistency promoted   personal routine supported   rest periods encouraged  Goal: Blood Counts Within Acceptable Range  Outcome: Not Progressing  Goal: Absence of Hypersensitivity Reaction  Outcome: Not Progressing  Goal: Absence of Infection  Outcome: Not Progressing  Intervention: Prevent and Manage Infection  Recent Flowsheet Documentation  Taken 7/8/2024 0800 by Michele Zhang  Infection Prevention:   cohorting utilized   hand hygiene promoted   visitors restricted/screened   single patient room provided   rest/sleep promoted   personal protective equipment utilized   equipment surfaces disinfected   environmental surveillance performed  Isolation Precautions: protective environment maintained  Goal: Improved Oral Mucous Membrane Health and Integrity  Outcome: Not Progressing  Goal: Nausea and Vomiting Symptom Relief  Outcome: Not  Progressing  Goal: Optimal Nutrition Intake  Outcome: Not Progressing  Intervention: Minimize and Manage Barriers to Oral Intake  Recent Flowsheet Documentation  Taken 7/8/2024 0800 by Michele Zhang  Oral Nutrition Promotion: physical activity promoted   Goal Outcome Evaluation:           Overall Patient Progress: no changeOverall Patient Progress: no change

## 2024-07-09 NOTE — PROGRESS NOTES
CLINICAL NUTRITION SERVICES - REASSESSMENT NOTE     Nutrition Prescription    RECOMMENDATIONS FOR MDs/PROVIDERS TO ORDER:  Consider starting TPN tomorrow if pt is unable to drink a combination of 3 Boost/Ensure today     Malnutrition Status:    Patient does not meet two of the established criteria necessary for diagnosing malnutrition but is at risk for malnutrition    Recommendations already ordered by Registered Dietitian (RD):  None at this time     Future/Additional Recommendations:  -Monitor PO intake  -Monitor wt trends  -Monitor labs   If TPN becomes part of POC:  Dosing weight:  67 kg  Access: central  Initial parameters (per day)  Volume:  2000 mL or per PharmD  Dextrose: 130 g (GIR 1.3 mg/kg/minute)  AA: 100 g  Lipids: 250 mL 20%, 7 days per week   Dextrose titration:   Monitor lytes and if within acceptable parameters (Mg++ > or = 1.5, K+ is > or = 3, and PO4 > or = 1.9), increase dextrose by 50 g/day to goal of 330 g dextrose.  Additives: MVI, MTE4, 100mg Thiamine x 5 days     Goal PN provides 330 g dextrose, 100 g AA, and 250 mL 20% lipids 7 days per week for total provision of 2022 Kcals (30 Kcals/kg), 1.5 g/kg protein, GIR 3.4 mg/kg/minute, and 25% fat kcals on average daily.     Additional TPN Recommendations:  -Labs: Monitor K+, Mg++, Phos, Na+ and BG daily. Monitor LFTs (Alk phos, total bilirubin, direct bilirubin, AST, ALT), BUN, Cr, and TG trends at least once weekly.  -With start of TPN, recommend order RN-managed HIGH replacement protocol for K+, Mg++, Phos.         EVALUATION OF THE PROGRESS TOWARD GOALS   Diet: High Kcal/High Protein, PRN snacks/supplements, Ensure Enlive once daily     Intake: % noted on RN flowsheets (primarily food from home per [employer]). Pt reports he drank 1 Ensure and a Boost high protein shake from home yesterday. No solid PO intake since yesterday morning per RN flowsheets given mucositis/mouth pain.      NEW FINDINGS   Pt is day 11 s/p Allo BMT. Met with pt  at bedside. He notes he has mouth pain secondary to mucositis (currently has Magic Mouthwash ordered). He discussed nausea is worse than last week (intermittent, currently on PRN anti-emetics). Pt notes he continues to have a combination of Ensure Enlive and Boost High Protein from home.     Pt inquired about the potential for TPN as this was previously discussed by other providers. Recommended pt drink 3 ONS daily to meet more of his kcal/protein needs in the hope of avoiding TPN (this would provide approximately 750-1050 kcals and 60g protein). Pt was agreeable to this and asked his nursing assistant to open a 2nd Boost high protein shake as RD was leaving the room.     GI:  Pt reports he had diarrhea a few days ago, is currently experiencing constipation. LBM was this morning. Per I's/O's, pt has been having 1-4 BM's every few days.     SKIN:  Rash on his B/L hips per PA note    LABS:  Reviewed. Notable for:  Na 134  Cr 0.60  Ca 8.7  Glucose 107      MEDICATIONS:  Reviewed. Notable for:  Citracal  Protonix  Metamucil  PRN Imodium, Magic Mouthwash, Zyprexa, Zofran, Miralax, Compazine, Senokot     WEIGHTS:  Wt has been stable since admission  Vitals:    07/05/24 0803 07/06/24 0753 07/07/24 0749 07/08/24 0744   Weight: 66.1 kg (145 lb 12.8 oz) 65.7 kg (144 lb 14.4 oz) 65.8 kg (145 lb 1.6 oz) 67.2 kg (148 lb 3.2 oz)    07/09/24 0839   Weight: 66.5 kg (146 lb 9.6 oz)        MALNUTRITION  % Intake: Decreased intake does not meet criteria due to timeline  % Weight Loss: None noted  Subcutaneous Fat Loss: None observed  Muscle Loss: None observed  Fluid Accumulation/Edema: None noted  Malnutrition Diagnosis: Patient does not meet two of the established criteria necessary for diagnosing malnutrition but is at risk for malnutrition    Previous Goals   Patient to consume % of nutritionally adequate meal trays TID, or the equivalent with supplements/snacks.   Evaluation: Not met overall due to decreased PO intake over  the last few days     Previous Nutrition Diagnosis  Predicted inadequate nutrient intake (kcal/protein) related to recent BMT with potential for side effects to affect ability to take adequate PO.   Evaluation: ongoing, new diagnosis takes precedence     CURRENT NUTRITION DIAGNOSIS  Inadequate oral intake related to mouth pain/mucositis as evidenced by pt report of drinking 2 Ensure/Boost daily, chart review      INTERVENTIONS  Implementation  Medical food supplement therapy- continue current orders    Nutrition education- provided education on the role of adequate protein/kcals for healing. Recommended pt consume a combination of 3 Boost/Ensure daily. Bedside RN/nursing student updated on this goal.     Collaboration with other providers- Tisha Blake PA-C     Goals  Patient to consume % of nutritionally adequate meal trays TID, or the equivalent with supplements/snacks.    Monitoring/Evaluation  Progress toward goals will be monitored and evaluated per protocol.    Diane Shell RD (Maggie), LD- 5C Clinical Dietitian   Available on [facility]  No longer available by paging

## 2024-07-09 NOTE — PLAN OF CARE
"Pt is day +11 from allo transplant. Pt continues to be on PCA pump, c/o pain 6/10 in his throat due to mucositis. Lactic triggered at 5, lab drawn and came back at 0.8. One dose of senna also given at 5. Lidocaine, MMW, lozenges, and spray used for mouth pain, pt states he does not like to use the PCA because it makes him sleepy.     Problem: Adult Inpatient Plan of Care  Goal: Plan of Care Review  Description: The Plan of Care Review/Shift note should be completed every shift.  The Outcome Evaluation is a brief statement about your assessment that the patient is improving, declining, or no change.  This information will be displayed automatically on your shift  note.  Outcome: Not Progressing  Flowsheets (Taken 7/9/2024 1542)  Overall Patient Progress: no change  Goal: Patient-Specific Goal (Individualized)  Description: You can add care plan individualizations to a care plan. Examples of Individualization might be:  \"Parent requests to be called daily at 9am for status\", \"I have a hard time hearing out of my right ear\", or \"Do not touch me to wake me up as it startles  me\".  Outcome: Not Progressing  Goal: Absence of Hospital-Acquired Illness or Injury  Outcome: Not Progressing  Intervention: Identify and Manage Fall Risk  Recent Flowsheet Documentation  Taken 7/9/2024 1600 by Michele Zhang  Safety Promotion/Fall Prevention: safety round/check completed  Taken 7/9/2024 1500 by Michele Zhang  Safety Promotion/Fall Prevention: safety round/check completed  Taken 7/9/2024 1200 by Michele Zhang  Safety Promotion/Fall Prevention: safety round/check completed  Taken 7/9/2024 1100 by Michele Zhang  Safety Promotion/Fall Prevention: safety round/check completed  Taken 7/9/2024 0800 by Michele Zhang  Safety Promotion/Fall Prevention: safety round/check completed  Intervention: Prevent Skin Injury  Recent Flowsheet Documentation  Taken 7/9/2024 0800 by Michele Zhang  Body Position: position changed independently  Goal: " Optimal Comfort and Wellbeing  Outcome: Not Progressing  Intervention: Monitor Pain and Promote Comfort  Recent Flowsheet Documentation  Taken 7/9/2024 0800 by Michele Zhang  Pain Management Interventions: medication (see MAR)  Goal: Readiness for Transition of Care  Outcome: Not Progressing     Problem: Stem Cell/Bone Marrow Transplant  Goal: Optimal Coping with Transplant  Outcome: Not Progressing  Intervention: Optimize Patient/Family Adjustment to Transplant  Recent Flowsheet Documentation  Taken 7/9/2024 0800 by Michele Zhang  Supportive Measures:   active listening utilized   positive reinforcement provided   relaxation techniques promoted   self-care encouraged  Goal: Symptom-Free Urinary Elimination  Outcome: Not Progressing  Intervention: Prevent or Manage Bladder Irritation  Recent Flowsheet Documentation  Taken 7/9/2024 0800 by Michele Zhang  Pain Management Interventions: medication (see MAR)  Urinary Elimination Promotion: frequent voiding encouraged  Hyperhydration Management: fluids provided  Goal: Diarrhea Symptom Control  Outcome: Not Progressing  Intervention: Manage Diarrhea  Recent Flowsheet Documentation  Taken 7/9/2024 0800 by Michele Zhang  Perineal Care: perineal hygiene encouraged  Goal: Improved Activity Tolerance  Outcome: Not Progressing  Intervention: Promote Improved Energy  Recent Flowsheet Documentation  Taken 7/9/2024 0800 by Michele Zhang  Environmental Support:   calm environment promoted   caregiver consistency promoted   distractions minimized   personal routine supported   rest periods encouraged  Goal: Blood Counts Within Acceptable Range  Outcome: Not Progressing  Goal: Absence of Hypersensitivity Reaction  Outcome: Not Progressing  Goal: Absence of Infection  Outcome: Not Progressing  Goal: Improved Oral Mucous Membrane Health and Integrity  Outcome: Not Progressing  Goal: Nausea and Vomiting Symptom Relief  Outcome: Not Progressing  Goal: Optimal Nutrition Intake  Outcome:  Not Progressing   Goal Outcome Evaluation:           Overall Patient Progress: no changeOverall Patient Progress: no change

## 2024-07-09 NOTE — PLAN OF CARE
VSS   Afebrile.  Up ad marjan, steady gait and balance.  Alert and oriented x4, using call light appropriately.    Pt requested to have O2 sat probe off his finger. Pt educated about the rationale behind the use of the O2 sats probe and being on a Hydromorphone PCA; pt indicated he would like to sleep without the O2 sats probe off.  Charge RN informed.  Pt indicating receiving good pain management from PCA.    Tacrolimus (PROGRAF) infusing at 5.5mL/hr or 110mcg/hr.  Infusing via purple lumen along with n/s carrier.    No blood products needed this AM.  No electrolytes needed this AM.    Problem: Adult Inpatient Plan of Care  Goal: Absence of Hospital-Acquired Illness or Injury  Intervention: Prevent Infection  Recent Flowsheet Documentation  Taken 7/8/2024 1930 by Edis Kasper RN  Infection Prevention:   cohorting utilized   personal protective equipment utilized   visitors restricted/screened   single patient room provided   rest/sleep promoted   hand hygiene promoted   equipment surfaces disinfected   environmental surveillance performed     Problem: Stem Cell/Bone Marrow Transplant  Goal: Improved Activity Tolerance  Intervention: Promote Improved Energy  Recent Flowsheet Documentation  Taken 7/8/2024 1930 by Edis Kasper RN  Fatigue Management:   frequent rest breaks encouraged   activity schedule adjusted  Sleep/Rest Enhancement:   awakenings minimized   consistent schedule promoted   family presence promoted   natural light exposure provided   noise level reduced   regular sleep/rest pattern promoted   room darkened  Activity Management:   activity adjusted per tolerance   up ad marjan   ambulated to bathroom   ambulated in room  Environmental Support:   calm environment promoted   caregiver consistency promoted   comfort object encouraged   distractions minimized   rest periods encouraged   personal routine supported   environmental consistency promoted     Problem: Stem Cell/Bone Marrow Transplant  Goal:  Improved Oral Mucous Membrane Health and Integrity  Intervention: Promote Oral Comfort and Health  Recent Flowsheet Documentation  Taken 7/8/2024 1930 by Edis Kasper, RN  Oral Care: oral rinse provided

## 2024-07-09 NOTE — PROGRESS NOTES
"BMT/Cell Therapy Daily Progress Note   07/09/2024    Patient ID:  Zaheer Borges is a 64 year old male, currently day +11 s/p MA MUD PBSCT for AML.    Admission date: 6/21/2024     Diagnosis AML     BMTCT Type URD Allo     Prep Regimen Bu/Flu     Donor Match and  Source 7/8 URD     GVHD Prophylaxis Siro/MMF/PtCy     Primary BMT MD Dr. Mejias    Clinical Trials MT 2023-33            INTERVAL  HISTORY   Mucositis ongoing--PCA providing some relief.  Did take in some ensures yesterday although PO intake dropping significantly  BM this AM.  No fever or rash.      Review of Systems: 10 point ROS negative except as noted above.    PHYSICAL EXAM     KPS:  60    /70 (BP Location: Left arm)   Pulse 85   Temp 96.9  F (36.1  C) (Axillary)   Resp 16   Ht 1.68 m (5' 6.14\")   Wt 66.5 kg (146 lb 9.6 oz)   SpO2 95%   BMI 23.56 kg/m       General: NAD   Mouth: generalized sloughing and erythema; ulceration of b/l retromolar trigone  Lungs: CTAB  Cardiovascular: RRR  Neuro: A&O, speech clear   Abdomen: NT, soft, BS+  Musculoskeletal: grossly normal  Lymph: no LE edema  Access: L Mckinney site NT, dressing cdi. R PAC not accessed    Current aGVHD staging:  start after engraftment      LABS AND IMAGING: I have assessed all abnormal lab values for their clinical significance and any values considered clinically significant have been addressed in the assessment and plan.      Lab Results   Component Value Date    WBC 0.1 (LL) 07/09/2024    ANEU 1.6 07/05/2024    HGB 7.3 (L) 07/09/2024    HCT 20.4 (L) 07/09/2024    PLT 17 (LL) 07/09/2024     (L) 07/09/2024    POTASSIUM 4.2 07/09/2024    CHLORIDE 101 07/09/2024    CO2 26 07/09/2024     (H) 07/09/2024    BUN 11.2 07/09/2024    CR 0.60 (L) 07/09/2024    MAG 1.7 07/09/2024    INR 1.07 07/08/2024    BILITOTAL 0.4 07/08/2024    AST 14 07/08/2024    ALT 26 07/08/2024    ALKPHOS 58 07/08/2024    PROTTOTAL 5.7 (L) 07/08/2024    ALBUMIN 3.8 07/08/2024       SYSTEMS-BASED " ASSESSMENT AND PLAN     Zaheer Borges is a 64 year old male with long hx of PV and now AML, undergoing URD 7/8 PBSCT with the Optimize trial. Today is day +11.        BMT/IEC PROTOCOL for AML  - Chemo protocol: OPTIMIZE trial              - Day -7: Allopurinol              - Day -6 to day -3: Busulfan/fludarabine              - Day -2: Fludarabine              - Day -1: Rest day              - Day 0: Transplant   - Donor and Recip B+   - Restaging per protocol   - GCSF started day +5, continue until ANC >2.5 x 2 days (changed to subcutaneous 7/4). Added Claritin 7/4 to mitigate bone pain.     HEME/COAG  #Pancytopenia 2/2 chemo/BMT.   - Transfusion parameters: hemoglobin <7, platelets <10     IMMUNOCOMPROMISED  - Relevant infectious history: tenofovir for hepatitis B core positivity.   - Prophylaxis plan: ACV, letermovir day +14, fabian through day +45. CT Chest clear. Levofloxacin while neutropenic. 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 Tacro/MMF started day +5. Tac 7/8 5.5; gtt increased     CARDIOVASCULAR  #Hypertension: Continue PTA amlodipine 5mg/d  - Risk of cardiomyopathy:  Baseline EF 60-65%  - Risk of arrhythmia: Baseline EKG showed Sinus rhythm with sinus arrhythmia     GI/NUTRITION  - Ulcer prophylaxis: protonix  - CINV: Zofran [id number]. Prn Zyrepxa at hs, prn Compazine & Ativan.  - Risk of malnutrition: RD to follow.  - Diarrhea: resolved  - hx Constipation: metamucil daily with prn senna & miralax  - Hemorrhoids: prep H, lidocaine   - Mucositis: start prn lozenges and magic mouthwash (7/5) . Viscous lidocaine. PCA 0.2 q15 (7/8-x)     RENAL/ELECTROLYTES/  - Electrolyte management: replace per sliding scale  # Hypocalcemia in setting of normal albumin- added PO supplement daily    SKIN:   -Rash on hips bilaterally: appeared ~7/5. likely bisulfan rash, continue to monitor    MUSCULOSKELETAL/FRAILTY  - Baseline Frailty Score: 2  - Patient with substantial  risk of sarcopenia  - Daily PT/OT as needed while inpatient  - Cancer Rehab as needed outpatient    SUPPORTIVE CARES  - MSK back pain intermittent: voltaren gel, bengay prn  - Headaches: fioricet, imitrex, oxycodone available prn  - Trazodone for sleep     SOCIAL DETERMINANTS  - Caregiver: Pt's primary caregiver will be spouse with family/friends as a secondary or back-up to assist as needed.   - Financial/insurance concerns: no    Known issues that I take into account for medical decisions, with salient changes to the plan considering these complexities noted above.    Patient Active Problem List   Diagnosis    Polycythemia vera (H)    Acute myeloid leukemia (H)    History of polycythemia vera    Primary hypertension    Acute myeloid leukemia in remission (H)     Clinically Significant Risk Factors            # Hypomagnesemia: Lowest Mg = 1.5 mg/dL in last 2 days, will replace as needed     # Thrombocytopenia: Lowest platelets = 17 in last 2 days, will monitor for bleeding   # Hypertension: Noted on problem list           #Precipitous drop in Hgb/Hct: Lowest Hgb this hospitalization: 7.3 g/dL. Will continue to monitor and treat/transfuse as appropriate.              Medically Ready for Discharge: Anticipated in 5+ Days    I saw and evaluated this patient as part of a shared visit with the attending physician. We reviewed and discussed this patient's history, physical exam, and treatment plan. I spent approximately 30 minutes on this patient's care today. Over 50% of our time was spent reviewing notes and tests, counseling the patient, and coordinating and documenting care.      Kari Cabral PA-C  [phone], anitra

## 2024-07-10 NOTE — PLAN OF CARE
"Pt is day +12 from allo transplant. Tac level drawn this AM, came back at 7. Pt c/o a headache this morning, tylenol and then later tramadol given, pain decreased from a 6 to a 4. Later in the day, pt said the headache was pretty much gone. Mg drawn, came back at 2.0, no replacements needed. Pt triggered a lactic this afternoon, came back at 1.5. Pt took zero PCA bumps this shift. MMW, lidocaine, and lozenges given for mucositis pain. CHG and shower done. Dressing changed, tubing and caps changed on both sides. PCA tubing changed as well.     Problem: Adult Inpatient Plan of Care  Goal: Plan of Care Review  Description: The Plan of Care Review/Shift note should be completed every shift.  The Outcome Evaluation is a brief statement about your assessment that the patient is improving, declining, or no change.  This information will be displayed automatically on your shift  note.  Outcome: Progressing  Flowsheets (Taken 7/10/2024 1642)  Overall Patient Progress: improving  Goal: Patient-Specific Goal (Individualized)  Description: You can add care plan individualizations to a care plan. Examples of Individualization might be:  \"Parent requests to be called daily at 9am for status\", \"I have a hard time hearing out of my right ear\", or \"Do not touch me to wake me up as it startles  me\".  Outcome: Progressing  Goal: Absence of Hospital-Acquired Illness or Injury  Outcome: Progressing  Intervention: Identify and Manage Fall Risk  Recent Flowsheet Documentation  Taken 7/10/2024 1600 by Michele Zhang  Safety Promotion/Fall Prevention: safety round/check completed  Taken 7/10/2024 1500 by Michele Zhang  Safety Promotion/Fall Prevention: safety round/check completed  Taken 7/10/2024 1300 by Michele Zhang  Safety Promotion/Fall Prevention: safety round/check completed  Taken 7/10/2024 1200 by Michele Zhang  Safety Promotion/Fall Prevention: safety round/check completed  Taken 7/10/2024 0800 by Michele Zhang  Safety " Promotion/Fall Prevention: safety round/check completed  Intervention: Prevent Skin Injury  Recent Flowsheet Documentation  Taken 7/10/2024 0800 by Michele Zhang  Body Position: position changed independently  Goal: Optimal Comfort and Wellbeing  Outcome: Progressing  Intervention: Monitor Pain and Promote Comfort  Recent Flowsheet Documentation  Taken 7/10/2024 0800 by Michele Zhang  Pain Management Interventions: medication (see MAR)  Goal: Readiness for Transition of Care  Outcome: Progressing     Problem: Stem Cell/Bone Marrow Transplant  Goal: Optimal Coping with Transplant  Outcome: Progressing  Intervention: Optimize Patient/Family Adjustment to Transplant  Recent Flowsheet Documentation  Taken 7/10/2024 0800 by Michele Zhang  Supportive Measures:   active listening utilized   goal-setting facilitated   relaxation techniques promoted  Goal: Symptom-Free Urinary Elimination  Outcome: Progressing  Intervention: Prevent or Manage Bladder Irritation  Recent Flowsheet Documentation  Taken 7/10/2024 0800 by Michele Zhang  Pain Management Interventions: medication (see MAR)  Urinary Elimination Promotion: frequent voiding encouraged  Hyperhydration Management: fluids provided  Goal: Diarrhea Symptom Control  Outcome: Progressing  Intervention: Manage Diarrhea  Recent Flowsheet Documentation  Taken 7/10/2024 0800 by Michele Zhang  Fluid/Electrolyte Management: fluids provided  Perineal Care: perineal hygiene encouraged  Goal: Improved Activity Tolerance  Outcome: Progressing  Intervention: Promote Improved Energy  Recent Flowsheet Documentation  Taken 7/10/2024 0800 by Michele Zhang  Activity Management: activity adjusted per tolerance  Environmental Support:   calm environment promoted   caregiver consistency promoted   distractions minimized   rest periods encouraged   personal routine supported  Goal: Blood Counts Within Acceptable Range  Outcome: Progressing  Goal: Absence of Hypersensitivity Reaction  Outcome:  Progressing  Goal: Absence of Infection  Outcome: Progressing  Goal: Improved Oral Mucous Membrane Health and Integrity  Outcome: Progressing  Intervention: Promote Oral Comfort and Health  Recent Flowsheet Documentation  Taken 7/10/2024 0800 by Michele Zhang  Oral Care: oral rinse provided  Goal: Nausea and Vomiting Symptom Relief  Outcome: Progressing  Goal: Optimal Nutrition Intake  Outcome: Progressing  Intervention: Minimize and Manage Barriers to Oral Intake  Recent Flowsheet Documentation  Taken 7/10/2024 0800 by Michele Zhang  Oral Nutrition Promotion: rest periods promoted   Goal Outcome Evaluation:           Overall Patient Progress: improvingOverall Patient Progress: improving

## 2024-07-10 NOTE — PLAN OF CARE
"/70 (BP Location: Left arm)   Pulse 84   Temp 98.9  F (37.2  C) (Axillary)   Resp 16   Ht 1.68 m (5' 6.14\")   Wt 66.5 kg (146 lb 9.6 oz)   SpO2 97%   BMI 23.56 kg/m       Neuro: A&Ox4. No new deficits noted.   Cardiac: Afebrile. OVSS.   Respiratory: Sating at 97% on RA. Denied SOB.  GI/:  denies n/v/d. Adequate urine output. No BM on this shift.   Diet/appetite: high kcal/high protein diet.  Activity:  up at marjan/independent.  Pain: At acceptable level on current regimen. Mucositis pain controlled with PCA dilaudid and  lidocaine solution given x1.   Skin: No new deficits noted.  LDA's: left CVC, tacrolimus infusing at 110 mcg/ml (5.5 ml/hr).    Plan: Magnesium infusing, recheck magnesium level 2-4 hrs after the last dose is infused. No other replacement needed. Continue with POC. Notify primary team with changes.   Problem: Adult Inpatient Plan of Care  Goal: Plan of Care Review  Description: The Plan of Care Review/Shift note should be completed every shift.  The Outcome Evaluation is a brief statement about your assessment that the patient is improving, declining, or no change.  This information will be displayed automatically on your shift  note.  Outcome: Progressing  Flowsheets (Taken 7/10/2024 0103)  Plan of Care Reviewed With: patient  Overall Patient Progress: no change   Goal Outcome Evaluation:      Plan of Care Reviewed With: patient    Overall Patient Progress: no changeOverall Patient Progress: no change           "

## 2024-07-10 NOTE — PROGRESS NOTES
"BMT/Cell Therapy Daily Progress Note   07/10/2024    Patient ID:  Zaheer Borges is a 64 year old male, currently day +12 s/p MA MUD PBSCT for AML.    Admission date: 6/21/2024     Diagnosis AML     BMTCT Type URD Allo     Prep Regimen Bu/Flu     Donor Match and  Source 7/8 URD     GVHD Prophylaxis Tac/MMF/PtCy     Primary BMT MD Dr. Mejias    Clinical Trials MT 2023-33            INTERVAL  HISTORY   Mucositis ongoing--PCA providing relief.  Did take in 3 ensures yesterday.  He thinks he needs a stool softener.  He has a HA this AM he thinks from dilaudid. No fever or rash.      Review of Systems: 10 point ROS negative except as noted above.    PHYSICAL EXAM     KPS:  60    /73 (BP Location: Left arm)   Pulse 77   Temp 97.1  F (36.2  C) (Axillary)   Resp 16   Ht 1.68 m (5' 6.14\")   Wt 66.5 kg (146 lb 9.6 oz)   SpO2 98%   BMI 23.56 kg/m       General: NAD   Mouth: generalized sloughing and erythema; ulceration of b/l retromolar trigone  Lungs: CTAB  Cardiovascular: RRR  Neuro: A&O, speech clear   Abdomen: NT, soft, BS+  Musculoskeletal: grossly normal  Lymph: no LE edema  Access: L Mckinney site NT, dressing cdi. R PAC not accessed    Current aGVHD staging:  start after engraftment      LABS AND IMAGING: I have assessed all abnormal lab values for their clinical significance and any values considered clinically significant have been addressed in the assessment and plan.      Lab Results   Component Value Date    WBC 0.4 (LL) 07/10/2024    ANEU 1.6 07/05/2024    HGB 7.2 (L) 07/10/2024    HCT 19.5 (L) 07/10/2024    PLT 18 (LL) 07/10/2024     (L) 07/10/2024    POTASSIUM 4.1 07/10/2024    CHLORIDE 101 07/10/2024    CO2 26 07/10/2024    GLC 97 07/10/2024    BUN 10.0 07/10/2024    CR 0.57 (L) 07/10/2024    MAG 1.5 (L) 07/10/2024    INR 1.07 07/08/2024    BILITOTAL 0.4 07/08/2024    AST 14 07/08/2024    ALT 26 07/08/2024    ALKPHOS 58 07/08/2024    PROTTOTAL 5.7 (L) 07/08/2024    ALBUMIN 3.8 07/08/2024 "       SYSTEMS-BASED ASSESSMENT AND PLAN     Zaheer Borges is a 64 year old male with long hx of PV and now AML, undergoing URD 7/8 PBSCT with the Optimize trial. Today is day +12.        BMT/IEC PROTOCOL for AML  - Chemo protocol: OPTIMIZE trial              - Day -7: Allopurinol              - Day -6 to day -3: Busulfan/fludarabine              - Day -2: Fludarabine              - Day -1: Rest day              - Day 0: Transplant   - Donor and Recip B+   - Restaging per protocol   - GCSF started day +5, continue until ANC >2.5 x 2 days. Added Claritin 7/4 to mitigate bone pain.     HEME/COAG  #Pancytopenia 2/2 chemo/BMT.   - Transfusion parameters: hemoglobin <7, platelets <10     IMMUNOCOMPROMISED  - Relevant infectious history: tenofovir for hepatitis B core positivity.   - Prophylaxis plan: ACV, letermovir day +14, fabian through day +45. CT Chest clear. Levofloxacin while neutropenic. 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 Tacro/MMF started day +5. Tac 7/8 5.5; gtt increased; next level today.     CARDIOVASCULAR  #Hypertension: Continue PTA amlodipine 5mg/d  - Risk of cardiomyopathy:  Baseline EF 60-65%  - Risk of arrhythmia: Baseline EKG showed Sinus rhythm with sinus arrhythmia     GI/NUTRITION  - Ulcer prophylaxis: protonix  - CINV: Zofran [id number]. Prn Zyrepxa at hs, prn Compazine & Ativan.  - Risk of malnutrition: RD to follow.  - Diarrhea: resolved  - hx Constipation: metamucil daily with prn senna & miralax  - Hemorrhoids: prep H, lidocaine   - Mucositis: start prn lozenges and magic mouthwash (7/5) . Viscous lidocaine. PCA 0.2 q15 (7/8-x)     RENAL/ELECTROLYTES/  - Electrolyte management: replace per sliding scale  # Hypocalcemia in setting of normal albumin- added PO supplement daily    SKIN:   -Rash on hips bilaterally: appeared ~7/5. likely bisulfan rash, continue to monitor    MUSCULOSKELETAL/FRAILTY  - Baseline Frailty Score: 2  - Patient with  substantial risk of sarcopenia  - Daily PT/OT as needed while inpatient  - Cancer Rehab as needed outpatient    SUPPORTIVE CARES  - MSK back pain intermittent: voltaren gel, bengay prn  - Headaches: fioricet, imitrex; add tramadol today per pt request  - Trazodone for sleep     SOCIAL DETERMINANTS  - Caregiver: Pt's primary caregiver will be spouse with family/friends as a secondary or back-up to assist as needed.   - Financial/insurance concerns: no    Known issues that I take into account for medical decisions, with salient changes to the plan considering these complexities noted above.    Patient Active Problem List   Diagnosis    Polycythemia vera (H)    Acute myeloid leukemia (H)    History of polycythemia vera    Primary hypertension    Acute myeloid leukemia in remission (H)     Clinically Significant Risk Factors            # Hypomagnesemia: Lowest Mg = 1.5 mg/dL in last 2 days, will replace as needed     # Thrombocytopenia: Lowest platelets = 17 in last 2 days, will monitor for bleeding   # Hypertension: Noted on problem list           #Precipitous drop in Hgb/Hct: Lowest Hgb this hospitalization: 7.2 g/dL. Will continue to monitor and treat/transfuse as appropriate.              Medically Ready for Discharge: Anticipated in 5+ Days    I spent 30 minutes in the care of this patient today, which included time necessary for preparation for the visit, obtaining history, ordering medications/tests/procedures as medically indicated, review of pertinent medical literature, counseling of the patient, communication of recommendations to the care team, and documentation time.      Tisha Blake pa-c  [phone]    Physician Attestation      I saw and evaluated Zaheer Borges as part of a shared APRN/PA visit.     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 He was diagnosed with sAML in April 2024, BMBx showed 20% blasts,  complex karyotype, TP53 55%, ASXL1 31.5%. He was refractory to 7+3  and was treated with sulema/decitabine. Pre-transplant BMBx showed 4% blasts, normal cytogenetics/ FISH, NGS positive for TP53 (2.4%) and ASXL1 (1.8%). Given high risk disease, residual blasts but in morphologic remission, and good performance status, he is getting a myeloablative (BuFlu) 7/8 URD (DP match, 27yM, B+/B+, CMV R+D-) PBSCT with PTCy(25mg/kgx2)/tac/MMF for GVHD ppx per the Optimize study.     D12. Continues to have mucositis, pain managed by PCA. Has been keeping up with oral intake. White count is 0.4 today, engrafting. Afebrile till date.     I spent 40 minutes on the patient unit personally reviewing medical records and medications, reviewing vital signs, labs, and imaging results as summarized above, discussing the patient's case on rounds with the [facility], intensively monitoring treatments with high risk of toxicity, coordinating care, and documenting in the electronic medical record.      Sravani Ding  Department of Hematology, Oncology and Transplantation  Pager [phone]/Text via [employer]

## 2024-07-10 NOTE — PROGRESS NOTES
CLINICAL NUTRITION SERVICES - BRIEF NOTE     Nutrition Prescription    RECOMMENDATIONS FOR MDs/PROVIDERS TO ORDER:  None at this time     Recommendations already ordered by Registered Dietitian (RD):  None at this time     Future/Additional Recommendations:  -Monitor PO intake  -Monitor wt trends  -Monitor labs      EVALUATION OF THE PROGRESS TOWARD GOALS   Diet: High Kcal/High Protein, PRN snacks/supplements, Ensure Enlive once daily   Intake: Pt reports he drank 1 Ensure Enlive + 2 Boost high protein from home yesterday (this provided 850 kcals and 60g protein, meeting 42% of low-end kcal and 75% of low-end protein needs).      NEW FINDINGS   Met with pt alongside DELFINA at bedside. Pt reports he met this RD's goal of 3 ONS yesterday. He notes his stomach felt off afterwards, however he denies any nausea this morning. Note pt's counts are increasing, hopeful pt will be able to continue to increase PO intake over the next few days.      INTERVENTIONS  Medical food supplement therapy- continue current orders. Goal of 3 Ensure Enlive/Boost high protein daily while working on increasing overall solid food intake with mucositis.    Monitoring/Evaluation  Progress toward goals will be monitored and evaluated per protocol.     Diane Shell (Maggie), JEREMIAH, LD- 5C Clinical Dietitian   Available on Loop App  No longer available by paging

## 2024-07-11 NOTE — PLAN OF CARE
Goal Outcome Evaluation:    Patient still having pain/discomfort with swallowing, using magic mouthwash and PCA dilaudid to help reduce the discomfort. Patient only took one bump form his PCA, states it makes him sleepy. He was able to drink one ensure, and then have some rice cereal with brown sugar and a lemon ice for lunch.

## 2024-07-11 NOTE — PLAN OF CARE
"Afebrile. OVSS. Continues to have pain related to mucositis, using MMW x1 and lidocaine solution x1. Using PCA minimally, used 0.4 mg overnight. No replacements needed this morning. Tac gtt infusing at 5.5 ml/hr. Denies n/v. Still not eating much, continue to encourage 3 ensures a day. Drank all 3 ensures yesterday. Voiding well. 1 bm overnight. Independent. Continue to monitor.         Problem: Adult Inpatient Plan of Care  Goal: Plan of Care Review  Description: The Plan of Care Review/Shift note should be completed every shift.  The Outcome Evaluation is a brief statement about your assessment that the patient is improving, declining, or no change.  This information will be displayed automatically on your shift  note.  Outcome: Not Progressing  Goal: Patient-Specific Goal (Individualized)  Description: You can add care plan individualizations to a care plan. Examples of Individualization might be:  \"Parent requests to be called daily at 9am for status\", \"I have a hard time hearing out of my right ear\", or \"Do not touch me to wake me up as it startles  me\".  Outcome: Not Progressing  Goal: Absence of Hospital-Acquired Illness or Injury  Outcome: Not Progressing  Intervention: Identify and Manage Fall Risk  Recent Flowsheet Documentation  Taken 7/11/2024 0350 by Melissa Charles, RN  Safety Promotion/Fall Prevention:   safety round/check completed   assistive device/personal items within reach   clutter free environment maintained   nonskid shoes/slippers when out of bed   patient and family education   room organization consistent  Taken 7/11/2024 0140 by Melissa Charles, RN  Safety Promotion/Fall Prevention: safety round/check completed  Taken 7/10/2024 2253 by Melissa Charles, RN  Safety Promotion/Fall Prevention:   assistive device/personal items within reach   clutter free environment maintained   nonskid shoes/slippers when out of bed   patient and family education   room organization consistent   safety " round/check completed  Taken 7/10/2024 2000 by Melissa Charles RN  Safety Promotion/Fall Prevention:   assistive device/personal items within reach   clutter free environment maintained   nonskid shoes/slippers when out of bed   patient and family education   room organization consistent   safety round/check completed  Intervention: Prevent Skin Injury  Recent Flowsheet Documentation  Taken 7/10/2024 2000 by Melissa Charles RN  Body Position: position changed independently  Skin Protection: adhesive use limited  Device Skin Pressure Protection: adhesive use limited  Intervention: Prevent Infection  Recent Flowsheet Documentation  Taken 7/10/2024 2000 by Melissa Charles RN  Infection Prevention:   equipment surfaces disinfected   hand hygiene promoted   rest/sleep promoted   single patient room provided   visitors restricted/screened  Goal: Optimal Comfort and Wellbeing  Outcome: Not Progressing  Intervention: Monitor Pain and Promote Comfort  Recent Flowsheet Documentation  Taken 7/11/2024 0350 by Melissa Charles RN  Pain Management Interventions: pain pump in use  Taken 7/10/2024 2253 by Melissa Charles RN  Pain Management Interventions: pain pump in use  Taken 7/10/2024 1945 by Melissa Charles RN  Pain Management Interventions:   medication (see MAR)   pain pump in use  Goal: Readiness for Transition of Care  Outcome: Not Progressing     Problem: Stem Cell/Bone Marrow Transplant  Goal: Optimal Coping with Transplant  Outcome: Not Progressing  Intervention: Optimize Patient/Family Adjustment to Transplant  Recent Flowsheet Documentation  Taken 7/10/2024 2000 by Melissa Charles RN  Supportive Measures:   active listening utilized   self-care encouraged  Goal: Symptom-Free Urinary Elimination  Outcome: Not Progressing  Intervention: Prevent or Manage Bladder Irritation  Recent Flowsheet Documentation  Taken 7/11/2024 0350 by Melissa Charles RN  Pain Management Interventions: pain pump in use  Taken 7/10/2024  2253 by Melissa Charles, RN  Pain Management Interventions: pain pump in use  Taken 7/10/2024 1945 by Melissa Charles RN  Pain Management Interventions:   medication (see MAR)   pain pump in use  Goal: Diarrhea Symptom Control  Outcome: Not Progressing  Intervention: Manage Diarrhea  Recent Flowsheet Documentation  Taken 7/10/2024 2000 by Melissa Charles RN  Skin Protection: adhesive use limited  Fluid/Electrolyte Management: fluids provided  Goal: Improved Activity Tolerance  Outcome: Not Progressing  Intervention: Promote Improved Energy  Recent Flowsheet Documentation  Taken 7/10/2024 2000 by Melissa Charles RN  Sleep/Rest Enhancement:   awakenings minimized   regular sleep/rest pattern promoted  Activity Management: activity adjusted per tolerance  Environmental Support: calm environment promoted  Goal: Absence of Hypersensitivity Reaction  Outcome: Not Progressing  Goal: Absence of Infection  Outcome: Not Progressing  Intervention: Prevent and Manage Infection  Recent Flowsheet Documentation  Taken 7/10/2024 2000 by Melissa Charles RN  Infection Prevention:   equipment surfaces disinfected   hand hygiene promoted   rest/sleep promoted   single patient room provided   visitors restricted/screened  Infection Management: aseptic technique maintained  Isolation Precautions: protective environment maintained  Goal: Improved Oral Mucous Membrane Health and Integrity  Outcome: Not Progressing  Intervention: Promote Oral Comfort and Health  Recent Flowsheet Documentation  Taken 7/10/2024 2000 by Melissa Charles RN  Oral Care: oral rinse provided  Goal: Optimal Nutrition Intake  Outcome: Not Progressing   Goal Outcome Evaluation:

## 2024-07-11 NOTE — PROGRESS NOTES
Home Infusion  Received referral from [facility] for IV Micafungin.  Benefits verified.  Patient has [employer] insurance and is covered 100%.  Called and spoke with Zaheer to review home infusion services, review benefits and offer choice of providers.  Patient would like to remain in the [facility] system and will use [facility] for home infusion.  Confirmed discharge address, phone, and emergency contact information. Patient denies recent illness (not related to pre-existing conditions) or travel in the house hold. Confirmed allergies. No home health agency has been seeing the patient.     Zaheer and Sherri, spouse are willing to learn and manage home IV therapy.  Questions answered.    [facility] will continue to follow until discharge and update pt once final orders are determined.    Thank you for the referral    Daniele Logan LPN, Coordinator   [facility]   [email or web address]  Office: [phone]

## 2024-07-11 NOTE — PROGRESS NOTES
"BMT CLINICAL SOCIAL WORK NOTE:    Focus: Supportive Counseling/Resources/Discharge Planning    Data: Zaheer Borges is a 64 year old male, currently day +13 s/p MA MUD PBSCT for AML.     Interventions: Clinical  (CSW) met with Pt to assess coping, provide supportive counseling and assist with resources as needed. Pt shared that he had no concerns or questions at this time.  Pt shared that his pain is a \"little better than yesterday\"-however still painful. CSW provided empathic listening, validation of concerns, and encouragement. CSW encouraged Pt to contact CSW for support, questions and/or resources.     Assessment: Pt presented as pleasant.  Pt appears to be coping appropriately at this time. Pt continues to be supported by spouse.     Plan: CSW will continue to provide supportive counseling and assistance with resources as needed. CSW will continue to collaborate with multidisciplinary team regarding Pt's plan of care.     FERMIN Sofia, [facility]  Adult Blood & Marrow Transplant   Phone: [phone]  Pager: [phone]  [employer] SEARCHABLE: BMT SW #4  Securely message with FIGS   Support Groups at [facility]: Social Work Services for Cancer Patients ([email or web address])      "

## 2024-07-11 NOTE — PROGRESS NOTES
"BMT/Cell Therapy Daily Progress Note   07/11/2024    Patient ID:  Zaheer Borges is a 64 year old male, currently day +13 s/p MA MUD PBSCT for AML.    Admission date: 6/21/2024     Diagnosis AML     BMTCT Type URD Allo     Prep Regimen Bu/Flu     Donor Match and  Source 7/8 URD     GVHD Prophylaxis Tac/MMF/PtCy     Primary BMT MD Dr. Mejias    Clinical Trials MT 2023-33            INTERVAL  HISTORY     Mucositis still present, wants to keep PCA as same.  Stable loose stools x3. Not eating much, but able to get 3 ensures down. No N/V.   No rashes.       Review of Systems: 10 point ROS negative except as noted above.    PHYSICAL EXAM     KPS:  60    /75 (BP Location: Left arm)   Pulse 90   Temp 97.1  F (36.2  C) (Axillary)   Resp 16   Ht 1.68 m (5' 6.14\")   Wt 65.9 kg (145 lb 4.8 oz)   SpO2 98%   BMI 23.35 kg/m       General: NAD   Mouth: generalized sloughing and erythema; ulceration of b/l retromolar trigone  Lungs: CTAB  Cardiovascular: RRR  Neuro: A&O, speech clear   Abdomen: NT, soft, BS+  Musculoskeletal: grossly normal  Lymph: no LE edema  Access: L Mckinney site NT, dressing cdi. R PAC not accessed    Current aGVHD staging:  start after engraftment      LABS AND IMAGING: I have assessed all abnormal lab values for their clinical significance and any values considered clinically significant have been addressed in the assessment and plan.      Lab Results   Component Value Date    WBC 1.7 (L) 07/11/2024    ANEU 1.0 (L) 07/11/2024    HGB 7.3 (L) 07/11/2024    HCT 20.0 (L) 07/11/2024    PLT 24 (LL) 07/11/2024     07/11/2024    POTASSIUM 4.1 07/11/2024    CHLORIDE 102 07/11/2024    CO2 25 07/11/2024    GLC 94 07/11/2024    BUN 10.3 07/11/2024    CR 0.68 07/11/2024    MAG 1.7 07/11/2024    INR 1.07 07/08/2024    BILITOTAL 0.5 07/11/2024    AST 15 07/11/2024    ALT 26 07/11/2024    ALKPHOS 77 07/11/2024    PROTTOTAL 6.0 (L) 07/11/2024    ALBUMIN 3.7 07/11/2024       SYSTEMS-BASED ASSESSMENT AND PLAN "     Zaheer Borges is a 64 year old male with long hx of PV and now AML, undergoing URD 7/8 PBSCT with the Optimize trial. Today is day +13.        BMT/IEC PROTOCOL for AML  - Chemo protocol: OPTIMIZE trial              - Day -7: Allopurinol              - Day -6 to day -3: Busulfan/fludarabine              - Day -2: Fludarabine              - Day -1: Rest day              - Day 0: Transplant   - Donor and Recip B+   - Restaging per protocol   - GCSF started day +5, continue until ANC >2.5 x 2 days. Added Claritin 7/4 to mitigate bone pain.     HEME/COAG  #Pancytopenia 2/2 chemo/BMT.   - Transfusion parameters: hemoglobin <7, platelets <10     IMMUNOCOMPROMISED  - Relevant infectious history: tenofovir for hepatitis B core positivity.   - Prophylaxis plan: ACV, letermovir day +14, fabian through day +45. CT Chest clear. Levofloxacin while neutropenic. 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 Tacro/MMF started day +5. Tac 7/10 7.0.      CARDIOVASCULAR  #Hypertension: Continue PTA amlodipine 5mg/d  - Risk of cardiomyopathy:  Baseline EF 60-65%  - Risk of arrhythmia: Baseline EKG showed Sinus rhythm with sinus arrhythmia     GI/NUTRITION  - Ulcer prophylaxis: protonix  - CINV: Zofran [id number]. Prn Zyrepxa at hs, prn Compazine & Ativan.  - Risk of malnutrition: RD to follow.  - Diarrhea: resolved  - hx Constipation: metamucil daily with prn senna & miralax  - Hemorrhoids: prep H, lidocaine   - Mucositis: start prn lozenges and magic mouthwash (7/5) . Viscous lidocaine. PCA 0.2 q15 (7/8-x)     RENAL/ELECTROLYTES/  - Electrolyte management: replace per sliding scale  # Hypocalcemia in setting of normal albumin- added PO supplement daily    SKIN:   -Rash on hips bilaterally: appeared ~7/5. likely bisulfan rash, continue to monitor    MUSCULOSKELETAL/FRAILTY  - Baseline Frailty Score: 2  - Patient with substantial risk of sarcopenia  - Daily PT/OT as needed while inpatient  -  Cancer Rehab as needed outpatient    SUPPORTIVE CARES  - MSK back pain intermittent: voltaren gel, bengay prn  - Headaches: fioricet, imitrex; add tramadol today per pt request  - Trazodone for sleep     SOCIAL DETERMINANTS  - Caregiver: Pt's primary caregiver will be spouse with family/friends as a secondary or back-up to assist as needed.   - Financial/insurance concerns: no    Known issues that I take into account for medical decisions, with salient changes to the plan considering these complexities noted above.    Patient Active Problem List   Diagnosis    Polycythemia vera (H)    Acute myeloid leukemia (H)    History of polycythemia vera    Primary hypertension    Acute myeloid leukemia in remission (H)     Clinically Significant Risk Factors            # Hypomagnesemia: Lowest Mg = 1.5 mg/dL in last 2 days, will replace as needed     # Thrombocytopenia: Lowest platelets = 18 in last 2 days, will monitor for bleeding   # Hypertension: Noted on problem list           #Precipitous drop in Hgb/Hct: Lowest Hgb this hospitalization: 7.2 g/dL. Will continue to monitor and treat/transfuse as appropriate.              Medically Ready for Discharge: Anticipated in 5+ Days    I spent 30 minutes in the care of this patient today, which included time necessary for preparation for the visit, obtaining history, ordering medications/tests/procedures as medically indicated, review of pertinent medical literature, counseling of the patient, communication of recommendations to the care team, and documentation time.      Kari Cabral PA-C  [phone]    Physician Attestation  I saw and evaluated Zaheer Borges as part of a shared APRN/PA visit.      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 He was diagnosed with sAML in April 2024, BMBx showed 20% blasts, complex karyotype, TP53 55%, ASXL1 31.5%. He was refractory to 7+3  and was treated  with sulema/decitabine. Pre-transplant BMBx showed 4% blasts, normal cytogenetics/ FISH, NGS positive for TP53 (2.4%) and ASXL1 (1.8%). Given high risk disease, residual blasts but in morphologic remission, and good performance status, he is getting a myeloablative (BuFlu) 7/8 URD (DP match, 26yM, B+/B+, CMV R+D-) PBSCT with PTCy(25mg/kgx2)/tac/MMF for GVHD ppx per the Optimize study.      D13. Mucositis is slowly improving, pain managed by PCA, using modest doses of dilaudid. Has been keeping up with oral intake. Engrafting. Afebrile till date.      I spent 40 minutes on the patient unit personally reviewing medical records and medications, reviewing vital signs, labs, and imaging results as summarized above, discussing the patient's case on rounds with the EL[facility], intensively monitoring treatments with high risk of toxicity, coordinating care, and documenting in the electronic medical record.       Sravani Ding  Department of Hematology, Oncology and Transplantation  Pager [phone]/Text via [employer]

## 2024-07-12 NOTE — PROGRESS NOTES
"BMT/Cell Therapy Daily Progress Note   07/12/2024    Patient ID:  Zaheer Borges is a 64 year old male, currently day +14 s/p MA MUD PBSCT for AML.    Admission date: 6/21/2024     Diagnosis AML     BMTCT Type URD Allo     Prep Regimen Bu/Flu     Donor Match and  Source 7/8 URD     GVHD Prophylaxis Tac/MMF/PtCy     Primary BMT MD Dr. Mejias    Clinical Trials MT 2023-33            INTERVAL  HISTORY     Mucositis slightly improved yesterday, enough to eat some solid food and get ensure down. Does not feel comfortable stopping PCA yet.  No N/V/D.   No infectious symptoms or rashes.    Review of Systems: 10 point ROS negative except as noted above.    PHYSICAL EXAM     KPS:  60    /76 (BP Location: Left arm)   Pulse 80   Temp 97  F (36.1  C) (Axillary)   Resp 18   Ht 1.68 m (5' 6.14\")   Wt 65.9 kg (145 lb 3.2 oz)   SpO2 95%   BMI 23.34 kg/m       General: NAD   Mouth: generalized sloughing and erythema; ulceration of b/l retromolar trigone  Lungs: CTAB  Cardiovascular: RRR  Neuro: A&O, speech clear   Musculoskeletal: grossly normal  Skin: no rashes  Lymph: no LE edema  Access: L Mckinney site NT, dressing cdi. R PAC not accessed    Current aGVHD staging:  skin 0, UGI 0, LGI 0, liver 0       LABS AND IMAGING: I have assessed all abnormal lab values for their clinical significance and any values considered clinically significant have been addressed in the assessment and plan.      Lab Results   Component Value Date    WBC 4.0 07/12/2024    ANEU 3.0 07/12/2024    HGB 6.9 (LL) 07/12/2024    HCT 19.5 (L) 07/12/2024    PLT 36 (LL) 07/12/2024     07/12/2024    POTASSIUM 4.2 07/12/2024    CHLORIDE 101 07/12/2024    CO2 26 07/12/2024    GLC 94 07/12/2024    BUN 9.6 07/12/2024    CR 0.81 07/12/2024    MAG 2.4 (H) 07/12/2024    INR 1.07 07/08/2024    BILITOTAL 0.5 07/11/2024    AST 15 07/11/2024    ALT 26 07/11/2024    ALKPHOS 77 07/11/2024    PROTTOTAL 6.0 (L) 07/11/2024    ALBUMIN 3.7 07/11/2024 "       SYSTEMS-BASED ASSESSMENT AND PLAN     Zaheer Borges is a 64 year old male with long hx of PV and now AML, undergoing URD 7/8 PBSCT with the Optimize trial. Today is day +14.     BMT/IEC PROTOCOL for AML  - Chemo protocol: OPTIMIZE trial              - Day -7: Allopurinol              - Day -6 to day -3: Busulfan/fludarabine              - Day -2: Fludarabine              - Day -1: Rest day              - Day 0: Transplant   - Donor and Recip B+   - Restaging per protocol   - GCSF started day +5, continue until ANC >2.5 x 2 days. Added Claritin 7/4 to mitigate bone pain.     HEME/COAG  #Pancytopenia 2/2 chemo/BMT.   - Transfusion parameters: hemoglobin <7, platelets <10     IMMUNOCOMPROMISED  - Relevant infectious history: tenofovir for hepatitis B core positivity.   - Prophylaxis plan: ACV, letermovir day +14, fabian through day +45. CT Chest clear. Levofloxacin while neutropenic. 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 Tacro/MMF started day +5. Tac 7/10 7.0, level pending (7/12)     CARDIOVASCULAR  #Hypertension: Continue PTA amlodipine 5mg/d  - Risk of cardiomyopathy:  Baseline EF 60-65%  - Risk of arrhythmia: Baseline EKG showed Sinus rhythm with sinus arrhythmia     GI/NUTRITION  - Ulcer prophylaxis: protonix  - CINV: Zofran [id number]. Prn Zyrepxa at hs, prn Compazine & Ativan.  - Risk of malnutrition: RD to follow.  - Diarrhea: resolved  - hx Constipation: metamucil daily with prn senna & miralax  - Hemorrhoids: prep H, lidocaine   - Mucositis: start prn lozenges and magic mouthwash (7/5) . Viscous lidocaine. PCA 0.2 q15 (7/8-x)     RENAL/ELECTROLYTES/  - Electrolyte management: replace per sliding scale  # Hypocalcemia in setting of normal albumin- added PO supplement daily    SKIN:   -Rash on hips bilaterally: appeared ~7/5. likely bisulfan rash, continue to monitor    MUSCULOSKELETAL/FRAILTY  - Baseline Frailty Score: 2  - Patient with substantial risk  of sarcopenia  - Daily PT/OT as needed while inpatient  - Cancer Rehab as needed outpatient    SUPPORTIVE CARES  - MSK back pain intermittent: voltaren gel, bengay prn  - Headaches: fioricet, imitrex; add tramadol today per pt request  - Trazodone for sleep     SOCIAL DETERMINANTS  - Caregiver: Pt's primary caregiver will be spouse with family/friends as a secondary or back-up to assist as needed.   - Financial/insurance concerns: no    Known issues that I take into account for medical decisions, with salient changes to the plan considering these complexities noted above.    Patient Active Problem List   Diagnosis    Polycythemia vera (H)    Acute myeloid leukemia (H)    History of polycythemia vera    Primary hypertension    Acute myeloid leukemia in remission (H)     Clinically Significant Risk Factors            # Hypomagnesemia: Lowest Mg = 1.4 mg/dL in last 2 days, will replace as needed     # Thrombocytopenia: Lowest platelets = 24 in last 2 days, will monitor for bleeding   # Hypertension: Noted on problem list           #Precipitous drop in Hgb/Hct: Lowest Hgb this hospitalization: 6.9 g/dL. Will continue to monitor and treat/transfuse as appropriate.              Medically Ready for Discharge: Anticipated in 5+ Days    I spent 30 minutes in the care of this patient today, which included time necessary for preparation for the visit, obtaining history, ordering medications/tests/procedures as medically indicated, review of pertinent medical literature, counseling of the patient, communication of recommendations to the care team, and documentation time.      Kari Cabral PA-C  [phone]    Physician Attestation  I saw and evaluated Zaheer Borges as part of a shared APRN/PA visit.      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 He was diagnosed with sAML in April 2024, BMBx showed 20% blasts, complex karyotype, TP53  55%, ASXL1 31.5%. He was refractory to 7+3  and was treated with sulema/decitabine. Pre-transplant BMBx showed 4% blasts, normal cytogenetics/ FISH, NGS positive for TP53 (2.4%) and ASXL1 (1.8%). Given high risk disease, residual blasts but in morphologic remission, and good performance status, he is getting a myeloablative (BuFlu) 7/8 URD (DP match, 27yM, B+/B+, CMV R+D-) PBSCT with PTCy(25mg/kgx2)/tac/MMF for GVHD ppx per the Optimize study.      D14. Mucositis is slowly improving. Using minimal dilaudid PCA. Has been keeping up with oral intake. Ambulating. Engrafting. Afebrile till date. Anticipate discharge by the end of the week.     I spent 40 minutes on the patient unit personally reviewing medical records and medications, reviewing vital signs, labs, and imaging results as summarized above, discussing the patient's case on rounds with the [facility], intensively monitoring treatments with high risk of toxicity, coordinating care, and documenting in the electronic medical record.       Sravani Ding  Department of Hematology, Oncology and Transplantation  Pager [phone]/Text via [email or web address]

## 2024-07-12 NOTE — PROGRESS NOTES
Blood and Marrow Transplant Discharge Teach    RNCC met with Zaheer and wife, Sherri, to discuss upcoming discharge. Reviewed plan for line care supplies, PT/OT recommendations and upcoming clinic visits.    Patient demonstrates understanding of the following:  Which situations necessitate calling provider and whom to contact: Yes  Proper use and care of (medical equipment, care aids, etc.) Yes  Reviewed Post Allo Transplant guidelines and patient verbalizes understanding    Infection Control:  Patient instructed on hand hygiene: Yes  Signs and symptoms of infection taught: Yes    For CAR-T patient: Verified that patient has product specific wallet card. Patient instructed to remain within close proximity of facility (within 30-40 minutes per program standard) for at least four weeks post infusion. CAR-T patient is instructed not to operate motorized vehicle or heavy machinery for a period of 8 weeks.    Time spent with patient: 30 minutes.  Specific Concerns: NA    Discharge location: Home    [ x ] Home Infusion Company: [facility]      [ x ] Pharmacy needs: Micafungin (home)     Tacrolimus $0     MMF $0     Prevymis- $0, PA approval 06/25    [ x ] Can fill meds at  pharmacy (BMT benefits soft check): yes    [ x ] PT/OT recommendations: home with assist    [ x ] 1st time discharge? Yes    [ x ] Discharge teach    [ x ] Micafungin teach- done 07/12    [ x ] Weekly Lab Day Preference? Monday, Wednesday, or Friday mornings    [ x ] D0 BAN scheduling notification    [ x ] clonoSEQ testing needed? no    [ x ] Car-T wallet card - NA     Claudine Soto  BMT Nurse Coordinator  Phone: [phone]  Pager: [phone]

## 2024-07-12 NOTE — PLAN OF CARE
Goal Outcome Evaluation:    Patient pain is improved, didn't take any PCA bump form his dilaudid PCA.  Did request magic mouthwash and the lidocaine to have at bedside to help with his throat discomfort. His only nutritional intake was his ensure this shift.

## 2024-07-12 NOTE — PLAN OF CARE
VSS on room air. Mild pain from mucositis. Pain pump in place, patient got one bump this shift. Lidocaine given, effective. Mild nausea, compazine given and effective. Voiding without issues. No BM shift. Tac gtt infusing. Mag replaced this shift, recheck at 0800. 1 unit of PRBCs infusing, recheck tomorrow AM. Lactic was triggered, 0.8. Patient rested well overnight.     Problem: Adult Inpatient Plan of Care  Goal: Optimal Comfort and Wellbeing  Outcome: Progressing  Intervention: Monitor Pain and Promote Comfort  Recent Flowsheet Documentation  Taken 7/12/2024 0305 by Juan R Wu, RN  Pain Management Interventions: pain pump in use  Taken 7/11/2024 2318 by Juan R Wu, RN  Pain Management Interventions: pain pump in use  Taken 7/11/2024 1939 by Juan R Wu, RN  Pain Management Interventions: pain pump in use

## 2024-07-12 NOTE — PROGRESS NOTES
Home Infusion  Zaheer is expected to discharge today and will be going home on IV micafungin 3x/week.   He has never done home IV therapy before nor has his wife Sherri and they want instruction and confirmation of competency with administration.   Met with Zaheer and Sherri at bedside.  Provided update on discharge plans and instructed in micafungin administration via elastomeric ball with SAS flushing.  Informed them about need for both lumens to receive daily flush of NS.   Had both Zaheer and Sherri perform hands on with practice equipment and teaching sheets.    Provided information about supplies and supply delivery, storage of medication, checking of label, dosing frequency, plan for CVAD dressing changes and labs in clinic and [facility] 24/7 support while on the IV therapy.   Zaheer and Sherri verbalized understanding of information given.  They demonstrated very good technique with practice and verbalized good understanding of process.  Stated they feels comfortable with administering the fabian independently and would prefer to do it at home vs clinic.   [facility] weekend nurse will reach out to Zaheer/Sherri for details on delivery of drug and supplies once discharge is confirmed and orders complete.  Madonna RIZZONI  Nurse Liaison  [email or web address]  [address] [place] [address]  [email or web address]  [phone] M-F I [facility] main: [phone]

## 2024-07-13 NOTE — PLAN OF CARE
[id number]: VSS on RA. Pain 3/10. Used 1 bump from PCA. No Bms overnight. Drank an ensure. No replacements needed with AM labs.

## 2024-07-13 NOTE — PLAN OF CARE
"Afebrile, vital signs stable. Alert and oriented.  Discontinued dilaudid PCA, rates mucositis throat pain 1-2/10. Lidocaine x1. Drank two ensure.  Transitioned MMF to oral and Plan to transition tacrolimus to oral tonight at 2000.   Mild nausea, zofran x1.  Diarrhea x3, imodium x1.   Possible discharge tomorrow.        Problem: Adult Inpatient Plan of Care  Goal: Plan of Care Review  Description: The Plan of Care Review/Shift note should be completed every shift.  The Outcome Evaluation is a brief statement about your assessment that the patient is improving, declining, or no change.  This information will be displayed automatically on your shift  note.  Outcome: Progressing  Goal: Patient-Specific Goal (Individualized)  Description: You can add care plan individualizations to a care plan. Examples of Individualization might be:  \"Parent requests to be called daily at 9am for status\", \"I have a hard time hearing out of my right ear\", or \"Do not touch me to wake me up as it startles  me\".  Outcome: Progressing  Goal: Absence of Hospital-Acquired Illness or Injury  Outcome: Progressing  Intervention: Identify and Manage Fall Risk  Recent Flowsheet Documentation  Taken 7/13/2024 1723 by Alondra Leavitt, RN  Safety Promotion/Fall Prevention: safety round/check completed  Taken 7/13/2024 1600 by Alondra Leavitt, RN  Safety Promotion/Fall Prevention:   assistive device/personal items within reach   nonskid shoes/slippers when out of bed   patient and family education   safety round/check completed   clutter free environment maintained  Goal: Optimal Comfort and Wellbeing  Outcome: Progressing  Goal: Readiness for Transition of Care  Outcome: Progressing     "

## 2024-07-13 NOTE — PROGRESS NOTES
"BMT/Cell Therapy Daily Progress Note   07/13/2024    Patient ID:  Zaheer Borges is a 64 year old male, currently day +15 s/p MA MUD PBSCT for AML.    Admission date: 6/21/2024     Diagnosis AML     BMTCT Type URD Allo     Prep Regimen Bu/Flu     Donor Match and  Source 7/8 URD     GVHD Prophylaxis Tac/MMF/PtCy     Primary BMT MD Dr. Mejias    Clinical Trials MT 2023-33            INTERVAL  HISTORY     Mucositis much better. Ok with stopping PCA. Goal to discharge tomorrow, will transition all IV to PO and see how he does. No diarrhea. No N/V. No rashes.   Eating has improved.    Review of Systems: 10 point ROS negative except as noted above.    PHYSICAL EXAM     KPS:  60    /78 (BP Location: Left arm)   Pulse 82   Temp 98.4  F (36.9  C) (Oral)   Resp 20   Ht 1.68 m (5' 6.14\")   Wt 66.1 kg (145 lb 11.2 oz)   SpO2 97%   BMI 23.42 kg/m       General: NAD   Mouth: generalized sloughing and erythema; ulceration of b/l retromolar trigone  Lungs: CTAB  Cardiovascular: RRR  Neuro: A&O, speech clear   Musculoskeletal: grossly normal  Skin: no rashes  Lymph: no LE edema  Access: L Mckinney site NT, dressing cdi. R PAC not accessed    Current aGVHD staging:  skin 0, UGI 0, LGI 0, liver 0       LABS AND IMAGING: I have assessed all abnormal lab values for their clinical significance and any values considered clinically significant have been addressed in the assessment and plan.      Lab Results   Component Value Date    WBC 7.1 07/13/2024    ANEU 6.1 07/13/2024    HGB 8.2 (L) 07/13/2024    HCT 23.8 (L) 07/13/2024    PLT 52 (L) 07/13/2024     07/13/2024    POTASSIUM 4.1 07/13/2024    CHLORIDE 102 07/13/2024    CO2 26 07/13/2024    GLC 91 07/13/2024    BUN 10.2 07/13/2024    CR 0.85 07/13/2024    MAG 1.7 07/13/2024    INR 1.07 07/08/2024    BILITOTAL 0.5 07/11/2024    AST 15 07/11/2024    ALT 26 07/11/2024    ALKPHOS 77 07/11/2024    PROTTOTAL 6.0 (L) 07/11/2024    ALBUMIN 3.7 07/11/2024       SYSTEMS-BASED " ASSESSMENT AND PLAN     Zaheer Borges is a 64 year old male with long hx of PV and now AML, undergoing URD 7/8 PBSCT with the Optimize trial. Today is day +15.     BMT/IEC PROTOCOL for AML  - Chemo protocol: OPTIMIZE trial              - Day -7: Allopurinol              - Day -6 to day -3: Busulfan/fludarabine              - Day -2: Fludarabine              - Day -1: Rest day              - Day 0: Transplant   - Donor and Recip B+   - Restaging per protocol   - GCSF started day +5, continue until ANC >2.5 x 2 days. Added Claritin 7/4 to mitigate bone pain. Stop (7/13)     HEME/COAG  #Pancytopenia 2/2 chemo/BMT.   - Transfusion parameters: hemoglobin <7, platelets <10     IMMUNOCOMPROMISED  - Relevant infectious history: tenofovir for hepatitis B core positivity.   - Prophylaxis plan: ACV, letermovir day +14, fabian through day +45. CT Chest clear. Levofloxacin while neutropenic. 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 Tacro/MMF started day +5. Tac 7/12= 12.0, decreased gtt. Start PO 2.5mg today.     CARDIOVASCULAR  #Hypertension: Continue PTA amlodipine 5mg/d  - Risk of cardiomyopathy:  Baseline EF 60-65%  - Risk of arrhythmia: Baseline EKG showed Sinus rhythm with sinus arrhythmia     GI/NUTRITION  - Ulcer prophylaxis: protonix  - CINV: Zofran [id number]. Prn Zyrepxa at hs, prn Compazine & Ativan.  - Risk of malnutrition: RD to follow.  - Diarrhea: resolved  - hx Constipation: metamucil daily with prn senna & miralax  - Hemorrhoids: prep H, lidocaine   - Mucositis: start prn lozenges and magic mouthwash (7/5) . Viscous lidocaine. PCA 0.2 q15 (7/8-7/13). PO oxycodone, IV dilaudid for breakthrough pain.     RENAL/ELECTROLYTES/  - Electrolyte management: replace per sliding scale  # Hypocalcemia in setting of normal albumin- added PO supplement daily    SKIN:   -Rash on hips bilaterally: appeared ~7/5. likely bisulfan rash, continue to  monitor    MUSCULOSKELETAL/FRAILTY  - Baseline Frailty Score: 2  - Patient with substantial risk of sarcopenia  - Daily PT/OT as needed while inpatient  - Cancer Rehab as needed outpatient    SUPPORTIVE CARES  - MSK back pain intermittent: voltaren gel, bengay prn  - Headaches: fioricet, imitrex; add tramadol today per pt request  - Trazodone for sleep     SOCIAL DETERMINANTS  - Caregiver: Pt's primary caregiver will be spouse with family/friends as a secondary or back-up to assist as needed.   - Financial/insurance concerns: no    Known issues that I take into account for medical decisions, with salient changes to the plan considering these complexities noted above.    Patient Active Problem List   Diagnosis    Polycythemia vera (H)    Acute myeloid leukemia (H)    History of polycythemia vera    Primary hypertension    Acute myeloid leukemia in remission (H)     Clinically Significant Risk Factors            # Hypomagnesemia: Lowest Mg = 1.4 mg/dL in last 2 days, will replace as needed     # Thrombocytopenia: Lowest platelets = 36 in last 2 days, will monitor for bleeding   # Hypertension: Noted on problem list           #Precipitous drop in Hgb/Hct: Lowest Hgb this hospitalization: 6.9 g/dL. Will continue to monitor and treat/transfuse as appropriate.              Medically Ready for Discharge: Anticipated in 5+ Days    I spent 30 minutes in the care of this patient today, which included time necessary for preparation for the visit, obtaining history, ordering medications/tests/procedures as medically indicated, review of pertinent medical literature, counseling of the patient, communication of recommendations to the care team, and documentation time.      Kari Cabral PA-C  [phone]      Physician Attestation  I saw and evaluated Zaheer Borges as part of a shared APRN/PA visit.      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 He was diagnosed with sAML in April 2024, BMBx showed 20% blasts, complex karyotype, TP53 55%, ASXL1 31.5%. He was refractory to 7+3  and was treated with sulema/decitabine. Pre-transplant BMBx showed 4% blasts, normal cytogenetics/ FISH, NGS positive for TP53 (2.4%) and ASXL1 (1.8%). Given high risk disease, residual blasts but in morphologic remission, and good performance status, he is getting a myeloablative (BuFlu) 7/8 URD (DP match, 27yM, B+/B+, CMV R+D-) PBSCT with PTCy(25mg/kgx2)/tac/MMF for GVHD ppx per the Optimize study.      D15. Mucositis is slowly improving, will discontinue PCA and change medications to oral. Eating solid food is difficult but does well with ensure. Ambulating. Anticipate discharge tomorrow.     I spent 40 minutes on the patient unit personally reviewing medical records and medications, reviewing vital signs, labs, and imaging results as summarized above, discussing the patient's case on rounds with the [facility], intensively monitoring treatments with high risk of toxicity, coordinating care, and documenting in the electronic medical record.       Sravani Ding  Department of Hematology, Oncology and Transplantation  Pager [phone]/Text via [employer]

## 2024-07-13 NOTE — PROGRESS NOTES
BMT day +14. Reports pain 2/10 of mouth and throat, which is tolerable, did not use PCA for dilaudid. Requested benzocaine-menthol lozenge and lidocaine solution for throat pain. Reported nausea, no vomiting. PRN zofran given. 1 loose BM this shift. Continues on tac gtt, rate was changed from 110 to 80 mcg/hr. Appetite fair, ate most of dinner from home. Showered this shift, up ad marjan, walked payne. Home infusion nurse did education on line care and micafungin administration at home. Pt and wife verbalized understanding and performed good technique.

## 2024-07-14 PROBLEM — Z94.81: Status: ACTIVE | Noted: 2024-01-01

## 2024-07-14 NOTE — PLAN OF CARE
1930-0730: D+16 Allo. VSS on RA. Oral pain 3/10 and managed with lozenge. IV Compazine given for nausea with good effect. Walked in the hallways. No replacements needed this morning. Plan to discharge today.

## 2024-07-14 NOTE — PROGRESS NOTES
"BMT/Cell Therapy Daily Progress Note   07/14/2024    Patient ID:  Zaheer Borges is a 64 year old male, currently day +16 s/p MA MUD PBSCT for AML.    Admission date: 6/21/2024     Diagnosis AML     BMTCT Type URD Allo     Prep Regimen Bu/Flu     Donor Match and  Source 7/8 URD     GVHD Prophylaxis Tac/MMF/PtCy     Primary BMT MD Dr. Mejias    Clinical Trials MT 2023-33            INTERVAL  HISTORY     Mucositis still present, but he thinks easily manageable at home with lidocaine and oxycodone.  Eating impaired by mucositis, but improving, able to get solid food down yesterday. Tolerating PO meds.  No rashes, N/V. Loose stools, but somewhat formed and no belly pain.      Review of Systems: 10 point ROS negative except as noted above.    PHYSICAL EXAM     KPS:  60    /72 (BP Location: Left arm)   Pulse 85   Temp 98.1  F (36.7  C) (Oral)   Resp 20   Ht 1.68 m (5' 6.14\")   Wt 65 kg (143 lb 4.8 oz)   SpO2 96%   BMI 23.03 kg/m       General: NAD   Mouth: generalized sloughing and erythema; ulceration of b/l retromolar trigone- improving  Lungs: CTAB  Cardiovascular: RRR  Neuro: A&O, speech clear   Musculoskeletal: grossly normal  Skin: no rashes  Lymph: no LE edema  Access: L Mckinney site NT, dressing cdi. R PAC not accessed    Current aGVHD staging:  skin 0, UGI 0, LGI 0, liver 0       LABS AND IMAGING: I have assessed all abnormal lab values for their clinical significance and any values considered clinically significant have been addressed in the assessment and plan.      Lab Results   Component Value Date    WBC 4.3 07/14/2024    ANEU 6.1 07/13/2024    HGB 7.9 (L) 07/14/2024    HCT 22.4 (L) 07/14/2024    PLT 72 (L) 07/14/2024     07/14/2024    POTASSIUM 4.2 07/14/2024    CHLORIDE 103 07/14/2024    CO2 26 07/14/2024    GLC 97 07/14/2024    BUN 10.8 07/14/2024    CR 0.79 07/14/2024    MAG 1.6 (L) 07/14/2024    INR 1.07 07/08/2024    BILITOTAL 0.5 07/11/2024    AST 15 07/11/2024    ALT 26 07/11/2024    " ALKPHOS 77 07/11/2024    PROTTOTAL 6.0 (L) 07/11/2024    ALBUMIN 3.7 07/11/2024       SYSTEMS-BASED ASSESSMENT AND PLAN     Zaheer Borges is a 64 year old male with long hx of PV and now AML, undergoing URD 7/8 PBSCT with the Optimize trial. Today is day +16.     BMT/IEC PROTOCOL for AML  - Chemo protocol: OPTIMIZE trial              - Day -7: Allopurinol              - Day -6 to day -3: Busulfan/fludarabine              - Day -2: Fludarabine              - Day -1: Rest day              - Day 0: Transplant   - Donor and Recip B+   - Restaging per protocol   - GCSF started day +5, continue until ANC >2.5 x 2 days. Added Claritin 7/4 to mitigate bone pain. Stop (7/13)     HEME/COAG  #Pancytopenia 2/2 chemo/BMT.   - Transfusion parameters: hemoglobin <7, platelets <10     IMMUNOCOMPROMISED  - Relevant infectious history: tenofovir for hepatitis B core positivity.   - Prophylaxis plan: ACV, letermovir day +14, fabian through day +45 (home MWF [facility]). CT Chest clear. Levofloxacin stopped with engraftment. 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 Tacro/MMF started day +5. Tac 7/12= 12.0, decreased gtt. Start PO 2.5mg (7/14) repeat level pending.     CARDIOVASCULAR  #Hypertension: Continue PTA amlodipine 5mg/d  - Risk of cardiomyopathy:  Baseline EF 60-65%  - Risk of arrhythmia: Baseline EKG showed Sinus rhythm with sinus arrhythmia     GI/NUTRITION  - Ulcer prophylaxis: protonix  - CINV: Zofran prn, compazine prn, ativan prn   - urosodiol to +60  - Risk of malnutrition: RD to follow.  - Diarrhea: resolved  - hx Constipation: metamucil daily with prn senna & miralax  - Hemorrhoids: prep H  - Mucositis: start prn lozenges and magic mouthwash (7/5) . Viscous lidocaine. PCA 0.2 q15 (7/8-7/13).    *Discharge sent with 4 days of PO oxycodone 5mg. Viscous lidocaine.     RENAL/ELECTROLYTES/  - Electrolyte management: replace per sliding scale  # Hypocalcemia in setting of normal  albumin- added PO supplement daily  #Hypomagnesemia 2/2 Tacrolimus- increased PO dose to 3 tabs BID on discharge      MUSCULOSKELETAL/FRAILTY  - Baseline Frailty Score: 2  - Patient with substantial risk of sarcopenia  - Daily PT/OT as needed while inpatient  - Cancer Rehab as needed outpatient    SUPPORTIVE CARES  - Trazodone for sleep     SOCIAL DETERMINANTS  - Caregiver: Pt's primary caregiver will be spouse with family/friends as a secondary or back-up to assist as needed.   - Financial/insurance concerns: no    Known issues that I take into account for medical decisions, with salient changes to the plan considering these complexities noted above.    Patient Active Problem List   Diagnosis    Polycythemia vera (H)    Acute myeloid leukemia (H)    History of polycythemia vera    Primary hypertension    Acute myeloid leukemia in remission (H)    H/O allogeneic bone marrow transplant (H)     Clinically Significant Risk Factors            # Hypomagnesemia: Lowest Mg = 1.6 mg/dL in last 2 days, will replace as needed     # Thrombocytopenia: Lowest platelets = 52 in last 2 days, will monitor for bleeding   # Hypertension: Noted on problem list           #Precipitous drop in Hgb/Hct: Lowest Hgb this hospitalization: 6.9 g/dL. Will continue to monitor and treat/transfuse as appropriate.              Medically Ready for Discharge: today    I spent 60 minutes in the care of this patient today, which included time necessary for preparation for the visit, obtaining history, ordering medications/tests/procedures as medically indicated, review of pertinent medical literature, counseling of the patient, communication of recommendations to the care team, and documentation time.      Kari Cabral PA-C  [phone]

## 2024-07-14 NOTE — DISCHARGE SUMMARY
[facility] Discharge Summary   Zaheer Borges MRN# [id number]   Age: 64 year old  YOB: 1960   Date of Admission: 6/21/2024  Date of Discharge:  7/14/24  Admitting Physician: Sebas Hernandez MD  Discharge Physician:  Dr. Ding  Discharge Diagnoses:    S/p allogenic bmt  AML  Hx polycythemia vera  Pancytopenia 2/2 chemo: anemia, thrombocytopenia, leukopenia  Mucositis  CINV  Hypomagnesemia  HTN  Discharge Medications:         Medication List        Started      acyclovir 800 MG tablet  Commonly known as: ZOVIRAX  800 mg, Oral, 2 TIMES DAILY  Replaces: acyclovir 200 MG capsule     calcium citrate 950 (200 Ca) MG tablet  Commonly known as: CITRACAL  950 mg, Oral, DAILY  Start taking on: July 15, 2024     letermovir 480 MG Tabs tablet  Commonly known as: PREVYMIS  480 mg, Oral, DAILY  Start taking on: July 15, 2024     lidocaine (viscous) 2 % solution  Commonly known as: XYLOCAINE  5 mLs, Mouth/Throat, EVERY 2 HOURS PRN, ; Max 8 doses/24 hour period.     loperamide 2 MG capsule  Commonly known as: IMODIUM  2-4 mg, Oral, 4 TIMES DAILY PRN     magic mouthwash suspension (diphenhydrAMINE, lidocaine, aluminum-magnesium & simethicone) compounding kit  10 mLs, Swish & Swallow, EVERY 6 HOURS PRN     magnesium plus protein 133 MG tablet  399 mg, Oral, 2 TIMES DAILY     micafungin 300 mg  Start taking on: July 15, 2024     mycophenolate 500 MG tablet  Commonly known as: GENERIC EQUIVALENT  1,000 mg, Oral, EVERY 8 HOURS     oxyCODONE 5 MG tablet  Commonly known as: ROXICODONE  5-10 mg, Oral, EVERY 6 HOURS PRN     pantoprazole 40 MG EC tablet  Commonly known as: PROTONIX  40 mg, Oral, EVERY MORNING BEFORE BREAKFAST  Start taking on: July 15, 2024     pramox-pe-glycerin-petrolatum 1-0.25-14.4-15 % Crea cream  Commonly known as: PREPARATION H  Rectal, 3 TIMES DAILY     prochlorperazine 5 MG tablet  Commonly known as: COMPAZINE  5 mg, Oral, EVERY 6 HOURS PRN     psyllium Wafr  2 Wafers, Oral, DAILY  Start taking on:  July 15, 2024     sennosides 8.6 MG tablet  Commonly known as: SENOKOT  1-2 tablets, Oral, 2 TIMES DAILY PRN     * tacrolimus 1 MG capsule  Commonly known as: GENERIC EQUIVALENT  Use two 1mg tabs (2mg) in addition to one 0.5mg tabs for a total of 2.5mg twice daily.     * tacrolimus 0.5 MG capsule  Commonly known as: GENERIC EQUIVALENT  Take one 0.5mg tab in addition to two 1mg tabs for a total of 2.5mg twice daily.     ursodiol 300 MG capsule  Commonly known as: ACTIGALL  300 mg, Oral, 3 TIMES DAILY           * This list has 2 medication(s) that are the same as other medications prescribed for you. Read the directions carefully, and ask your doctor or other care provider to review them with you.                Modified      amLODIPine 5 MG tablet  Commonly known as: NORVASC  5 mg, Oral, EVERY EVENING  What changed: when to take this     LORazepam 0.5 MG tablet  Commonly known as: ATIVAN  0.5 mg, Oral, EVERY 6 HOURS PRN  What changed:   medication strength  how much to take  when to take this  reasons to take this     ondansetron 8 MG tablet  Commonly known as: ZOFRAN  8 mg, Oral, EVERY 8 HOURS PRN  What changed: reasons to take this            Discontinued      acetaminophen 500 MG tablet  Commonly known as: TYLENOL     acyclovir 200 MG capsule  Commonly known as: ZOVIRAX  Replaced by: acyclovir 800 MG tablet     cetirizine HCl 10 MG Caps     fluconazole 200 MG tablet  Commonly known as: DIFLUCAN     levofloxacin 250 MG tablet  Commonly known as: LEVAQUIN     loratadine 10 MG tablet  Commonly known as: CLARITIN     MULTIVITAMIN ADULT PO            Brief History of Illness:    **Adopted from H&P  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 On routine testing, noted to have 27% blasts in April 2024. AML confirmed with BMBx. Started daunorubicin and cytarabine on 4/12. Residual disease noted on repeat bone marrow biopsy so re-induction of chemotherapy started on 5/2 with decitabine and  venetoclax. Now presenting for 7/8 URD PBSCT.      No acute medical complaints on admission.  He is feeling well with no sick contacts or symptoms concerning for infection. Calendar, medications, and expectations for upcoming transplant process were reviewed to the patient and his wife, Sherri's stated satisfaction.      Diagnosis and Treatment Summary         Hematologic history:  Patient has a long history of PV which was managed by Hydrea and phlebotomy. On routine testing, noted to have 27% blasts in April 2024. AML confirmed with BMBx. Started daunorubicin and cytarabine on 4/12. Residual disease noted on repeat bone marrow biopsy so re-induction of chemotherapy started on 5/2 with decitabine and venetoclax.      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  20% myeloid blasts in a hypercellular bone marrow (70% cellular)  Cytogenetic results:  45,XY,psu dic(22;5)(q12;q11.1)[2]/43-44,sl,-16,-18,add(20)(p11.2),+0-1mar[cp13]/44,sdl, +ester(5;22)(p10;p10),-psu dic(22;5)[2]/46,XY[5]  FLT3 mutation results:  Negative  Molecular results:    TP53 c.659A>C, p.[id number] ([id number]) VAF: 55.0%  ASXL1 c.2604del, p.[id number] ([id number]) VAF: 31.5%       Peripheral blood, morphology:   Normocytic normochromic anemia  Neutropenia and lymphopenia with 16% circulating blasts  Mild thrombocytopenia with atypical platelet morphology and circulating micromegakaryocytes     Comment:  Given the known history of polycythemia vera, this is classified as blast phase.       Preliminary results are discussed with Dr. Coto on 4/12/24.   Dr. DOWLING reviewed the case and concurs with the diagnosis.      See attached Cytogenetics results from [facility].   Electronically signed by Kourtney Israel MD on 4/25/2024 at  6:29 PM Preliminary result electronically signed by Kourtney Israel MD on 4/16/2024 at  3:23  PM Preliminary result electronically signed by Kourtney Israel MD on 4/15/2024 at  3:39 PM      4/12/24 NGS  TIER 1: Variants of Known Clinical Significance in Hematologic   Malignancies     1. TP53 c.659A>C, p.[id number] ([id number])   VAF: 55.0%   TP53 encodes an important tumor suppressor (p53) that regulates   cell cycle progression, apoptosis, DNA repair, and metabolic   changes (7). Somatic mutations of TP53 are found in 5-10% of de   kathy acute myeloid leukemia (AML), in 24-48% of patients with   secondary AML (15) (19), and in 25-40% of patients with   therapy-related myeloid neoplasms, including AML (11) (17).   Pathogenic germline mutations in TP53 are associated with   Li-Fraumeni syndrome (OMA: 568063), a cancer predisposition   syndrome associated with a high, lifelong risk of a broad spectrum   of cancers (4) (10). This particular mutation occurs in the   DNA-binding domain and is predicted to alter the normal function   of the tumor suppressor p53 (12). In AML, TP53 mutations are   commonly associated with complex karyotype and suboptimal overall   patient outcome, even after hematopoietic stem cell   transplantation (9) (13) (14). In newly-diagnosed [id number]   AML, variant allele frequencies greater than 40% or biallelic TP53   mutations are independently associated with higher rates of   relapse and inferior overall survival regardless of the type of   therapy received (18). In patients with therapy-related myeloid   neoplasms, TP53 mutations are frequently biallelic and associated   with complex karyotype and shorter overall survival (11) (17).   Please note that the variant allele frequency is high, suggesting   that this TP53 mutation may be homozygous or hemizygous in the   neoplastic cells due to copy neutral loss of heterozygosity   (CN-MELIDA) or deletion of 17p (6). Correlation with cytogenetic   findings is recommended.     2. ASXL1 c.2604del, p.[id number] ([id number])   VAF: 31.5%    ASXL1 encodes a protein that interacts with polycomb complex   proteins and chromatin remodelers to control gene expression (5).   Somatic ASXL1 mutations are found in 6.5% of de kathy AML patients   and in 30% of patients with secondary AML (5). ASXL1 mutations are   also seen in AML, myelodysplasia related (AML-MR) (11) (8) (1). In   myeloid malignancies, acquired ASXL1 mutations are often exon 12   frameshift or nonsense mutations (3) (9) (16). This mutation is   predicted to alter the normal function of ASXL1 (2). ASXL1   mutations are associated with poor prognosis in myeloid   malignancies, including AML (5) (9).     TIER 2: Variants of Unknown Clinical Significance in Hematologic   Malignancies     1. RUNX1 c.26C>T, p.Ibd1Liu ([id number])   VAF: 50.1%   This variant has not been reported in hematologic malignancies, to   the best of our knowledge.      4/30/24 BONE MARROW BIOPSY FINAL DIAGNOSIS   Bone marrow aspirate, clot section, and trephine core biopsy:  Residual leukemic blasts, 12% of bone marrow cells  Hypocellular bone marrow (average 10% cellularity) with trilineage hypoplasia      BONE MARROW DIFFERENTIAL: Performed on touch imprint.  Blasts:  12 %   Neutrophils & Precursors: 2 %   Lymphocytes:  69 %   Monocytes:  0 %   Eosinophils & Precursors:   0 %   Basophils & Precursors:  0 %   Plasma Cells:  5 %   Erythrocyte Precursors:  12 %      Date Treatment Response Toxicities/Complications   4/12/24 7+3        5/2/24 Dec/ven x 2 cycles    none                               Lab Values     I have assessed all abnormal lab values for their clinical significance and any values considered clinically significant have been addressed in the assessment and plan.   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 Today is day +16.      BMT/IEC PROTOCOL for AML  - Chemo protocol: OPTIMIZE trial              - Day -7:  Allopurinol              - Day -6 to day -3: Busulfan/fludarabine              - Day -2: Fludarabine              - Day -1: Rest day              - Day 0: Transplant   - Donor and Recip B+   - Restaging per protocol   - GCSF started day +5, continue until ANC >2.5 x 2 days. Added Claritin 7/4 to mitigate bone pain. Stop (7/13)     HEME/COAG  #Pancytopenia 2/2 chemo/BMT.   - Transfusion parameters: hemoglobin <7, platelets <10     IMMUNOCOMPROMISED  - Relevant infectious history: tenofovir for hepatitis B core positivity.   - Prophylaxis plan: ACV, letermovir day +14, fabian through day +45 (home MWF [facility]). CT Chest clear. Levofloxacin stopped with engraftment. 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 Tacro/MMF started day +5. Tac 7/12= 12.0, decreased gtt. Start PO 2.5mg (7/14) repeat level pending.     CARDIOVASCULAR  #Hypertension: Continue PTA amlodipine 5mg/d  - Risk of cardiomyopathy:  Baseline EF 60-65%  - Risk of arrhythmia: Baseline EKG showed Sinus rhythm with sinus arrhythmia     GI/NUTRITION  - Ulcer prophylaxis: protonix  - CINV: Zofran prn, compazine prn, ativan prn   - urosodiol to +60  - Risk of malnutrition: RD to follow.  - Diarrhea: resolved  - hx Constipation: metamucil daily with prn senna & miralax  - Hemorrhoids: prep H  - Mucositis: start prn lozenges and magic mouthwash (7/5) . Viscous lidocaine. PCA 0.2 q15 (7/8-7/13).               *Discharge sent with 4 days of PO oxycodone 5mg. Viscous lidocaine.     RENAL/ELECTROLYTES/  - Electrolyte management: replace per sliding scale  # Hypocalcemia in setting of normal albumin- added PO supplement daily  #Hypomagnesemia 2/2 Tacrolimus- increased PO dose to 3 tabs BID on discharge        MUSCULOSKELETAL/FRAILTY  - Baseline Frailty Score: 2  - Patient with substantial risk of sarcopenia  - Daily PT/OT as needed while inpatient  - Cancer Rehab as needed outpatient     SUPPORTIVE CARES  - Trazodone  for sleep     SOCIAL DETERMINANTS  - Caregiver: Pt's primary caregiver will be spouse with family/friends as a secondary or back-up to assist as needed.   - Financial/insurance concerns: no     Known issues that I take into account for medical decisions, with salient changes to the plan considering these complexities noted above.         Patient Active Problem List   Diagnosis    Polycythemia vera (H)    Acute myeloid leukemia (H)    History of polycythemia vera    Primary hypertension    Acute myeloid leukemia in remission (H)    H/O allogeneic bone marrow transplant (H)         Clinically Significant Risk Factors            # Hypomagnesemia: Lowest Mg = 1.6 mg/dL in last 2 days, will replace as needed     # Thrombocytopenia: Lowest platelets = 52 in last 2 days, will monitor for bleeding   # Hypertension: Noted on problem list             #Precipitous drop in Hgb/Hct: Lowest Hgb this hospitalization: 6.9 g/dL. Will continue to monitor and treat/transfuse as appropriate.                 Discharge Instructions and Follow-Up:    Discharge diet: Regular diet as tolerated  Discharge activity: Activity as tolerated   Discharge follow-up: Follow up with BMT Clinic as follows:  7/15 BMT clinic 130p lab, 145p provider visit, 215 pharmacy    Discharge Disposition:    Discharged to home.    Kari Cabral PA-C  7/14/2024    I spent 60 minutes in the care of this patient today, which included time necessary for preparation for the visit, obtaining history, ordering medications/tests/procedures as medically indicated, review of pertinent medical literature, counseling of the patient, communication of recommendations to the care team, and documentation time.    Advice for Patients concerning COVID19:  a. Avoid contact with individuals:   i. Who are sick or have recently been sick  ii. Have traveled to high risk areas (per CDC guidelines) or have been on a cruise in the last 14 days  iii. Who were or could have been exposed to  COVID-19   b. If experiencing symptoms such as: Fever, cough or shortness of breath contact BMT at [phone] Mon-Fri 8am-4:30pm or After Hours at [phone] (ask to speak to a BMT Fellow) for guidance on need for clinical assessment  c. Avoid all non- essential travel at this time; if traveling is necessary use mask (N-95)   d. Wear a mask when in public areas  d. Avoid crowded places, if possible  f. Follow CDC advice https://www.cdc.gov/coronavirus/2019-ncov/index.html and travel guidelines https://www.cdc.gov/coronavirus/2019-ncov/travelers/index.html

## 2024-07-14 NOTE — PROGRESS NOTES
Pt discharged to: Home  Via: private car  Time: 1515  Reason not before 11am or 2 hrs after order written: Delayed for discharge teaching when family present.   Accompanied by: spouse  Belongings: remain with patient.   Teaching: completed per unit protocol.   Cap and line flushed with caregiver: completed per unit protocol.   Central line teach back questions complete: completed per unit protocol.   Pill box filled: completed per unit protocol.   Clinic appointment: July 15th, 2024  Report called/faxed: N/A  Local housing: N/A

## 2024-07-15 NOTE — PROGRESS NOTES
BMT/Cell Therapy Daily Progress Note        Patient ID:  Zaheer Borges is a 64 year old male, currently day +17 s/p MA MUD PBSCT for AML.     Admission date: 6/21/2024      Diagnosis AML     BMTCT Type URD Allo     Prep Regimen Bu/Flu     Donor Match and  Source 7/8 URD     GVHD Prophylaxis Tac/MMF/PtCy     Primary BMT MD Dr. Mejias    Clinical Trials MT 2023-33             INTERVAL  HISTORY      Discharged yesterday. Mucositis improving. Ate well yesterday at home. No n/v. Stools unchanged. No rashes.   Pharmacy appt this afternoon.     Review of Systems: 10 point ROS negative except as noted above.     PHYSICAL EXAM      KPS:  60  /82   Pulse 87   Temp 98.5  F (36.9  C) (Oral)   Resp 16   Wt 65.2 kg (143 lb 11.2 oz)   SpO2 97%   BMI 23.09 kg/m         General: NAD   Mouth: clear  Lungs: CTAB  Cardiovascular: RRR  Neuro: A&O, speech clear   Musculoskeletal: grossly normal  Skin: no rashes  Lymph: no LE edema  Access: L Mckinney site NT, dressing cdi. R PAC not accessed     Current aGVHD staging:  skin 0, UGI 0, LGI 0, liver 0         LABS AND IMAGING: I have assessed all abnormal lab values for their clinical significance and any values considered clinically significant have been addressed in the assessment and plan.       Lab Results   Component Value Date    WBC 2.4 (L) 07/15/2024    ANEU 6.1 07/13/2024    HGB 9.6 (L) 07/15/2024    HCT 27.1 (L) 07/15/2024     (L) 07/15/2024     07/14/2024    POTASSIUM 4.2 07/14/2024    CHLORIDE 103 07/14/2024    CO2 26 07/14/2024    GLC 97 07/14/2024    BUN 10.8 07/14/2024    CR 0.79 07/14/2024    MAG 1.6 (L) 07/14/2024    INR 1.07 07/08/2024       SYSTEMS-BASED ASSESSMENT AND PLAN      Zaheer Borges is a 64 year old male with long hx of PV and now AML, undergoing URD 7/8 PBSCT with the Optimize trial. Today is day +16.      BMT/IEC PROTOCOL for AML  - Chemo protocol: OPTIMIZE trial              - Day -7: Allopurinol              - Day -6 to day -3:  Busulfan/fludarabine              - Day -2: Fludarabine              - Day -1: Rest day              - Day 0: Transplant   - Donor and Recip B+   - Restaging per protocol   - GCSF started day +5, continue until ANC >2.5 x 2 days. Added Claritin 7/4 to mitigate bone pain. Stop (7/13)     HEME/COAG  #Pancytopenia 2/2 chemo/BMT.   - Transfusion parameters: hemoglobin <7, platelets <10     IMMUNOCOMPROMISED  - Relevant infectious history: tenofovir for hepatitis B core positivity.   - Prophylaxis plan: ACV, letermovir day +14, [facility]45 [facility] CT Chest clear. Levofloxacin stopped with engraftment. 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 Tacro/MMF started day +5. Tac 7/12= 12.0, decreased gtt. Start PO 2.5mg (7/14) level 5.6-took dose this morning 7/15 so will recheck Wed 7/17     CARDIOVASCULAR  #Hypertension: Continue PTA amlodipine 5mg/d  - Risk of cardiomyopathy:  Baseline EF 60-65%  - Risk of arrhythmia: Baseline EKG showed Sinus rhythm with sinus arrhythmia     GI/NUTRITION  - Ulcer prophylaxis: protonix  - CINV: Zofran prn, compazine prn, ativan prn   - urosodiol to +60  - Risk of malnutrition: RD to follow.  - Diarrhea: resolved  - hx Constipation: metamucil daily with prn senna & miralax  - Hemorrhoids: prep H  - Mucositis: start prn lozenges and magic mouthwash (7/5) . Viscous lidocaine. PCA 0.2 q15 (7/8-7/13).               *Discharge sent with 4 days of PO oxycodone 5mg. Viscous lidocaine.     RENAL/ELECTROLYTES/  - Electrolyte management: replace per sliding scale  # Hypocalcemia in setting of normal albumin- added PO supplement daily  #Hypomagnesemia 2/2 Tacrolimus- increased PO dose to 3 tabs BID on discharge        MUSCULOSKELETAL/FRAILTY  - Baseline Frailty Score: 2  - Patient with substantial risk of sarcopenia  - Daily PT/OT as needed while inpatient  - Cancer Rehab as needed outpatient     SUPPORTIVE CARES  - Trazodone for  sleep     SOCIAL DETERMINANTS  - Caregiver: Pt's primary caregiver will be spouse with family/friends as a secondary or back-up to assist as needed.   - Financial/insurance concerns: no    RETURN TO CLINIC:   Wednesday for labs, provider    I spent 30 minutes in the care of this patient today, which included time necessary for preparation for the visit, obtaining history, ordering medications/tests/procedures as medically indicated, review of pertinent medical literature, counseling of the patient, communication of recommendations to the care team, and documentation time.    The longitudinal plan of care for the diagnosis(es)/condition(s) as documented were addressed during this visit. Due to the added complexity in care, I will continue to support Olgajune in the subsequent management and with ongoing continuity of care.      Lorraine Flores NP

## 2024-07-15 NOTE — PROGRESS NOTES
I met with Zaheer Borges to review his medication list after hospital discharge post-transplant. Zaheer Borges and his caregiver had the opportunity to ask questions regarding his therapy which were answered to their satisfaction. We specifically discussed the following items:    Changes made to scheduled medication list by today's provider include:  Added: none  Deleted: none  Changed: none    We discussed estimated durations, pertinent side effects, appropriate timing, med box management, and tacrolimus level process.  I assessed his med box which was filled correctly.     Current Outpatient Medications   Medication Sig Dispense Refill    acyclovir (ZOVIRAX) 200 MG capsule Take 4 capsules (800 mg) by mouth 2 times daily 240 capsule 2    amLODIPine (NORVASC) 5 MG tablet Take 1 tablet (5 mg) by mouth every evening 30 tablet 1    calcium citrate (CITRACAL) 950 (200 Ca) MG tablet Take 1 tablet (950 mg) by mouth daily 60 tablet 1    letermovir (PREVYMIS) 480 MG TABS tablet Take 1 tablet (480 mg) by mouth daily 30 tablet 2    lidocaine, viscous, (XYLOCAINE) 2 % solution Take 5 mLs by mouth every 2 hours as needed ; Max 8 doses/24 hour period. 100 mL 0    loperamide (IMODIUM) 2 MG capsule Take 1-2 capsules (2-4 mg) by mouth 4 times daily as needed for diarrhea 30 capsule 0    LORazepam (ATIVAN) 0.5 MG tablet Take 1 tablet (0.5 mg) by mouth every 6 hours as needed for anxiety (nausea/vomiting/sleep) 30 tablet 0    magic mouthwash suspension, diphenhydrAMINE, lidocaine, aluminum-magnesium & simethicone, (FIRST-MOUTHWASH BLM) compounding kit Swish and swallow 10 mLs in mouth every 6 hours as needed for mouth sores 119 mL 0    micafungin 300 mg Inject 300 mg into the vein Every Mon, Wed, Fri Morning      mycophenolate (GENERIC EQUIVALENT) 500 MG tablet Take 2 tablets (1,000 mg) by mouth every 8 hours 120 tablet 0    ondansetron (ZOFRAN) 8 MG tablet Take 1 tablet (8 mg) by mouth every 8 hours as needed 30 tablet 0    oxyCODONE  (ROXICODONE) 5 MG tablet Take 1-2 tablets (5-10 mg) by mouth every 6 hours as needed for moderate pain 16 tablet 0    pantoprazole (PROTONIX) 40 MG EC tablet Take 1 tablet (40 mg) by mouth every morning (before breakfast) 60 tablet 1    pramox-pe-glycerin-petrolatum (PREPARATION H) 1-0.25-14.4-15 % CREA cream Place rectally 3 times daily 51 g 0    prochlorperazine (COMPAZINE) 5 MG tablet Take 1 tablet (5 mg) by mouth every 6 hours as needed for nausea or vomiting 30 tablet 0    psyllium (METAMUCIL) WAFR Take 2 Wafers by mouth daily 24 Wafer 0    sennosides (SENOKOT) 8.6 MG tablet Take 1-2 tablets by mouth 2 times daily as needed for constipation 30 tablet 0    Specialty Vitamins Products (MAGNESIUM PLUS PROTEIN) 133 MG tablet Take 3 tablets (399 mg) by mouth 2 times daily 120 tablet 2    tacrolimus (GENERIC EQUIVALENT) 0.5 MG capsule Take one 0.5mg tab in addition to two 1mg tabs for a total of 2.5mg twice daily. 60 capsule 0    tacrolimus (GENERIC EQUIVALENT) 1 MG capsule Use two 1mg tabs (2mg) in addition to one 0.5mg tabs for a total of 2.5mg twice daily. 120 capsule 0    tenofovir (VIREAD) 300 MG tablet Take 1 tablet (300 mg) by mouth daily 30 tablet 0    traZODone (DESYREL) 100 MG tablet Take 1 tablet (100 mg) by mouth at bedtime 30 tablet 0    ursodiol (ACTIGALL) 300 MG capsule Take 1 capsule (300 mg) by mouth 3 times daily 135 capsule 0        Pertinent labs considered today:  Lab Results   Component Value Date     07/15/2024                 normal sodium 136-148  Lab Results   Component Value Date    POTASSIUM 4.4 07/15/2024    POTASSIUM 4.4 07/24/2020       normal potassium 3.5-5.2   Lab Results   Component Value Date    CHLORIDE 100 07/15/2024    CHLORIDE 107 07/24/2020           normal chloride    Lab Results   Component Value Date    CO2 26 07/15/2024    CO2 26 07/24/2020                normal CO2 20-32  Lab Results   Component Value Date    BUN 16.0 07/15/2024    BUN 11 07/24/2020                  normal BUN 5-24  Lab Results   Component Value Date     07/15/2024     07/24/2020                 normal glucose   Lab Results   Component Value Date    CR 0.70 07/15/2024                 normal cr 0.8-1.5  Lab Results   Component Value Date    ARISTIDES 9.2 07/15/2024               normal calcium  8.5-10.4  Lab Results   Component Value Date    BILITOTAL 0.5 07/11/2024          normal bilirubin 0.2-1.3  Lab Results   Component Value Date    PROTTOTAL 6.0 07/11/2024          normal total protein 6.0-8.2  Lab Results   Component Value Date    ALBUMIN 3.7 07/11/2024    ALBUMIN 4.3 02/07/2020             normal albumin 3.2-4.5  Lab Results   Component Value Date    ALKPHOS 77 07/11/2024            normal alkphos   Lab Results   Component Value Date    ALT 26 07/11/2024         normal ALT 0-65  Lab Results   Component Value Date    AST 15 07/11/2024         normal AST 0-37  Lab Results   Component Value Date    HGB 9.6 07/15/2024    HGB 15.5 06/28/2021     Lab Results   Component Value Date    WBC 2.4 07/15/2024    WBC 3.9 06/28/2021      Lab Results   Component Value Date     07/15/2024     06/28/2021     Estimated Creatinine Clearance: 99.6 mL/min (based on SCr of 0.7 mg/dL).      Juanito Alonso, [facility], PharmD

## 2024-07-15 NOTE — NURSING NOTE
"Oncology Rooming Note    July 15, 2024 2:17 PM   Zaheer Borges is a 64 year old male who presents for:    Chief Complaint   Patient presents with    Oncology Clinic Visit     Acute Myeloid leukemia      Initial Vitals: /82   Pulse 87   Temp 98.5  F (36.9  C) (Oral)   Resp 16   Wt 65.2 kg (143 lb 11.2 oz)   SpO2 97%   BMI 23.09 kg/m   Estimated body mass index is 23.09 kg/m  as calculated from the following:    Height as of 6/21/24: 1.68 m (5' 6.14\").    Weight as of this encounter: 65.2 kg (143 lb 11.2 oz). Body surface area is 1.74 meters squared.  No Pain (0) Comment: Data Unavailable   No LMP for male patient.  Allergies reviewed: Yes  Medications reviewed: Yes    Medications: Medication refills not needed today.  Pharmacy name entered into [employer]    [facility] #[id number] [place], [place] - [address] [place] [address]  AT [facility] OF [facility] [address] [place] [address] PHARMACY # [id number] [place], [place] - [address]    Frailty Screening:   Is the patient here for a new oncology consult visit in cancer care? 2. No      Clinical concerns:  none      uNris Fang              "

## 2024-07-15 NOTE — NURSING NOTE
Chief Complaint   Patient presents with    Oncology Clinic Visit     Acute Myeloid leukemia     Blood Draw     Labs drawn via CVC by RN in lab     Labs collected from CVC by RN, line flushed with saline and heparin.  Vitals taken. Pt checked in for appointment(s).   Pt did not hold tacrolimus    Myesha ANGELA RN PHN BSN  BMT/Oncology Lab

## 2024-07-15 NOTE — LETTER
7/15/2024      Zaheer Borges  [address] [place] [id number]      Dear Colleague,    Thank you for referring your patient, Zaheer Borges, to the [facility] [place]. Please see a copy of my visit note below.    BMT/Cell Therapy Daily Progress Note        Patient ID:  Zaheer Borges is a 64 year old male, currently day +17 s/p MA MUD PBSCT for AML.     Admission date: 6/21/2024      Diagnosis AML     BMTCT Type URD Allo     Prep Regimen Bu/Flu     Donor Match and  Source 7/8 URD     GVHD Prophylaxis Tac/MMF/PtCy     Primary BMT MD Dr. Mejias    Clinical Trials MT 2023-33             INTERVAL  HISTORY      Discharged yesterday. Mucositis improving. Ate well yesterday at home. No n/v. Stools unchanged. No rashes.   Pharmacy appt this afternoon.     Review of Systems: 10 point ROS negative except as noted above.     PHYSICAL EXAM      KPS:  60  /82   Pulse 87   Temp 98.5  F (36.9  C) (Oral)   Resp 16   Wt 65.2 kg (143 lb 11.2 oz)   SpO2 97%   BMI 23.09 kg/m         General: NAD   Mouth: clear  Lungs: CTAB  Cardiovascular: RRR  Neuro: A&O, speech clear   Musculoskeletal: grossly normal  Skin: no rashes  Lymph: no LE edema  Access: L Mckinney site NT, dressing cdi. R PAC not accessed     Current aGVHD staging:  skin 0, UGI 0, LGI 0, liver 0         LABS AND IMAGING: I have assessed all abnormal lab values for their clinical significance and any values considered clinically significant have been addressed in the assessment and plan.       Lab Results   Component Value Date    WBC 2.4 (L) 07/15/2024    ANEU 6.1 07/13/2024    HGB 9.6 (L) 07/15/2024    HCT 27.1 (L) 07/15/2024     (L) 07/15/2024     07/14/2024    POTASSIUM 4.2 07/14/2024    CHLORIDE 103 07/14/2024    CO2 26 07/14/2024    GLC 97 07/14/2024    BUN 10.8 07/14/2024    CR 0.79 07/14/2024    MAG 1.6 (L) 07/14/2024    INR 1.07 07/08/2024       SYSTEMS-BASED ASSESSMENT AND PLAN      Zaheer Borges is a 64 year  old male with long hx of PV and now AML, undergoing URD 7/8 PBSCT with the Optimize trial. Today is day +16.      BMT/IEC PROTOCOL for AML  - Chemo protocol: OPTIMIZE trial              - Day -7: Allopurinol              - Day -6 to day -3: Busulfan/fludarabine              - Day -2: Fludarabine              - Day -1: Rest day              - Day 0: Transplant   - Donor and Recip B+   - Restaging per protocol   - GCSF started day +5, continue until ANC >2.5 x 2 days. Added Claritin 7/4 to mitigate bone pain. Stop (7/13)     HEME/COAG  #Pancytopenia 2/2 chemo/BMT.   - Transfusion parameters: hemoglobin <7, platelets <10     IMMUNOCOMPROMISED  - Relevant infectious history: tenofovir for hepatitis B core positivity.   - Prophylaxis plan: ACV, letermovir day +14, fabian through day +45 (home M[facility]). CT Chest clear. Levofloxacin stopped with engraftment. 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 Tacro/MMF started day +5. Tac 7/12= 12.0, decreased gtt. Start PO 2.5mg (7/14) level 5.6-took dose this morning 7/15 so will recheck Wed 7/17     CARDIOVASCULAR  #Hypertension: Continue PTA amlodipine 5mg/d  - Risk of cardiomyopathy:  Baseline EF 60-65%  - Risk of arrhythmia: Baseline EKG showed Sinus rhythm with sinus arrhythmia     GI/NUTRITION  - Ulcer prophylaxis: protonix  - CINV: Zofran prn, compazine prn, ativan prn   - urosodiol to +60  - Risk of malnutrition: RD to follow.  - Diarrhea: resolved  - hx Constipation: metamucil daily with prn senna & miralax  - Hemorrhoids: prep H  - Mucositis: start prn lozenges and magic mouthwash (7/5) . Viscous lidocaine. PCA 0.2 q15 (7/8-7/13).               *Discharge sent with 4 days of PO oxycodone 5mg. Viscous lidocaine.     RENAL/ELECTROLYTES/  - Electrolyte management: replace per sliding scale  # Hypocalcemia in setting of normal albumin- added PO supplement daily  #Hypomagnesemia 2/2 Tacrolimus- increased PO dose to 3 tabs  BID on discharge        MUSCULOSKELETAL/FRAILTY  - Baseline Frailty Score: 2  - Patient with substantial risk of sarcopenia  - Daily PT/OT as needed while inpatient  - Cancer Rehab as needed outpatient     SUPPORTIVE CARES  - Trazodone for sleep     SOCIAL DETERMINANTS  - Caregiver: Pt's primary caregiver will be spouse with family/friends as a secondary or back-up to assist as needed.   - Financial/insurance concerns: no    RETURN TO CLINIC:   Wednesday for labs, provider    I spent 30 minutes in the care of this patient today, which included time necessary for preparation for the visit, obtaining history, ordering medications/tests/procedures as medically indicated, review of pertinent medical literature, counseling of the patient, communication of recommendations to the care team, and documentation time.    The longitudinal plan of care for the diagnosis(es)/condition(s) as documented were addressed during this visit. Due to the added complexity in care, I will continue to support Olgajune in the subsequent management and with ongoing continuity of care.      Lorraine Flores NP

## 2024-07-16 NOTE — PROGRESS NOTES
BMT/Cell Therapy Daily Progress Note        Patient ID:  Zaheer Borges is a 64 year old male, currently day +19 s/p MA MUD PBSCT for AML.         Diagnosis AML     BMTCT Type URD Allo     Prep Regimen Bu/Flu     Donor Match and  Source 7/8 URD     GVHD Prophylaxis Tac/MMF/PtCy     Primary BMT MD Dr. Mejias    Clinical Trials MT 2023-33             INTERVAL  HISTORY   Here for follow-up. Some intermittent nausea, no emesis. Loose stool a couple days ago, better now. Taking prn Zofran and metamucil. His hands feel hot/burning, suspect from Busulfan. Using cold packs. Difficult to sleep d/t the discomfort. No rash. No fevers. Some intermittent aches also, R cheek, R thigh. Would like to try Claritin again.      Review of Systems: 10 point ROS negative except as noted above.     PHYSICAL EXAM      KPS:  70  Blood pressure 104/75, pulse 86, temperature 98.3  F (36.8  C), temperature source Oral, resp. rate 18, weight 64.9 kg (143 lb), SpO2 98%.  Wt Readings from Last 4 Encounters:   07/17/24 64.9 kg (143 lb)   07/15/24 65.2 kg (143 lb 11.2 oz)   07/14/24 65 kg (143 lb 4.8 oz)   06/10/24 67.5 kg (148 lb 12.8 oz)       General: NAD   Mouth: OP moist without lesions  Lungs: CTAB  Cardiovascular: RRR  GI: +bs, soft, NT/ND  Neuro: non-focal  Musculoskeletal: grossly normal  Skin: no rashes; hands/palms appear normal  Lymph: no LE edema  Access: L Mckinney site NT, dressing cdi. R PAC not accessed     Acute GVHD          7/17/2024    17:27   Acute GVHD   New evidence of Acute Wxsgm-Txdokh-Pxpv Disease developed since last entry? No          LABS: I have assessed all abnormal lab values for their clinical significance and any values considered clinically significant have been addressed in the assessment and plan.      Lab Results   Component Value Date    WBC 2.4 (L) 07/17/2024    ANEU 1.4 (L) 07/17/2024    HGB 9.2 (L) 07/17/2024    HCT 27.2 (L) 07/17/2024     (L) 07/17/2024     07/17/2024    POTASSIUM 4.1 07/17/2024     CHLORIDE 101 07/17/2024    CO2 25 07/17/2024     (H) 07/17/2024    BUN 18.6 07/17/2024    CR 0.76 07/17/2024    MAG 1.7 07/17/2024    INR 1.07 07/08/2024    BILITOTAL 0.4 07/17/2024    AST 15 07/17/2024    ALT 21 07/17/2024    ALKPHOS 85 07/17/2024    PROTTOTAL 6.4 07/17/2024    ALBUMIN 4.2 07/17/2024         ASSESSMENT AND PLAN      Zaheer Borges is a 64 year old male with long hx of PV and now AML, undergoing URD 7/8 PBSCT with the Optimize trial. Today is day +19       BMT/IEC PROTOCOL for AML  - Chemo protocol: OPTIMIZE trial; Bu/flu  - Donor and Recip B+   - Restaging per protocol   - last dose GCSF 7/12. Re-dose prn ANC </=1000. ANC 1.4 today.     HEME/COAG  #Pancytopenia 2/2 chemo/BMT. Plts improving   - Transfusion parameters: hemoglobin <7, platelets <10     IMMUNOCOMPROMISED  - Relevant infectious history: tenofovir for hepatitis B core positivity.   - Prophylaxis plan: ACV, letermovir day +14, fabian through day +45 (home MWF [facility]). CT Chest clear. Levofloxacin stopped with engraftment. 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 Tacro/MMF started day +5.   Tac level 5.6 (7/14); pending today     CARDIOVASCULAR  #Hypertension: amlodipine   - Risk of cardiomyopathy:  Baseline EF 60-65%  - Risk of arrhythmia: Baseline EKG showed Sinus rhythm with sinus arrhythmia     GI/NUTRITION  - Ulcer prophylaxis: protonix  - CINV: Zofran prn, compazine prn, ativan prn   - ursodiol to +60  - Diarrhea: resolved  - hx Constipation: metamucil daily with prn senna & miralax  - Hemorrhoids: prep H  - Mucositis: start prn lozenges and magic mouthwash (7/5) . Viscous lidocaine. PCA 0.2 q15 (7/8-7/13).               *Discharge sent with 4 days of PO oxycodone 5mg. Viscous lidocaine.     RENAL/ELECTROLYTES/  - Electrolyte management: replace per sliding scale  # Hypocalcemia in setting of normal albumin- added PO supplement daily  #Hypomagnesemia 2/2 Tacrolimus- increased  PO dose to 3 tabs BID at discharge. Mg 1.7 today.        MUSCULOSKELETAL/FRAILTY  - Baseline Frailty Score: 2  - Patient with substantial risk of sarcopenia  - Cancer Rehab as needed outpatient     SUPPORTIVE CARES  - Trazodone prn sleep  #suspect hand/foot syndrome 2/2 Busulfan - affecting his hands with painful burning.   - cont ice packs prn. Add gabapentin 100mg at hs and titrate to effect. Also Rx TMC ointment. Should be self limiting.     SOCIAL DETERMINANTS  - Caregiver: Pt's primary caregiver will be spouse with family/friends as a secondary or back-up to assist as needed.   - Financial/insurance concerns: no    Summary:  Trial walt at hs, TMC ointment for burning hand pain likely 2/2 Busulfan  RTC Friday for labs, follow-up  Cont fabian MWF at home  Add labs, ELIA visit next Tuesday 7/23 and Monday 7/29.  Has labs, BMBx Friday 7/26 (no provider appts available this day currently)  Follow-up with Dr. Mejias Monday 8/5    I spent 40 minutes in the care of this patient today, which included time necessary for preparation for the visit, obtaining history, ordering medications/tests/procedures as medically indicated, review of pertinent medical literature, counseling of the patient, communication of recommendations to the care team, and documentation time.    The longitudinal plan of care for the diagnosis(es)/condition(s) as documented were addressed during this visit. Due to the added complexity in care, I will continue to support Zaheer in the subsequent management and with ongoing continuity of care.    Daina Booth PA-C

## 2024-07-17 NOTE — NURSING NOTE
"Oncology Rooming Note    July 17, 2024 4:34 PM   Zaheer Borges is a 64 year old male who presents for:    Chief Complaint   Patient presents with    Blood Draw     History of AML. Presents to lab for blood draw via CVC.     Oncology Clinic Visit     Acute myeloid leukemia     Initial Vitals: /75 (BP Location: Right arm, Patient Position: Sitting, Cuff Size: Adult Regular)   Pulse 86   Temp 98.3  F (36.8  C) (Oral)   Resp 18   Wt 64.9 kg (143 lb)   SpO2 98%   BMI 22.98 kg/m   Estimated body mass index is 22.98 kg/m  as calculated from the following:    Height as of 6/21/24: 1.68 m (5' 6.14\").    Weight as of this encounter: 64.9 kg (143 lb). Body surface area is 1.74 meters squared.  Data Unavailable Comment: Data Unavailable   No LMP for male patient.  Allergies reviewed: Yes  Medications reviewed: Yes    Medications: Medication refills not needed today.  Pharmacy name entered into [employer]:    [facility] #[id number] [address]  AT [facility] OF [address] PHARMACY # [id number] - [place], [place] - [address]    Frailty Screening:   Is the patient here for a new oncology consult visit in cancer care? 2. No      Clinical concerns: Patient wanted to discuss skin sensitivity in hands, heard of neurontin.  Daina was notified.      Kimberly Borges EMT            "

## 2024-07-17 NOTE — NURSING NOTE
Vital signs and weight obtained. Patient complaints of generalized bone pains. Labs drawn via CVC as ordered. Last tacro dose was Tuesday, 7/16 at 2200 hours.    Both lumens of CVC flushed with saline and heparin locked. Caps changed. Dressing due to be changed. Message sent to clinic.

## 2024-07-17 NOTE — LETTER
7/17/2024      Zaheer Borges  [address] [place] [id number]      Dear Colleague,    Thank you for referring your patient, Zaheer Borges, to the [facility] BLOOD AND MARROW TRANSPLANT PROGRAM [place]. Please see a copy of my visit note below.    BMT/Cell Therapy Daily Progress Note        Patient ID:  Zaheer Borges is a 64 year old male, currently day +19 s/p MA MUD PBSCT for AML.         Diagnosis AML     BMTCT Type URD Allo     Prep Regimen Bu/Flu     Donor Match and  Source 7/8 URD     GVHD Prophylaxis Tac/MMF/PtCy     Primary BMT MD Dr. Mejias    Clinical Trials MT 2023-33             INTERVAL  HISTORY   Here for follow-up. Some intermittent nausea, no emesis. Loose stool a couple days ago, better now. Taking prn Zofran and metamucil. His hands feel hot/burning, suspect from Busulfan. Using cold packs. Difficult to sleep d/t the discomfort. No rash. No fevers. Some intermittent aches also, R cheek, R thigh. Would like to try Claritin again.      Review of Systems: 10 point ROS negative except as noted above.     PHYSICAL EXAM      KPS:  70  Blood pressure 104/75, pulse 86, temperature 98.3  F (36.8  C), temperature source Oral, resp. rate 18, weight 64.9 kg (143 lb), SpO2 98%.  Wt Readings from Last 4 Encounters:   07/17/24 64.9 kg (143 lb)   07/15/24 65.2 kg (143 lb 11.2 oz)   07/14/24 65 kg (143 lb 4.8 oz)   06/10/24 67.5 kg (148 lb 12.8 oz)       General: NAD   Mouth: OP moist without lesions  Lungs: CTAB  Cardiovascular: RRR  GI: +bs, soft, NT/ND  Neuro: non-focal  Musculoskeletal: grossly normal  Skin: no rashes; hands/palms appear normal  Lymph: no LE edema  Access: L Mckinney site NT, dressing cdi. R PAC not accessed     Acute GVHD          7/17/2024    17:27   Acute GVHD   New evidence of Acute Lnvfl-Mszktk-Jced Disease developed since last entry? No          LABS: I have assessed all abnormal lab values for their clinical significance and any values considered clinically significant have been  addressed in the assessment and plan.      Lab Results   Component Value Date    WBC 2.4 (L) 07/17/2024    ANEU 1.4 (L) 07/17/2024    HGB 9.2 (L) 07/17/2024    HCT 27.2 (L) 07/17/2024     (L) 07/17/2024     07/17/2024    POTASSIUM 4.1 07/17/2024    CHLORIDE 101 07/17/2024    CO2 25 07/17/2024     (H) 07/17/2024    BUN 18.6 07/17/2024    CR 0.76 07/17/2024    MAG 1.7 07/17/2024    INR 1.07 07/08/2024    BILITOTAL 0.4 07/17/2024    AST 15 07/17/2024    ALT 21 07/17/2024    ALKPHOS 85 07/17/2024    PROTTOTAL 6.4 07/17/2024    ALBUMIN 4.2 07/17/2024         ASSESSMENT AND PLAN      Zaheer Borges is a 64 year old male with long hx of PV and now AML, undergoing URD 7/8 PBSCT with the Optimize trial. Today is day +19       BMT/IEC PROTOCOL for AML  - Chemo protocol: OPTIMIZE trial; Bu/flu  - Donor and Recip B+   - Restaging per protocol   - last dose GCSF 7/12. Re-dose prn ANC </=1000. ANC 1.4 today.     HEME/COAG  #Pancytopenia 2/2 chemo/BMT. Plts improving   - Transfusion parameters: hemoglobin <7, platelets <10     IMMUNOCOMPROMISED  - Relevant infectious history: tenofovir for hepatitis B core positivity.   - Prophylaxis plan: ACV, letermovir day +14, fabian through day +45 (home MWF [facility]"). CT Chest clear. Levofloxacin stopped with engraftment. 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 Tacro/MMF started day +5.   Tac level 5.6 (7/14); pending today     CARDIOVASCULAR  #Hypertension: amlodipine   - Risk of cardiomyopathy:  Baseline EF 60-65%  - Risk of arrhythmia: Baseline EKG showed Sinus rhythm with sinus arrhythmia     GI/NUTRITION  - Ulcer prophylaxis: protonix  - CINV: Zofran prn, compazine prn, ativan prn   - ursodiol to +60  - Diarrhea: resolved  - hx Constipation: metamucil daily with prn senna & miralax  - Hemorrhoids: prep H  - Mucositis: start prn lozenges and magic mouthwash (7/5) . Viscous lidocaine. PCA 0.2 q15 (7/8-7/13).                *Discharge sent with 4 days of PO oxycodone 5mg. Viscous lidocaine.     RENAL/ELECTROLYTES/  - Electrolyte management: replace per sliding scale  # Hypocalcemia in setting of normal albumin- added PO supplement daily  #Hypomagnesemia 2/2 Tacrolimus- increased PO dose to 3 tabs BID at discharge. Mg 1.7 today.        MUSCULOSKELETAL/FRAILTY  - Baseline Frailty Score: 2  - Patient with substantial risk of sarcopenia  - Cancer Rehab as needed outpatient     SUPPORTIVE CARES  - Trazodone prn sleep  #suspect hand/foot syndrome 2/2 Busulfan - affecting his hands with painful burning.   - cont ice packs prn. Add gabapentin 100mg at hs and titrate to effect. Also Rx TMC ointment. Should be self limiting.     SOCIAL DETERMINANTS  - Caregiver: Pt's primary caregiver will be spouse with family/friends as a secondary or back-up to assist as needed.   - Financial/insurance concerns: no    Summary:  Trial walt at hs, TMC ointment for burning hand pain likely 2/2 Busulfan  RTC Friday for labs, follow-up  Cont fabian MWF at home  Add labs, ELIA visit next Tuesday 7/23 and Monday 7/29.  Has labs, BMBx Friday 7/26 (no provider appts available this day currently)  Follow-up with Dr. Mejias Monday 8/5    I spent 40 minutes in the care of this patient today, which included time necessary for preparation for the visit, obtaining history, ordering medications/tests/procedures as medically indicated, review of pertinent medical literature, counseling of the patient, communication of recommendations to the care team, and documentation time.    The longitudinal plan of care for the diagnosis(es)/condition(s) as documented were addressed during this visit. Due to the added complexity in care, I will continue to support Zaheer in the subsequent management and with ongoing continuity of care.    Daina Booth PA-C

## 2024-07-18 NOTE — PROGRESS NOTES
Patient called wondering about lab appointment for a redraw for his tacro. I sent scheduling a message to schedule a lab appt @ 1pm per pts request. Zaheer will come in this afternoon to repeat tacro and he did confirm he is holding his morning tacro dose.

## 2024-07-18 NOTE — NURSING NOTE
Chief Complaint   Patient presents with    Lab Only     Labs drawn via  by RN.      Labs drawn via  by RN. CVC flushed, caps and dressing changed.     Kalani Rico RN

## 2024-07-18 NOTE — PROGRESS NOTES
On call Hematology fellow note:    Received a critical lab result, tacrolimus level 88.1.     Latest Reference Range & Units 07/12/24 08:51 07/14/24 03:32 07/14/24 09:26 07/17/24 16:09   Tacrolimus by Tandem Mass Spectrometry 5.0 - 15.0 ug/L 12.0 7.8 5.6 88.1 () (P)       Spoke with Mr. Borges.  He has been fatigued with a bitter taste in his mouth but reports no other new symptoms.  No headache, tremors.  CMP shows normal liver and kidney function.  Clinically not consistent with tacrolimus toxicity.  Lab was drawn from his central line, which may have been used for tacrolimus infusion inpatient explaining the high level or this is an erroneous lab.    Lab is repeating tac level on the same sample.  Advised patient to hold tacrolimus and come in tomorrow for redraw of tac level.     Discussed with pharmacist and Dr. Jenna Wilkinson MD MS  Hematology fellow, PGY4  Pager: [phone]

## 2024-07-19 NOTE — PROGRESS NOTES
BMT/Cell Therapy Daily Progress Note        Patient ID:  Zaheer Borges is a 64 year old male, currently day +19 s/p MA MUD PBSCT for AML.         Diagnosis AML     BMTCT Type URD Allo     Prep Regimen Bu/Flu     Donor Match and  Source 7/8 URD     GVHD Prophylaxis Tac/MMF/PtCy     Primary BMT MD Dr. Mejias    Clinical Trials MT 2023-33             INTERVAL  HISTORY   Here for follow-up. Still with some intermittent nausea, no emesis. He is using zofran to help with this. 2 stools yesterday, soft and formed. No fevers or new infections symptoms. Hand/feet symptoms are improving- no further burning. No rash.  Happy with counts.      Review of Systems: 10 point ROS negative except as noted above.     PHYSICAL EXAM      KPS:  70  Blood pressure 124/72, pulse 92, temperature 98.1  F (36.7  C), resp. rate 16, weight 65 kg (143 lb 3.2 oz), SpO2 98%.  Wt Readings from Last 4 Encounters:   07/19/24 65 kg (143 lb 3.2 oz)   07/17/24 64.9 kg (143 lb)   07/15/24 65.2 kg (143 lb 11.2 oz)   07/14/24 65 kg (143 lb 4.8 oz)       General: NAD   Mouth: OP moist without lesions  Lungs: CTAB  Cardiovascular: RRR  GI: +bs, soft, NT/ND  Neuro: non-focal  Musculoskeletal: grossly normal  Skin: no rashes; hands/palms appear normal  Lymph: no LE edema  Access: L Mckinney site NT, dressing cdi. R PAC not accessed     Acute GVHD          7/17/2024    17:27   Acute GVHD   New evidence of Acute Nllfo-Ikysic-Akca Disease developed since last entry? No          LABS: I have assessed all abnormal lab values for their clinical significance and any values considered clinically significant have been addressed in the assessment and plan.      Lab Results   Component Value Date    WBC 2.8 (L) 07/19/2024    ANEU 2.1 07/19/2024    HGB 9.8 (L) 07/19/2024    HCT 28.9 (L) 07/19/2024     (L) 07/19/2024     07/19/2024    POTASSIUM 4.1 07/19/2024    CHLORIDE 103 07/19/2024    CO2 25 07/19/2024     (H) 07/19/2024    BUN 15.3 07/19/2024    CR 0.69  07/19/2024    MAG 1.8 07/18/2024    INR 1.07 07/08/2024    BILITOTAL 0.3 07/18/2024    AST 15 07/18/2024    ALT 16 07/18/2024    ALKPHOS 77 07/18/2024    PROTTOTAL 6.4 07/18/2024    ALBUMIN 4.1 07/18/2024         ASSESSMENT AND PLAN      Zaheer Borges is a 64 year old male with long hx of PV and now AML, undergoing URD 7/8 PBSCT with the Optimize trial. Today is day +21    BMT/IEC PROTOCOL for AML  - Chemo protocol: OPTIMIZE trial; Bu/flu  - Donor and Recip B+   - Restaging per protocol   - last dose GCSF 7/12. Re-dose prn ANC </=1000.      HEME/COAG  #Pancytopenia 2/2 chemo/BMT. Plts improving   - Transfusion parameters: hemoglobin <7, platelets <10     IMMUNOCOMPROMISED  - Relevant infectious history: tenofovir for hepatitis B core positivity.   - Prophylaxis plan: ACV, letermovir day +14, fabian through day +45 (home MWF [facility]). CT Chest clear. Levofloxacin stopped with engraftment. 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 Tacro/MMF started day +5.   Tac level 5.6 (7/14); Level was high, drawn through contaminated line. He will resume at 2.5mg BID with level 7/18 at 5.4.     CARDIOVASCULAR  #Hypertension: amlodipine   - Risk of cardiomyopathy:  Baseline EF 60-65%  - Risk of arrhythmia: Baseline EKG showed Sinus rhythm with sinus arrhythmia     GI/NUTRITION  - Ulcer prophylaxis: protonix  - CINV: Zofran prn, compazine prn, ativan prn   - ursodiol to +60  - Diarrhea: resolved  - hx Constipation: metamucil daily with prn senna & miralax  - Hemorrhoids: prep H  - Mucositis: start prn lozenges and magic mouthwash (7/5) . Viscous lidocaine. PCA 0.2 q15 (7/8-7/13).               *Discharge sent with 4 days of PO oxycodone 5mg. Viscous lidocaine.     RENAL/ELECTROLYTES/  - Electrolyte management: replace per sliding scale  # Hypocalcemia in setting of normal albumin- added PO supplement daily. Now resolved, Pt would like to trial off as this is a large pill- ok to hold  starting 7/19.  #Hypomagnesemia 2/2 Tacrolimus- increased PO dose to 3 tabs BID at discharge. Mg 1.7 today.        MUSCULOSKELETAL/FRAILTY  - Baseline Frailty Score: 2  - Patient with substantial risk of sarcopenia  - Cancer Rehab as needed outpatient     SUPPORTIVE CARES  - Trazodone prn sleep  #suspect hand/foot syndrome 2/2 Busulfan - affecting his hands with painful burning.   - cont ice packs prn. Add gabapentin 100mg at hs and titrate to effect. Also Rx TMC ointment. Should be self limiting. Resolving 7/19- he did not start either the cream or the gabapentin and his pain is improving.      SOCIAL DETERMINANTS  - Caregiver: Pt's primary caregiver will be spouse with family/friends as a secondary or back-up to assist as needed.   - Financial/insurance concerns: no    Summary:    RTC Tuesday for labs, follow-up  Cont fabian MWF at home  Labs, ELIA visit next Tuesday 7/23 and Monday 7/29.  Has labs, BMBx Friday 7/26 (no provider appts available this day currently)  Follow-up with Dr. Mejias Monday 8/5    I spent 30 minutes in the care of this patient today, which included time necessary for preparation for the visit, obtaining history, ordering medications/tests/procedures as medically indicated, review of pertinent medical literature, counseling of the patient, communication of recommendations to the care team, and documentation time.    The longitudinal plan of care for the diagnosis(es)/condition(s) as documented were addressed during this visit. Due to the added complexity in care, I will continue to support [place] in the subsequent management and with ongoing continuity of care.    Stefanie Lopez, CNP  [employer] or Pager [phone]

## 2024-07-19 NOTE — NURSING NOTE
Chief Complaint   Patient presents with    Labs Only     CVC, heparin locked, vitals checked     Shital Han RN on 7/19/2024 at 11:11 AM

## 2024-07-19 NOTE — LETTER
7/19/2024      Zaheer Borges  [address] [place] [phone]      Dear Colleague,    Thank you for referring your patient, Zaheer Borges, to the [facility] BLOOD AND MARROW TRANSPLANT PROGRAM [place]. Please see a copy of my visit note below.    BMT/Cell Therapy Daily Progress Note        Patient ID:  Zaheer Borges is a 64 year old male, currently day +19 s/p MA MUD PBSCT for AML.         Diagnosis AML     BMTCT Type URD Allo     Prep Regimen Bu/Flu     Donor Match and  Source 7/8 URD     GVHD Prophylaxis Tac/MMF/PtCy     Primary BMT MD Dr. Mejias    Clinical Trials MT 2023-33             INTERVAL  HISTORY   Here for follow-up. Still with some intermittent nausea, no emesis. He is using zofran to help with this. 2 stools yesterday, soft and formed. No fevers or new infections symptoms. Hand/feet symptoms are improving- no further burning. No rash.  Happy with counts.      Review of Systems: 10 point ROS negative except as noted above.     PHYSICAL EXAM      KPS:  70  Blood pressure 124/72, pulse 92, temperature 98.1  F (36.7  C), resp. rate 16, weight 65 kg (143 lb 3.2 oz), SpO2 98%.  Wt Readings from Last 4 Encounters:   07/19/24 65 kg (143 lb 3.2 oz)   07/17/24 64.9 kg (143 lb)   07/15/24 65.2 kg (143 lb 11.2 oz)   07/14/24 65 kg (143 lb 4.8 oz)       General: NAD   Mouth: OP moist without lesions  Lungs: CTAB  Cardiovascular: RRR  GI: +bs, soft, NT/ND  Neuro: non-focal  Musculoskeletal: grossly normal  Skin: no rashes; hands/palms appear normal  Lymph: no LE edema  Access: L Mckinney site NT, dressing cdi. R PAC not accessed     Acute GVHD          7/17/2024    17:27   Acute GVHD   New evidence of Acute Wtcjf-Dbhxvn-Nplf Disease developed since last entry? No          LABS: I have assessed all abnormal lab values for their clinical significance and any values considered clinically significant have been addressed in the assessment and plan.      Lab Results   Component Value Date    WBC 2.8 (L) 07/19/2024     ANEU 2.1 07/19/2024    HGB 9.8 (L) 07/19/2024    HCT 28.9 (L) 07/19/2024     (L) 07/19/2024     07/19/2024    POTASSIUM 4.1 07/19/2024    CHLORIDE 103 07/19/2024    CO2 25 07/19/2024     (H) 07/19/2024    BUN 15.3 07/19/2024    CR 0.69 07/19/2024    MAG 1.8 07/18/2024    INR 1.07 07/08/2024    BILITOTAL 0.3 07/18/2024    AST 15 07/18/2024    ALT 16 07/18/2024    ALKPHOS 77 07/18/2024    PROTTOTAL 6.4 07/18/2024    ALBUMIN 4.1 07/18/2024         ASSESSMENT AND PLAN      Zaheer Borges is a 64 year old male with long hx of PV and now AML, undergoing URD 7/8 PBSCT with the Optimize trial. Today is day +21    BMT/IEC PROTOCOL for AML  - Chemo protocol: OPTIMIZE trial; Bu/flu  - Donor and Recip B+   - Restaging per protocol   - last dose GCSF 7/12. Re-dose prn ANC </=1000.      HEME/COAG  #Pancytopenia 2/2 chemo/BMT. Plts improving   - Transfusion parameters: hemoglobin <7, platelets <10     IMMUNOCOMPROMISED  - Relevant infectious history: tenofovir for hepatitis B core positivity.   - Prophylaxis plan: ACV, letermovir day +14, fabian through day +45 (home MWF [facility]). CT Chest clear. Levofloxacin stopped with engraftment. 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 Tacro/MMF started day +5.   Tac level 5.6 (7/14); Level was high, drawn through contaminated line. He will resume at 2.5mg BID with level 7/18 at 5.4.     CARDIOVASCULAR  #Hypertension: amlodipine   - Risk of cardiomyopathy:  Baseline EF 60-65%  - Risk of arrhythmia: Baseline EKG showed Sinus rhythm with sinus arrhythmia     GI/NUTRITION  - Ulcer prophylaxis: protonix  - CINV: Zofran prn, compazine prn, ativan prn   - ursodiol to +60  - Diarrhea: resolved  - hx Constipation: metamucil daily with prn senna & miralax  - Hemorrhoids: prep H  - Mucositis: start prn lozenges and magic mouthwash (7/5) . Viscous lidocaine. PCA 0.2 q15 (7/8-7/13).               *Discharge sent with 4 days of PO oxycodone  5mg. Viscous lidocaine.     RENAL/ELECTROLYTES/  - Electrolyte management: replace per sliding scale  # Hypocalcemia in setting of normal albumin- added PO supplement daily. Now resolved, Pt would like to trial off as this is a large pill- ok to hold starting 7/19.  #Hypomagnesemia 2/2 Tacrolimus- increased PO dose to 3 tabs BID at discharge. Mg 1.7 today.        MUSCULOSKELETAL/FRAILTY  - Baseline Frailty Score: 2  - Patient with substantial risk of sarcopenia  - Cancer Rehab as needed outpatient     SUPPORTIVE CARES  - Trazodone prn sleep  #suspect hand/foot syndrome 2/2 Busulfan - affecting his hands with painful burning.   - cont ice packs prn. Add gabapentin 100mg at hs and titrate to effect. Also Rx TMC ointment. Should be self limiting. Resolving 7/19- he did not start either the cream or the gabapentin and his pain is improving.      SOCIAL DETERMINANTS  - Caregiver: Pt's primary caregiver will be spouse with family/friends as a secondary or back-up to assist as needed.   - Financial/insurance concerns: no    Summary:    RTC Tuesday for labs, follow-up  Cont fabian MWF at home  Labs, ELIA visit next Tuesday 7/23 and Monday 7/29.  Has labs, BMBx Friday 7/26 (no provider appts available this day currently)  Follow-up with Dr. Mejias Monday 8/5    I spent 30 minutes in the care of this patient today, which included time necessary for preparation for the visit, obtaining history, ordering medications/tests/procedures as medically indicated, review of pertinent medical literature, counseling of the patient, communication of recommendations to the care team, and documentation time.    The longitudinal plan of care for the diagnosis(es)/condition(s) as documented were addressed during this visit. Due to the added complexity in care, I will continue to support Zaheer in the subsequent management and with ongoing continuity of care.    Stefanie Lopez, CNP  Vocera or Pager [phone]

## 2024-07-19 NOTE — NURSING NOTE
"Oncology Rooming Note    July 19, 2024 11:32 AM   Zaheer Borges is a 64 year old male who presents for:    Chief Complaint   Patient presents with    Labs Only     CVC, heparin locked, vitals checked    RECHECK     AML here for provider visit     Initial Vitals: /72   Pulse 92   Temp 98.1  F (36.7  C)   Resp 16   Wt 65 kg (143 lb 3.2 oz)   SpO2 98%   BMI 23.01 kg/m   Estimated body mass index is 23.01 kg/m  as calculated from the following:    Height as of 6/21/24: 1.68 m (5' 6.14\").    Weight as of this encounter: 65 kg (143 lb 3.2 oz). Body surface area is 1.74 meters squared.  No Pain (0) Comment: Data Unavailable   No LMP for male patient.  Allergies reviewed: Yes  Medications reviewed: Yes    Medications: Medication refills not needed today.  Pharmacy name entered into [employer]:    [facility] #[id number] - [address]  AT [facility] [address] [id number] - [place], [place] - [facility]    Frailty Screening:   Is the patient here for a new oncology consult visit in cancer care? 2. No      Clinical concerns: None      Raheem Oliva RN              "

## 2024-07-23 NOTE — NURSING NOTE
Chief Complaint   Patient presents with    Blood Draw     Labs drawn by RN in Lab from Left Chest Mckinney catheter. Line flushed with Saline and Heparin.      Carla Alston RN

## 2024-07-23 NOTE — LETTER
7/23/2024      Zaheer Borges  [address] [place] [id number]      Dear Colleague,    Thank you for referring your patient, Zaheer Borges, to the [facility] BLOOD AND MARROW TRANSPLANT PROGRAM [place]. Please see a copy of my visit note below.    BMT/Cell Therapy Daily Progress Note        Patient ID:  Zaheer Borges is a 64 year old male, currently day +19 s/p MA MUD PBSCT for AML.         Diagnosis AML     BMTCT Type URD Allo     Prep Regimen Bu/Flu     Donor Match and  Source 7/8 URD     GVHD Prophylaxis Tac/MMF/PtCy     Primary BMT MD Dr. Mejias    Clinical Trials MT 2023-33             INTERVAL  HISTORY   Here for follow-up.   Still having trouble with tastebuds. Sweets taste good. Takes zofran during the day and ativan at night. Imodium only as needed. No fevers. No rashes. No bleeding.      Review of Systems: 10 point ROS negative except as noted above.     PHYSICAL EXAM      KPS:  70  Blood pressure 126/87, pulse 81, temperature 98.3  F (36.8  C), temperature source Oral, resp. rate 12, weight 65.4 kg (144 lb 2.9 oz), SpO2 97%.  Wt Readings from Last 4 Encounters:   07/23/24 65.4 kg (144 lb 2.9 oz)   07/19/24 65 kg (143 lb 3.2 oz)   07/17/24 64.9 kg (143 lb)   07/15/24 65.2 kg (143 lb 11.2 oz)       General: NAD   Mouth: masked  Lungs: CTAB  Cardiovascular: RRR  GI: +bs, soft, NT/ND  Neuro: non-focal  Musculoskeletal: grossly normal  Skin: no rashes   Lymph: no LE edema  Access: L Mckinney site NT, dressing cdi. R PAC not accessed     Acute GVHD          7/17/2024    17:27   Acute GVHD   New evidence of Acute Hahyi-Jcucvh-Bpkc Disease developed since last entry? No          LABS: I have assessed all abnormal lab values for their clinical significance and any values considered clinically significant have been addressed in the assessment and plan.      Lab Results   Component Value Date    WBC 2.3 (L) 07/23/2024    ANEU 2.1 07/19/2024    HGB 9.8 (L) 07/23/2024    HCT 28.5 (L) 07/23/2024     (L)  07/23/2024     07/23/2024    POTASSIUM 4.2 07/23/2024    CHLORIDE 102 07/23/2024    CO2 24 07/23/2024     (H) 07/23/2024    BUN 15.0 07/23/2024    CR 0.76 07/23/2024    MAG 1.6 (L) 07/23/2024    INR 1.07 07/08/2024    BILITOTAL 0.3 07/23/2024    AST 19 07/23/2024    ALT 17 07/23/2024    ALKPHOS 75 07/23/2024    PROTTOTAL 6.5 07/23/2024    ALBUMIN 4.3 07/23/2024         ASSESSMENT AND PLAN      Zaheer Borges is a 64 year old male with long hx of PV and now AML, undergoing URD 7/8 PBSCT with the Optimize trial. Today is day +25    BMT/IEC PROTOCOL for AML  - Chemo protocol: OPTIMIZE trial; Bu/flu  - Donor and Recip B+   - Restaging per protocol   - last dose GCSF 7/12. Re-dose prn ANC </=1000.      HEME/COAG  #Pancytopenia 2/2 chemo/BMT. Plts improving   - Transfusion parameters: hemoglobin <7, platelets <10     IMMUNOCOMPROMISED  - Relevant infectious history: tenofovir for hepatitis B core positivity.   - Prophylaxis plan: ACV, letermovir day +14, [facility] through day [facility]45 [facility] Chest clear. Levofloxacin stopped with engraftment. 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 Tacro/MMF started day +5.   Tac level 5.6 (7/14); Level was high, drawn through contaminated line. He will resume at 2.5mg BID with level 7/18 at 5.4.Tac level pending today.     CARDIOVASCULAR  #Hypertension: amlodipine   - Risk of cardiomyopathy:  Baseline EF 60-65%  - Risk of arrhythmia: Baseline EKG showed Sinus rhythm with sinus arrhythmia     GI/NUTRITION  - Ulcer prophylaxis: protonix  - CINV: Zofran prn, compazine prn, ativan prn   - ursodiol to +60  - Diarrhea: resolved  - hx Constipation: metamucil daily with prn senna & miralax  - Hemorrhoids: prep H  - Mucositis: start prn lozenges and magic mouthwash (7/5) . Viscous lidocaine. PCA 0.2 q15 (7/8-7/13).               *Discharge sent with 4 days of PO oxycodone 5mg. Viscous lidocaine.     RENAL/ELECTROLYTES/  -  Electrolyte management: replace per sliding scale  # Hypocalcemia in setting of normal albumin- added PO supplement daily. Now resolved, Pt would like to trial off as this is a large pill- ok to hold starting 7/19.  #Hypomagnesemia 2/2 Tacrolimus- increased PO dose to 3 tabs BID at discharge. Mg 1.6 today.        MUSCULOSKELETAL/FRAILTY  - Baseline Frailty Score: 2  - Patient with substantial risk of sarcopenia  - Cancer Rehab as needed outpatient     SUPPORTIVE CARES  - Trazodone prn sleep  #suspect hand/foot syndrome 2/2 Busulfan - affecting his hands with painful burning-Resolving 7/19- he did not start either the cream or the gabapentin and his pain is improving.      SOCIAL DETERMINANTS  - Caregiver: Pt's primary caregiver will be spouse with family/friends as a secondary or back-up to assist as needed.   - Financial/insurance concerns: no    Summary:    RTC    Cont fabian MWF at home  Labs, ELIA visit Monday 7/29.  Has labs, BMBx Friday 7/26 (no provider appts available this day currently)  Follow-up with Dr. Mejias Monday 8/5    I spent 30 minutes in the care of this patient today, which included time necessary for preparation for the visit, obtaining history, ordering medications/tests/procedures as medically indicated, review of pertinent medical literature, counseling of the patient, communication of recommendations to the care team, and documentation time.    The longitudinal plan of care for the diagnosis(es)/condition(s) as documented were addressed during this visit. Due to the added complexity in care, I will continue to support Zaheer in the subsequent management and with ongoing continuity of care.    Lorraine Flores NP

## 2024-07-23 NOTE — NURSING NOTE
"Oncology Rooming Note    July 23, 2024 3:34 PM   Zaheer Borges is a 64 year old male who presents for:    Chief Complaint   Patient presents with    Blood Draw     Labs drawn by RN in Lab from Left Chest Mckinney catheter. Line flushed with Saline and Heparin.     Oncology Clinic Visit     Acute Myeloid leukemia      Initial Vitals: /87   Pulse 81   Temp 98.3  F (36.8  C) (Oral)   Resp 12   Wt 65.4 kg (144 lb 2.9 oz)   SpO2 97%   BMI 23.17 kg/m   Estimated body mass index is 23.17 kg/m  as calculated from the following:    Height as of 6/21/24: 1.68 m (5' 6.14\").    Weight as of this encounter: 65.4 kg (144 lb 2.9 oz). Body surface area is 1.75 meters squared.  No Pain (0) Comment: Data Unavailable   No LMP for male patient.  Allergies reviewed: Yes  Medications reviewed: Yes    Medications: MEDICATION REFILLS NEEDED TODAY. Provider was notified. Via message column   Pharmacy name entered into [facility]:    [id number] [place], [place] - [address] [facility]  AT [facility] OF [address] [facility] # [id number] - [place], [place] - [facility] [address]    Frailty Screening:   Is the patient here for a new oncology consult visit in cancer care? 2. No      Clinical concerns:  mycophenolate, tenofovir, magnesium plus protein, and zofran refills needed      Nuris Fang              "

## 2024-07-23 NOTE — PROGRESS NOTES
BMT/Cell Therapy Daily Progress Note        Patient ID:  Zaheer Borges is a 64 year old male, currently day +19 s/p MA MUD PBSCT for AML.         Diagnosis AML     BMTCT Type URD Allo     Prep Regimen Bu/Flu     Donor Match and  Source 7/8 URD     GVHD Prophylaxis Tac/MMF/PtCy     Primary BMT MD Dr. Mejias    Clinical Trials MT 2023-33             INTERVAL  HISTORY   Here for follow-up.   Still having trouble with tastebuds. Sweets taste good. Takes zofran during the day and ativan at night. Imodium only as needed. No fevers. No rashes. No bleeding.      Review of Systems: 10 point ROS negative except as noted above.     PHYSICAL EXAM      KPS:  70  Blood pressure 126/87, pulse 81, temperature 98.3  F (36.8  C), temperature source Oral, resp. rate 12, weight 65.4 kg (144 lb 2.9 oz), SpO2 97%.  Wt Readings from Last 4 Encounters:   07/23/24 65.4 kg (144 lb 2.9 oz)   07/19/24 65 kg (143 lb 3.2 oz)   07/17/24 64.9 kg (143 lb)   07/15/24 65.2 kg (143 lb 11.2 oz)       General: NAD   Mouth: masked  Lungs: CTAB  Cardiovascular: RRR  GI: +bs, soft, NT/ND  Neuro: non-focal  Musculoskeletal: grossly normal  Skin: no rashes   Lymph: no LE edema  Access: L Mckinney site NT, dressing cdi. R PAC not accessed     Acute GVHD          7/17/2024    17:27   Acute GVHD   New evidence of Acute Sxfbc-Wbjafw-Mibm Disease developed since last entry? No          LABS: I have assessed all abnormal lab values for their clinical significance and any values considered clinically significant have been addressed in the assessment and plan.      Lab Results   Component Value Date    WBC 2.3 (L) 07/23/2024    ANEU 2.1 07/19/2024    HGB 9.8 (L) 07/23/2024    HCT 28.5 (L) 07/23/2024     (L) 07/23/2024     07/23/2024    POTASSIUM 4.2 07/23/2024    CHLORIDE 102 07/23/2024    CO2 24 07/23/2024     (H) 07/23/2024    BUN 15.0 07/23/2024    CR 0.76 07/23/2024    MAG 1.6 (L) 07/23/2024    INR 1.07 07/08/2024    BILITOTAL 0.3 07/23/2024     AST 19 07/23/2024    ALT 17 07/23/2024    ALKPHOS 75 07/23/2024    PROTTOTAL 6.5 07/23/2024    ALBUMIN 4.3 07/23/2024         ASSESSMENT AND PLAN      Zaheer Borges is a 64 year old male with long hx of PV and now AML, undergoing URD 7/8 PBSCT with the Optimize trial. Today is day +25    BMT/IEC PROTOCOL for AML  - Chemo protocol: OPTIMIZE trial; Bu/flu  - Donor and Recip B+   - Restaging per protocol   - last dose GCSF 7/12. Re-dose prn ANC </=1000.      HEME/COAG  #Pancytopenia 2/2 chemo/BMT. Plts improving   - Transfusion parameters: hemoglobin <7, platelets <10     IMMUNOCOMPROMISED  - Relevant infectious history: tenofovir for hepatitis B core positivity.   - Prophylaxis plan: ACV, letermovir day +14, fabian through day +45 (home [facility] CT Chest clear. Levofloxacin stopped with engraftment. 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 Tacro/MMF started day +5.   Tac level 5.6 (7/14); Level was high, drawn through contaminated line. He will resume at 2.5mg BID with level 7/18 at 5.4.Tac level pending today.     CARDIOVASCULAR  #Hypertension: amlodipine   - Risk of cardiomyopathy:  Baseline EF 60-65%  - Risk of arrhythmia: Baseline EKG showed Sinus rhythm with sinus arrhythmia     GI/NUTRITION  - Ulcer prophylaxis: protonix  - CINV: Zofran prn, compazine prn, ativan prn   - ursodiol to +60  - Diarrhea: resolved  - hx Constipation: metamucil daily with prn senna & miralax  - Hemorrhoids: prep H  - Mucositis: start prn lozenges and magic mouthwash (7/5) . Viscous lidocaine. PCA 0.2 q15 (7/8-7/13).               *Discharge sent with 4 days of PO oxycodone 5mg. Viscous lidocaine.     RENAL/ELECTROLYTES/  - Electrolyte management: replace per sliding scale  # Hypocalcemia in setting of normal albumin- added PO supplement daily. Now resolved, Pt would like to trial off as this is a large pill- ok to hold starting 7/19.  #Hypomagnesemia 2/2 Tacrolimus- increased PO dose  to 3 tabs BID at discharge. Mg 1.6 today.        MUSCULOSKELETAL/FRAILTY  - Baseline Frailty Score: 2  - Patient with substantial risk of sarcopenia  - Cancer Rehab as needed outpatient     SUPPORTIVE CARES  - Trazodone prn sleep  #suspect hand/foot syndrome 2/2 Busulfan - affecting his hands with painful burning-Resolving 7/19- he did not start either the cream or the gabapentin and his pain is improving.      SOCIAL DETERMINANTS  - Caregiver: Pt's primary caregiver will be spouse with family/friends as a secondary or back-up to assist as needed.   - Financial/insurance concerns: no    Summary:    RTC    Cont fabian MWF at home  Labs, ELIA visit Monday 7/29.  Has labs, BMBx Friday 7/26 (no provider appts available this day currently)  Follow-up with Dr. Mejias Monday 8/5    I spent 30 minutes in the care of this patient today, which included time necessary for preparation for the visit, obtaining history, ordering medications/tests/procedures as medically indicated, review of pertinent medical literature, counseling of the patient, communication of recommendations to the care team, and documentation time.    The longitudinal plan of care for the diagnosis(es)/condition(s) as documented were addressed during this visit. Due to the added complexity in care, I will continue to support [place] in the subsequent management and with ongoing continuity of care.    Lorraine Flores NP

## 2024-07-26 NOTE — LETTER
7/26/2024      Zaheer Borges  [address] [place] [phone]      Dear Colleague,    Thank you for referring your patient, Zaheer Borges, to the [facility] BLOOD AND MARROW TRANSPLANT PROGRAM [place]. Please see a copy of my visit note below.    BMBX Teaching and Assessment       Teaching concerns addressed: Bone marrow biopsy and infection prevention.     Person(s) involved in teaching: Patient  Motivation Level  Asks Questions: Yes  Eager to Learn: Yes  Cooperative: Yes  Receptive (willing/able to accept information): Yes    Patient demonstrates understanding of the following:     Reason for the appointment, diagnosis and treatment plan: Yes  Knowledge of proper use of medications and conditions for which they are ordered (with special attention to potential side effects or drug interactions): Yes  Which situations necessitate calling provider and whom to contact: Yes    Teaching concerns addressed:   Reviewed activity restrictions if received premeds, potential for bleeding and actions to take if develops any of the issues below    Pain management techniques: Yes  Patient instructed on hand hygiene: Yes  How and/when to access community resources: Yes    Infection Control:  Patient demonstrates understanding of the following:   Bone marrow procedure site care taught: Yes  Signs and symptoms of infection taught: Yes       Instructional Materials Used/Given: Pt instructed to keep bmbx site clean and dry for 24hrs. Pt educated to monitor site for signs of infection such as redness, rash, oozing, puss, bleeding, pain, and elevated temp. Pt instructed to go to call the [facility] triage line or go to the ER if any signs of infection should occur. Pt educated to not operate machinery if receiving versed/dilaudid. Pt and wife verbalize understanding.  Pre-procedure labs drawn via CVC. Provider order received to administer Dilaudid 0.5mg IVP as premed for BMBX. Procedural consent discussed and pt's signature obtained.   Allergies reviewed.  PT currently alert and oriented to plan of care.  Pt lying prone in stretcher.  Call light w/in reach.  Provider and  at bedside.        Post procedure: Patient vital signs stable, ambulating, site is clean, dry and intact prior to discharge and line removed. Pt discharged with wife as .      Sophie Kathleen RN

## 2024-07-26 NOTE — PROGRESS NOTES
BMBX Teaching and Assessment       Teaching concerns addressed: Bone marrow biopsy and infection prevention.     Person(s) involved in teaching: Patient  Motivation Level  Asks Questions: Yes  Eager to Learn: Yes  Cooperative: Yes  Receptive (willing/able to accept information): Yes    Patient demonstrates understanding of the following:     Reason for the appointment, diagnosis and treatment plan: Yes  Knowledge of proper use of medications and conditions for which they are ordered (with special attention to potential side effects or drug interactions): Yes  Which situations necessitate calling provider and whom to contact: Yes    Teaching concerns addressed:   Reviewed activity restrictions if received premeds, potential for bleeding and actions to take if develops any of the issues below    Pain management techniques: Yes  Patient instructed on hand hygiene: Yes  How and/when to access community resources: Yes    Infection Control:  Patient demonstrates understanding of the following:   Bone marrow procedure site care taught: Yes  Signs and symptoms of infection taught: Yes       Instructional Materials Used/Given: Pt instructed to keep bmbx site clean and dry for 24hrs. Pt educated to monitor site for signs of infection such as redness, rash, oozing, puss, bleeding, pain, and elevated temp. Pt instructed to go to call the [facility] triage line or go to the ER if any signs of infection should occur. Pt educated to not operate machinery if receiving versed/dilaudid. Pt and wife verbalize understanding.  Pre-procedure labs drawn via CVC. Provider order received to administer Dilaudid 0.5mg IVP as premed for BMBX. Procedural consent discussed and pt's signature obtained.  Allergies reviewed.  PT currently alert and oriented to plan of care.  Pt lying prone in stretcher.  Call light w/in reach.  Provider and  at bedside.        Post procedure: Patient vital signs stable, ambulating, site is clean, dry and intact  prior to discharge and line removed. Pt discharged with wife as .      Sophie Kathleen RN

## 2024-07-26 NOTE — NURSING NOTE
"Oncology Rooming Note    July 26, 2024 9:28 AM   Zaheer Borges is a 64 year old male who presents for:    Chief Complaint   Patient presents with    Oncology Clinic Visit     BMBX s/p BMT r/t AML     Initial Vitals: BP (!) 147/86   Pulse 82   Temp 98.2  F (36.8  C)   Resp 16   Wt 66.4 kg (146 lb 4.8 oz)   SpO2 98%   BMI 23.51 kg/m   Estimated body mass index is 23.51 kg/m  as calculated from the following:    Height as of 6/21/24: 1.68 m (5' 6.14\").    Weight as of this encounter: 66.4 kg (146 lb 4.8 oz). Body surface area is 1.76 meters squared.  Data Unavailable Comment: Data Unavailable   No LMP for male patient.  Allergies reviewed: Yes  Medications reviewed: Yes    Medications: MEDICATION REFILLS NEEDED TODAY. Provider was notified.  Pharmacy name entered into [facility]:    [id number] [place], [place] - [address] [facility]  AT [facility] OF [address] PHARMACY [address] [place], [place] - [facility]    Frailty Screening:   Is the patient here for a new oncology consult visit in cancer care? 2. No    Clinical concerns: VSS. No clinical concerns.     Sophie Kathleen RN              "

## 2024-07-26 NOTE — PROCEDURES
BMT ONC Adult Bone Marrow Biopsy Procedure Note  July 26, 2024  /79   Pulse 72   Temp 98.2  F (36.8  C)   Resp 14   Wt 66.4 kg (146 lb 4.8 oz)   SpO2 98%   BMI 23.51 kg/m       Learning needs assessment complete within 12 months? YES    DIAGNOSIS: AML     PROCEDURE: Unilateral Bone Marrow Biopsy and Unilateral Aspirate    LOCATION: [facility] 2nd Floor    Patient s identification was positively verified by VERBAL IDENTIFICATION and invasive procedure safety checklist was completed. Informed consent was obtained. Following the administration of  dilaudid 0.5mg IV  as pre-medication, patient was placed in the prone position and prepped and draped in a sterile manner. Approximately 10 cc of 1% Lidocaine was used over the left posterior iliac spine. Following this a 3 mm incision was made. Trephine bone marrow core(s) was (were) obtained from the [facility]. Bone marrow aspirates were obtained from the IC. Aspirates were sent for morphology, immunophenotyping, cytogenetics, and molecular diagnostics RFLP. A total of approximately 17 ml of marrow was aspirated. Following this procedure a sterile dressing was applied to the bone marrow biopsy site(s). The patient was placed in the supine position to maintain pressure on the biopsy site. Post-procedure wound care instructions were given.     Complications: YES    Pre-procedural pain: 0 out of 10 on the numeric pain rating scale.     Procedural pain: slight pain       Post-procedural pain assessment: 0 out of 10 on the numeric pain rating scale.     Interventions: YES; patient has bone that crumbles beneath the trephine.  Changed to 8 gauge and was able to get a good core that way.     Length of procedure:20 minutes or less      Procedure performed by: Merlyn Mitchell PA-C

## 2024-07-26 NOTE — PROGRESS NOTES
[id number]: Study Visit Note   Subject name: Zaheer Borges     Visit: Day 28    Did the study visit occur within the appropriate window allowed by the protocol? yes    I have personally interviewed Zaheer Borges and reviewed his medical record for adverse events and concomitant medications and these have been recorded on the corresponding logs in Zaheer Borges's research file.     Day 28 labs drawn.    Zaheer Borges was given the opportunity to ask any trial related questions.  Please see provider progress note for physical exam and other clinical information. Labs were reviewed - any significant lab values were addressed and reviewed.    Jenny Gilliam RN

## 2024-07-29 NOTE — LETTER
7/29/2024      Zaheer Borges  [address] [place] [phone]      Dear Colleague,    Thank you for referring your patient, Zaheer Borges, to the [facility] BLOOD AND MARROW TRANSPLANT PROGRAM [place]. Please see a copy of my visit note below.    BMT Progress Note        Patient ID:  Zaheer Borges is a 64 year old male, currently day +31 s/p MA MUD PBSCT for AML.         Diagnosis AML     BMTCT Type URD Allo     Prep Regimen Bu/Flu     Donor Match and  Source 7/8 URD     GVHD Prophylaxis Tac/MMF/PtCy     Primary BMT MD Dr. Mejias    Clinical Trials MT 2023-33             INTERVAL  HISTORY   Here for follow-up. He has some intermittent nausea, no emesis. Occasional loose stools. No fevers or infectious sx. No rash.      Review of Systems: 10 point ROS negative except as noted above.     PHYSICAL EXAM      KPS:  80  Blood pressure 124/76, pulse 84, temperature 98.5  F (36.9  C), temperature source Oral, resp. rate 16, weight 66.8 kg (147 lb 4.8 oz), SpO2 97%.  Wt Readings from Last 4 Encounters:   07/29/24 66.8 kg (147 lb 4.8 oz)   07/26/24 66.4 kg (146 lb 4.8 oz)   07/23/24 65.4 kg (144 lb 2.9 oz)   07/19/24 65 kg (143 lb 3.2 oz)     General: NAD   Mouth: OP moist without lesions  Lungs: CTAB  Cardiovascular: RRR  GI: +bs, soft, NT/ND  Neuro: non-focal  Skin: no rashes   Lymph: no LE edema  Access: L Mckinney site NT, dressing cdi. R PAC not accessed     Acute GVHD          7/29/2024    12:26 7/17/2024    17:27   Acute GVHD   New evidence of Acute Huymr-Fnzvvb-Miew Disease developed since last entry? No No          LABS: I have assessed all abnormal lab values for their clinical significance and any values considered clinically significant have been addressed in the assessment and plan.      Lab Results   Component Value Date    WBC 2.9 (L) 07/29/2024    ANEU 2.1 07/19/2024    HGB 10.4 (L) 07/29/2024    HCT 30.4 (L) 07/29/2024     (L) 07/29/2024     07/29/2024    POTASSIUM 4.3 07/29/2024    CHLORIDE  100 07/29/2024    CO2 25 07/29/2024     (H) 07/29/2024    BUN 12.6 07/29/2024    CR 0.58 (L) 07/29/2024    MAG 1.6 (L) 07/29/2024    INR 1.07 07/08/2024    BILITOTAL 0.3 07/29/2024    AST 19 07/29/2024    ALT 15 07/29/2024    ALKPHOS 68 07/29/2024    PROTTOTAL 6.6 07/29/2024    ALBUMIN 4.3 07/29/2024         ASSESSMENT AND PLAN      Zaheer Borges is a 64 year old male with long hx of PV and now AML, undergoing URD 7/8 PBSCT with the Optimize trial. Today is day +31    BMT/IEC PROTOCOL for AML  - Chemo protocol: OPTIMIZE trial; Bu/flu  - Donor and Recip B+   - Restaging per protocol ; BMbx 7/26, results pending  - last dose GCSF 7/12. Re-dose prn ANC </=1000.      HEME/COAG  #Pancytopenia 2/2 chemo/BMT. Plts improving   - Transfusion parameters: hemoglobin <7, platelets <10     IMMUNOCOMPROMISED  - Relevant infectious history: tenofovir for hepatitis B core positivity.   - Prophylaxis plan: ACV, letermovir day +14, fabian through day +45 (home MWF [facility]). CT Chest clear. Levofloxacin stopped with engraftment. Sulfa allergic so Pentamidine to start at day +28- not yet scheduled (d31) so requested for next visit 8/5.     RISK OF GVHD  - Prophylaxis: PTcy +3/+4. Tacro/MMF started day +5.   Tac level 5.6 (7/14); Level was high, drawn through contaminated line. Resumed at 2.5mg BID with level pending today.     CARDIOVASCULAR  #Hypertension: amlodipine   - Risk of cardiomyopathy:  Baseline EF 60-65%  - Risk of arrhythmia: Baseline EKG showed Sinus rhythm with sinus arrhythmia     GI/NUTRITION  - Ulcer prophylaxis: protonix  - CINV: Zofran prn, compazine prn, ativan prn   - ursodiol through +60  - Diarrhea: resolved  - hx Constipation: metamucil daily with prn senna & miralax  - Mucositis: resolved     RENAL/ELECTROLYTES/  - Electrolyte management: replace per sliding scale  # Hypocalcemia in setting of normal albumin- added PO supplement daily. Now resolved, Pt would like to trial off as this is a large pill- ok to hold  starting 7/19.  #Hypomagnesemia 2/2 Tacrolimus- cont Mg plus protein 3 tabs bid. Mg 1.6 today.     MUSCULOSKELETAL/FRAILTY  - Baseline Frailty Score: 2  - Patient with substantial risk of sarcopenia  - Cancer Rehab as needed outpatient     SUPPORTIVE CARES  - Trazodone prn sleep  #suspect hand/foot syndrome 2/2 Busulfan - affecting his hands with painful burning-Resolving Has tmc ointment and gabapentin prn. 7/19- he did not start either the cream or the gabapentin and his pain is improving.      SOCIAL DETERMINANTS  - Caregiver: Pt's primary caregiver will be spouse with family/friends as a secondary or back-up to assist as needed.   - Financial/insurance concerns: no    Summary:  Cont fabian MWF at home  Labs, follow-up with Dr. Mejias Monday 8/5- inh Pentamidine requested same day  Messaging BMT schedulers as I don't yet see weekly BAN visits after 8/5    I spent 40 minutes in the care of this patient today, which included time necessary for preparation for the visit, obtaining history, ordering medications/tests/procedures as medically indicated, review of pertinent medical literature, counseling of the patient, communication of recommendations to the care team, and documentation time.    The longitudinal plan of care for the diagnosis(es)/condition(s) as documented were addressed during this visit. Due to the added complexity in care, I will continue to support Ol[place]une in the subsequent management and with ongoing continuity of care.    Daina Booth PA-C

## 2024-07-29 NOTE — NURSING NOTE
Chief Complaint   Patient presents with    Oncology Clinic Visit     Acute myeloid leukemia in remission    Blood Draw     Labs drawn via CVC by RN in lab, vitals taken.      Labs drawn via CVC by RN. Flushed with saline and heparin. Caps changed. Vitals taken. Pt checked into next appt.      Carrie Louie RN

## 2024-07-29 NOTE — PROGRESS NOTES
BMT Progress Note        Patient ID:  Zaheer Borges is a 64 year old male, currently day +31 s/p MA MUD PBSCT for AML.         Diagnosis AML     BMTCT Type URD Allo     Prep Regimen Bu/Flu     Donor Match and  Source 7/8 URD     GVHD Prophylaxis Tac/MMF/PtCy     Primary BMT MD Dr. Mejias    Clinical Trials MT 2023-33             INTERVAL  HISTORY   Here for follow-up. He has some intermittent nausea, no emesis. Occasional loose stools. No fevers or infectious sx. No rash.      Review of Systems: 10 point ROS negative except as noted above.     PHYSICAL EXAM      KPS:  80  Blood pressure 124/76, pulse 84, temperature 98.5  F (36.9  C), temperature source Oral, resp. rate 16, weight 66.8 kg (147 lb 4.8 oz), SpO2 97%.  Wt Readings from Last 4 Encounters:   07/29/24 66.8 kg (147 lb 4.8 oz)   07/26/24 66.4 kg (146 lb 4.8 oz)   07/23/24 65.4 kg (144 lb 2.9 oz)   07/19/24 65 kg (143 lb 3.2 oz)     General: NAD   Mouth: OP moist without lesions  Lungs: CTAB  Cardiovascular: RRR  GI: +bs, soft, NT/ND  Neuro: non-focal  Skin: no rashes   Lymph: no LE edema  Access: L Mckinney site NT, dressing cdi. R PAC not accessed     Acute GVHD          7/29/2024    12:26 7/17/2024    17:27   Acute GVHD   New evidence of Acute Cwhef-Voxqdt-Suuu Disease developed since last entry? No No          LABS: I have assessed all abnormal lab values for their clinical significance and any values considered clinically significant have been addressed in the assessment and plan.      Lab Results   Component Value Date    WBC 2.9 (L) 07/29/2024    ANEU 2.1 07/19/2024    HGB 10.4 (L) 07/29/2024    HCT 30.4 (L) 07/29/2024     (L) 07/29/2024     07/29/2024    POTASSIUM 4.3 07/29/2024    CHLORIDE 100 07/29/2024    CO2 25 07/29/2024     (H) 07/29/2024    BUN 12.6 07/29/2024    CR 0.58 (L) 07/29/2024    MAG 1.6 (L) 07/29/2024    INR 1.07 07/08/2024    BILITOTAL 0.3 07/29/2024    AST 19 07/29/2024    ALT 15 07/29/2024    ALKPHOS 68 07/29/2024     PROTTOTAL 6.6 07/29/2024    ALBUMIN 4.3 07/29/2024         ASSESSMENT AND PLAN      Zaheer Borges is a 64 year old male with long hx of PV and now AML, undergoing URD 7/8 PBSCT with the Optimize trial. Today is day +31    BMT/IEC PROTOCOL for AML  - Chemo protocol: OPTIMIZE trial; Bu/flu  - Donor and Recip B+   - Restaging per protocol ; BMbx 7/26, results pending  - last dose GCSF 7/12. Re-dose prn ANC </=1000.      HEME/COAG  #Pancytopenia 2/2 chemo/BMT. Plts improving   - Transfusion parameters: hemoglobin <7, platelets <10     IMMUNOCOMPROMISED  - Relevant infectious history: tenofovir for hepatitis B core positivity.   - Prophylaxis plan: ACV, letermovir day +14, [facility] CT Chest clear. Levofloxacin stopped with engraftment. Sulfa allergic so Pentamidine to start at day +28- not yet scheduled (d31) so requested for next visit 8/5.     RISK OF GVHD  - Prophylaxis: PTcy +3/+4. Tacro/MMF started day +5.   Tac level 5.6 (7/14); Level was high, drawn through contaminated line. Resumed at 2.5mg BID with level pending today.     CARDIOVASCULAR  #Hypertension: amlodipine   - Risk of cardiomyopathy:  Baseline EF 60-65%  - Risk of arrhythmia: Baseline EKG showed Sinus rhythm with sinus arrhythmia     GI/NUTRITION  - Ulcer prophylaxis: protonix  - CINV: Zofran prn, compazine prn, ativan prn   - ursodiol through +60  - Diarrhea: resolved  - hx Constipation: metamucil daily with prn senna & miralax  - Mucositis: resolved     RENAL/ELECTROLYTES/  - Electrolyte management: replace per sliding scale  # Hypocalcemia in setting of normal albumin- added PO supplement daily. Now resolved, Pt would like to trial off as this is a large pill- ok to hold starting 7/19.  #Hypomagnesemia 2/2 Tacrolimus- cont Mg plus protein 3 tabs bid. Mg 1.6 today.     MUSCULOSKELETAL/FRAILTY  - Baseline Frailty Score: 2  - Patient with substantial risk of sarcopenia  - Cancer Rehab as needed outpatient     SUPPORTIVE  CARES  - Trazodone prn sleep  #suspect hand/foot syndrome 2/2 Busulfan - affecting his hands with painful burning-Resolving Has tmc ointment and gabapentin prn. 7/19- he did not start either the cream or the gabapentin and his pain is improving.      SOCIAL DETERMINANTS  - Caregiver: Pt's primary caregiver will be spouse with family/friends as a secondary or back-up to assist as needed.   - Financial/insurance concerns: no    Summary:  Cont fabian MWF at home  Labs, follow-up with Dr. Mejias Monday 8/5- inh Pentamidine requested same day  Messaging BMT schedulers as I don't yet see weekly BAN visits after 8/5    I spent 40 minutes in the care of this patient today, which included time necessary for preparation for the visit, obtaining history, ordering medications/tests/procedures as medically indicated, review of pertinent medical literature, counseling of the patient, communication of recommendations to the care team, and documentation time.    The longitudinal plan of care for the diagnosis(es)/condition(s) as documented were addressed during this visit. Due to the added complexity in care, I will continue to support Zaheer in the subsequent management and with ongoing continuity of care.    Daina Booth PA-C

## 2024-07-29 NOTE — NURSING NOTE
"Oncology Rooming Note    July 29, 2024 11:48 AM   Zaheer Borges is a 64 year old male who presents for:    Chief Complaint   Patient presents with    Oncology Clinic Visit     Acute myeloid leukemia in remission    Blood Draw     Labs drawn via CVC by RN in lab, vitals taken.      Initial Vitals: /76 (BP Location: Right arm, Patient Position: Sitting, Cuff Size: Adult Regular)   Pulse 84   Temp 98.5  F (36.9  C) (Oral)   Resp 16   Wt 66.8 kg (147 lb 4.8 oz)   SpO2 97%   BMI 23.67 kg/m   Estimated body mass index is 23.67 kg/m  as calculated from the following:    Height as of 6/21/24: 1.68 m (5' 6.14\").    Weight as of this encounter: 66.8 kg (147 lb 4.8 oz). Body surface area is 1.77 meters squared.  No Pain (0) Comment: Data Unavailable   No LMP for male patient.  Allergies reviewed: Yes  Medications reviewed: Yes    Medications: MEDICATION REFILLS NEEDED TODAY. Provider was notified.  Pharmacy name entered into [facility]:    [facility][id number] [place], [place] - [address] [place] [address]  AT [facility] OF [address] [facility] # [id number] - [place], [place] - [facility] [address]    Frailty Screening:   Is the patient here for a new oncology consult visit in cancer care? 2. No      Clinical concerns: Patient would like a refill of Specialty Vitamins Products (MAGNESIUM PLUS PROTEIN) 133 MG tablet  Provider was notified.      Kimberly Borges EMT            "

## 2024-08-05 NOTE — PROGRESS NOTES
Zaheer Borges was seen today for a Pentamidine nebulizer tx ordered by Dr. Mejias.    Patient was first given 2.5 mg of Albuterol  nebulizer ([id number], LOT#[id number], EXP 11/30/2025), after which Pentamidine 300 mg (Lot # [id number], EXP 06/2025) mixed with 6cc Sterile H20 was administered through a filtered nebulizer.    Pre-treatment: SpO2 = 95%   HR = 77   BBS = clear   Post-treatment: SpO2 = 96%  HR = 90  BBS = clear       No adverse side effects noted by the patient.    This service today was provided under Dr. Alston, who was available if needed.     Procedure was completed by Mela Hernandez.

## 2024-08-05 NOTE — LETTER
8/5/2024      Zaheer Borges  [address] [place] [phone]      Dear Colleague,    Thank you for referring your patient, Zaheer Borges, to the [facility] BLOOD AND MARROW TRANSPLANT PROGRAM [place]. Please see a copy of my visit note below.    BMT Progress Note        Patient ID:  Zaheer Borges is a 64 year old male, currently day +38 s/p MA MUD PBSCT for AML.         Diagnosis AML     BMTCT Type URD Allo     Prep Regimen Bu/Flu     Donor Match and  Source 7/8 URD     GVHD Prophylaxis Tac/MMF/PtCy     Primary BMT MD Dr. Mejias    Clinical Trials MT 2023-33             INTERVAL  HISTORY   Here for follow-up. He has some intermittent nausea, no emesis. Occasional loose stools. No fevers or infectious sx. No rash. Pleased with his recovery.     Review of Systems: 10 point ROS negative except as noted above.     PHYSICAL EXAM      KPS:  80  Blood pressure 132/85, pulse 83, temperature 98.5  F (36.9  C), temperature source Oral, resp. rate 16, weight 67 kg (147 lb 9.6 oz), SpO2 98%.  Wt Readings from Last 4 Encounters:   08/05/24 67 kg (147 lb 9.6 oz)   07/29/24 66.8 kg (147 lb 4.8 oz)   07/26/24 66.4 kg (146 lb 4.8 oz)   07/23/24 65.4 kg (144 lb 2.9 oz)     General: NAD   Mouth: OP moist without lesions  Lungs: CTAB  Cardiovascular: RRR  GI: +bs, soft, NT/ND  Neuro: non-focal  Skin: no rashes   Lymph: no LE edema  Access: L Mckinney site NT, dressing cdi. R PAC not accessed     Acute GVHD          8/5/2024    16:54 7/29/2024    12:26 7/17/2024    17:27   Acute GVHD   New evidence of Acute Gujir-Pbodoc-Pcns Disease developed since last entry? No No No          LABS: I have assessed all abnormal lab values for their clinical significance and any values considered clinically significant have been addressed in the assessment and plan.        Blood Counts       Recent Labs   Lab Test 08/05/24  0749 07/29/24  1138 07/26/24  0852 05/20/24  0932 04/10/24  1108   HGB 11.2* 10.4* 10.1*   < > 13.2*   HCT 33.0* 30.4* 29.3*    < > 36.6*   WBC 4.5 2.9* 2.5*   < > 2.6*   ANEUTAUTO 3.4 2.0 1.8   < >  --    ALYMPAUTO 0.3* 0.2* 0.2*   < >  --    AMONOAUTO 0.7 0.6 0.4   < >  --    AEOSAUTO 0.1 0.1 0.0   < >  --    ABSBASO 0.1 0.0 0.0   < >  --    NRBCMAN 0.0 0.0 0.0   < > 0.0   ABLA  --   --   --   --  0.3*    135* 129*   < > 136*    < > = values in this interval not displayed.         Recent Labs   Lab Test 07/12/24  0316   ABORH B POS         No lab results found.      Chemistries     Basic Panel  Recent Labs   Lab Test 08/05/24  0749 07/29/24  1138 07/26/24  0852    135 138   POTASSIUM 4.2 4.3 4.3   CHLORIDE 104 100 104   CO2 23 25 25   BUN 14.7 12.6 17.6   CR 0.61* 0.58* 0.66*   * 148* 120*        Calcium, Magnesium, Phosphorus  Recent Labs   Lab Test 08/05/24  0749 07/29/24  1138 07/26/24  0852 07/23/24  1523 07/15/24  1405 07/14/24  0332 07/13/24  0308 07/12/24  0851 07/12/24  0316   ARISTIDES 9.3 9.3 9.1 9.1   < > 8.7* 8.5*  --  8.9   MAG 1.7 1.6*  --  1.6*   < > 1.6* 1.7   < > 1.4*   PHOS  --   --   --   --   --  4.1 4.0  --  4.3    < > = values in this interval not displayed.        LFTs  Recent Labs   Lab Test 08/05/24  0749 07/29/24  1138 07/26/24  0852   BILITOTAL 0.3 0.3 0.2   ALKPHOS 69 68 74   AST 19 19 18   ALT 16 15 15   ALBUMIN 4.4 4.3 4.3         Bone Marrow Biopsy       Morphology    Lab Results   Component Value Date    FINALDX  07/26/2024     Bone marrow, posterior iliac crest, left decalcified trephine biopsy and touch imprint; aspirate clot, direct aspirate smear, and concentrated aspirate smear; and peripheral blood smear:  Recurrent/persistent myeloid neoplasm with the following features:  -Normocellular (cellularity estimated at 40-50%) with trilineage hematopoietic maturation, no overt dysplasia, and 1% blasts with atypical localization and positive p53 IHC  -Peripheral blood showing moderate normochromic, normocytic anemia; moderate leukopenia with lymphopenia; slight thrombocytopenia  -See comment       COMDX  07/26/2024     There is an unusual population identified in this sample characterized by bright CD34 expression and negative CD38 expression forming a discrete population. The immunophenotype of this small population of blasts is similar but not identical to previously described neoplastic blasts. These cells are seen in a background of otherwise unremarkable myeloid blasts. Final interpretation requires correlation with results of other ancillary studies, morphologic, and clinical features.            Flow Cytometry    Lab Results   Component Value Date    FLINTERP  07/26/2024     A. Iliac Crest, Bone Marrow Aspirate, Left:  -Unusual myeloid blast population (0.1%)  -See comment      COMDX  07/26/2024     There is an unusual population identified in this sample characterized by bright CD34 expression and negative CD38 expression forming a discrete population. The immunophenotype of this small population of blasts is similar but not identical to previously described neoplastic blasts. These cells are seen in a background of otherwise unremarkable myeloid blasts. Final interpretation requires correlation with results of other ancillary studies, morphologic, and clinical features.                Chimerism     Lab Results   Component Value Date    SPECDES  07/26/2024     Bone Marrow: ACD Syringe      SPECDES  07/26/2024     Bone Marrow: ACD Syringe      LDRESULTS  07/26/2024     RESULTS:    POST BONE MARROW  DONOR: (SHELIA, [id number])  98 %     RECIPIENT:  2 %    These results are accurate +/-5%.                LDRESULTS  07/26/2024     RESULTS:     POST CD3+ FRACTION  DONOR: (SHELIA [id number])  59 %     RECIPIENT:  41 %    These results are accurate +/-5%.                LDRESULTS  07/26/2024     RESULTS:     POST CD33/66b+(Myeloid) FRACTION  DONOR: (SHELIA [id number])  100 %     RECIPIENT:  0 %    These results are accurate +/-5%.                     ASSESSMENT AND PLAN      Zaheer  Katerina is a 64 year old male with long hx of PV and now AML, undergoing URD 7/8 PBSCT with the Optimize trial. Today is day +38    BMT/IEC PROTOCOL for AML  - Chemo protocol: OPTIMIZE trial; Bu/flu  - Donor and Recip B+   - Restaging per protocol ; BMbx 7/26, results show MRD on marrow and flow  - last dose GCSF 7/12. Re-dose prn ANC </=1000.   - START: maintenance aza/sulema     HEME/COAG  #Pancytopenia 2/2 chemo/BMT. Plts improving   - Transfusion parameters: hemoglobin <7, platelets <10     IMMUNOCOMPROMISED  - Relevant infectious history: tenofovir for hepatitis B core positivity.   - Prophylaxis plan: ACV, letermovir day +14, [facility] Chest clear. Levofloxacin stopped with engraftment. Sulfa allergic so Pentamidine to start at day +28- not yet scheduled (d31) so requested for next visit 8/5.     RISK OF GVHD  - Prophylaxis: PTcy +3/+4. Tacro/MMF started day +5.   Tac level 5.6 (7/14); Level was high, drawn through contaminated line. Resumed at 2.5mg BID with level pending today.  **Plan to stop tacrolimus at day 60 if there are no signs or symptoms of GVHD     CARDIOVASCULAR  #Hypertension: amlodipine   - Risk of cardiomyopathy:  Baseline EF 60-65%  - Risk of arrhythmia: Baseline EKG showed Sinus rhythm with sinus arrhythmia     GI/NUTRITION  - Ulcer prophylaxis: protonix  - CINV: Zofran prn, compazine prn, ativan prn   - ursodiol through +60  - Diarrhea: resolved  - hx Constipation: metamucil daily with prn senna & miralax  - Mucositis: resolved     RENAL/ELECTROLYTES/  - Electrolyte management: replace per sliding scale  # Hypocalcemia in setting of normal albumin- added PO supplement daily. Now resolved, Pt would like to trial off as this is a large pill- ok to hold starting 7/19.  #Hypomagnesemia 2/2 Tacrolimus- cont Mg plus protein 3 tabs bid. Mg 1.6 today.     MUSCULOSKELETAL/FRAILTY  - Baseline Frailty Score: 2  - Patient with substantial risk of sarcopenia  - Cancer Rehab as  needed outpatient     SUPPORTIVE CARES  - Trazodone prn sleep  #suspect hand/foot syndrome 2/2 Busulfan - affecting his hands with painful burning-Resolving Has tmc ointment and gabapentin prn. 7/19- he did not start either the cream or the gabapentin and his pain is improving.      SOCIAL DETERMINANTS  - Caregiver: Pt's primary caregiver will be spouse with family/friends as a secondary or back-up to assist as needed.   - Financial/insurance concerns: no    Summary:  I reviewed the results of his restaging marrow that shows MRD by immunohistochemistry, and flow cytometry.  Given the high risk nature of the disease, I recommended starting maintenance, and discontinuing his GVHD prophyaxis at 60 days in the absence of GVHD.  I reviewed the risks and benefits of maintenance and he wishes to proceed.      Plan  -start low dose (aza 20/m2 x 5 days/sulema 200 daily x 28) per VIALE-T dosing  - Plan to stop (not taper) tacrolimus at day 60 if there is no S/S of GVHD  - Cont fabian MWF at home    The longitudinal plan of care for the diagnosis(es)/condition(s) as documented were addressed during this visit. Due to the added complexity in care, I will continue to support Zaheer in the subsequent management and with ongoing continuity of care.      45 minutes spent on the date of the encounter doing chart review, history and exam, lab review, documentation and coordniating care.    SRINIVAS WILKS MD  August 5, 2024        Again, thank you for allowing me to participate in the care of your patient.        Sincerely,        SRINIVAS WILKS MD

## 2024-08-05 NOTE — NURSING NOTE
"Oncology Rooming Note    August 5, 2024 8:04 AM   Zaheer Borges is a 64 year old male who presents for:    Chief Complaint   Patient presents with    Blood Draw     Labs obtained via cvc, heparin flushed, VS taken, checked into next appt    Oncology Clinic Visit     Acute myeloid leukemia in remission     Initial Vitals: /85   Pulse 83   Temp 98.5  F (36.9  C) (Oral)   Resp 16   Wt 67 kg (147 lb 9.6 oz)   SpO2 98%   BMI 23.72 kg/m   Estimated body mass index is 23.72 kg/m  as calculated from the following:    Height as of 6/21/24: 1.68 m (5' 6.14\").    Weight as of this encounter: 67 kg (147 lb 9.6 oz). Body surface area is 1.77 meters squared.  No Pain (0) Comment: Data Unavailable   No LMP for male patient.  Allergies reviewed: Yes  Medications reviewed: Yes    Medications: MEDICATION REFILLS NEEDED TODAY. Provider was notified. Via message column  Pharmacy name entered into [facility]:    [facility][id number] - [place], [place] - [address] [facility] [address]  AT [facility] OF [address] [facility] # 10[address] [place], [place] - [address]    Frailty Screening:   Is the patient here for a new oncology consult visit in cancer care? 2. No      Clinical concerns:  Needs refills tacrolimus amlodipine, prevymis, and acyclovir      Nuris Fang              "

## 2024-08-05 NOTE — PROGRESS NOTES
BMT Progress Note        Patient ID:  Zaheer Borges is a 64 year old male, currently day +38 s/p MA MUD PBSCT for AML.         Diagnosis AML     BMTCT Type URD Allo     Prep Regimen Bu/Flu     Donor Match and  Source 7/8 URD     GVHD Prophylaxis Tac/MMF/PtCy     Primary BMT MD Dr. Mejias    Clinical Trials MT 2023-33             INTERVAL  HISTORY   Here for follow-up. He has some intermittent nausea, no emesis. Occasional loose stools. No fevers or infectious sx. No rash. Pleased with his recovery.     Review of Systems: 10 point ROS negative except as noted above.     PHYSICAL EXAM      KPS:  80  Blood pressure 132/85, pulse 83, temperature 98.5  F (36.9  C), temperature source Oral, resp. rate 16, weight 67 kg (147 lb 9.6 oz), SpO2 98%.  Wt Readings from Last 4 Encounters:   08/05/24 67 kg (147 lb 9.6 oz)   07/29/24 66.8 kg (147 lb 4.8 oz)   07/26/24 66.4 kg (146 lb 4.8 oz)   07/23/24 65.4 kg (144 lb 2.9 oz)     General: NAD   Mouth: OP moist without lesions  Lungs: CTAB  Cardiovascular: RRR  GI: +bs, soft, NT/ND  Neuro: non-focal  Skin: no rashes   Lymph: no LE edema  Access: L Mckinney site NT, dressing cdi. R PAC not accessed     Acute GVHD          8/5/2024    16:54 7/29/2024    12:26 7/17/2024    17:27   Acute GVHD   New evidence of Acute Usloq-Gclghk-Upqj Disease developed since last entry? No No No          LABS: I have assessed all abnormal lab values for their clinical significance and any values considered clinically significant have been addressed in the assessment and plan.        Blood Counts       Recent Labs   Lab Test 08/05/24  0749 07/29/24  1138 07/26/24  0852 05/20/24  0932 04/10/24  1108   HGB 11.2* 10.4* 10.1*   < > 13.2*   HCT 33.0* 30.4* 29.3*   < > 36.6*   WBC 4.5 2.9* 2.5*   < > 2.6*   ANEUTAUTO 3.4 2.0 1.8   < >  --    ALYMPAUTO 0.3* 0.2* 0.2*   < >  --    AMONOAUTO 0.7 0.6 0.4   < >  --    AEOSAUTO 0.1 0.1 0.0   < >  --    ABSBASO 0.1 0.0 0.0   < >  --    NRBCMAN 0.0 0.0 0.0   < > 0.0   ABLA   --   --   --   --  0.3*    135* 129*   < > 136*    < > = values in this interval not displayed.         Recent Labs   Lab Test 07/12/24  0316   ABORH B POS         No lab results found.      Chemistries     Basic Panel  Recent Labs   Lab Test 08/05/24  0749 07/29/24  1138 07/26/24  0852    135 138   POTASSIUM 4.2 4.3 4.3   CHLORIDE 104 100 104   CO2 23 25 25   BUN 14.7 12.6 17.6   CR 0.61* 0.58* 0.66*   * 148* 120*        Calcium, Magnesium, Phosphorus  Recent Labs   Lab Test 08/05/24  0749 07/29/24  1138 07/26/24  0852 07/23/24  1523 07/15/24  1405 07/14/24  0332 07/13/24  0308 07/12/24  0851 07/12/24  0316   ARISTIDES 9.3 9.3 9.1 9.1   < > 8.7* 8.5*  --  8.9   MAG 1.7 1.6*  --  1.6*   < > 1.6* 1.7   < > 1.4*   PHOS  --   --   --   --   --  4.1 4.0  --  4.3    < > = values in this interval not displayed.        LFTs  Recent Labs   Lab Test 08/05/24  0749 07/29/24  1138 07/26/24  0852   BILITOTAL 0.3 0.3 0.2   ALKPHOS 69 68 74   AST 19 19 18   ALT 16 15 15   ALBUMIN 4.4 4.3 4.3         Bone Marrow Biopsy       Morphology    Lab Results   Component Value Date    FINALDX  07/26/2024     Bone marrow, posterior iliac crest, left decalcified trephine biopsy and touch imprint; aspirate clot, direct aspirate smear, and concentrated aspirate smear; and peripheral blood smear:  Recurrent/persistent myeloid neoplasm with the following features:  -Normocellular (cellularity estimated at 40-50%) with trilineage hematopoietic maturation, no overt dysplasia, and 1% blasts with atypical localization and positive p53 IHC  -Peripheral blood showing moderate normochromic, normocytic anemia; moderate leukopenia with lymphopenia; slight thrombocytopenia  -See comment      COMDX  07/26/2024     There is an unusual population identified in this sample characterized by bright CD34 expression and negative CD38 expression forming a discrete population. The immunophenotype of this small population of blasts is similar but not  identical to previously described neoplastic blasts. These cells are seen in a background of otherwise unremarkable myeloid blasts. Final interpretation requires correlation with results of other ancillary studies, morphologic, and clinical features.            Flow Cytometry    Lab Results   Component Value Date    FLINTERP  07/26/2024     A. Iliac Crest, Bone Marrow Aspirate, Left:  -Unusual myeloid blast population (0.1%)  -See comment      COMDX  07/26/2024     There is an unusual population identified in this sample characterized by bright CD34 expression and negative CD38 expression forming a discrete population. The immunophenotype of this small population of blasts is similar but not identical to previously described neoplastic blasts. These cells are seen in a background of otherwise unremarkable myeloid blasts. Final interpretation requires correlation with results of other ancillary studies, morphologic, and clinical features.                Chimerism     Lab Results   Component Value Date    SPECDES  07/26/2024     Bone Marrow: ACD Syringe      SPECDES  07/26/2024     Bone Marrow: ACD Syringe      LDRESULTS  07/26/2024     RESULTS:    POST BONE MARROW  DONOR: (SHELIA, [id number])  98 %     RECIPIENT:  2 %    These results are accurate +/-5%.                LDRESULTS  07/26/2024     RESULTS:     POST CD3+ FRACTION  DONOR: (SHELIA, [id number])  59 %     RECIPIENT:  41 %    These results are accurate +/-5%.                LDRESULTS  07/26/2024     RESULTS:     POST CD33/66b+(Myeloid) FRACTION  DONOR: (SHELIA, [id number])  100 %     RECIPIENT:  0 %    These results are accurate +/-5%.                     ASSESSMENT AND PLAN      Zaheer Borges is a 64 year old male with long hx of PV and now AML, undergoing URD 7/8 PBSCT with the Optimize trial. Today is day +38    BMT/IEC PROTOCOL for AML  - Chemo protocol: OPTIMIZE trial; Bu/flu  - Donor and Recip B+   - Restaging per protocol ; BMbx 7/26,  results show MRD on marrow and flow  - last dose GCSF 7/12. Re-dose prn ANC </=1000.   - START: maintenance aza/sulema     HEME/COAG  #Pancytopenia 2/2 chemo/BMT. Plts improving   - Transfusion parameters: hemoglobin <7, platelets <10     IMMUNOCOMPROMISED  - Relevant infectious history: tenofovir for hepatitis B core positivity.   - Prophylaxis plan: ACV, letermovir day +14, fabian through day +45 (home MWF [facility]). CT Chest clear. Levofloxacin stopped with engraftment. Sulfa allergic so Pentamidine to start at day +28- not yet scheduled (d31) so requested for next visit 8/5.     RISK OF GVHD  - Prophylaxis: PTcy +3/+4. Tacro/MMF started day +5.   Tac level 5.6 (7/14); Level was high, drawn through contaminated line. Resumed at 2.5mg BID with level pending today.  **Plan to stop tacrolimus at day 60 if there are no signs or symptoms of GVHD     CARDIOVASCULAR  #Hypertension: amlodipine   - Risk of cardiomyopathy:  Baseline EF 60-65%  - Risk of arrhythmia: Baseline EKG showed Sinus rhythm with sinus arrhythmia     GI/NUTRITION  - Ulcer prophylaxis: protonix  - CINV: Zofran prn, compazine prn, ativan prn   - ursodiol through +60  - Diarrhea: resolved  - hx Constipation: metamucil daily with prn senna & miralax  - Mucositis: resolved     RENAL/ELECTROLYTES/  - Electrolyte management: replace per sliding scale  # Hypocalcemia in setting of normal albumin- added PO supplement daily. Now resolved, Pt would like to trial off as this is a large pill- ok to hold starting 7/19.  #Hypomagnesemia 2/2 Tacrolimus- cont Mg plus protein 3 tabs bid. Mg 1.6 today.     MUSCULOSKELETAL/FRAILTY  - Baseline Frailty Score: 2  - Patient with substantial risk of sarcopenia  - Cancer Rehab as needed outpatient     SUPPORTIVE CARES  - Trazodone prn sleep  #suspect hand/foot syndrome 2/2 Busulfan - affecting his hands with painful burning-Resolving Has tmc ointment and gabapentin prn. 7/19- he did not start either the cream or the gabapentin and  his pain is improving.      SOCIAL DETERMINANTS  - Caregiver: Pt's primary caregiver will be spouse with family/friends as a secondary or back-up to assist as needed.   - Financial/insurance concerns: no    Summary:  I reviewed the results of his restaging marrow that shows MRD by immunohistochemistry, and flow cytometry.  Given the high risk nature of the disease, I recommended starting maintenance, and discontinuing his GVHD prophyaxis at 60 days in the absence of GVHD.  I reviewed the risks and benefits of maintenance and he wishes to proceed.      Plan  -start low dose (aza 20/m2 x 5 days/sulema 200 daily x 28) per VIALE-T dosing  - Plan to stop (not taper) tacrolimus at day 60 if there is no S/S of GVHD  - Cont fabian MWF at home    The longitudinal plan of care for the diagnosis(es)/condition(s) as documented were addressed during this visit. Due to the added complexity in care, I will continue to support Zaheer in the subsequent management and with ongoing continuity of care.      45 minutes spent on the date of the encounter doing chart review, history and exam, lab review, documentation and coordniating care.    SRINIVAS WILKS MD  August 5, 2024

## 2024-08-06 NOTE — TELEPHONE ENCOUNTER
Prior Authorization Not Needed per Insurance    Medication: VENCLEXTA 100 MG PO TABS  Insurance Company: [employer] - Phone [phone] Fax [phone]  Expected CoPay: $ 0  Pharmacy Filling the Rx: [facility] - [place], [place] - [address]  Pharmacy Notified:   Patient Notified:         Thank you,    Lorena López  Oncology Pharmacy Liaison JIM [email or web address]  Phone: [phone]  Fax: [phone]

## 2024-08-09 NOTE — PROGRESS NOTES
[facility]: Cancer Care Initial Note                                    Discussion with Patient:                                                      Pt to start aza/sulema mtn therapy.  Message sent to scheduling to get pt set up to start next week at [facility], pt to start 8/13.  Msg sent to [facility] to see if pt has coverage to do at home as he is open to [facility] for IV Daphnie.      My chart message sent to pt with printed education for Azacitadine, low plts, anemia, neutropenia, and when to call.  Educated pt on dates for infusion appts.      Call to pt this afternoon to complete chemo education on Azacitidine.  Pt reports that he spoke with oral pharm earlier today regarding the venetoclax.        Goals          General     Functional (pt-stated)      Notes - Note created  8/9/2024 10:55 AM by Lea Rebollar RN     Goal Statement: I will use my clinic and care team resources as directed.  Date Goal set: 8/9/2024  Barriers: disease burden  Strengths: support, coping, motivation, health awareness, and involvement with care team  Date to Achieve By: ongoing  Patient expressed understanding of goal: Yes  Action steps to achieve this goal:  I will contact triage with new, worsening or uncontrolled symptoms.   I will contact triage with temperature over 100.4  I will call with difficulties of scheduling and/or transportation.   I will request needed refills when there are 7 days of medication remaining.   I will not send urgent or symptomatic messages through [employer].   I will contact scheduling to arrange or make changes in my appointments.                Assessment:                                                      Initial  Current living arrangement:: I live in a private home with family  Type of residence:: Private home - stairs  Informal Support system:: Spouse;Family  Equipment Currently Used at Home: none  Bed or wheelchair confined:: No  Mobility Status: Independent  Transportation means:: Regular car  Medication  adherence problem (GOAL):: No  Knowledgeable about how to use meds:: Yes  Medication side effects suspected:: No  Referrals Placed: None  Advanced Care Plans/Directives on file:: No  Advanced Care Plan/Directive Status: Not Applicable  Patient Reported Pain?: No  Pain Score: No Pain (0)    Plan of Care Education Review:   Assessment completed with:: Patient    Plan of Care Education   Yearly learning assessment completed?: Yes (see Education tab)  Diagnosis:: AML  Does patient understand diagnosis?: Yes  Tx plan/regimen:: Aza/Gus  Does patient understand treatment plan/regimen?: Yes  Preparing for treatment:: Reviewed treatment preparation information with patient (vascular access, day of chemo, visitor policy, what to bring, etc.)  Side effect education:: Diarrhea/Constipation;Fatigue;Infection;Lab value monitoring (anemia, neutropenia, thrombocytopenia);Mylosuppression;Nausea/Vomiting;Skin changes  Safety/self care at home reviewed with patient:: Yes  Coping - concerns/fears reviewed with patient:: Yes  Plan of Care:: ELIA follow-up appointment;Lab appointment;MD follow-up appointment;Treatment schedule  When to call provider:: Bleeding;Increased shortness of breath;New/worsening pain;Shaking chills;Temperature >100.4F;Uncontrolled nausea/vomiting;Uncontrolled diarrhea/constipation  Reasons for deferring treatment reviewed with patient:: Yes  Additional education provided for: : Injectable therapies;Bleeding precautions;Neutropenic precautions;Change in medication/medication education  Procedure education provided for: : Blood product transfusion    Evaluation of Learning  Patient Education Provided: Yes  Readiness:: Acceptance  Method:: Literature;Explanation  Response:: Verbalizes understanding           Intervention/Education provided during outreach:                                                       Education completed on common side effects including myelosuppression, N/V, constipation/diarrhea and  injection site reaction.  Educated on expected sched of infusions, lab checks and follow up.  Educated on when to call clinic and how to contact triage.  Pt asked good questions, all answered to pt satisfaction.  Pt states understanding of teaching.      Patient to follow up as scheduled at next appt  Patient to call/Naymitt message with updates  Confirmed patient has clinic and triage numbers    CAROLEE Aguilar, RN  RN Care Coordinator  [facility]

## 2024-08-09 NOTE — ORAL ONC MGMT
"Oral Chemotherapy Monitoring Program    Lab Monitoring Plan  Frequent labs are being monitored at baseline.  Subjective/Objective:  Zaheer Borges is a 64 year old male contacted by phone for an initial visit for oral chemotherapy education. Zaheer was on venetoclax (400 mg daily) prior to transplant. As such, he had a full bottle on hand that he would like to use up for this first cycle of 1 tablet (100 mg) daily. We originally sent a prescription to [facility] this morning however I had them cancel it on their end. He confirms the directions with me as well as asking clarifying questions on his dose this time around (target dose 200 mg but dose reduced due to DDI with letermovir). Zaheer understands to let us know if he will be stopping his letermovir as we will then need to dose adjust his venetoclax.        8/7/2024     3:00 PM 8/9/2024    10:00 AM 8/9/2024    10:12 AM 8/9/2024    10:48 AM   ORAL CHEMOTHERAPY   Assessment Type Initial Work up Refill Left Voicemail New Teach   Diagnosis Code Acute Myeloid Leukemia (AML) Acute Myeloid Leukemia (AML) Acute Myeloid Leukemia (AML) Acute Myeloid Leukemia (AML)   Providers Randell Mejias   [facility]   Drug Name Venclexta (venetoclax) Venclexta (venetoclax) Venclexta (venetoclax) Venclexta (venetoclax)   Dose 200 mg 100 mg  1,000 mg   Current Schedule Daily Daily  Daily   Cycle Details Continuous Continuous  Continuous   Planned next cycle start date    8/13/2024       Last PHQ-2 Score on record:        No data to display                Vitals:  BP:   BP Readings from Last 1 Encounters:   08/05/24 132/85     Wt Readings from Last 1 Encounters:   08/05/24 67 kg (147 lb 9.6 oz)     Estimated body surface area is 1.77 meters squared as calculated from the following:    Height as of 6/21/24: 1.68 m (5' 6.14\").    Weight as of 8/5/24: 67 kg (147 lb 9.6 oz).    Labs:  _  Result Component Current Result Ref Range   Sodium 138 " (8/5/2024) 135 - 145 mmol/L     _  Result Component Current Result Ref Range   Potassium 4.2 (8/5/2024) 3.4 - 5.3 mmol/L     _  Result Component Current Result Ref Range   Calcium 9.3 (8/5/2024) 8.8 - 10.4 mg/dL     _  Result Component Current Result Ref Range   Magnesium 1.7 (8/5/2024) 1.7 - 2.3 mg/dL     _  Result Component Current Result Ref Range   Phosphorus 4.1 (7/14/2024) 2.5 - 4.5 mg/dL     _  Result Component Current Result Ref Range   Albumin 4.4 (8/5/2024) 3.5 - 5.2 g/dL     _  Result Component Current Result Ref Range   Urea Nitrogen 14.7 (8/5/2024) 8.0 - 23.0 mg/dL     _  Result Component Current Result Ref Range   Creatinine 0.61 (L) (8/5/2024) 0.67 - 1.17 mg/dL     _  Result Component Current Result Ref Range   AST 19 (8/5/2024) 0 - 45 U/L     _  Result Component Current Result Ref Range   ALT 16 (8/5/2024) 0 - 70 U/L     _  Result Component Current Result Ref Range   Bilirubin Total 0.3 (8/5/2024) <=1.2 mg/dL     _  Result Component Current Result Ref Range   WBC Count 4.5 (8/5/2024) 4.0 - 11.0 10e3/uL     _  Result Component Current Result Ref Range   Hemoglobin 11.2 (L) (8/5/2024) 13.3 - 17.7 g/dL     _  Result Component Current Result Ref Range   Platelet Count 173 (8/5/2024) 150 - 450 10e3/uL     _  Result Component Current Result Ref Range   Absolute Neutrophils 2.1 (7/19/2024) 1.6 - 8.3 10e3/uL     _  Result Component Current Result Ref Range   Absolute Neutrophils 3.4 (8/5/2024) 1.6 - 8.3 10e3/uL        Assessment:  Patient is appropriate to start therapy.    Plan:  Basic chemotherapy teaching was reviewed with the patient including indication, start date of therapy, dose, administration, adverse effects, missed doses, food and drug interactions, monitoring, side effect management, office contact information, and safe handling. Written materials were provided and all questions answered.    Follow-Up:  Confirm cycle start, ~8/13/24  1 week after starting for initial assessment     Sharath Prabhakar  PharmD, BCPS, [facility]  Hematology/Oncology Clinical Pharmacist  Oral Chemotherapy Monitoring Program  [facility]  [phone]

## 2024-08-09 NOTE — ORAL ONC MGMT
Oral Chemotherapy Monitoring Program     Placed call to patient in follow up of oral chemotherapy. Hoping to touch base with Zaheer for new teach for his venetoclax. In the meantime, his venetoclax was released by my colleague Isabel to [facility] to get to him by desired start date of Tuesday 8/13.    Left message requesting call back. No drug names were mentioned. Will update when response received.     Sharath Prabhakar, PharmD, BCPS, BCOP  Hematology/Oncology Clinical Pharmacist  [facility]  [phone]

## 2024-08-12 NOTE — NURSING NOTE
"Oncology Rooming Note    August 12, 2024 1:01 PM   Zaheer Borges is a 64 year old male who presents for:    Chief Complaint   Patient presents with    Blood Draw     Labs drawn via CVC by RN in lab. VS taken.     Oncology Clinic Visit     Acute Myeloid Leukemia     Initial Vitals: BP (!) 149/88   Pulse 82   Resp 16   Wt 66.1 kg (145 lb 12.8 oz)   SpO2 97%   BMI 23.43 kg/m   Estimated body mass index is 23.43 kg/m  as calculated from the following:    Height as of 6/21/24: 1.68 m (5' 6.14\").    Weight as of this encounter: 66.1 kg (145 lb 12.8 oz). Body surface area is 1.76 meters squared.  No Pain (0) Comment: Data Unavailable   No LMP for male patient.  Allergies reviewed: Yes  Medications reviewed: Yes    Medications: Pt will discuss refills with Provider  Pharmacy name entered into [facility]:    [id number] - [place], [place] - [address] [place] [address]  AT [facility] [address] [place] [address] [facility]ARMACY # [id number] - [place], [place] - [address]  [facility] - [place], [place] - [address]    Frailty Screening:   Is the patient here for a new oncology consult visit in cancer care? 2. No      Clinical concerns: None       Karyn Yu LPN  8/12/2024              "

## 2024-08-12 NOTE — LETTER
8/12/2024      Zaheer Borges  [address] [place] [id number]      Dear Colleague,    Thank you for referring your patient, Zaheer Borges, to the [facility] [place]. Please see a copy of my visit note below.    BMT Progress Note        Patient ID:  Zaheer Borges is a 64 year old male, currently day +45 s/p MA MUD PBSCT for AML.         Diagnosis AML     BMTCT Type URD Allo     Prep Regimen Bu/Flu     Donor Match and  Source 7/8 URD     GVHD Prophylaxis Tac/MMF/PtCy     Primary BMT MD Dr. Mejias    Clinical Trials MT 2023-33             INTERVAL  HISTORY   Feeling well. Has occasional nausea, occasional diarrhea. At worse 2x in a day but does not occur every day. No rashes. No fever or uri symptoms.      Review of Systems: 8 point ROS negative except as noted above.     PHYSICAL EXAM      KPS:  80  Blood pressure (!) 149/88, pulse 82, resp. rate 16, weight 66.1 kg (145 lb 12.8 oz), SpO2 97%.  Wt Readings from Last 4 Encounters:   08/05/24 67 kg (147 lb 9.6 oz)   07/29/24 66.8 kg (147 lb 4.8 oz)   07/26/24 66.4 kg (146 lb 4.8 oz)   07/23/24 65.4 kg (144 lb 2.9 oz)     General: NAD   Mouth: OP moist without lesions  Lungs: CTAB  Cardiovascular: RRR  GI: +bs, soft, NT/ND  Neuro: non-focal  Skin: no rashes   Lymph: no LE edema  Access: L Mckinney site NT, dressing cdi. R PAC not accessed     Acute GVHD          8/5/2024    16:54 7/29/2024    12:26 7/17/2024    17:27   Acute GVHD   New evidence of Acute Egexs-Tfaojt-Qqfb Disease developed since last entry? No No No          LABS: I have assessed all abnormal lab values for their clinical significance and any values considered clinically significant have been addressed in the assessment and plan.      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 Today is day +45    BMT/IEC PROTOCOL for AML  - Chemo protocol: OPTIMIZE trial; Bu/flu  - Donor and Recip B+   - Restaging per protocol ; BMbx 7/26, results show MRD on marrow and flow  - last dose GCSF 7/12. Re-dose prn ANC </=1000.   - Given high risk nature of disease, per primary MD Mejias, start low dose (aza 20/m2 x 5 days/sulema 200 daily x 28) per VIALE-T dosing (anticipate 6 cycles)     HEME/COAG  #Pancytopenia 2/2 chemo/BMT. Plts improving   - Transfusion parameters: hemoglobin <7, platelets <10     IMMUNOCOMPROMISED  - Relevant infectious history: tenofovir for hepatitis B core positivity.   - Prophylaxis plan: ACV, letermovir day +14, fabian through day +45 (home MWF I) now complete. CT Chest clear. Levofloxacin stopped with engraftment. Sulfa allergic so Pentamidine given last 8/5     RISK OF GVHD  - Prophylaxis: PTcy +3/+4. Tacro/MMF started day +5. Tac 2.5mg/day. Level ordered for 8/13  **Plan to stop tacrolimus at day 60 if there are no signs or symptoms of GVHD     CARDIOVASCULAR  #Hypertension: amlodipine   - Risk of cardiomyopathy:  Baseline EF 60-65%  - Risk of arrhythmia: Baseline EKG showed Sinus rhythm with sinus arrhythmia     GI/NUTRITION  - Ulcer prophylaxis: protonix  - CINV: Zofran prn, compazine prn, ativan prn   - ursodiol through +60  - hx Constipation: metamucil daily with prn senna & miralax     RENAL/ELECTROLYTES/  - Electrolyte management: replace per sliding scale  #Hypomagnesemia 2/2 Tacrolimus- cont Mg plus protein 3 tabs bid.      MUSCULOSKELETAL/FRAILTY  - Baseline Frailty Score: 2  - Patient with substantial risk of sarcopenia  - Cancer Rehab as needed outpatient     SUPPORTIVE CARES  -  Trazodone prn sleep  #suspect hand/foot syndrome 2/2 Busulfan - affecting his hands with painful burning-Resolving Has tmc ointment and gabapentin prn. 7/19- he did not start either the cream or the gabapentin and his pain is improving.      SOCIAL DETERMINANTS  - Caregiver: Pt's primary caregiver will be spouse with family/friends as a secondary or back-up to assist as needed.   - Financial/insurance concerns: no      Plan  - Refills sent   - Tomorrow starts first cycle sulema/aza, tac level and mag ordered (missed today)  - Per Dr Mejias, plan to stop (not taper) tacrolimus at day 60 if there is no S/S of GVHD    8/19 next ELIA follow up  8/29 next Randell visit    The longitudinal plan of care for the diagnosis(es)/condition(s) as documented were addressed during this visit. Due to the added complexity in care, I will continue to support Olgajune in the subsequent management and with ongoing continuity of care.    25 minutes spent on the date of the encounter doing chart review, history and exam, lab review, documentation and coordniating care.    Soumya Cristobal PA-C on 8/12/2024 at 1:57 PM      Again, thank you for allowing me to participate in the care of your patient.        Sincerely,        BMT Advanced Practice Provider

## 2024-08-12 NOTE — NURSING NOTE
Chief Complaint   Patient presents with    Blood Draw     Labs drawn via CVC by RN in lab. VS taken.      Labs drawn via CVC, caps changed.  Line flushed and Heparin locked. Vital signs taken. Checked into next appointment.   Sherri Harp RN

## 2024-08-12 NOTE — PROGRESS NOTES
BMT Progress Note        Patient ID:  Zaheer Borges is a 64 year old male, currently day +45 s/p MA MUD PBSCT for AML.         Diagnosis AML     BMTCT Type URD Allo     Prep Regimen Bu/Flu     Donor Match and  Source 7/8 URD     GVHD Prophylaxis Tac/MMF/PtCy     Primary BMT MD Dr. Mejias    Clinical Trials MT 2023-33             INTERVAL  HISTORY   Feeling well. Has occasional nausea, occasional diarrhea. At worse 2x in a day but does not occur every day. No rashes. No fever or uri symptoms.      Review of Systems: 8 point ROS negative except as noted above.     PHYSICAL EXAM      KPS:  80  Blood pressure (!) 149/88, pulse 82, resp. rate 16, weight 66.1 kg (145 lb 12.8 oz), SpO2 97%.  Wt Readings from Last 4 Encounters:   08/05/24 67 kg (147 lb 9.6 oz)   07/29/24 66.8 kg (147 lb 4.8 oz)   07/26/24 66.4 kg (146 lb 4.8 oz)   07/23/24 65.4 kg (144 lb 2.9 oz)     General: NAD   Mouth: OP moist without lesions  Lungs: CTAB  Cardiovascular: RRR  GI: +bs, soft, NT/ND  Neuro: non-focal  Skin: no rashes   Lymph: no LE edema  Access: L Mckinney site NT, dressing cdi. R PAC not accessed     Acute GVHD          8/5/2024    16:54 7/29/2024    12:26 7/17/2024    17:27   Acute GVHD   New evidence of Acute Gayov-Osakgm-Uexr Disease developed since last entry? No No No          LABS: I have assessed all abnormal lab values for their clinical significance and any values considered clinically significant have been addressed in the assessment and plan.      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 Today is day +45    BMT/IEC PROTOCOL for AML  - Chemo protocol: OPTIMIZE trial; Bu/flu  - Donor and Recip B+   - Restaging per protocol ; BMbx 7/26, results show MRD on marrow and flow  - last dose GCSF 7/12. Re-dose prn ANC </=1000.   - Given high risk nature of disease, per primary MD Mejias, start low dose (aza 20/m2 x 5 days/sulema 200 daily x 28) per VIALE-T dosing (anticipate 6 cycles)     HEME/COAG  #Pancytopenia 2/2 chemo/BMT. Plts improving   - Transfusion parameters: hemoglobin <7, platelets <10     IMMUNOCOMPROMISED  - Relevant infectious history: tenofovir for hepatitis B core positivity.   - Prophylaxis plan: ACV, letermovir day +14, fabian through day +45 (home MWF [facility]) now complete. CT Chest clear. Levofloxacin stopped with engraftment. Sulfa allergic so Pentamidine given last 8/5     RISK OF GVHD  - Prophylaxis: PTcy +3/+4. Tacro/MMF started day +5. Tac 2.5mg/day. Level ordered for 8/13  **Plan to stop tacrolimus at day 60 if there are no signs or symptoms of GVHD     CARDIOVASCULAR  #Hypertension: amlodipine   - Risk of cardiomyopathy:  Baseline EF 60-65%  - Risk of arrhythmia: Baseline EKG showed Sinus rhythm with sinus arrhythmia     GI/NUTRITION  - Ulcer prophylaxis: protonix  - CINV: Zofran prn, compazine prn, ativan prn   - ursodiol through +60  - hx Constipation: metamucil daily with prn senna & miralax     RENAL/ELECTROLYTES/  - Electrolyte management: replace per sliding scale  #Hypomagnesemia 2/2 Tacrolimus- cont Mg plus protein 3 tabs bid.      MUSCULOSKELETAL/FRAILTY  - Baseline Frailty Score: 2  - Patient with substantial risk of sarcopenia  - Cancer Rehab as needed outpatient     SUPPORTIVE CARES  - Trazodone prn sleep  #suspect hand/foot syndrome 2/2 Busulfan - affecting his hands with painful burning-Resolving Has tmc ointment and gabapentin prn. 7/19- he did not start either the cream or the gabapentin and his pain is improving.      SOCIAL  DETERMINANTS  - Caregiver: Pt's primary caregiver will be spouse with family/friends as a secondary or back-up to assist as needed.   - Financial/insurance concerns: no      Plan  - Refills sent   - Tomorrow starts first cycle sulema/aza, tac level and mag ordered (missed today)  - Per Dr Mejias, plan to stop (not taper) tacrolimus at day 60 if there is no S/S of GVHD    8/19 next ELIA follow up  8/29 rupesh Mejias visit    The longitudinal plan of care for the diagnosis(es)/condition(s) as documented were addressed during this visit. Due to the added complexity in care, I will continue to support Olgajune in the subsequent management and with ongoing continuity of care.    25 minutes spent on the date of the encounter doing chart review, history and exam, lab review, documentation and coordniating care.    Soumya Cristobal PA-C on 8/12/2024 at 1:57 PM

## 2024-08-13 NOTE — NURSING NOTE
Chief Complaint   Patient presents with    Blood Draw     Labs drawn via CVC by RN     Labs drawn via CVC by RN. Flushed with saline and heparin. Pt tolerated well. Vitals taken. Pt checked into next appointment.    Kalani Rico RN

## 2024-08-13 NOTE — PROGRESS NOTES
Infusion Nursing Note:  Zaheer Borges presents today for c1d1 azacitadine.    Patient seen by provider today: No   present during visit today: Not Applicable.    Note: Zaheer here today feeling well, some intermittent nausea for which he is taking zofran, has compazine available at home. Labs monitored, parameters met. Azacitadine was checked by two chemo competent RNs and administered in RLQ of abdomen. Pt was monitored x15 min as this is first dose aza. Tolerated well.      Intravenous Access:  No Intravenous access/labs at this visit.    Treatment Conditions:  Lab Results   Component Value Date    HGB 12.2 (L) 08/13/2024    WBC 4.2 08/13/2024    ANEU 2.1 07/19/2024    ANEUTAUTO 3.3 08/13/2024     (L) 08/13/2024        Lab Results   Component Value Date     08/13/2024    POTASSIUM 4.3 08/13/2024    MAG 1.7 08/13/2024    CR 0.66 (L) 08/13/2024    ARISTIDES 9.5 08/13/2024    BILITOTAL 0.4 08/13/2024    ALBUMIN 4.4 08/13/2024    ALT 15 08/13/2024    AST 19 08/13/2024       Results reviewed, labs MET treatment parameters, ok to proceed with treatment.      Post Infusion Assessment:  Patient tolerated infusion without incident.  Blood return noted pre and post infusion.  Site patent and intact, free from redness, edema or discomfort.  No evidence of extravasations.  Access discontinued per protocol.       Discharge Plan:   Patient discharged in stable condition accompanied by: self.  Departure Mode: Ambulatory.      Sophie Rivera RN

## 2024-08-14 NOTE — PROGRESS NOTES
Infusion Nursing Note:  Zaheer Borges presents today for Azacitadine.    Patient seen by provider today: No   present during visit today: Not Applicable.    Note: Zaheer here feeling well. See flowsheet for details. Azacitadine administered in LLQ of abdomen, pt tolerated well.      Intravenous Access:  No Intravenous access/labs at this visit.    Treatment Conditions:  Not Applicable.      Post Infusion Assessment:  Patient tolerated injection without incident.       Discharge Plan:   Patient discharged in stable condition accompanied by: self.  Departure Mode: Ambulatory.      Sophie Rivera RN

## 2024-08-15 NOTE — PROGRESS NOTES
Infusion Nursing Note:  Zaheer Borges presents today for dose 3/5 Azacitadine.    Patient seen by provider today: No   present during visit today: Not Applicable.     Note: Zaheer here feeling well. See flowsheet for details. Azacitadine administered in RLQ of abdomen, pt tolerated well.        Intravenous Access:  No Intravenous access/labs at this visit.     Treatment Conditions:  Not Applicable.        Post Infusion Assessment:  Patient tolerated injection without incident.         Discharge Plan:   Patient discharged in stable condition accompanied by: self.  Departure Mode: Ambulatory.

## 2024-08-15 NOTE — NURSING NOTE
"Oncology Rooming Note    August 15, 2024 10:36 AM   Zaheer Borges is a 64 year old male who presents for:    Chief Complaint   Patient presents with    Chemotherapy     Hx of AML here for chemo inj     Initial Vitals: There were no vitals taken for this visit. Estimated body mass index is 23.58 kg/m  as calculated from the following:    Height as of 6/21/24: 1.68 m (5' 6.14\").    Weight as of 8/13/24: 66.5 kg (146 lb 11.2 oz). There is no height or weight on file to calculate BSA.  Data Unavailable Comment: Data Unavailable   No LMP for male patient.  Allergies reviewed: Yes  Medications reviewed: Yes    Medications: Medication refills not needed today.  Pharmacy name entered into [employer]:    [id number] - [place], [place] - [address] [place] [address]  AT [facility] [address] [facility] # [id number] - [place], [place] - [address]  [facility] - [place], [place] - [address]    Frailty Screening:   Is the patient here for a new oncology consult visit in cancer care? 2. No      Clinical concerns: None      Raheem Oliva RN              "

## 2024-08-16 NOTE — PROGRESS NOTES
Infusion Nursing Note:  Zaheer Borges presents today for scheduled infusion.    Patient seen by provider today: No   present during visit today: Not Applicable.    Note: Patient arrived for C1D4 Azacitidine. Reports nausea yesterday afternoon, managed with PO zofran. Feeling well this morning. Denies chest pain, SOB, N/V, fever. ONC toxicity assessment completed. Home medication and allergies reviewed. No parameters for today's infusion. Received azacitidine 35 mg subcutaneous to left lower abdomen.       Intravenous Access:  No Intravenous access/labs at this visit.    Treatment Conditions:  Not Applicable.      Post Infusion Assessment:  Patient tolerated injection without incident.  Site patent and intact, free from redness, edema or discomfort.        Discharge Plan:   Patient discharged in stable condition accompanied by: self.  Departure Mode: Ambulatory.      Sherri Harp RN

## 2024-08-17 NOTE — PROGRESS NOTES
Infusion Nursing Note:  Zaheer Borges presents today for Azacitadine.    Patient seen by provider today: No   present during visit today: Not Applicable.    Note: Patient doing overall well today. He does have some nausea, and nurse confirmed that patient is taking his Ondansetron. No fevers reported. Oncology Toxicity assessment performed.    Azacitidine 35mg given via subcutaneous route in RLQ of abdomen.    Intravenous Access:  No Intravenous access/labs at this visit.    Treatment Conditions:  Not Applicable.      Post Infusion Assessment:  Patient tolerated injection without incident.  Site patent and intact, free from redness, edema or discomfort.  Access discontinued per protocol.     Discharge Plan:   Patient declined prescription refills.  Patient discharged in stable condition accompanied by: self.  Departure Mode: Ambulatory.      Myesha Morris RN

## 2024-08-19 NOTE — NURSING NOTE
"Oncology Rooming Note    August 19, 2024 1:21 PM   Zaheer Borges is a 64 year old male who presents for:    Chief Complaint   Patient presents with    Oncology Clinic Visit     Acute myeloid leukemia    Port Draw     Labs collected from CVC by RN, line flushed with saline and heparin.  Vitals taken. Pt checked in for appointment(s).      Initial Vitals: /89   Pulse 69   Temp 98  F (36.7  C)   Resp 16   Wt 65.3 kg (144 lb)   SpO2 98%   BMI 23.14 kg/m   Estimated body mass index is 23.14 kg/m  as calculated from the following:    Height as of 6/21/24: 1.68 m (5' 6.14\").    Weight as of this encounter: 65.3 kg (144 lb). Body surface area is 1.75 meters squared.  No Pain (0) Comment: Data Unavailable   No LMP for male patient.  Allergies reviewed: Yes  Medications reviewed: Yes    Medications: Medication refills not needed today.  Pharmacy name entered into [employer]:    [id number] - [place], [place] - [address] [place] [address]  AT [facility] [address] [place] [address] [facility] # [id number] - [place], [place] - [address]  [facility] - [place], [place] - [address]    Frailty Screening:   Is the patient here for a new oncology consult visit in cancer care? 2. No      Clinical concerns: Patient states no new concerns to discuss with provider.  Provider was NOT notified.      Kimberly Borges EMT            "

## 2024-08-19 NOTE — PROGRESS NOTES
BMT Progress Note        Patient ID:  Zaheer Borges is a 64 year old male, currently day +52 s/p MA MUD PBSCT for AML.         Diagnosis AML     BMTCT Type URD Allo     Prep Regimen Bu/Flu     Donor Match and  Source 7/8 URD     GVHD Prophylaxis Tac/MMF/PtCy     Primary BMT MD Dr. Mejias    Clinical Trials MT 2023-33             INTERVAL  HISTORY   Feeling ok.  Some nausea and loose stool with recent chemo.  No vomiting.  PO intake still pretty good.  No fever, rash or bleeding. Intermittent cough in AM.  Not throughout the day.  No SOB, URI symptoms.     Review of Systems: 8 point ROS negative except as noted above.     PHYSICAL EXAM      KPS:  80  Blood pressure 127/89, pulse 69, temperature 98  F (36.7  C), resp. rate 16, weight 65.3 kg (144 lb), SpO2 98%.    Wt Readings from Last 4 Encounters:   08/19/24 65.3 kg (144 lb)   08/17/24 66.7 kg (147 lb)   08/13/24 66.5 kg (146 lb 11.2 oz)   08/12/24 66.1 kg (145 lb 12.8 oz)     General: NAD   Mouth: masked  Lungs: CTAB  Cardiovascular: RRR  GI: +bs, soft, NT/ND  Neuro: non-focal  Skin: no rashes   Lymph: no LE edema  Access: L Mckinney site NT, dressing cdi. R PAC not accessed     Acute GVHD          8/19/2024    14:02 8/5/2024    16:54 7/29/2024    12:26   Acute GVHD   New evidence of Acute Fdwzj-Gnublp-Dopn Disease developed since last entry? No No No          LABS: I have assessed all abnormal lab values for their clinical significance and any values considered clinically significant have been addressed in the assessment and plan.      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 Today is day +52    BMT/IEC PROTOCOL for AML  - Chemo protocol: OPTIMIZE trial; Bu/flu  - Donor and Recip B+   - Restaging per protocol ; BMbx 7/26, results show MRD on marrow and flow  - last dose GCSF 7/12. Re-dose prn ANC </=1000.   - Given high risk nature of disease, per primary MD Mejias, start low dose (aza 20/m2 x 5 days/sulema 200 daily x 28) per VIALE-T dosing (anticipate 6 cycles)     HEME/COAG  #Pancytopenia 2/2 chemo/BMT.   - Transfusion parameters: hemoglobin <7, platelets <10     IMMUNOCOMPROMISED  - Relevant infectious history: tenofovir for hepatitis B core positivity.   - Prophylaxis plan: ACV, letermovir day +14, fabian through day +45 (home MWF [facility]) now complete. CT Chest clear. Levofloxacin stopped with engraftment. Sulfa allergic so Pentamidine given last 8/5; requested for 9/9     RISK OF GVHD  - Prophylaxis: PTcy +3/+4. Tacro/MMF started day +5. Tac 2.5mg/day. Level ordered for today  **Plan to stop tacrolimus at day 60 if there are no signs or symptoms of GVHD     CARDIOVASCULAR  #Hypertension: amlodipine   - Risk of cardiomyopathy:  Baseline EF 60-65%  - Risk of arrhythmia: Baseline EKG showed Sinus rhythm with sinus arrhythmia     GI/NUTRITION  - Ulcer prophylaxis: protonix  - CINV: Zofran prn, compazine prn, ativan prn   - ursodiol through +60  - hx Constipation: metamucil daily with prn senna & miralax     RENAL/ELECTROLYTES/  - Electrolyte management: replace per sliding scale  #Hypomagnesemia 2/2 Tacrolimus- cont Mg plus protein 3 tabs bid.      MUSCULOSKELETAL/FRAILTY  - Baseline Frailty Score: 2  - Patient with substantial risk of sarcopenia  - Cancer Rehab as needed outpatient     SUPPORTIVE CARES  - Trazodone prn sleep  #suspect hand/foot syndrome 2/2 Busulfan - affecting his hands with painful burning-Resolving Has tmc ointment and gabapentin prn. 7/19- he did not start either the cream or the  gabapentin and his pain is improving.      SOCIAL DETERMINANTS  - Caregiver: Pt's primary caregiver will be spouse with family/friends as a secondary or back-up to assist as needed.   - Financial/insurance concerns: no      Plan  8/26 next ELIA follow up;  Per Dr Mejias, plan to stop (not taper) tacrolimus at day 60 if there is no S/S of GVHD  8/29 f/up w/ Dr. Mejias   Requested [facility] pentam for 9/9 when pt is here    The longitudinal plan of care for the diagnosis(es)/condition(s) as documented were addressed during this visit. Due to the added complexity in care, I will continue to support Gang in the subsequent management and with ongoing continuity of care.    25 minutes spent on the date of the encounter doing chart review, history and exam, lab review, documentation and coordniating care.    Tisha Blake PA-C

## 2024-08-19 NOTE — LETTER
8/19/2024      Zaheer Borges  [address] [place] [id number]      Dear Colleague,    Thank you for referring your patient, Zaheer Borges, to the [facility] BLOOD AND MARROW TRANSPLANT PROGRAM [place]. Please see a copy of my visit note below.    BMT Progress Note        Patient ID:  Zaheer Borges is a 64 year old male, currently day +52 s/p MA MUD PBSCT for AML.         Diagnosis AML     BMTCT Type URD Allo     Prep Regimen Bu/Flu     Donor Match and  Source 7/8 URD     GVHD Prophylaxis Tac/MMF/PtCy     Primary BMT MD Dr. Mejias    Clinical Trials MT 2023-33             INTERVAL  HISTORY   Feeling ok.  Some nausea and loose stool with recent chemo.  No vomiting.  PO intake still pretty good.  No fever, rash or bleeding. Intermittent cough in AM.  Not throughout the day.  No SOB, URI symptoms.     Review of Systems: 8 point ROS negative except as noted above.     PHYSICAL EXAM      KPS:  80  Blood pressure 127/89, pulse 69, temperature 98  F (36.7  C), resp. rate 16, weight 65.3 kg (144 lb), SpO2 98%.    Wt Readings from Last 4 Encounters:   08/19/24 65.3 kg (144 lb)   08/17/24 66.7 kg (147 lb)   08/13/24 66.5 kg (146 lb 11.2 oz)   08/12/24 66.1 kg (145 lb 12.8 oz)     General: NAD   Mouth: masked  Lungs: CTAB  Cardiovascular: RRR  GI: +bs, soft, NT/ND  Neuro: non-focal  Skin: no rashes   Lymph: no LE edema  Access: L Mckinney site NT, dressing cdi. R PAC not accessed     Acute GVHD          8/19/2024    14:02 8/5/2024    16:54 7/29/2024    12:26   Acute GVHD   New evidence of Acute Monuz-Qthhqp-Ryyj Disease developed since last entry? No No No          LABS: I have assessed all abnormal lab values for their clinical significance and any values considered clinically significant have been addressed in the assessment and plan.      Lab Results   Component Value Date    WBC 4.2 08/13/2024    ANEU 2.1 07/19/2024    HGB 12.2 (L) 08/13/2024    HCT 36.1 (L) 08/13/2024     (L) 08/13/2024     08/13/2024     POTASSIUM 4.3 08/13/2024    CHLORIDE 103 08/13/2024    CO2 24 08/13/2024     (H) 08/13/2024    BUN 12.7 08/13/2024    CR 0.66 (L) 08/13/2024    MAG 1.7 08/13/2024    INR 1.07 07/08/2024           ASSESSMENT AND PLAN      Zaheer Borges is a 64 year old male with long hx of PV and now AML, undergoing URD 7/8 PBSCT with the Optimize trial. Today is day +52    BMT/IEC PROTOCOL for AML  - Chemo protocol: OPTIMIZE trial; Bu/flu  - Donor and Recip B+   - Restaging per protocol ; BMbx 7/26, results show MRD on marrow and flow  - last dose GCSF 7/12. Re-dose prn ANC </=1000.   - Given high risk nature of disease, per primary MD Mejias, start low dose (aza 20/m2 x 5 days/sulema 200 daily x 28) per VIALE-T dosing (anticipate 6 cycles)     HEME/COAG  #Pancytopenia 2/2 chemo/BMT.   - Transfusion parameters: hemoglobin <7, platelets <10     IMMUNOCOMPROMISED  - Relevant infectious history: tenofovir for hepatitis B core positivity.   - Prophylaxis plan: ACV, letermovir day +14, [facility]. [facility] Chest clear. Levofloxacin stopped with engraftment. Sulfa allergic so Pentamidine given last 8/5; requested for 9/9     RISK OF GVHD  - Prophylaxis: PTcy +3/+4. Tacro/MMF started day +5. Tac 2.5mg/day. Level ordered for today  **Plan to stop tacrolimus at day 60 if there are no signs or symptoms of GVHD     CARDIOVASCULAR  #Hypertension: amlodipine   - Risk of cardiomyopathy:  Baseline EF 60-65%  - Risk of arrhythmia: Baseline EKG showed Sinus rhythm with sinus arrhythmia     GI/NUTRITION  - Ulcer prophylaxis: protonix  - CINV: Zofran prn, compazine prn, ativan prn   - ursodiol through +60  - hx Constipation: metamucil daily with prn senna & miralax     RENAL/ELECTROLYTES/  - Electrolyte management: replace per sliding scale  #Hypomagnesemia 2/2 Tacrolimus- cont Mg plus protein 3 tabs bid.      MUSCULOSKELETAL/FRAILTY  - Baseline Frailty Score: 2  - Patient with substantial risk of sarcopenia  -  Cancer Rehab as needed outpatient     SUPPORTIVE CARES  - Trazodone prn sleep  #suspect hand/foot syndrome 2/2 Busulfan - affecting his hands with painful burning-Resolving Has tmc ointment and gabapentin prn. 7/19- he did not start either the cream or the gabapentin and his pain is improving.      SOCIAL DETERMINANTS  - Caregiver: Pt's primary caregiver will be spouse with family/friends as a secondary or back-up to assist as needed.   - Financial/insurance concerns: no      Plan  8/26 next [facility] follow up;  Per Dr Mejias, plan to stop (not taper) tacrolimus at day 60 if there is no S/S of GVHD  8/29 f/up w/ Dr. Mejias   Requested IH pentam for 9/9 when pt is here    The longitudinal plan of care for the diagnosis(es)/condition(s) as documented were addressed during this visit. Due to the added complexity in care, I will continue to support Gang in the subsequent management and with ongoing continuity of care.    25 minutes spent on the date of the encounter doing chart review, history and exam, lab review, documentation and coordniating care.    Tisha Blake PA-C       Again, thank you for allowing me to participate in the care of your patient.        Sincerely,        BMT Advanced Practice Provider

## 2024-08-19 NOTE — NURSING NOTE
Chief Complaint   Patient presents with    Oncology Clinic Visit     Acute myeloid leukemia    Port Draw     Labs collected from CVC by RN, line flushed with saline and heparin.  Vitals taken. Pt checked in for appointment(s).      Labs collected from CVC by RN, both caps changed and both lines flushed with saline and heparin.  Vitals taken. Pt checked in for appointment(s).     Port accessed with 20 gauge 3/4 inch flat needle by RN, line flushed with saline and heparin.     Michaelle Mendiola RN

## 2024-08-20 NOTE — ORAL ONC MGMT
Oral Chemotherapy Monitoring Program     Placed call to patient in follow up of oral chemotherapy. Calling for venetoclax initial assessment. Left message requesting call back. No drug names were mentioned. Will update when response received.     Irene Heard,PharmD, [facility]  Oral Chemotherapy Monitoring Program  [facility]  [phone]  August 20, 2024

## 2024-08-22 NOTE — ORAL ONC MGMT
Oral Chemotherapy Monitoring Program    Subjective/Objective:  Zaheer Borges is a pleasant 64 year old male contacted by phone for a follow-up visit for oral chemotherapy. Zaheer confirms that he has been taking venetoclax 100 mg daily since 8/13 when he started his cycle. Denies any missed doses.     In terms of side effects, he has been noting daily nausea with his venetoclax. He reports that he takes his venetoclax with lunch, and he has been typically taking Zofran once per day to help with nausea. This seems to dampen his nausea but not resolve it completely. He also reports using Ativan at bedtime for nausea, which seems to help. He reports that he doesn't feel nauseas when he wakes up, so he is able to get breakfast and lunch in as per usual. However, the nausea does make it difficult to have an appetite at dinnertime. When discussed further, Zaheer questions whether it is okay to take two Zofran tablets at once (16 mg). He trialed this today and thought he noticed some improvement in his nausea. He reports he has tried Compazine in the past but that this made him feel sedated, and he prefers to avoid this on typical days for this reason. In terms of other adverse effects, he reported regular bowel movements (he had been having looser stools and thought the Zofran may be helping firm things up a bit, but not a concern at this time) and denied other noticeable side effects. He reported heel pain, which he felt was unlikely to be related to venetoclax. He otherwise felt that his side effects were manageable but would like better control of nausea if able.    Assessment/Plan:  Zaheer seems to be tolerating venetoclax; however, he is having consistent nausea. Unclear whether Zofran is maximally beneficial to him - though he does notice improvement with dosing 8-16 mg as well as seems to note a bit of stool slowing. Discussed with Zaheer that there may be alternative medications we could try to help control his nausea, such  as granisetron (though we will need to check on insurance) or olanzapine. Zaheer expressed interest in trialing a new medication such as this.     Discussed with Zaheer that I would reach out to the team to see which avenue they would prefer / whether we could try to get granisetron covered by insurance if needed. In the meantime, recommended he continue doing what he is doing - avoid doubling the dose of olanzapine but may repeat in a minimum of 6 hours if needed (maximum 3 doses per day). He expressed understanding of this plan and had no other questions for me.    Amanda Davis, PharmD, [facility]  Oral Chemotherapy Monitoring Program  [facility]  [phone]        8/7/2024     3:00 PM 8/9/2024    10:00 AM 8/9/2024    10:12 AM 8/9/2024    10:48 AM 8/20/2024     1:00 PM 8/22/2024     4:00 PM   ORAL CHEMOTHERAPY   Assessment Type Initial Work up Refill Left Voicemail New Teach Left Voicemail Initial Follow up   Diagnosis Code Acute Myeloid Leukemia (AML) Acute Myeloid Leukemia (AML) Acute Myeloid Leukemia (AML) Acute Myeloid Leukemia (AML) Acute Myeloid Leukemia (AML) Acute Myeloid Leukemia (AML)   Providers [facility]   [facility]   Drug Name Venclexta (venetoclax) Venclexta (venetoclax) Venclexta (venetoclax) Venclexta (venetoclax) Venclexta (venetoclax) Venclexta (venetoclax)   Dose 200 mg 100 mg  1,000 mg 100 mg 100 mg   Current Schedule Daily Daily  Daily Daily Daily   Cycle Details Continuous Continuous  Continuous Continuous Continuous   Planned next cycle start date    8/13/2024 8/13/2024   Doses missed in last 2 weeks      0   Adherence Assessment      Adherent   Adverse Effects      Nausea   Nausea      Grade 1   Pharmacist Intervention(nausea)      Yes   Intervention(s)      Rx medication recommendation   Any new drug interactions?      No   Is the dose as ordered appropriate for the patient?      Yes  "    Last PHQ-2 Score on record:        No data to display              Vitals:  BP:   BP Readings from Last 1 Encounters:   08/19/24 127/89     Wt Readings from Last 1 Encounters:   08/19/24 65.3 kg (144 lb)     Estimated body surface area is 1.75 meters squared as calculated from the following:    Height as of 6/21/24: 1.68 m (5' 6.14\").    Weight as of 8/19/24: 65.3 kg (144 lb).    Labs:  Result Component Current Result Ref Range   Sodium 136 (8/19/2024) 135 - 145 mmol/L     Result Component Current Result Ref Range   Potassium 4.3 (8/19/2024) 3.4 - 5.3 mmol/L     Result Component Current Result Ref Range   Calcium 9.2 (8/19/2024) 8.8 - 10.4 mg/dL     Result Component Current Result Ref Range   Magnesium 1.8 (8/19/2024) 1.7 - 2.3 mg/dL     Result Component Current Result Ref Range   Phosphorus 3.6 (8/13/2024) 2.5 - 4.5 mg/dL     Result Component Current Result Ref Range   Albumin 4.4 (8/19/2024) 3.5 - 5.2 g/dL     Result Component Current Result Ref Range   Urea Nitrogen 16.6 (8/19/2024) 8.0 - 23.0 mg/dL     Result Component Current Result Ref Range   Creatinine 0.70 (8/19/2024) 0.67 - 1.17 mg/dL     Result Component Current Result Ref Range   AST 23 (8/19/2024) 0 - 45 U/L     Result Component Current Result Ref Range   ALT 19 (8/19/2024) 0 - 70 U/L     Result Component Current Result Ref Range   Bilirubin Total 0.5 (8/19/2024) <=1.2 mg/dL     Result Component Current Result Ref Range   WBC Count 3.2 (L) (8/19/2024) 4.0 - 11.0 10e3/uL     Result Component Current Result Ref Range   Hemoglobin 12.4 (L) (8/19/2024) 13.3 - 17.7 g/dL     Result Component Current Result Ref Range   Platelet Count 129 (L) (8/19/2024) 150 - 450 10e3/uL     No results found for ANC within last 30 days.     Result Component Current Result Ref Range   Absolute Neutrophils 2.4 (8/19/2024) 1.6 - 8.3 10e3/uL     "

## 2024-08-26 NOTE — PROGRESS NOTES
BMT Progress Note        Patient ID:  Zaheer Borges is a 64 year old male, currently day +59 s/p MA MUD PBSCT for AML.         Diagnosis AML     BMTCT Type URD Allo     Prep Regimen Bu/Flu     Donor Match and  Source 7/8 URD     GVHD Prophylaxis Tac/MMF/PtCy     Primary BMT MD Dr. Mejias    Clinical Trials MT 2023-33             INTERVAL  HISTORY   Feeling ok.  Still with afternoon nausea related to oral chemo.  No vomiting.  Still eating 2meals/day with a stable wt.  1 loose stool in last week.  No significant diarrhea.  No fever or rash.  He does still note leg pain--seems muscular; now hamstrings--L>R.  Worse in AM, better with sitting.  No neuropathic pain.     Review of Systems: 8 point ROS negative except as noted above.     PHYSICAL EXAM      KPS:  80  Blood pressure 125/82, pulse 84, temperature 98.8  F (37.1  C), temperature source Oral, resp. rate 18, weight 66 kg (145 lb 8 oz).    Wt Readings from Last 4 Encounters:   08/26/24 66 kg (145 lb 8 oz)   08/19/24 65.3 kg (144 lb)   08/17/24 66.7 kg (147 lb)   08/13/24 66.5 kg (146 lb 11.2 oz)     General: NAD   Mouth: masked  Lungs: CTAB  Cardiovascular: RRR  GI: +bs, soft, NT/ND  Neuro: non-focal  Skin: no rashes   Lymph: no LE edema  Access: L Mckinney site NT, dressing cdi. R PAC not accessed     Acute GVHD          8/26/2024    14:57 8/19/2024    14:02 8/5/2024    16:54   Acute GVHD   New evidence of Acute Wychg-Ujzjml-Zzxb Disease developed since last entry? No No No       LABS: I have assessed all abnormal lab values for their clinical significance and any values considered clinically significant have been addressed in the assessment and plan.      Lab Results   Component Value Date    WBC 2.1 (L) 08/26/2024    ANEU 2.1 07/19/2024    HGB 12.4 (L) 08/26/2024    HCT 35.5 (L) 08/26/2024     (L) 08/26/2024     08/26/2024    POTASSIUM 4.5 08/26/2024    CHLORIDE 101 08/26/2024    CO2 24 08/26/2024     (H) 08/26/2024    BUN 17.3 08/26/2024    CR  0.69 08/26/2024    MAG 1.6 (L) 08/26/2024    INR 1.07 07/08/2024       ASSESSMENT AND PLAN      Zaheer Borges is a 64 year old male with long hx of PV and now AML, undergoing URD 7/8 PBSCT with the Optimize trial. Today is day +59    BMT/IEC PROTOCOL for AML  - Chemo protocol: OPTIMIZE trial; Bu/flu  - Donor and Recip B+   - Restaging per protocol; BMbx 7/26, results show MRD on marrow and flow  - last dose GCSF 7/12. Re-dose prn ANC </=1000.   - Given high risk nature of disease, per primary MD Mejias, start low dose (aza 20/m2 x 5 days/sulema 200 daily x 28) per VIALE-T dosing (anticipate 6 cycles)  - PB DNA pending from today (8/26)     HEME/COAG  #Pancytopenia 2/2 chemo/BMT.   - Transfusion parameters: hemoglobin <7, platelets <10     IMMUNOCOMPROMISED  - Relevant infectious history: tenofovir for hepatitis B core positivity.   - Prophylaxis plan: ACV, letermovir day +14, fabian through day +45 now complete. CT Chest clear. Levofloxacin stopped with engraftment. Sulfa allergic so Pentamidine given last 8/5; requested for 9/9     RISK OF GVHD  - Prophylaxis: PTcy +3/+4. Tacro/MMF started day +5. Tac 2.5mg/day.   **Plan to stop tacrolimus at day 60 if there are no signs or symptoms of GVHD--stop today (8/26); pt advised what to look (aGVHD)     CARDIOVASCULAR  #Hypertension: amlodipine   - Risk of cardiomyopathy:  Baseline EF 60-65%  - Risk of arrhythmia: Baseline EKG showed Sinus rhythm with sinus arrhythmia     GI/NUTRITION  - Ulcer prophylaxis: protonix  - CINV: Zofran TID; add zyprexa today (8/26), compazine prn, ativan prn   - ursodiol through +60  - hx Constipation: metamucil daily with prn senna & miralax     RENAL/ELECTROLYTES/  - Electrolyte management: replace per sliding scale  #Hypomagnesemia 2/2 Tacrolimus- cont Mg plus protein but decrease to 2 tabs bid. Follow off tac     MUSCULOSKELETAL/FRAILTY  - Baseline Frailty Score: 2  - Patient with substantial risk of sarcopenia  - Cancer Rehab as needed  outpatient     SUPPORTIVE CARES  - Trazodone prn sleep  #suspect hand/foot syndrome 2/2 Busulfan - resolved without use of TCM or gabapentin     SOCIAL DETERMINANTS  - Caregiver: Pt's primary caregiver will be spouse with family/friends as a secondary or back-up to assist as needed.   - Financial/insurance concerns: no      Plan  stop (not taper) tacrolimus at day 60 (no S/S of GVHD)  8/29 f/up w/ Dr. Mejias   Requested IH pentam for 9/9 when pt is here (second request)    The longitudinal plan of care for the diagnosis(es)/condition(s) as documented were addressed during this visit. Due to the added complexity in care, I will continue to support Gang in the subsequent management and with ongoing continuity of care.    45 minutes spent on the date of the encounter doing chart review, history and exam, lab review, documentation and coordniating care.    Tisha Blake PA-C

## 2024-08-26 NOTE — LETTER
8/26/2024      Zaheer Borges  [address] [place] [id number]      Dear Colleague,    Thank you for referring your patient, Zaheer Borges, to the [facility] BLOOD AND MARROW TRANSPLANT PROGRAM [place]. Please see a copy of my visit note below.    BMT Progress Note        Patient ID:  Zaheer Borges is a 64 year old male, currently day +59 s/p MA MUD PBSCT for AML.         Diagnosis AML     BMTCT Type URD Allo     Prep Regimen Bu/Flu     Donor Match and  Source 7/8 URD     GVHD Prophylaxis Tac/MMF/PtCy     Primary BMT MD Dr. Mejias    Clinical Trials MT 2023-33             INTERVAL  HISTORY   Feeling ok.  Still with afternoon nausea related to oral chemo.  No vomiting.  Still eating 2meals/day with a stable wt.  1 loose stool in last week.  No significant diarrhea.  No fever or rash.  He does still note leg pain--seems muscular; now hamstrings--L>R.  Worse in AM, better with sitting.  No neuropathic pain.     Review of Systems: 8 point ROS negative except as noted above.     PHYSICAL EXAM      KPS:  80  Blood pressure 125/82, pulse 84, temperature 98.8  F (37.1  C), temperature source Oral, resp. rate 18, weight 66 kg (145 lb 8 oz).    Wt Readings from Last 4 Encounters:   08/26/24 66 kg (145 lb 8 oz)   08/19/24 65.3 kg (144 lb)   08/17/24 66.7 kg (147 lb)   08/13/24 66.5 kg (146 lb 11.2 oz)     General: NAD   Mouth: masked  Lungs: CTAB  Cardiovascular: RRR  GI: +bs, soft, NT/ND  Neuro: non-focal  Skin: no rashes   Lymph: no LE edema  Access: L Mckinney site NT, dressing cdi. R PAC not accessed     Acute GVHD          8/26/2024    14:57 8/19/2024    14:02 8/5/2024    16:54   Acute GVHD   New evidence of Acute Sdvfj-Ntnuop-Vlnw Disease developed since last entry? No No No       LABS: I have assessed all abnormal lab values for their clinical significance and any values considered clinically significant have been addressed in the assessment and plan.      Lab Results   Component Value Date    WBC 2.1 (L) 08/26/2024     ANEU 2.1 07/19/2024    HGB 12.4 (L) 08/26/2024    HCT 35.5 (L) 08/26/2024     (L) 08/26/2024     08/26/2024    POTASSIUM 4.5 08/26/2024    CHLORIDE 101 08/26/2024    CO2 24 08/26/2024     (H) 08/26/2024    BUN 17.3 08/26/2024    CR 0.69 08/26/2024    MAG 1.6 (L) 08/26/2024    INR 1.07 07/08/2024       ASSESSMENT AND PLAN      Zaheer Borges is a 64 year old male with long hx of PV and now AML, undergoing URD 7/8 PBSCT with the Optimize trial. Today is day +59    BMT/IEC PROTOCOL for AML  - Chemo protocol: OPTIMIZE trial; Bu/flu  - Donor and Recip B+   - Restaging per protocol; BMbx 7/26, results show MRD on marrow and flow  - last dose GCSF 7/12. Re-dose prn ANC </=1000.   - Given high risk nature of disease, per primary MD Mejias, start low dose (aza 20/m2 x 5 days/sulema 200 daily x 28) per VIALE-T dosing (anticipate 6 cycles)  - PB DNA pending from today (8/26)     HEME/COAG  #Pancytopenia 2/2 chemo/BMT.   - Transfusion parameters: hemoglobin <7, platelets <10     IMMUNOCOMPROMISED  - Relevant infectious history: tenofovir for hepatitis B core positivity.   - Prophylaxis plan: ACV, letermovir day +14, fabian through day +45 now complete. CT Chest clear. Levofloxacin stopped with engraftment. Sulfa allergic so Pentamidine given last 8/5; requested for 9/9     RISK OF GVHD  - Prophylaxis: PTcy +3/+4. Tacro/MMF started day +5. Tac 2.5mg/day.   **Plan to stop tacrolimus at day 60 if there are no signs or symptoms of GVHD--stop today (8/26); pt advised what to look (aGVHD)     CARDIOVASCULAR  #Hypertension: amlodipine   - Risk of cardiomyopathy:  Baseline EF 60-65%  - Risk of arrhythmia: Baseline EKG showed Sinus rhythm with sinus arrhythmia     GI/NUTRITION  - Ulcer prophylaxis: protonix  - CINV: Zofran TID; add zyprexa today (8/26), compazine prn, ativan prn   - ursodiol through +60  - hx Constipation: metamucil daily with prn senna & miralax     RENAL/ELECTROLYTES/  - Electrolyte management: replace  per sliding scale  #Hypomagnesemia 2/2 Tacrolimus- cont Mg plus protein but decrease to 2 tabs bid. Follow off tac     MUSCULOSKELETAL/FRAILTY  - Baseline Frailty Score: 2  - Patient with substantial risk of sarcopenia  - Cancer Rehab as needed outpatient     SUPPORTIVE CARES  - Trazodone prn sleep  #suspect hand/foot syndrome 2/2 Busulfan - resolved without use of TCM or gabapentin     SOCIAL DETERMINANTS  - Caregiver: Pt's primary caregiver will be spouse with family/friends as a secondary or back-up to assist as needed.   - Financial/insurance concerns: no      Plan  stop (not taper) tacrolimus at day 60 (no S/S of GVHD)  8/29 f/up w/ Dr. Mejias   Requested IH pentam for 9/9 when pt is here (second request)    The longitudinal plan of care for the diagnosis(es)/condition(s) as documented were addressed during this visit. Due to the added complexity in care, I will continue to support Gang in the subsequent management and with ongoing continuity of care.    45 minutes spent on the date of the encounter doing chart review, history and exam, lab review, documentation and coordniating care.    Tisha Blake PA-C       Again, thank you for allowing me to participate in the care of your patient.        Sincerely,        BMT Advanced Practice Provider

## 2024-08-26 NOTE — ORAL ONC MGMT
Oral Chemotherapy Monitoring Program  Lab Follow Up    Reviewed lab results from 8/26.        8/7/2024     3:00 PM 8/9/2024    10:00 AM 8/9/2024    10:12 AM 8/9/2024    10:48 AM 8/20/2024     1:00 PM 8/22/2024     4:00 PM 8/26/2024     3:00 PM   ORAL CHEMOTHERAPY   Assessment Type Initial Work up Refill Left Voicemail New Teach Left Voicemail Initial Follow up Lab Monitoring;Chart Review   Diagnosis Code Acute Myeloid Leukemia (AML) Acute Myeloid Leukemia (AML) Acute Myeloid Leukemia (AML) Acute Myeloid Leukemia (AML) Acute Myeloid Leukemia (AML) Acute Myeloid Leukemia (AML) Acute Myeloid Leukemia (AML)   Providers Randell Mejias   [facility]   Drug Name Venclexta (venetoclax) Venclexta (venetoclax) Venclexta (venetoclax) Venclexta (venetoclax) Venclexta (venetoclax) Venclexta (venetoclax) Venclexta (venetoclax)   Dose 200 mg 100 mg  1,000 mg 100 mg 100 mg 100 mg   Current Schedule Daily Daily  Daily Daily Daily Daily   Cycle Details Continuous Continuous  Continuous Continuous Continuous Continuous   Planned next cycle start date    8/13/2024 8/13/2024    Doses missed in last 2 weeks      0    Adherence Assessment      Adherent    Adverse Effects      Nausea    Nausea      Grade 1    Pharmacist Intervention(nausea)      Yes    Intervention(s)      Rx medication recommendation    Any new drug interactions?      No    Is the dose as ordered appropriate for the patient?      Yes        Labs:  _  Result Component Current Result Ref Range   Sodium 135 (8/26/2024) 135 - 145 mmol/L     _  Result Component Current Result Ref Range   Potassium 4.5 (8/26/2024) 3.4 - 5.3 mmol/L     _  Result Component Current Result Ref Range   Calcium 9.2 (8/26/2024) 8.8 - 10.4 mg/dL     _  Result Component Current Result Ref Range   Magnesium 1.6 (L) (8/26/2024) 1.7 - 2.3 mg/dL     _  Result Component Current Result Ref Range    Phosphorus 3.6 (8/13/2024) 2.5 - 4.5 mg/dL     _  Result Component Current Result Ref Range   Albumin 4.4 (8/26/2024) 3.5 - 5.2 g/dL     _  Result Component Current Result Ref Range   Urea Nitrogen 17.3 (8/26/2024) 8.0 - 23.0 mg/dL     _  Result Component Current Result Ref Range   Creatinine 0.69 (8/26/2024) 0.67 - 1.17 mg/dL     _  Result Component Current Result Ref Range   AST 20 (8/26/2024) 0 - 45 U/L     _  Result Component Current Result Ref Range   ALT 18 (8/26/2024) 0 - 70 U/L     _  Result Component Current Result Ref Range   Bilirubin Total 0.5 (8/26/2024) <=1.2 mg/dL     _  Result Component Current Result Ref Range   WBC Count 2.1 (L) (8/26/2024) 4.0 - 11.0 10e3/uL     _  Result Component Current Result Ref Range   Hemoglobin 12.4 (L) (8/26/2024) 13.3 - 17.7 g/dL     _  Result Component Current Result Ref Range   Platelet Count 126 (L) (8/26/2024) 150 - 450 10e3/uL     No results found for ANC within last 30 days.     _  Result Component Current Result Ref Range   Absolute Neutrophils 1.5 (L) (8/26/2024) 1.6 - 8.3 10e3/uL        Assessment & Plan:  Results show hypomagnesemia, however patient is going to stop tacrolimus at Day 60 so that should improve without intervention. Results notable for grade 1 (Hgb <LLN to 10 g/L) anemia, grade 1 (plt <150K) thrombocytopenia and grade 1 neutropenia. Pancytopenias expected with treatment. Continue Venetoclax as prescribed.     Questions answered to patient's satisfaction. Seen by provider.     Follow-Up:  Labs/visit with Dr. Mejias 8/29    Isabel Monroe, Pao  Oral Chemotherapy Monitoring Program  [facility]  [phone]

## 2024-08-26 NOTE — NURSING NOTE
"Oncology Rooming Note    August 26, 2024 1:08 PM   Zaheer Borges is a 64 year old male who presents for:    Chief Complaint   Patient presents with    Blood Draw     Labs drawn via CVC by RN in lab.     Oncology Clinic Visit     Acute myeloid leukemia in remission      Initial Vitals: /82   Pulse 84   Temp 98.8  F (37.1  C) (Oral)   Resp 18   Wt 66 kg (145 lb 8 oz)   BMI 23.38 kg/m   Estimated body mass index is 23.38 kg/m  as calculated from the following:    Height as of 6/21/24: 1.68 m (5' 6.14\").    Weight as of this encounter: 66 kg (145 lb 8 oz). Body surface area is 1.75 meters squared.  Severe Pain (6) Comment: Data Unavailable   No LMP for male patient.  Allergies reviewed: Yes  Medications reviewed: Yes    Medications: MEDICATION REFILLS NEEDED TODAY. Provider was notified.  Pharmacy name entered into [employer]:    [facility] #[id number] [place], [place] - [address] [place] [address]  [facility] [address] [place] [address] PHARMACY # [id number] - [place], [place] - [address] [facility] - [place], [place] - [address] [place] [address]    Frailty Screening:   Is the patient here for a new oncology consult visit in cancer care? 2. No      Clinical concerns: Pt needs refill on Pantoprazole. Pt reports that both of his legs have been aching and lower back as well that started about 2 weeks ago. Provider was notified via message.       Ara Morataya, EMT     "

## 2024-08-26 NOTE — NURSING NOTE
Chief Complaint   Patient presents with    Blood Draw     Labs drawn via CVC by RN in lab.      Labs collected from CVC by RN, line flushed with saline and heparin.  Vitals taken. Pt checked in for appointment(s).  No research kit- Martina notified.     Myesha ANGELA RN PHN BSN  BMT/Oncology Lab

## 2024-08-29 NOTE — NURSING NOTE
"Oncology Rooming Note    August 29, 2024 3:23 PM   Zaheer Borges is a 64 year old male who presents for:    Chief Complaint   Patient presents with    Blood Draw     CVC blood draw by lab RN    Oncology Clinic Visit     Acute myeloid leukemia     Initial Vitals: /82   Pulse 78   Temp 98.2  F (36.8  C) (Oral)   Resp 16   Wt 66.3 kg (146 lb 1.6 oz)   SpO2 97%   BMI 23.48 kg/m   Estimated body mass index is 23.48 kg/m  as calculated from the following:    Height as of 6/21/24: 1.68 m (5' 6.14\").    Weight as of this encounter: 66.3 kg (146 lb 1.6 oz). Body surface area is 1.76 meters squared.  No Pain (0) Comment: Data Unavailable   No LMP for male patient.  Allergies reviewed: Yes  Medications reviewed: Yes    Medications: Medication refills not needed today.  Pharmacy name entered into [employer]:    [facility] #[id number] [place], [place] - [address] [facility]  AT [facility] [address] [place] [address] PHARMACY # [id number] - [place], [place] - [address]  [facility] - [place], [place] - [address]    Frailty Screening:   Is the patient here for a new oncology consult visit in cancer care? 2. No      Clinical concerns: Patient states no new concerns to discuss with provider.       Kimberly Borges EMT            "

## 2024-08-29 NOTE — NURSING NOTE
Chief Complaint   Patient presents with    Blood Draw     CVC blood draw by lab RN       Labs drawn via CVC line and flushed with heparin by lab RN. Vitals taken and next appointment arrived.    Alondra Bacon RN

## 2024-08-29 NOTE — PROGRESS NOTES
BMT Progress Note        Patient ID:  Zaheer Borges is a 64 year old male, currently day +63 s/p MA MUD PBSCT for AML.         Diagnosis AML     BMTCT Type URD Allo     Prep Regimen Bu/Flu     Donor Match and  Source 7/8 URD     GVHD Prophylaxis Tac/MMF/PtCy     Primary BMT MD Dr. Mejias    Clinical Trials MT 2023-33             INTERVAL  HISTORY   Feeling ok.  He is tolerated the azacitidine venetoclax well and did not have any significant cytopenias and has not required transfusions thus far.  He has no signs or symptoms of ismka-ozrxef-dclb disease, no infection, he is eating and drinking well, and he does have some nausea particularly after the venetoclax but this is mild and easily controlled.     Review of Systems: 8 point ROS negative except as noted above.     PHYSICAL EXAM      KPS:  80  Blood pressure 135/82, pulse 78, temperature 98.2  F (36.8  C), temperature source Oral, resp. rate 16, weight 66.3 kg (146 lb 1.6 oz), SpO2 97%.    Wt Readings from Last 4 Encounters:   08/29/24 66.3 kg (146 lb 1.6 oz)   08/26/24 66 kg (145 lb 8 oz)   08/19/24 65.3 kg (144 lb)   08/17/24 66.7 kg (147 lb)     General: NAD   Mouth: masked  Lungs: CTAB  Cardiovascular: RRR  GI: +bs, soft, NT/ND  Neuro: non-focal  Skin: no rashes   Lymph: no LE edema  Access: L Mckinney site NT, dressing cdi. R PAC not accessed     Acute GVHD          8/30/2024    08:04 8/26/2024    14:57 8/19/2024    14:02   Acute GVHD   New evidence of Acute Nhmaj-Gprkdg-Ella Disease developed since last entry? No No No       LABS: I have assessed all abnormal lab values for their clinical significance and any values considered clinically significant have been addressed in the assessment and plan.        Blood Counts       Recent Labs   Lab Test 08/29/24  1507 08/26/24  1300 08/19/24  1306 05/20/24  0932 04/10/24  1108   HGB 12.2* 12.4* 12.4*   < > 13.2*   HCT 33.8* 35.5* 35.4*   < > 36.6*   WBC 1.8* 2.1* 3.2*   < > 2.6*   ANEUTAUTO 1.2* 1.5* 2.4   < >  --     ALYMPAUTO 0.2* 0.2* 0.3*   < >  --    AMONOAUTO 0.4 0.4 0.5   < >  --    AEOSAUTO 0.0 0.0 0.2   < >  --    ABSBASO 0.0 0.0 0.0   < >  --    NRBCMAN 0.0 0.0 0.0   < > 0.0   ABLA  --   --   --   --  0.3*   * 126* 129*   < > 136*    < > = values in this interval not displayed.           Chemistries     Basic Panel  Recent Labs   Lab Test 08/29/24  1507 08/26/24  1300 08/19/24  1306    135 136   POTASSIUM 4.1 4.5 4.3   CHLORIDE 101 101 101   CO2 25 24 26   BUN 14.0 17.3 16.6   CR 0.63* 0.69 0.70   * 123* 113*        Calcium, Magnesium, Phosphorus  Recent Labs   Lab Test 08/29/24  1507 08/26/24  1300 08/19/24  1306 08/13/24  1006 07/15/24  1405 07/14/24  0332 07/13/24  0308   ARISTIDES 9.0 9.2 9.2 9.5   < > 8.7* 8.5*   MAG 1.8 1.6* 1.8 1.7   < > 1.6* 1.7   PHOS  --   --   --  3.6  --  4.1 4.0    < > = values in this interval not displayed.        LFTs  Recent Labs   Lab Test 08/29/24  1507 08/26/24  1300 08/19/24  1306   BILITOTAL 0.3 0.5 0.5   ALKPHOS 77 63 58   AST 22 20 23   ALT 20 18 19   ALBUMIN 4.3 4.4 4.4         ImmuneSuppression     Tacrolimus  Recent Labs   Lab Test 08/19/24  1306 08/13/24  1006 08/05/24  0749   TACROL 7.5 7.2 7.1      Recent Labs   Lab Test 08/19/24  1306 08/13/24  1006 08/05/24  0749   DOSTAC 8/18/2024 8/13/2024 8/4/2024      Recent Labs   Lab Test 08/19/24  1306 08/13/24  1006 08/05/24  0749   DOSTACT 10:00 PM 12:00 AM 11:00 PM          ASSESSMENT AND PLAN      Zaheer Borges is a 64 year old male with long hx of PV and now AML, undergoing URD 7/8 PBSCT with the Optimize trial. 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 Re-dose prn ANC </=500  - Given high risk nature of disease, per primary MD Mejias, start low dose (aza 20/m2 x 5 days/sulema 200 daily x 28) per VIALE-T dosing (anticipate 6 cycles)     7/26/24          CD3 59 97        CD33 100 97        Bone marrow 98              HEME/COAG  #Pancytopenia 2/2 chemo/BMT.   - Transfusion parameters: hemoglobin <7, platelets <10     IMMUNOCOMPROMISED  - Relevant infectious history: tenofovir for hepatitis B core positivity.   - Prophylaxis plan: ACV, letermovir day +14, fabian through day +45 now complete. CT Chest clear. Levofloxacin stopped with engraftment. Sulfa allergic so Pentamidine given last 8/5; requested for 9/9     RISK OF GVHD  - Prophylaxis: PTcy +3/+4. Tacro/MMF started day +5. Tac 2.5mg/day.   8/29: given the presence of MRD in his last marrow, we stopped tacrolimus at day 60     CARDIOVASCULAR  #Hypertension: amlodipine   - Risk of cardiomyopathy:  Baseline EF 60-65%  - Risk of arrhythmia: Baseline EKG showed Sinus rhythm with sinus arrhythmia     GI/NUTRITION  - Ulcer prophylaxis: protonix  - CINV: Zofran TID; add zyprexa today (8/26), compazine prn, ativan prn   - ursodiol through +60  - hx Constipation: metamucil daily with prn senna & miralax     RENAL/ELECTROLYTES/  - Electrolyte management: replace per sliding scale  #Hypomagnesemia 2/2 Tacrolimus- cont Mg plus protein but decrease to 2 tabs bid. Follow off tac     MUSCULOSKELETAL/FRAILTY  - Baseline Frailty Score: 2  - Patient with substantial risk of sarcopenia  - Cancer Rehab as needed outpatient     SUPPORTIVE CARES  - Trazodone prn sleep  #suspect hand/foot syndrome 2/2 Busulfan - resolved without use of TCM or gabapentin     SOCIAL DETERMINANTS  - Caregiver: Pt's primary caregiver will be spouse with family/friends as a secondary or back-up to assist as needed.   - Financial/insurance concerns: no    Summary: He has no signs or symptoms of mvjdm-jhgaaz-nzyt disease and is tolerated maintenance well.  He has minimal residual disease on his last marrow, and for that reason, I recommended that he discontinue immune suppression and he has discontinued this on day 60 (3 days ago).  I reviewed signs and  symptoms of GVHD with him today.    Plan  - off tac  - stop magnesium, pantoprazole, ursodiol  - continue aza/sulema maintenance  - reviewed gvhd S/S  - schedule pentamidine  - schedule ELIA visit next week with labs  - GCSF for ANC <500 only  - line out (has port)    The longitudinal plan of care for the diagnosis(es)/condition(s) as documented were addressed during this visit. Due to the added complexity in care, I will continue to support Gang in the subsequent management and with ongoing continuity of care.      30 minutes spent on the date of the encounter doing chart review, history and exam, lab review, documentation and coordniating care.    SRINIVAS WILKS MD  August 29, 2024

## 2024-08-29 NOTE — LETTER
8/29/2024      Zaheer Borges  [address] [place] [id number]      Dear Colleague,    Thank you for referring your patient, Zaheer Borges, to the [facility] [place]. Please see a copy of my visit note below.    BMT Progress Note        Patient ID:  Zaheer Borges is a 64 year old male, currently day +63 s/p MA MUD PBSCT for AML.         Diagnosis AML     BMTCT Type URD Allo     Prep Regimen Bu/Flu     Donor Match and  Source 7/8 URD     GVHD Prophylaxis Tac/MMF/PtCy     Primary BMT MD Dr. Mejias    Clinical Trials MT 2023-33             INTERVAL  HISTORY   Feeling ok.  He is tolerated the azacitidine venetoclax well and did not have any significant cytopenias and has not required transfusions thus far.  He has no signs or symptoms of fxugo-vyydev-etnn disease, no infection, he is eating and drinking well, and he does have some nausea particularly after the venetoclax but this is mild and easily controlled.     Review of Systems: 8 point ROS negative except as noted above.     PHYSICAL EXAM      KPS:  80  Blood pressure 135/82, pulse 78, temperature 98.2  F (36.8  C), temperature source Oral, resp. rate 16, weight 66.3 kg (146 lb 1.6 oz), SpO2 97%.    Wt Readings from Last 4 Encounters:   08/29/24 66.3 kg (146 lb 1.6 oz)   08/26/24 66 kg (145 lb 8 oz)   08/19/24 65.3 kg (144 lb)   08/17/24 66.7 kg (147 lb)     General: NAD   Mouth: masked  Lungs: CTAB  Cardiovascular: RRR  GI: +bs, soft, NT/ND  Neuro: non-focal  Skin: no rashes   Lymph: no LE edema  Access: L Mckinney site NT, dressing cdi. R PAC not accessed     Acute GVHD          8/30/2024    08:04 8/26/2024    14:57 8/19/2024    14:02   Acute GVHD   New evidence of Acute Ureac-Zibwyt-Wstq Disease developed since last entry? No No No       LABS: I have assessed all abnormal lab values for their clinical significance and any values considered clinically significant have been addressed in the assessment and plan.        Blood  Counts       Recent Labs   Lab Test 08/29/24  1507 08/26/24  1300 08/19/24  1306 05/20/24  0932 04/10/24  1108   HGB 12.2* 12.4* 12.4*   < > 13.2*   HCT 33.8* 35.5* 35.4*   < > 36.6*   WBC 1.8* 2.1* 3.2*   < > 2.6*   ANEUTAUTO 1.2* 1.5* 2.4   < >  --    ALYMPAUTO 0.2* 0.2* 0.3*   < >  --    AMONOAUTO 0.4 0.4 0.5   < >  --    AEOSAUTO 0.0 0.0 0.2   < >  --    ABSBASO 0.0 0.0 0.0   < >  --    NRBCMAN 0.0 0.0 0.0   < > 0.0   ABLA  --   --   --   --  0.3*   * 126* 129*   < > 136*    < > = values in this interval not displayed.           Chemistries     Basic Panel  Recent Labs   Lab Test 08/29/24  1507 08/26/24  1300 08/19/24  1306    135 136   POTASSIUM 4.1 4.5 4.3   CHLORIDE 101 101 101   CO2 25 24 26   BUN 14.0 17.3 16.6   CR 0.63* 0.69 0.70   * 123* 113*        Calcium, Magnesium, Phosphorus  Recent Labs   Lab Test 08/29/24  1507 08/26/24  1300 08/19/24  1306 08/13/24  1006 07/15/24  1405 07/14/24  0332 07/13/24  0308   ARISTIDES 9.0 9.2 9.2 9.5   < > 8.7* 8.5*   MAG 1.8 1.6* 1.8 1.7   < > 1.6* 1.7   PHOS  --   --   --  3.6  --  4.1 4.0    < > = values in this interval not displayed.        LFTs  Recent Labs   Lab Test 08/29/24  1507 08/26/24  1300 08/19/24  1306   BILITOTAL 0.3 0.5 0.5   ALKPHOS 77 63 58   AST 22 20 23   ALT 20 18 19   ALBUMIN 4.3 4.4 4.4         ImmuneSuppression     Tacrolimus  Recent Labs   Lab Test 08/19/24  1306 08/13/24  1006 08/05/24  0749   TACROL 7.5 7.2 7.1      Recent Labs   Lab Test 08/19/24  1306 08/13/24  1006 08/05/24  0749   DOSTAC 8/18/2024 8/13/2024 8/4/2024      Recent Labs   Lab Test 08/19/24  1306 08/13/24  1006 08/05/24  0749   DOSTACT 10:00 PM 12:00 AM 11:00 PM          ASSESSMENT AND PLAN      Zaheer Borges is a 64 year old male with long hx of PV and now AML, undergoing URD 7/8 PBSCT with the Optimize trial. Today is day +63    BMT/IEC PROTOCOL for AML  - Chemo protocol: OPTIMIZE trial; Bu/flu  - Donor and Recip B+   - Restaging per protocol; BMbx 7/26, results  show MRD on marrow and flow  - last dose GCSF 7/12. Re-dose prn ANC </=500  - Given high risk nature of disease, per primary MD Mejias, start low dose (aza 20/m2 x 5 days/sulema 200 daily x 28) per VIALE-T dosing (anticipate 6 cycles)     7/26/24         CD3 59         CD33 100         Bone marrow 98              HEME/COAG  #Pancytopenia 2/2 chemo/BMT.   - Transfusion parameters: hemoglobin <7, platelets <10     IMMUNOCOMPROMISED  - Relevant infectious history: tenofovir for hepatitis B core positivity.   - Prophylaxis plan: ACV, letermovir day +14, fabian through day +45 now complete. CT Chest clear. Levofloxacin stopped with engraftment. Sulfa allergic so Pentamidine given last 8/5; requested for 9/9     RISK OF GVHD  - Prophylaxis: PTcy +3/+4. Tacro/MMF started day +5. Tac 2.5mg/day.   8/29: given the presence of MRD in his last marrow, we stopped tacrolimus at day 60     CARDIOVASCULAR  #Hypertension: amlodipine   - Risk of cardiomyopathy:  Baseline EF 60-65%  - Risk of arrhythmia: Baseline EKG showed Sinus rhythm with sinus arrhythmia     GI/NUTRITION  - Ulcer prophylaxis: protonix  - CINV: Zofran TID; add zyprexa today (8/26), compazine prn, ativan prn   - ursodiol through +60  - hx Constipation: metamucil daily with prn senna & miralax     RENAL/ELECTROLYTES/  - Electrolyte management: replace per sliding scale  #Hypomagnesemia 2/2 Tacrolimus- cont Mg plus protein but decrease to 2 tabs bid. Follow off tac     MUSCULOSKELETAL/FRAILTY  - Baseline Frailty Score: 2  - Patient with substantial risk of sarcopenia  - Cancer Rehab as needed outpatient     SUPPORTIVE CARES  - Trazodone prn sleep  #suspect hand/foot syndrome 2/2 Busulfan - resolved without use of TCM or gabapentin     SOCIAL DETERMINANTS  - Caregiver: Pt's primary caregiver will be spouse with family/friends as a secondary or back-up to assist as needed.   - Financial/insurance concerns: no    Summary: He has no signs or symptoms of eniny-lcplve-lhyx  disease and is tolerated maintenance well.  He has minimal residual disease on his last marrow, and for that reason, I recommended that he discontinue immune suppression and he has discontinued this on day 60 (3 days ago).  I reviewed signs and symptoms of GVHD with him today.    Plan  - off tac  - stop magnesium, pantoprazole, ursodiol  - continue aza/sulema maintenance  - reviewed gvhd S/S  - schedule pentamidine  - schedule ELIA visit next week with labs  - GCSF for ANC <500 only    The longitudinal plan of care for the diagnosis(es)/condition(s) as documented were addressed during this visit. Due to the added complexity in care, I will continue to support Zaheer in the subsequent management and with ongoing continuity of care.      30 minutes spent on the date of the encounter doing chart review, history and exam, lab review, documentation and coordniating care.    SRINIVAS WILKS MD  August 29, 2024        Again, thank you for allowing me to participate in the care of your patient.        Sincerely,        SRINIVAS WILKS MD

## 2024-09-03 NOTE — NURSING NOTE
"Oncology Rooming Note    September 3, 2024 9:38 AM   Zaheer Borges is a 64 year old male who presents for:    No chief complaint on file.    Initial Vitals: BP (!) 132/90 (BP Location: Right arm)   Pulse 89   Temp 98.1  F (36.7  C) (Oral)   Resp 18   Wt 67.6 kg (149 lb)   SpO2 97%   BMI 23.95 kg/m   Estimated body mass index is 23.95 kg/m  as calculated from the following:    Height as of 6/21/24: 1.68 m (5' 6.14\").    Weight as of this encounter: 67.6 kg (149 lb). Body surface area is 1.78 meters squared.  Moderate Pain (5) Comment: Data Unavailable   No LMP for male patient.  Allergies reviewed: Yes  Medications reviewed: Yes    Medications: Medication refills not needed today.  Pharmacy name entered into [employer]:    [facility] #[id number] [place], [place] - [address] [place] [address]  AT [facility] [address] [place] [address] PHARMACY # [id number] - [place], [place] - [address] [facility] [address]  [facility] - [place], [place] - [address]     Frailty Screening:   Is the patient here for a new oncology consult visit in cancer care? 2. No    Clinical concerns: None-   Provider was NOT notified.      Radha Mcdaniel RN              "

## 2024-09-04 NOTE — LETTER
9/4/2024      Zaheer Borges  [address] [facility] [id number]      Dear Colleague,    Thank you for referring your patient, Zaheer Borges, to the [facility] BLOOD AND MARROW TRANSPLANT PROGRAM [place]. Please see a copy of my visit note below.    BMT Progress Note        Patient ID:  Zaheer Borges is a 64 year old male, currently day +68 s/p MA MUD PBSCT for AML.         Diagnosis AML     BMTCT Type URD Allo     Prep Regimen Bu/Flu     Donor Match and  Source 7/8 URD     GVHD Prophylaxis Tac/MMF/PtCy     Primary BMT MD Dr. Mejias    Clinical Trials MT 2023-33             INTERVAL  HISTORY   Returns for follow up. Uses zofran and zyprexa to manage nausea. Denies diarrhea. No rashes. No fever, cough or infectious complaints. Expresses some concern about decrease in counts, would like DNA marker lab released to him as soon as possible in the future.     Review of Systems: 8 point ROS negative except as noted above.     PHYSICAL EXAM      KPS:  80  Blood pressure (!) 150/89, pulse 93, temperature 98.2  F (36.8  C), temperature source Oral, resp. rate 16, weight 67.2 kg (148 lb 3.2 oz), SpO2 98%.    Wt Readings from Last 4 Encounters:   09/04/24 67.2 kg (148 lb 3.2 oz)   09/03/24 67.6 kg (149 lb)   08/29/24 66.3 kg (146 lb 1.6 oz)   08/26/24 66 kg (145 lb 8 oz)     General: NAD, pleasant, conversant  HEENT: OP non erythematous, sclera anicteric  Lungs: CTAB  Cardiovascular: RRR  GI: +bs, soft, NT/ND  Neuro: non-focal  Skin: no rashes   Lymph: no LE edema  Access: L Mckinney site NT, dressing cdi. R PAC not accessed     Acute GVHD          8/30/2024    08:04 8/26/2024    14:57 8/19/2024    14:02   Acute GVHD   New evidence of Acute Lecna-Jhfrkt-Wzvt Disease developed since last entry? No No No       LABS: I have assessed all abnormal lab values for their clinical significance and any values considered clinically significant have been addressed in the assessment and plan.      Recent Labs   Lab Test 09/03/24  0945  08/29/24  1507 08/26/24  1300 08/19/24  1306 05/20/24  0932 04/10/24  1108   HGB 12.0* 12.2* 12.4* 12.4*   < > 13.2*   HCT 33.7* 33.8* 35.5* 35.4*   < > 36.6*   WBC 1.2* 1.8* 2.1* 3.2*   < > 2.6*   ANEUTAUTO  --  1.2* 1.5* 2.4   < >  --    ALYMPAUTO  --  0.2* 0.2* 0.3*   < >  --    AMONOAUTO  --  0.4 0.4 0.5   < >  --    AEOSAUTO  --  0.0 0.0 0.2   < >  --    ABSBASO  --  0.0 0.0 0.0   < >  --    NRBCMAN 0.0 0.0 0.0 0.0   < > 0.0   ABLA  --   --   --   --   --  0.3*   PLT 90* 116* 126* 129*   < > 136*    < > = values in this interval not displayed.           Chemistries     Basic Panel  Recent Labs   Lab Test 09/03/24  0945 08/29/24  1507 08/26/24  1300    137 135   POTASSIUM 3.9 4.1 4.5   CHLORIDE 102 101 101   CO2 24 25 24   BUN 13.4 14.0 17.3   CR 0.60* 0.63* 0.69   * 127* 123*        Calcium, Magnesium, Phosphorus  Recent Labs   Lab Test 09/03/24  0945 08/29/24  1507 08/26/24  1300 08/19/24  1306 08/13/24  1006 07/15/24  1405 07/14/24  0332 07/13/24  0308   ARISTIDES 9.0 9.0 9.2 9.2 9.5   < > 8.7* 8.5*   MAG  --  1.8 1.6* 1.8 1.7   < > 1.6* 1.7   PHOS  --   --   --   --  3.6  --  4.1 4.0    < > = values in this interval not displayed.        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 Re-dose prn ANC </=500  - Given high risk nature of disease, per primary MD Mejias, start low dose (aza 20/m2 x 5 days/sulema 200 daily x 28) per VIALE-T dosing (anticipate 6 cycles)     7/26/24         CD3 59 97        CD33 100 97        Bone marrow 98               HEME/COAG  #Pancytopenia 2/2 chemo/BMT.   - Transfusion parameters: hemoglobin <7, platelets <10. Plt down slightly,will monitor.      IMMUNOCOMPROMISED  - Relevant infectious history: tenofovir for hepatitis B core positivity.   - Prophylaxis plan: ACV, letermovir day +14, fabian through day +45 now complete. CT Chest clear. Levofloxacin stopped with engraftment, if ANC still borderline next week will resume levaquin.  Sulfa allergic so Pentamidine given last 8/5; scheduled 9/9     RISK OF GVHD  - Prophylaxis: PTcy +3/+4. Tacro/MMF started day +5.   8/29: given the presence of MRD in his last marrow, stopped tacrolimus at day 60     CARDIOVASCULAR  #Hypertension: amlodipine   - Risk of cardiomyopathy:  Baseline EF 60-65%  - Risk of arrhythmia: Baseline EKG showed Sinus rhythm with sinus arrhythmia     GI/NUTRITION  - Ulcer prophylaxis: protonix  - CINV: Zofran TID; added zyprexa (8/26), compazine prn, ativan prn   - ursodiol through +60  - hx Constipation: metamucil daily with prn senna & miralax     RENAL/ELECTROLYTES/  - Electrolyte management: replace per sliding scale     MUSCULOSKELETAL/FRAILTY  - Baseline Frailty Score: 2  - Patient with substantial risk of sarcopenia  - Cancer Rehab as needed outpatient     SUPPORTIVE CARES  - Trazodone prn sleep  #suspect hand/foot syndrome 2/2 Busulfan - resolved without use of TCM or gabapentin     SOCIAL DETERMINANTS  - Caregiver: Pt's primary caregiver will be spouse with family/friends as a secondary or back-up to assist as needed.   - Financial/insurance concerns: no      Plan  - Reviewed gvhd S/S    RTC   9/9 pentamidine  9/9 next cycle aza FHI  9/16 BMT follow up ELIA may need to resume levaquin  GCSF for ANC <500 only    Schedulers will call IR for line removal next week (per pt preference). He has a port    The longitudinal plan of care for the diagnosis(es)/condition(s) as documented were addressed during this visit. Due to the added complexity in care,  I will continue to support Zaheer in the subsequent management and with ongoing continuity of care.      30 minutes spent on the date of the encounter doing chart review, history and exam, lab review, documentation and coordniating care.    Soumya Cristobal PA-C on 9/4/2024 at 11:22 AM    August 29, 2024        Again, thank you for allowing me to participate in the care of your patient.        Sincerely,        BMT Advanced Practice Provider

## 2024-09-04 NOTE — NURSING NOTE
"Oncology Rooming Note    September 4, 2024 10:11 AM   Zaheer Borges is a 64 year old male who presents for:    Chief Complaint   Patient presents with    Oncology Clinic Visit     Acute myeloid leukemia     Initial Vitals: BP (!) 150/89 (BP Location: Right arm, Patient Position: Sitting, Cuff Size: Adult Regular)   Pulse 93   Temp 98.2  F (36.8  C) (Oral)   Resp 16   Wt 67.2 kg (148 lb 3.2 oz)   SpO2 98%   BMI 23.82 kg/m   Estimated body mass index is 23.82 kg/m  as calculated from the following:    Height as of 6/21/24: 1.68 m (5' 6.14\").    Weight as of this encounter: 67.2 kg (148 lb 3.2 oz). Body surface area is 1.77 meters squared.  No Pain (0) Comment: Data Unavailable   No LMP for male patient.  Allergies reviewed: Yes  Medications reviewed: Yes    Medications: Medication refills not needed today.  Pharmacy name entered into [facility]:    [facility][id number] - [place], [place] - [address] [place] [address]  [facility] [address] [place] [address] [facility]ARMACY # [id number] - [place], [place] - [address]  [facility] - [place], [place] - [address]    Frailty Screening:   Is the patient here for a new oncology consult visit in cancer care? 2. No      Clinical concerns: none      Africa Martin, EMT  9/4/2024              "

## 2024-09-04 NOTE — PROGRESS NOTES
BMT Progress Note        Patient ID:  Zaheer Borges is a 64 year old male, currently day +68 s/p MA MUD PBSCT for AML.         Diagnosis AML     BMTCT Type URD Allo     Prep Regimen Bu/Flu     Donor Match and  Source 7/8 URD     GVHD Prophylaxis Tac/MMF/PtCy     Primary BMT MD Dr. Mejias    Clinical Trials MT 2023-33             INTERVAL  HISTORY   Returns for follow up. Uses zofran and zyprexa to manage nausea. Denies diarrhea. No rashes. No fever, cough or infectious complaints. Expresses some concern about decrease in counts, would like DNA marker lab released to him as soon as possible in the future.     Review of Systems: 8 point ROS negative except as noted above.     PHYSICAL EXAM      KPS:  80  Blood pressure (!) 150/89, pulse 93, temperature 98.2  F (36.8  C), temperature source Oral, resp. rate 16, weight 67.2 kg (148 lb 3.2 oz), SpO2 98%.    Wt Readings from Last 4 Encounters:   09/04/24 67.2 kg (148 lb 3.2 oz)   09/03/24 67.6 kg (149 lb)   08/29/24 66.3 kg (146 lb 1.6 oz)   08/26/24 66 kg (145 lb 8 oz)     General: NAD, pleasant, conversant  HEENT: OP non erythematous, sclera anicteric  Lungs: CTAB  Cardiovascular: RRR  GI: +bs, soft, NT/ND  Neuro: non-focal  Skin: no rashes   Lymph: no LE edema  Access: L Mckinney site NT, dressing cdi. R PAC not accessed     Acute GVHD          8/30/2024    08:04 8/26/2024    14:57 8/19/2024    14:02   Acute GVHD   New evidence of Acute Dmwix-Tssqof-Ucgt Disease developed since last entry? No No No       LABS: I have assessed all abnormal lab values for their clinical significance and any values considered clinically significant have been addressed in the assessment and plan.      Recent Labs   Lab Test 09/03/24  0945 08/29/24  1507 08/26/24  1300 08/19/24  1306 05/20/24  0932 04/10/24  1108   HGB 12.0* 12.2* 12.4* 12.4*   < > 13.2*   HCT 33.7* 33.8* 35.5* 35.4*   < > 36.6*   WBC 1.2* 1.8* 2.1* 3.2*   < > 2.6*   ANEUTAUTO  --  1.2* 1.5* 2.4   < >  --    ALYMPAUTO  --  0.2*  0.2* 0.3*   < >  --    AMONOAUTO  --  0.4 0.4 0.5   < >  --    AEOSAUTO  --  0.0 0.0 0.2   < >  --    ABSBASO  --  0.0 0.0 0.0   < >  --    NRBCMAN 0.0 0.0 0.0 0.0   < > 0.0   ABLA  --   --   --   --   --  0.3*   PLT 90* 116* 126* 129*   < > 136*    < > = values in this interval not displayed.           Chemistries     Basic Panel  Recent Labs   Lab Test 09/03/24  0945 08/29/24  1507 08/26/24  1300    137 135   POTASSIUM 3.9 4.1 4.5   CHLORIDE 102 101 101   CO2 24 25 24   BUN 13.4 14.0 17.3   CR 0.60* 0.63* 0.69   * 127* 123*        Calcium, Magnesium, Phosphorus  Recent Labs   Lab Test 09/03/24  0945 08/29/24  1507 08/26/24  1300 08/19/24  1306 08/13/24  1006 07/15/24  1405 07/14/24  0332 07/13/24  0308   ARISTIDES 9.0 9.0 9.2 9.2 9.5   < > 8.7* 8.5*   MAG  --  1.8 1.6* 1.8 1.7   < > 1.6* 1.7   PHOS  --   --   --   --  3.6  --  4.1 4.0    < > = values in this interval not displayed.        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 Re-dose prn ANC </=500  - Given high risk nature of disease, per primary MD Mejias, start low dose (aza 20/m2 x 5 days/sulema 200 daily x 28) per VIALE-T dosing (anticipate 6 cycles)     7/26/24         CD3 59 97        CD33 100 97        Bone marrow 98              HEME/COAG  #Pancytopenia 2/2 chemo/BMT.   - Transfusion parameters: hemoglobin <7, platelets <10. Plt down slightly,will monitor.      IMMUNOCOMPROMISED  - Relevant infectious history: tenofovir for hepatitis B core positivity.   - Prophylaxis plan:  ACV, letermovir day +14, fabian through day +45 now complete. CT Chest clear. Levofloxacin stopped with engraftment, if ANC still borderline next week will resume levaquin.  Sulfa allergic so Pentamidine given last 8/5; scheduled 9/9     RISK OF GVHD  - Prophylaxis: PTcy +3/+4. Tacro/MMF started day +5.   8/29: given the presence of MRD in his last marrow, stopped tacrolimus at day 60     CARDIOVASCULAR  #Hypertension: amlodipine   - Risk of cardiomyopathy:  Baseline EF 60-65%  - Risk of arrhythmia: Baseline EKG showed Sinus rhythm with sinus arrhythmia     GI/NUTRITION  - Ulcer prophylaxis: protonix  - CINV: Zofran TID; added zyprexa (8/26), compazine prn, ativan prn   - ursodiol through +60  - hx Constipation: metamucil daily with prn senna & miralax     RENAL/ELECTROLYTES/  - Electrolyte management: replace per sliding scale     MUSCULOSKELETAL/FRAILTY  - Baseline Frailty Score: 2  - Patient with substantial risk of sarcopenia  - Cancer Rehab as needed outpatient     SUPPORTIVE CARES  - Trazodone prn sleep  #suspect hand/foot syndrome 2/2 Busulfan - resolved without use of TCM or gabapentin     SOCIAL DETERMINANTS  - Caregiver: Pt's primary caregiver will be spouse with family/friends as a secondary or back-up to assist as needed.   - Financial/insurance concerns: no      Plan  - Reviewed gvhd S/S    RTC   9/9 pentamidine  9/9 next cycle aza FHI  9/16 BMT follow up ELIA may need to resume levaquin  GCSF for ANC <500 only    Schedulers will call IR for line removal next week (per pt preference). He has a port    The longitudinal plan of care for the diagnosis(es)/condition(s) as documented were addressed during this visit. Due to the added complexity in care, I will continue to support Zaheer in the subsequent management and with ongoing continuity of care.      30 minutes spent on the date of the encounter doing chart review, history and exam, lab review, documentation and coordniating care.    Soumya Cristobal,  DELFINA on 9/4/2024 at 11:22 AM    August 29, 2024

## 2024-09-09 NOTE — PROGRESS NOTES
Patient was seen today for a Pentamidine nebulizer tx ordered Daina Booth PA-C.    Patient was first given 4 puffs Albuterol MDI, after which Pentamidine 300 mg (Lot # [id number]) mixed with 6cc Sterile H20 was administered through a filtered nebulizer.    Pre-treatment: SpO2 = 97%   HR = 95   BBS = clear   Post-treatment: SpO2 = 100%  HR = 101  BBS = clear    No adverse side effects noted by the patient.    This service today was provided under the direct supervision of Dr. Encarnacion, who was available if needed.     Procedure was completed by Courtney Acevedo.

## 2024-09-09 NOTE — NURSING NOTE
"Oncology Rooming Note    September 9, 2024 10:05 AM   Zaheer Borges is a 64 year old male who presents for:    Chief Complaint   Patient presents with    Blood Draw     Labs drawn via CVC by RN in lab, vitals taken.     Oncology Clinic Visit     Acute myeloid leukemia     Initial Vitals: /89 (BP Location: Right arm, Patient Position: Sitting, Cuff Size: Adult Regular)   Pulse 95   Temp 98.1  F (36.7  C) (Oral)   Resp 18   Wt 67.2 kg (148 lb 3.2 oz)   SpO2 96%   BMI 23.82 kg/m   Estimated body mass index is 23.82 kg/m  as calculated from the following:    Height as of 6/21/24: 1.68 m (5' 6.14\").    Weight as of this encounter: 67.2 kg (148 lb 3.2 oz). Body surface area is 1.77 meters squared.  No Pain (0) Comment: Data Unavailable   No LMP for male patient.  Allergies reviewed: Yes  Medications reviewed: Yes    Medications: Medication refills not needed today.  Pharmacy name entered into [facility]:    [facility] #[id number] - [place], [place] - [address] [place] [address] DR AT [facility] [address] [facility]ARMACY # [id number] - [place], [place] - [address]  [facility] - [place], [place] - [address]    Frailty Screening:   Is the patient here for a new oncology consult visit in cancer care? 2. No      Clinical concerns: Patient states no new concerns to discuss with provider.       Kimberly Borges EMT            "

## 2024-09-09 NOTE — PROGRESS NOTES
BMT Progress Note        Patient ID:  Zaheer Borges is a 64 year old male, currently day +73 s/p MA MUD PBSCT for AML.         Diagnosis AML     BMTCT Type URD Allo     Prep Regimen Bu/Flu     Donor Match and  Source 7/8 URD     GVHD Prophylaxis Tac/MMF/PtCy     Primary BMT MD Dr. Mejias    Clinical Trials MT 2023-33             INTERVAL  HISTORY   Returns for follow up. Notes itching rash on back and chest. No rash he has noticed. Tried benadryl cream which helps some. Sometimes the itching wakes him up.Needs zofran BID to manage zofran. Slight increase in loose stool since last week, about one to two small, loose stools each day.   No night sweats.  No cough or infectious complaints.      Review of Systems: 8 point ROS negative except as noted above.     PHYSICAL EXAM      KPS:  80  Pulse 95, temperature 98.1  F (36.7  C), temperature source Oral, resp. rate 18, weight 67.2 kg (148 lb 3.2 oz), SpO2 96%.    Wt Readings from Last 4 Encounters:   09/09/24 67.2 kg (148 lb 3.2 oz)   09/04/24 67.2 kg (148 lb 3.2 oz)   09/03/24 67.6 kg (149 lb)   08/29/24 66.3 kg (146 lb 1.6 oz)     General: NAD, pleasant, conversant  HEENT: OP non erythematous, sclera anicteric  Lungs: CTAB  Cardiovascular: RRR  GI: +bs, soft, NT/ND  Neuro: non-focal  Skin: very faint flat pink/light hyperpigmented macular rash to upper chest  Lymph: no LE edema  Access: L Mckinney site NT, dressing cdi. R PAC not accessed     Acute GVHD          8/30/2024    08:04 8/26/2024    14:57 8/19/2024    14:02   Acute GVHD   New evidence of Acute Lrrdv-Uxavau-Idyx Disease developed since last entry? No No No       LABS: I have assessed all abnormal lab values for their clinical significance and any values considered clinically significant have been addressed in the assessment and plan.      Lab Results   Component Value Date    WBC 1.2 (L) 09/09/2024    ANEU 0.8 (L) 09/09/2024    HGB 12.3 (L) 09/09/2024    HCT 33.7 (L) 09/09/2024    PLT 85 (L) 09/09/2024      09/09/2024    POTASSIUM 4.3 09/09/2024    CHLORIDE 101 09/09/2024    CO2 24 09/09/2024    GLC 99 09/09/2024    BUN 11.9 09/09/2024    CR 0.63 (L) 09/09/2024    MAG 1.8 08/29/2024    INR 1.07 07/08/2024             ASSESSMENT AND PLAN      Zaheer Borges is a 64 year old male with long hx of PV and now AML, undergoing URD 7/8 PBSCT with the Optimize trial. Today is day +73    BMT/IEC PROTOCOL for AML  - Chemo protocol: OPTIMIZE trial; Bu/flu  - Donor and Recip B+   - Restaging per protocol; BMbx 7/26, results show MRD on marrow and flow  - last dose GCSF 7/12. Re-dose prn ANC </=500  - Given high risk nature of disease, per primary MD Mejias, start low dose (aza 20/m2 x 5 days/sulema 200 daily x 28) per VIALE-T dosing (anticipate 6 cycles)  9/9 other cells noted on cbc differential in setting of MRD and falling chimerism. Messaged Dr Mejias, he will review and schedule bmbx asap     7/26/24 8/26 8/29       CD3 59 97 81       CD33 100 97 96       Bone marrow 98              HEME/COAG  #Pancytopenia 2/2 chemo/BMT.   - Transfusion parameters: hemoglobin <7, platelets <10. Plt down again (see chimerism, other cells above)     IMMUNOCOMPROMISED  - Relevant infectious history: tenofovir for hepatitis B core positivity.   - Prophylaxis plan: ACV, letermovir day +14, fabian through day +45 now complete. CT Chest clear. Levofloxacin resumed with neutrophils persistently <1 (~900)  Sulfa allergic so Pentamidine given today, 9/9 9/9 Itching, very faint hard to distinguish rash.  Will check viral panel (ddx gvhd, infectious, disease)     RISK OF GVHD  - Prophylaxis: PTcy +3/+4. Tacro/MMF started day +5.   8/29: given the presence of MRD in his last marrow, stopped tacrolimus at day 60     CARDIOVASCULAR  #Hypertension: amlodipine   - Risk of cardiomyopathy:  Baseline EF 60-65%  - Risk of arrhythmia: Baseline EKG showed Sinus rhythm with sinus arrhythmia     GI/NUTRITION  - Ulcer prophylaxis: protonix  - CINV: Zofran TID; added zyprexa  "(8/26), compazine prn, ativan prn   - ursodiol through +60  - hx Constipation, now soft to loose stool.  Weight stable     RENAL/ELECTROLYTES/  - Electrolyte management: replace per sliding scale     MUSCULOSKELETAL/FRAILTY  - Baseline Frailty Score: 2  - Patient with substantial risk of sarcopenia  - Cancer Rehab as needed outpatient     SUPPORTIVE CARES  - Trazodone prn sleep  #suspect hand/foot syndrome 2/2 Busulfan - resolved without use of TCM or gabapentin     SOCIAL DETERMINANTS  - Caregiver: Pt's primary caregiver will be spouse with family/friends as a secondary or back-up to assist as needed.   - Financial/insurance concerns: no    SKIN   Itching. Very faint rash, nothing to biopsy. He can use topical triamcinolone cream, topical benadryl. He will call if any areas become red, or new areas are involved. Liver enzymes wnl (not likely causing itching). Will check viral panel (ddx gvhd, infectious, disease)      Plan  Discussed \"other\" cells on CBC diff with Dr Mejias. Working on expediting bmbx this week  Elissae levaquin  Requested BMT ELIA visit added to bone marrow biopsy day.   Randell visit to be scheduled by NC for bmbx follow up    The longitudinal plan of care for the diagnosis(es)/condition(s) as documented were addressed during this visit. Due to the added complexity in care, I will continue to support [place] in the subsequent management and with ongoing continuity of care.      40 minutes spent on the date of the encounter doing chart review, history and exam, lab review, documentation and coordniating care.    Soumya Cristobal PA-C on 9/9/2024 at 4:22 PM        "

## 2024-09-09 NOTE — LETTER
9/9/2024      Zaheer Borges  [address] [facility] [address]      Dear Colleague,    Thank you for referring your patient, Zaheer Borges, to the [facility] BLOOD AND MARROW TRANSPLANT PROGRAM [place]. Please see a copy of my visit note below.    BMT Progress Note        Patient ID:  Zaheer Borges is a 64 year old male, currently day +73 s/p MA MUD PBSCT for AML.         Diagnosis AML     BMTCT Type URD Allo     Prep Regimen Bu/Flu     Donor Match and  Source 7/8 URD     GVHD Prophylaxis Tac/MMF/PtCy     Primary BMT MD Dr. Mejias    Clinical Trials MT 2023-33             INTERVAL  HISTORY   Returns for follow up. Notes itching rash on back and chest. No rash he has noticed. Tried benadryl cream which helps some. Sometimes the itching wakes him up.Needs zofran BID to manage zofran. Slight increase in loose stool since last week, about one to two small, loose stools each day.   No night sweats.  No cough or infectious complaints.      Review of Systems: 8 point ROS negative except as noted above.     PHYSICAL EXAM      KPS:  80  Pulse 95, temperature 98.1  F (36.7  C), temperature source Oral, resp. rate 18, weight 67.2 kg (148 lb 3.2 oz), SpO2 96%.    Wt Readings from Last 4 Encounters:   09/09/24 67.2 kg (148 lb 3.2 oz)   09/04/24 67.2 kg (148 lb 3.2 oz)   09/03/24 67.6 kg (149 lb)   08/29/24 66.3 kg (146 lb 1.6 oz)     General: NAD, pleasant, conversant  HEENT: OP non erythematous, sclera anicteric  Lungs: CTAB  Cardiovascular: RRR  GI: +bs, soft, NT/ND  Neuro: non-focal  Skin: very faint flat pink/light hyperpigmented macular rash to upper chest  Lymph: no LE edema  Access: L Mckinney site NT, dressing cdi. R PAC not accessed     Acute GVHD          8/30/2024    08:04 8/26/2024    14:57 8/19/2024    14:02   Acute GVHD   New evidence of Acute Nmbxq-Qnidqs-Ptex Disease developed since last entry? No No No       LABS: I have assessed all abnormal lab values for their clinical significance and any values considered  clinically significant have been addressed in the assessment and plan.      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 Today is day +73    BMT/IEC PROTOCOL for AML  - Chemo protocol: OPTIMIZE trial; Bu/flu  - Donor and Recip B+   - Restaging per protocol; BMbx 7/26, results show MRD on marrow and flow  - last dose GCSF 7/12. Re-dose prn ANC </=500  - Given high risk nature of disease, per primary MD Mejias, start low dose (aza 20/m2 x 5 days/sulema 200 daily x 28) per VIALE-T dosing (anticipate 6 cycles)  9/9 other cells noted on cbc differential in setting of MRD and falling chimerism. Messaged Dr Mejias, he will review and schedule bmbx asap     7/26/24 8/26 8/29       CD3 59 97 81       CD33 100 97 96       Bone marrow 98              HEME/COAG  #Pancytopenia 2/2 chemo/BMT.   - Transfusion parameters: hemoglobin <7, platelets <10. Plt down again (see chimerism, other cells above)     IMMUNOCOMPROMISED  - Relevant infectious history: tenofovir for hepatitis B core positivity.   - Prophylaxis plan: ACV, letermovir day +14, fabian through day +45 now complete. CT Chest clear. Levofloxacin resumed with neutrophils persistently <1 (~900)  Sulfa allergic so Pentamidine given today, 9/9  9/9 Itching, very faint hard to distinguish rash.  Will check viral panel (ddx gvhd, infectious, disease)     RISK OF GVHD  - Prophylaxis: PTcy +3/+4. Tacro/MMF started day +5.   8/29: given the presence of MRD in his last marrow, stopped tacrolimus at day 60    "  CARDIOVASCULAR  #Hypertension: amlodipine   - Risk of cardiomyopathy:  Baseline EF 60-65%  - Risk of arrhythmia: Baseline EKG showed Sinus rhythm with sinus arrhythmia     GI/NUTRITION  - Ulcer prophylaxis: protonix  - CINV: Zofran TID; added zyprexa (8/26), compazine prn, ativan prn   - ursodiol through +60  - hx Constipation, now soft to loose stool.  Weight stable     RENAL/ELECTROLYTES/  - Electrolyte management: replace per sliding scale     MUSCULOSKELETAL/FRAILTY  - Baseline Frailty Score: 2  - Patient with substantial risk of sarcopenia  - Cancer Rehab as needed outpatient     SUPPORTIVE CARES  - Trazodone prn sleep  #suspect hand/foot syndrome 2/2 Busulfan - resolved without use of TCM or gabapentin     SOCIAL DETERMINANTS  - Caregiver: Pt's primary caregiver will be spouse with family/friends as a secondary or back-up to assist as needed.   - Financial/insurance concerns: no    SKIN   Itching. Very faint rash, nothing to biopsy. He can use topical triamcinolone cream, topical benadryl. He will call if any areas become red, or new areas are involved. Liver enzymes wnl (not likely causing itching). Will check viral panel (ddx gvhd, infectious, disease)      Plan  Discussed \"other\" cells on CBC diff with Dr Mejias. Working on expediting bmbx this week  Elissae levaquin  Requested BMT ELIA visit added to bone marrow biopsy day.   Randell visit to be scheduled by NC for bmbx follow up    The longitudinal plan of care for the diagnosis(es)/condition(s) as documented were addressed during this visit. Due to the added complexity in care, I will continue to support Olgajune in the subsequent management and with ongoing continuity of care.      40 minutes spent on the date of the encounter doing chart review, history and exam, lab review, documentation and coordniating care.    Soumya Cristobal PA-C on 9/9/2024 at 4:22 PM          Again, thank you for allowing me to participate in the care of your patient.  "       Sincerely,        BMT Advanced Practice Provider

## 2024-09-09 NOTE — NURSING NOTE
Chief Complaint   Patient presents with    Blood Draw     Labs drawn via CVC by RN in lab, vitals taken.       Labs drawn via CVC by RN. Flushed with saline and heparin. Caps changed, needs dressing change today. Vitals taken. Pt to 3rd floor for next appointment.     Carrie Louie RN

## 2024-09-09 NOTE — TELEPHONE ENCOUNTER
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  I recommended a marrow ASAP to clarify his situation.  He agrees to the plan      SRINIVAS WILKS MD  September 9, 2024

## 2024-09-10 NOTE — PROGRESS NOTES
BMT ONC Adult Bone Marrow Biopsy Procedure Note  September 12, 2024  BP (!) 139/91   Pulse 99   Temp 98.7  F (37.1  C)   Resp 18   Wt 66 kg (145 lb 8 oz)   SpO2 98%   BMI 23.38 kg/m       Learning needs assessment complete within 12 months? YES    DIAGNOSIS: AML    PROCEDURE: Unilateral Bone Marrow Biopsy and Unilateral Aspirate    [place] [facility] [place]    Patient s identification was positively verified by verbal identification and invasive procedure safety checklist was completed. Informed consent was obtained. Following the administration of Dilaudid 0.5 mg as pre-medication, patient was placed in the prone position and prepped and draped in a sterile manner. Approximately 10 cc of 1% Lidocaine was used over the right posterior iliac spine. Following this a 3 mm incision was made. Trephine bone marrow core(s) was (were) obtained from the [facility]. Bone marrow aspirates were obtained from the [facility]. Aspirates were sent for morphology, immunophenotyping, cytogenetics, and molecular diagnostics gene rearrangement. A total of approximately 20 ml of marrow was aspirated. Following this procedure a sterile dressing was applied to the bone marrow biopsy site(s). The patient was placed in the supine position to maintain pressure on the biopsy site. Post-procedure wound care instructions were given.     Complications: NO    Pre-procedural pain: 0 out of 10 on the numeric pain rating scale.     Post-procedural pain assessment: 0 out of 10 on the numeric pain rating scale.     Interventions: NO    Length of procedure:20 minutes or less      Procedure performed by: Karin May CNP

## 2024-09-12 NOTE — NURSING NOTE
BMBX Teaching and Assessment       Teaching concerns addressed: Bone marrow biopsy and infection prevention.     Person(s) involved in teaching: Patient  Motivation Level  Asks Questions: Yes  Eager to Learn: Yes  Cooperative: Yes  Receptive (willing/able to accept information): Yes    Patient demonstrates understanding of the following:     Reason for the appointment, diagnosis and treatment plan: Yes  Knowledge of proper use of medications and conditions for which they are ordered (with special attention to potential side effects or drug interactions): Yes  Which situations necessitate calling provider and whom to contact: Yes    Teaching concerns addressed:   Reviewed activity restrictions if received premeds, potential for bleeding and actions to take if develops any of the issues below    Pain management techniques: Yes  Patient instructed on hand hygiene: Yes  How and/when to access community resources: Yes    Infection Control:  Patient demonstrates understanding of the following:   Bone marrow procedure site care taught: Yes  Signs and symptoms of infection taught: Yes       Instructional Materials Used/Given: Pt instructed to keep bmbx site clean and dry for 24hrs. Pt educated to monitor site for signs of infection such as redness, rash, oozing, puss, bleeding, pain, and elevated temp. Pt instructed to go to call the [facility] triage line or go to the ER if any signs of infection should occur. Pt educated to not operate machinery if receiving versed. Pt and wife verbalize understanding.     Pre-procedure labs drawn via CVC. Post procedure: Patient vital signs stable, ambulating, site is clean, dry and intact prior to discharge and line removed. Pt discharged with wife as .      Sharon Chan RN

## 2024-09-12 NOTE — PROGRESS NOTES
BMT Progress Note        Patient ID:  Zaheer Borges is a 64 year old male, currently day +76 s/p MA MUD PBSCT for AML.         Diagnosis AML     BMTCT Type URD Allo     Prep Regimen Bu/Flu     Donor Match and  Source 7/8 URD     GVHD Prophylaxis Tac/MMF/PtCy     Primary BMT MD Dr. Mejias    Clinical Trials MT 2023-33             INTERVAL  HISTORY   Returns for follow up. Disappointed by news of blasts on his blod draw a few days ago, anxiously awaiting results of bone marrow biopsy today. Itching to chest/back is improved. Still using zofran BID. Stools are stable- 1-2 loose stools per day. No night sweats, no new infectious concerns. No signs of GVHD. No bleeding.       Review of Systems: 8 point ROS negative except as noted above.     PHYSICAL EXAM      KPS:  80  There were no vitals taken for this visit.  Vitals reviewed in other encounter.     Wt Readings from Last 4 Encounters:   09/12/24 66 kg (145 lb 8 oz)   09/09/24 67.2 kg (148 lb 3.2 oz)   09/04/24 67.2 kg (148 lb 3.2 oz)   09/03/24 67.6 kg (149 lb)     General: NAD, pleasant, conversant  HEENT: OP non erythematous, sclera anicteric  Lungs: CTAB  Cardiovascular: RRR  GI: +bs, soft, NT/ND  Neuro: non-focal  Skin: very faint flat pink/light hyperpigmented macular rash to upper chest  Lymph: no LE edema  Access: L Mckinney site NT, dressing cdi. R PAC not accessed     Acute GVHD          8/30/2024    08:04 8/26/2024    14:57 8/19/2024    14:02   Acute GVHD   New evidence of Acute Nxety-Miphha-Myhr Disease developed since last entry? No No No       LABS: I have assessed all abnormal lab values for their clinical significance and any values considered clinically significant have been addressed in the assessment and plan.      Lab Results   Component Value Date    WBC 1.9 (L) 09/12/2024    ANEU 0.8 (L) 09/09/2024    HGB 11.8 (L) 09/12/2024    HCT 33.5 (L) 09/12/2024    PLT 69 (L) 09/12/2024     09/12/2024    POTASSIUM 4.1 09/12/2024    CHLORIDE 101 09/12/2024     CO2 24 09/12/2024     (H) 09/12/2024    BUN 17.0 09/12/2024    CR 0.68 09/12/2024    MAG 1.8 08/29/2024    INR 1.07 07/08/2024       ASSESSMENT AND PLAN      Zaheer Borges is a 64 year old male with long hx of PV and now AML, undergoing URD 7/8 PBSCT with the Optimize trial. Today is day +76    BMT/IEC PROTOCOL for AML  - Chemo protocol: OPTIMIZE trial; Bu/flu  - Donor and Recip B+   - Restaging per protocol; BMbx 7/26, results show MRD on marrow and flow  - last dose GCSF 7/12. Re-dose prn ANC </=500  - Given high risk nature of disease, per primary MD Mejias, start low dose (aza 20/m2 x 5 days/sulema 200 daily x 28) per VIALE-T dosing (anticipate 6 cycles)  9/9 other cells noted on cbc differential in setting of MRD and falling chimerism. Repeat BMBx completed 9/12, results pending. Review with Dr. Mejias 9/18.      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   - Transfusion parameters: hemoglobin <7, platelets <10. Plt down again (see chimerism, other cells above)     IMMUNOCOMPROMISED  - Relevant infectious history: tenofovir for hepatitis B core positivity.   - Prophylaxis plan: ACV, letermovir day +14, fabian through day +45 now complete. CT Chest clear. Levofloxacin resumed with neutrophils persistently <1 (~900)  Sulfa allergic so Pentamidine given today, 9/9 9/9 Itching, very faint hard to distinguish rash.  Will check viral panel (ddx gvhd, infectious, disease).      RISK OF GVHD  - Prophylaxis: PTcy +3/+4. Tacro/MMF started day +5.   8/29: given the presence of MRD in his last marrow, stopped tacrolimus at day 60     CARDIOVASCULAR  #Hypertension: amlodipine   - Risk of cardiomyopathy:  Baseline EF 60-65%  - Risk of arrhythmia: Baseline EKG showed Sinus rhythm with sinus arrhythmia     GI/NUTRITION  - Ulcer prophylaxis: protonix  - CINV: Zofran TID; added zyprexa (8/26), compazine prn, ativan prn   - ursodiol  through +60  - hx Constipation, now soft to loose stool.  Weight stable     RENAL/ELECTROLYTES/  - Electrolyte management: replace per sliding scale     MUSCULOSKELETAL/FRAILTY  - Baseline Frailty Score: 2  - Patient with substantial risk of sarcopenia  - Cancer Rehab as needed outpatient     SUPPORTIVE CARES  - Trazodone prn sleep  #suspect hand/foot syndrome 2/2 Busulfan - resolved without use of TCM or gabapentin     SOCIAL DETERMINANTS  - Caregiver: Pt's primary caregiver will be spouse with family/friends as a secondary or back-up to assist as needed.   - Financial/insurance concerns: no    SKIN   Itching. Very faint rash, nothing to biopsy. He can use topical triamcinolone cream, topical benadryl. He will call if any areas become red, or new areas are involved. Liver enzymes wnl (not likely causing itching). Will check viral panel (ddx gvhd, infectious, disease), pending      Plan  BMBx completed today.   Continue levaquin, prophys as differential pending.   RTC to see Dr. Mejias 9/18    The longitudinal plan of care for the diagnosis(es)/condition(s) as documented were addressed during this visit. Due to the added complexity in care, I will continue to support Zaheer in the subsequent management and with ongoing continuity of care.      30 minutes spent on the date of the encounter doing chart review, history and exam, lab review, documentation and coordniating care.    Stefanie Lopez NP on 9/9/2024 at 4:22 PM

## 2024-09-12 NOTE — LETTER
9/12/2024      Zaheer Borges  [address] [facility] [address]      Dear Colleague,    Thank you for referring your patient, Zaheer Borges, to the [facility] BLOOD AND MARROW TRANSPLANT PROGRAM [place]. Please see a copy of my visit note below.    BMT Progress Note        Patient ID:  Zaheer Borges is a 64 year old male, currently day +76 s/p MA MUD PBSCT for AML.         Diagnosis AML     BMTCT Type URD Allo     Prep Regimen Bu/Flu     Donor Match and  Source 7/8 URD     GVHD Prophylaxis Tac/MMF/PtCy     Primary BMT MD Dr. Mejias    Clinical Trials MT 2023-33             INTERVAL  HISTORY   Returns for follow up. Disappointed by news of blasts on his blod draw a few days ago, anxiously awaiting results of bone marrow biopsy today. Itching to chest/back is improved. Still using zofran BID. Stools are stable- 1-2 loose stools per day. No night sweats, no new infectious concerns. No signs of GVHD. No bleeding.       Review of Systems: 8 point ROS negative except as noted above.     PHYSICAL EXAM      KPS:  80  There were no vitals taken for this visit.  Vitals reviewed in other encounter.     Wt Readings from Last 4 Encounters:   09/12/24 66 kg (145 lb 8 oz)   09/09/24 67.2 kg (148 lb 3.2 oz)   09/04/24 67.2 kg (148 lb 3.2 oz)   09/03/24 67.6 kg (149 lb)     General: NAD, pleasant, conversant  HEENT: OP non erythematous, sclera anicteric  Lungs: CTAB  Cardiovascular: RRR  GI: +bs, soft, NT/ND  Neuro: non-focal  Skin: very faint flat pink/light hyperpigmented macular rash to upper chest  Lymph: no LE edema  Access: L Mckinney site NT, dressing cdi. R PAC not accessed     Acute GVHD          8/30/2024    08:04 8/26/2024    14:57 8/19/2024    14:02   Acute GVHD   New evidence of Acute Ifiec-Ukztlo-Cfat Disease developed since last entry? No No No       LABS: I have assessed all abnormal lab values for their clinical significance and any values considered clinically significant have been addressed in the assessment and  plan.      Lab Results   Component Value Date    WBC 1.9 (L) 09/12/2024    ANEU 0.8 (L) 09/09/2024    HGB 11.8 (L) 09/12/2024    HCT 33.5 (L) 09/12/2024    PLT 69 (L) 09/12/2024     09/12/2024    POTASSIUM 4.1 09/12/2024    CHLORIDE 101 09/12/2024    CO2 24 09/12/2024     (H) 09/12/2024    BUN 17.0 09/12/2024    CR 0.68 09/12/2024    MAG 1.8 08/29/2024    INR 1.07 07/08/2024       ASSESSMENT AND PLAN      Zaheer Borges is a 64 year old male with long hx of PV and now AML, undergoing URD 7/8 PBSCT with the Optimize trial. Today is day +76    BMT/IEC PROTOCOL for AML  - Chemo protocol: OPTIMIZE trial; Bu/flu  - Donor and Recip B+   - Restaging per protocol; BMbx 7/26, results show MRD on marrow and flow  - last dose GCSF 7/12. Re-dose prn ANC </=500  - Given high risk nature of disease, per primary MD Mejias, start low dose (aza 20/m2 x 5 days/sulema 200 daily x 28) per VIALE-T dosing (anticipate 6 cycles)  9/9 other cells noted on cbc differential in setting of MRD and falling chimerism. Repeat BMBx completed 9/12, results pending. Review with Dr. Mejias 9/18.      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   - Transfusion parameters: hemoglobin <7, platelets <10. Plt down again (see chimerism, other cells above)     IMMUNOCOMPROMISED  - Relevant infectious history: tenofovir for hepatitis B core positivity.   - Prophylaxis plan: ACV, letermovir day +14, fabian through day +45 now complete. CT Chest clear. Levofloxacin resumed with neutrophils persistently <1 (~900)  Sulfa allergic so Pentamidine given today, 9/9 9/9 Itching, very faint hard to distinguish rash.  Will check viral panel (ddx gvhd, infectious, disease).      RISK OF GVHD  - Prophylaxis: PTcy +3/+4. Tacro/MMF started day +5.   8/29: given the presence of MRD in his last marrow, stopped tacrolimus at day 60     CARDIOVASCULAR  #Hypertension: amlodipine   -  Risk of cardiomyopathy:  Baseline EF 60-65%  - Risk of arrhythmia: Baseline EKG showed Sinus rhythm with sinus arrhythmia     GI/NUTRITION  - Ulcer prophylaxis: protonix  - CINV: Zofran TID; added zyprexa (8/26), compazine prn, ativan prn   - ursodiol through +60  - hx Constipation, now soft to loose stool.  Weight stable     RENAL/ELECTROLYTES/  - Electrolyte management: replace per sliding scale     MUSCULOSKELETAL/FRAILTY  - Baseline Frailty Score: 2  - Patient with substantial risk of sarcopenia  - Cancer Rehab as needed outpatient     SUPPORTIVE CARES  - Trazodone prn sleep  #suspect hand/foot syndrome 2/2 Busulfan - resolved without use of TCM or gabapentin     SOCIAL DETERMINANTS  - Caregiver: Pt's primary caregiver will be spouse with family/friends as a secondary or back-up to assist as needed.   - Financial/insurance concerns: no    SKIN   Itching. Very faint rash, nothing to biopsy. He can use topical triamcinolone cream, topical benadryl. He will call if any areas become red, or new areas are involved. Liver enzymes wnl (not likely causing itching). Will check viral panel (ddx gvhd, infectious, disease), pending      Plan  BMBx completed today.   Continue levaquin, prophys as differential pending.   RTC to see Dr. Mejias 9/18    The longitudinal plan of care for the diagnosis(es)/condition(s) as documented were addressed during this visit. Due to the added complexity in care, I will continue to support Zaheer in the subsequent management and with ongoing continuity of care.      30 minutes spent on the date of the encounter doing chart review, history and exam, lab review, documentation and coordniating care.    Stefanie Lopez NP on 9/9/2024 at 4:22 PM          Again, thank you for allowing me to participate in the care of your patient.        Sincerely,        BMT Advanced Practice Provider

## 2024-09-12 NOTE — LETTER
9/12/2024      Zaheer Borges  [address] [place] [id number]      Dear Colleague,    Thank you for referring your patient, Zaheer Borges, to the [facility]. Please see a copy of my visit note below.    BMT ONC Adult Bone Marrow Biopsy Procedure Note  September 12, 2024  BP (!) 139/91   Pulse 99   Temp 98.7  F (37.1  C)   Resp 18   Wt 66 kg (145 lb 8 oz)   SpO2 98%   BMI 23.38 kg/m       Learning needs assessment complete within 12 months? YES    DIAGNOSIS: AML    PROCEDURE: Unilateral Bone Marrow Biopsy and Unilateral Aspirate    [place] [facility] [place]    Patient s identification was positively verified by verbal identification and invasive procedure safety checklist was completed. Informed consent was obtained. Following the administration of Dilaudid 0.5 mg as pre-medication, patient was placed in the prone position and prepped and draped in a sterile manner. Approximately 10 cc of 1% Lidocaine was used over the right posterior iliac spine. Following this a 3 mm incision was made. Trephine bone marrow core(s) was (were) obtained from the [facility]. Bone marrow aspirates were obtained from the [facility]. Aspirates were sent for morphology, immunophenotyping, cytogenetics, and molecular diagnostics gene rearrangement. A total of approximately 20 ml of marrow was aspirated. Following this procedure a sterile dressing was applied to the bone marrow biopsy site(s). The patient was placed in the supine position to maintain pressure on the biopsy site. Post-procedure wound care instructions were given.     Complications: NO    Pre-procedural pain: 0 out of 10 on the numeric pain rating scale.     Post-procedural pain assessment: 0 out of 10 on the numeric pain rating scale.     Interventions: NO    Length of procedure:20 minutes or less      Procedure performed by: Karin May CNP      Again, thank you for allowing me to participate in the care of your patient.        Sincerely,        Karin  BOBO May CNP

## 2024-09-12 NOTE — NURSING NOTE
"Oncology Rooming Note    September 12, 2024 2:01 PM   Zaheer Borges is a 64 year old male who presents for:    Chief Complaint   Patient presents with    Bone Marrow Biopsy     BMBx hx of AML     Initial Vitals: BP (!) 139/91   Pulse 99   Temp 98.7  F (37.1  C)   Resp 18   Wt 66 kg (145 lb 8 oz)   SpO2 98%   BMI 23.38 kg/m   Estimated body mass index is 23.38 kg/m  as calculated from the following:    Height as of 6/21/24: 1.68 m (5' 6.14\").    Weight as of this encounter: 66 kg (145 lb 8 oz). Body surface area is 1.75 meters squared.  No Pain (0) Comment: Data Unavailable   No LMP for male patient.  Allergies reviewed: Yes  Medications reviewed: Yes    Medications: Medication refills not needed today.  Pharmacy name entered into [facility]:    [id number] [place], [place] - [address] [place] [address] [address] [facility] [address] [place] [address] [facility]ARMACY # [id number] - [place], [place] - [address]  [facility] - [place], [place] - [address]     Frailty Screening:   Is the patient here for a new oncology consult visit in cancer care? 2. No      Clinical concerns:       Sharon Chan RN              "

## 2024-09-13 NOTE — TELEPHONE ENCOUNTER
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  We had a long discussion regarding potential options and I recommended that he return to Dr. Cano (who I also spoke to) to begin treatment with hypomethylating agents and venetoclax in standard dosing.  I also recommend that he consider second opinions at the [facility] and [facility] but not to wait to begin therapy before visiting the sites.  I answered his questions as best I can, and he will plan to follow-up with Dr. Cano.        SRINIVAS WILKS MD  September 13, 2024

## 2024-09-20 NOTE — PROGRESS NOTES
[facility] Cancer Care Oral Chemotherapy Monitoring Program    Thank you for the opportunity to be a part in the care of this patient's oral chemotherapy. The oncology pharmacy will no longer be following this patient for oral chemotherapy. If there are any questions or the plan changes, feel free to contact us.        8/9/2024    10:12 AM 8/9/2024    10:48 AM 8/20/2024     1:00 PM 8/22/2024     4:00 PM 8/26/2024     3:00 PM 8/30/2024    10:00 AM 9/20/2024    10:00 AM   ORAL CHEMOTHERAPY   Assessment Type Left Voicemail New Teach Left Voicemail Initial Follow up Lab Monitoring;Chart Review Refill;Lab Monitoring;Chart Review Discontinuation   Diagnosis Code Acute Myeloid Leukemia (AML) Acute Myeloid Leukemia (AML) Acute Myeloid Leukemia (AML) Acute Myeloid Leukemia (AML) Acute Myeloid Leukemia (AML) Acute Myeloid Leukemia (AML) Acute Myeloid Leukemia (AML)   Providers Randell Mejias   [facility]   Drug Name Venclexta (venetoclax) Venclexta (venetoclax) Venclexta (venetoclax) Venclexta (venetoclax) Venclexta (venetoclax) Venclexta (venetoclax) Venclexta (venetoclax)   Dose  1,000 mg 100 mg 100 mg 100 mg 100 mg 100 mg   Current Schedule  Daily Daily Daily Daily Daily Daily   Cycle Details  Continuous Continuous Continuous Continuous Continuous Continuous   Planned next cycle start date  8/13/2024 8/13/2024 9/9/2024    Doses missed in last 2 weeks    0      Adherence Assessment    Adherent      Adverse Effects    Nausea      Nausea    Grade 1      Pharmacist Intervention(nausea)    Yes      Intervention(s)    Rx medication recommendation      Any new drug interactions?    No      Is the dose as ordered appropriate for the patient?    Yes          Baldev Bazzi, PharmD  Oral Chemotherapy Monitoring Program  [facility]  [phone]

## 2024-10-07 NOTE — PROGRESS NOTES
06/25/2024  PT HAS COVERAGE FOR TPA, LINE CARE AND HYDRATION.   IF LINE CARE THERAPY ONLY: NURSING IS NOT COVERED, AND ORG LEVEL MUST BE PO. AJ

## 2024-10-16 ENCOUNTER — PATIENT OUTREACH (OUTPATIENT)
Dept: ONCOLOGY | Facility: CLINIC | Age: 64
End: 2024-10-16
Payer: COMMERCIAL

## 2024-10-16 NOTE — PROGRESS NOTES
[facility]: Cancer Care                                                                                          Reviewed pt chart, noted he was in hospital at [facility] and  10/12/24 1218 after being extubated.  Enrollment status updated, writer removed from care team.  Sympathy card sent to family.    Lea Rebollar, TAMMYN, RN  RN Care Coordinator  [facility]

## 2024-10-18 LAB — INTERPRETATION: NORMAL

## 2024-10-23 LAB
ADDITIONAL COMMENTS: NORMAL
INTERPRETATION: NORMAL
ISCN: NORMAL
METHODS: NORMAL

## (undated) RX ORDER — LIDOCAINE HYDROCHLORIDE 10 MG/ML
INJECTION, SOLUTION EPIDURAL; INFILTRATION; INTRACAUDAL; PERINEURAL
Status: DISPENSED
Start: 2019-10-31

## (undated) RX ORDER — HEPARIN SODIUM,PORCINE 10 UNIT/ML
VIAL (ML) INTRAVENOUS
Status: DISPENSED
Start: 2024-01-01

## (undated) RX ORDER — LIDOCAINE HYDROCHLORIDE 10 MG/ML
INJECTION, SOLUTION EPIDURAL; INFILTRATION; INTRACAUDAL; PERINEURAL
Status: DISPENSED
Start: 2024-01-01

## (undated) RX ORDER — PENTAMIDINE ISETHIONATE 300 MG/300MG
INHALANT RESPIRATORY (INHALATION)
Status: DISPENSED
Start: 2024-01-01

## (undated) RX ORDER — FENTANYL CITRATE 50 UG/ML
INJECTION, SOLUTION INTRAMUSCULAR; INTRAVENOUS
Status: DISPENSED
Start: 2024-01-01

## (undated) RX ORDER — SODIUM CHLORIDE 9 MG/ML
INJECTION, SOLUTION INTRAVENOUS
Status: DISPENSED
Start: 2024-01-01

## (undated) RX ORDER — ALBUTEROL SULFATE 0.83 MG/ML
SOLUTION RESPIRATORY (INHALATION)
Status: DISPENSED
Start: 2024-01-01